# Patient Record
Sex: MALE | Race: WHITE | NOT HISPANIC OR LATINO | Employment: OTHER | URBAN - METROPOLITAN AREA
[De-identification: names, ages, dates, MRNs, and addresses within clinical notes are randomized per-mention and may not be internally consistent; named-entity substitution may affect disease eponyms.]

---

## 2017-01-19 ENCOUNTER — LAB VISIT (OUTPATIENT)
Dept: LAB | Facility: HOSPITAL | Age: 72
End: 2017-01-19
Attending: FAMILY MEDICINE
Payer: MEDICARE

## 2017-01-19 ENCOUNTER — OFFICE VISIT (OUTPATIENT)
Dept: FAMILY MEDICINE | Facility: CLINIC | Age: 72
End: 2017-01-19
Payer: MEDICARE

## 2017-01-19 VITALS
SYSTOLIC BLOOD PRESSURE: 160 MMHG | HEIGHT: 71 IN | WEIGHT: 183.06 LBS | OXYGEN SATURATION: 98 % | BODY MASS INDEX: 25.63 KG/M2 | TEMPERATURE: 98 F | HEART RATE: 62 BPM | DIASTOLIC BLOOD PRESSURE: 86 MMHG

## 2017-01-19 DIAGNOSIS — Z01.89 ENCOUNTER FOR OTHER SPECIFIED SPECIAL EXAMINATIONS: ICD-10-CM

## 2017-01-19 DIAGNOSIS — Z12.5 ENCOUNTER FOR SCREENING FOR MALIGNANT NEOPLASM OF PROSTATE: ICD-10-CM

## 2017-01-19 DIAGNOSIS — E78.5 HYPERLIPIDEMIA, UNSPECIFIED HYPERLIPIDEMIA TYPE: ICD-10-CM

## 2017-01-19 DIAGNOSIS — Z13.6 SCREENING FOR AAA (AORTIC ABDOMINAL ANEURYSM): ICD-10-CM

## 2017-01-19 DIAGNOSIS — N40.1 BENIGN NON-NODULAR PROSTATIC HYPERPLASIA WITH LOWER URINARY TRACT SYMPTOMS: ICD-10-CM

## 2017-01-19 DIAGNOSIS — I10 ESSENTIAL HYPERTENSION, BENIGN: ICD-10-CM

## 2017-01-19 DIAGNOSIS — Z23 NEED FOR PROPHYLACTIC VACCINATION AND INOCULATION AGAINST INFLUENZA: ICD-10-CM

## 2017-01-19 DIAGNOSIS — R79.9 ABNORMAL FINDING OF BLOOD CHEMISTRY: ICD-10-CM

## 2017-01-19 DIAGNOSIS — M19.90 OSTEOARTHRITIS, UNSPECIFIED OSTEOARTHRITIS TYPE, UNSPECIFIED SITE: ICD-10-CM

## 2017-01-19 DIAGNOSIS — N40.1 BENIGN NON-NODULAR PROSTATIC HYPERPLASIA WITH LOWER URINARY TRACT SYMPTOMS: Primary | ICD-10-CM

## 2017-01-19 LAB
ALBUMIN SERPL BCP-MCNC: 3.9 G/DL
ALP SERPL-CCNC: 95 U/L
ALT SERPL W/O P-5'-P-CCNC: 17 U/L
ANION GAP SERPL CALC-SCNC: 6 MMOL/L
AST SERPL-CCNC: 14 U/L
BASOPHILS # BLD AUTO: 0.03 K/UL
BASOPHILS NFR BLD: 0.3 %
BILIRUB SERPL-MCNC: 0.5 MG/DL
BUN SERPL-MCNC: 19 MG/DL
CALCIUM SERPL-MCNC: 10.2 MG/DL
CHLORIDE SERPL-SCNC: 102 MMOL/L
CHOLEST/HDLC SERPL: 3.8 {RATIO}
CO2 SERPL-SCNC: 29 MMOL/L
COMPLEXED PSA SERPL-MCNC: 0.95 NG/ML
CREAT SERPL-MCNC: 1.1 MG/DL
DIFFERENTIAL METHOD: ABNORMAL
EOSINOPHIL # BLD AUTO: 0.1 K/UL
EOSINOPHIL NFR BLD: 1.1 %
ERYTHROCYTE [DISTWIDTH] IN BLOOD BY AUTOMATED COUNT: 13.9 %
EST. GFR  (AFRICAN AMERICAN): >60 ML/MIN/1.73 M^2
EST. GFR  (NON AFRICAN AMERICAN): >60 ML/MIN/1.73 M^2
GLUCOSE SERPL-MCNC: 109 MG/DL
HCT VFR BLD AUTO: 47 %
HDL/CHOLESTEROL RATIO: 26.2 %
HDLC SERPL-MCNC: 149 MG/DL
HDLC SERPL-MCNC: 39 MG/DL
HGB BLD-MCNC: 16.1 G/DL
LDLC SERPL CALC-MCNC: 91.8 MG/DL
LYMPHOCYTES # BLD AUTO: 1.7 K/UL
LYMPHOCYTES NFR BLD: 19.5 %
MCH RBC QN AUTO: 31.2 PG
MCHC RBC AUTO-ENTMCNC: 34.3 %
MCV RBC AUTO: 91 FL
MONOCYTES # BLD AUTO: 0.5 K/UL
MONOCYTES NFR BLD: 5.9 %
NEUTROPHILS # BLD AUTO: 6.4 K/UL
NEUTROPHILS NFR BLD: 72.6 %
NONHDLC SERPL-MCNC: 110 MG/DL
PLATELET # BLD AUTO: 252 K/UL
PMV BLD AUTO: 9.7 FL
POTASSIUM SERPL-SCNC: 5.3 MMOL/L
PROT SERPL-MCNC: 7.3 G/DL
RBC # BLD AUTO: 5.16 M/UL
SODIUM SERPL-SCNC: 137 MMOL/L
TRIGL SERPL-MCNC: 91 MG/DL
WBC # BLD AUTO: 8.79 K/UL

## 2017-01-19 PROCEDURE — 80061 LIPID PANEL: CPT

## 2017-01-19 PROCEDURE — 99999 PR PBB SHADOW E&M-EST. PATIENT-LVL III: CPT | Mod: PBBFAC,,, | Performed by: FAMILY MEDICINE

## 2017-01-19 PROCEDURE — 83036 HEMOGLOBIN GLYCOSYLATED A1C: CPT

## 2017-01-19 PROCEDURE — 99499 UNLISTED E&M SERVICE: CPT | Mod: S$GLB,,, | Performed by: FAMILY MEDICINE

## 2017-01-19 PROCEDURE — 80074 ACUTE HEPATITIS PANEL: CPT

## 2017-01-19 PROCEDURE — 1159F MED LIST DOCD IN RCRD: CPT | Mod: S$GLB,,, | Performed by: FAMILY MEDICINE

## 2017-01-19 PROCEDURE — 1126F AMNT PAIN NOTED NONE PRSNT: CPT | Mod: S$GLB,,, | Performed by: FAMILY MEDICINE

## 2017-01-19 PROCEDURE — 1160F RVW MEDS BY RX/DR IN RCRD: CPT | Mod: S$GLB,,, | Performed by: FAMILY MEDICINE

## 2017-01-19 PROCEDURE — 80053 COMPREHEN METABOLIC PANEL: CPT

## 2017-01-19 PROCEDURE — 3079F DIAST BP 80-89 MM HG: CPT | Mod: S$GLB,,, | Performed by: FAMILY MEDICINE

## 2017-01-19 PROCEDURE — 90662 IIV NO PRSV INCREASED AG IM: CPT | Mod: S$GLB,,, | Performed by: FAMILY MEDICINE

## 2017-01-19 PROCEDURE — 99214 OFFICE O/P EST MOD 30 MIN: CPT | Mod: 25,S$GLB,, | Performed by: FAMILY MEDICINE

## 2017-01-19 PROCEDURE — 1157F ADVNC CARE PLAN IN RCRD: CPT | Mod: S$GLB,,, | Performed by: FAMILY MEDICINE

## 2017-01-19 PROCEDURE — 85025 COMPLETE CBC W/AUTO DIFF WBC: CPT

## 2017-01-19 PROCEDURE — 36415 COLL VENOUS BLD VENIPUNCTURE: CPT | Mod: PO

## 2017-01-19 PROCEDURE — 84153 ASSAY OF PSA TOTAL: CPT

## 2017-01-19 PROCEDURE — 3077F SYST BP >= 140 MM HG: CPT | Mod: S$GLB,,, | Performed by: FAMILY MEDICINE

## 2017-01-19 PROCEDURE — G0008 ADMIN INFLUENZA VIRUS VAC: HCPCS | Mod: S$GLB,,, | Performed by: FAMILY MEDICINE

## 2017-01-19 RX ORDER — TAMSULOSIN HYDROCHLORIDE 0.4 MG/1
0.4 CAPSULE ORAL DAILY
COMMUNITY
Start: 2016-12-29 | End: 2017-01-19

## 2017-01-19 RX ORDER — NAPROXEN 500 MG/1
500 TABLET ORAL 2 TIMES DAILY PRN
Qty: 60 TABLET | Refills: 6 | Status: SHIPPED | OUTPATIENT
Start: 2017-01-19 | End: 2018-01-10 | Stop reason: SDUPTHER

## 2017-01-19 RX ORDER — HYDROCODONE BITARTRATE AND ACETAMINOPHEN 10; 325 MG/1; MG/1
TABLET ORAL
COMMUNITY
Start: 2016-12-31 | End: 2017-01-19

## 2017-01-19 RX ORDER — ATORVASTATIN CALCIUM 80 MG/1
80 TABLET, FILM COATED ORAL DAILY
Qty: 90 TABLET | Refills: 3 | Status: SHIPPED | OUTPATIENT
Start: 2017-01-19 | End: 2018-01-10 | Stop reason: SDUPTHER

## 2017-01-19 RX ORDER — HYDROCHLOROTHIAZIDE 25 MG/1
25 TABLET ORAL DAILY
COMMUNITY
End: 2018-01-10 | Stop reason: SDUPTHER

## 2017-01-19 RX ORDER — FINASTERIDE 5 MG/1
5 TABLET, FILM COATED ORAL DAILY
Qty: 90 TABLET | Refills: 3 | Status: SHIPPED | OUTPATIENT
Start: 2017-01-19 | End: 2018-01-10 | Stop reason: SDUPTHER

## 2017-01-19 RX ORDER — TAMSULOSIN HYDROCHLORIDE 0.4 MG/1
0.4 CAPSULE ORAL DAILY
Qty: 90 CAPSULE | Refills: 3 | Status: SHIPPED | OUTPATIENT
Start: 2017-01-19 | End: 2017-02-18

## 2017-01-19 NOTE — PROGRESS NOTES
[facility]  Progress Note    SUBJECTIVE:     Chief Complaint   Patient presents with    [facility]   Zac Plunkett  is a 71 y.o. male here to establish care and for follow up of chronic conditions. Patient has no other new complaints/problems at this time.he tries to diet/exercise better. Admits taking his meds compliantly. Doesn't check bp at home too often.      Review of patient's allergies indicates:   Allergen Reactions    Metronidazole      Other reaction(s): Rash  Other reaction(s): Rash    Propoxyphene-acetaminophen      Other reaction(s): Rash       Past Medical History   Diagnosis Date    Gross hematuria     Hyperlipidemia     Hypertension     Kidney stone     Lumbar spondylosis     Lumbar spondylosis      Past Surgical History   Procedure Laterality Date    Appendectomy      Laminectomy l4-l5       Family History   Problem Relation Age of Onset    Diabetes Mother     Heart disease Father     Melanoma Neg Hx     Psoriasis Neg Hx     Lupus Neg Hx     Eczema Neg Hx     Acne Neg Hx     Liver disease Neg Hx     Liver cancer Neg Hx     Cirrhosis Neg Hx     Colon polyps Neg Hx     Colon cancer Neg Hx      Social History   Substance Use Topics    Smoking status: Current Every Day Smoker     Types: Cigars    Smokeless tobacco: Never Used      Comment: smokes cigars     Alcohol use No        Review of Systems   Constitutional: Negative for chills, fever and malaise/fatigue.   HENT: Negative.    Respiratory: Negative.  Negative for cough and shortness of breath.    Cardiovascular: Negative.  Negative for chest pain.   Gastrointestinal: Negative.  Negative for abdominal pain, nausea and vomiting.   Genitourinary: Negative.    Neurological: Negative for weakness and headaches.   All other systems reviewed and are negative.    OBJECTIVE:     Vitals:    01/19/17 0932   BP: (!) 160/86   Pulse: 62   Temp: 97.6 °F (36.4 °C)     Body mass index is 25.54  kg/(m^2).    Physical Exam   Constitutional: He is oriented to person, place, and time and well-developed, well-nourished, and in no distress. No distress.   HENT:   Head: Normocephalic and atraumatic.   Eyes: Conjunctivae and EOM are normal.   Cardiovascular: Normal rate, regular rhythm and normal heart sounds.  Exam reveals no gallop and no friction rub.    No murmur heard.  Pulmonary/Chest: Effort normal and breath sounds normal. No respiratory distress. He has no wheezes. He has no rales.   Abdominal: Soft. Bowel sounds are normal. He exhibits no distension. There is no tenderness.   Neurological: He is alert and oriented to person, place, and time.   Skin: Skin is warm. He is not diaphoretic.   + some skin changes consistent with actinic keratosis       Old records were reviewed. Labs and/or images were independently reviewed.    ASSESSMENT     1. Benign non-nodular prostatic hyperplasia with lower urinary tract symptoms    2. Hyperlipidemia, unspecified hyperlipidemia type    3. Need for prophylactic vaccination and inoculation against influenza    4. Osteoarthritis, unspecified osteoarthritis type, unspecified site    5. Essential hypertension, benign    6. Abnormal finding of blood chemistry     7. Encounter for screening for malignant neoplasm of prostate     8. Screening for AAA (aortic abdominal aneurysm)    9. Encounter for other specified special examinations         PLAN:     Need for prophylactic vaccination and inoculation against influenza  -     Influenza - High Dose (65+) (PF) (IM)    Benign non-nodular prostatic hyperplasia with lower urinary tract symptoms  -     tamsulosin (FLOMAX) 0.4 mg Cp24; Take 1 capsule (0.4 mg total) by mouth once daily.  Dispense: 90 capsule; Refill: 3  -     finasteride (PROSCAR) 5 mg tablet; Take 1 tablet (5 mg total) by mouth once daily.  Dispense: 90 tablet; Refill: 3  -     PSA, Screening; Future; Expected date: 1/19/17  -     Stable. Continue current  regimen.    Hyperlipidemia, unspecified hyperlipidemia type  -     atorvastatin (LIPITOR) 80 MG tablet; Take 1 tablet (80 mg total) by mouth once daily.  Dispense: 90 tablet; Refill: 3  -     Stable. Continue current regimen.    Osteoarthritis, unspecified osteoarthritis type, unspecified site  -     naproxen (NAPROSYN) 500 MG tablet; Take 1 tablet (500 mg total) by mouth 2 (two) times daily as needed (pain).  Dispense: 60 tablet; Refill: 6  -     Stable. Continue current regimen.    Essential hypertension, benign  -     CBC auto differential; Future  -     Comprehensive metabolic panel; Future  -     Hemoglobin A1c; Future  -     Lipid panel; Future  -     Educated patient to take medications as prescribed, and to record bp logs daily to bring along to next visit. Instructed patient to decrease salt intake. Also told patient to call if any new signs or symptoms develop.    Encounter for screening for malignant neoplasm of prostate   -     PSA, Screening; Future; Expected date: 1/19/17    Screening for AAA (aortic abdominal aneurysm)  -     US Abdominal Aorta; Future; Expected date: 1/19/17      RTC PRCARTER Waters MD  01/19/2017 9:51 AM

## 2017-01-20 LAB
ESTIMATED AVG GLUCOSE: 131 MG/DL
HAV IGM SERPL QL IA: NEGATIVE
HBA1C MFR BLD HPLC: 6.2 %
HBV CORE IGM SERPL QL IA: NEGATIVE
HBV SURFACE AG SERPL QL IA: NEGATIVE
HCV AB SERPL QL IA: NEGATIVE

## 2017-02-06 ENCOUNTER — TELEPHONE (OUTPATIENT)
Dept: FAMILY MEDICINE | Facility: CLINIC | Age: 72
End: 2017-02-06

## 2017-02-06 ENCOUNTER — HOSPITAL ENCOUNTER (OUTPATIENT)
Dept: RADIOLOGY | Facility: HOSPITAL | Age: 72
Discharge: HOME | End: 2017-02-06
Attending: FAMILY MEDICINE
Payer: MEDICARE

## 2017-02-06 DIAGNOSIS — Z13.6 SCREENING FOR AAA (AORTIC ABDOMINAL ANEURYSM): ICD-10-CM

## 2017-02-06 PROCEDURE — 76775 US EXAM ABDO BACK WALL LIM: CPT | Mod: TC

## 2017-02-06 PROCEDURE — 76775 US EXAM ABDO BACK WALL LIM: CPT | Mod: 26,,, | Performed by: RADIOLOGY

## 2017-08-07 ENCOUNTER — OFFICE VISIT (OUTPATIENT)
Dept: FAMILY MEDICINE | Facility: CLINIC | Age: 72
End: 2017-08-07
Payer: MEDICARE

## 2017-08-07 VITALS
RESPIRATION RATE: 19 BRPM | OXYGEN SATURATION: 98 % | DIASTOLIC BLOOD PRESSURE: 80 MMHG | HEIGHT: 71 IN | HEART RATE: 58 BPM | WEIGHT: 170.31 LBS | SYSTOLIC BLOOD PRESSURE: 128 MMHG | BODY MASS INDEX: 23.84 KG/M2

## 2017-08-07 DIAGNOSIS — R09.89 PROMINENT AORTA: ICD-10-CM

## 2017-08-07 DIAGNOSIS — E78.5 HYPERLIPIDEMIA, UNSPECIFIED HYPERLIPIDEMIA TYPE: ICD-10-CM

## 2017-08-07 DIAGNOSIS — I10 HYPERTENSION, BENIGN: ICD-10-CM

## 2017-08-07 DIAGNOSIS — N40.0 ENLARGED PROSTATE: ICD-10-CM

## 2017-08-07 DIAGNOSIS — Z00.00 ENCOUNTER FOR PREVENTIVE HEALTH EXAMINATION: Primary | ICD-10-CM

## 2017-08-07 PROCEDURE — 99999 PR PBB SHADOW E&M-EST. PATIENT-LVL IV: CPT | Mod: PBBFAC,,, | Performed by: NURSE PRACTITIONER

## 2017-08-07 PROCEDURE — 99499 UNLISTED E&M SERVICE: CPT | Mod: S$GLB,,, | Performed by: NURSE PRACTITIONER

## 2017-08-07 PROCEDURE — G0439 PPPS, SUBSEQ VISIT: HCPCS | Mod: S$GLB,,, | Performed by: NURSE PRACTITIONER

## 2017-08-07 NOTE — PATIENT INSTRUCTIONS
Counseling and Referral of Other Preventative  (Italic type indicates deductible and co-insurance are waived)    Patient Name: Zac Plunkett  Today's Date: 8/7/2017      SERVICE LIMITATIONS RECOMMENDATION    Vaccines    · Pneumococcal (once after 65)    · Influenza (annually)    · Hepatitis B (if medium/high risk)    · Prevnar 13      Hepatitis B medium/high risk factors:       - End-stage renal disease       - Hemophiliacs who received Factor VII or         IX concentrates       - Clients of institutions for the mentally             retarded       - Persons who live in the same house as          a HepB carrier       - Homosexual men       - Illicit injectable drug abusers     Pneumococcal: Done, no repeat necessary     Influenza: Recommended to patient     Hepatitis B: Done, no repeat necessary     Prevnar 13: Done, no repeat necessary    Prostate cancer screening (annually to age 75)     Prostate specific antigen (PSA) Shared decision making with Provider. Sometimes a co-pay may be required if the patient decides to have this test. The USPSTF no longer recommends prostate cancer screening routinely in medicine: every 1 year    Colorectal cancer screening (to age 75)    · Fecal occult blood test (annual)  · Flexible sigmoidoscopy (5y)  · Screening colonoscopy (10y)  · Barium enema   Last done 4/2015, recommend to repeat every 10  years    Diabetes self-management training (no USPSTF recommendations)  Requires referral by treating physician for patient with diabetes or renal disease. 10 hours of initial DSMT sessions of no less than 30 minutes each in a continuous 12-month period. 2 hours of follow-up DSMT in subsequent years.  N/A    Glaucoma screening (no USPSTF recommendation)  Diabetes mellitus, family history   , age 50 or over    American, age 65 or over  Recommend follow up with eye care professional regularly    Medical nutrition therapy for diabetes or renal disease (no recommended  schedule)  Requires referral by treating physician for patient with diabetes or renal disease or kidney transplant within the past 3 years.  Can be provided in same year as diabetes self-management training (DSMT), and CMS recommends medical nutrition therapy take place after DSMT. Up to 3 hours for initial year and 2 hours in subsequent years.  N/A    Cardiovascular screening blood tests (every 5 years)  · Fasting lipid panel  Order as a panel if possible  Done this year, repeat every year    Diabetes screening tests (at least every 3 years, Medicare covers annually or at 6-month intervals for prediabetic patients)  · Fasting blood sugar (FBS) or glucose tolerance test (GTT)  Patient must be diagnosed with one of the following:       - Hypertension       - Dyslipidemia       - Obesity (BMI 30kg/m2)       - Previous elevated impaired FBS or GTT       ... or any two of the following:       - Overweight (BMI 25 but <30)       - Family history of diabetes       - Age 65 or older       - History of gestational diabetes or birth of baby weighing more than 9 pounds  Ha1c completed this year    Abdominal aortic aneurysm screening (once)  · Sonogram   Limited to patients who meet one of the following criteria:       - Men who are 65-75 years old and have smoked more than 100 cigarette in their lifetime       - Anyone with a family history of abdominal aortic aneurysm       - Anyone recommended for screening by the USPSTF  Done, no repeat necessary    HIV screening (annually for increased risk patients)  · HIV-1 and HIV-2 by EIA, or LUISITO, rapid antibody test or oral mucosa transudate  Patients must be at increased risk for HIV infection per USPSTF guidelines or pregnant. Tests covered annually for patient at increased risk or as requested by the patient. Pregnant patients may receive up to 3 tests during pregnancy.  no clinical risk factors      Smoking cessation counseling (up to 8 sessions per year)  Patients must be  asymptomatic of tobacco-related conditions to receive as a preventative service.  discussed with patient, counseled     Subsequent annual wellness visit  At least 12 months since last AWV  Return in one year     The following information is provided to all patients.  This information is to help you find resources for any of the problems found today that may be affecting your health:                Living healthy guide: www.[facility].[place].[facility]      Understanding Diabetes: www.diabetes.org      Eating healthy: www.cdc.gov/healthyweight      CDC home safety checklist: www.cdc.gov/steadi/patient.html      Agency on Aging: www.goea.[place].[facility]      Alcoholics anonymous (AA): www.aa.org      Physical Activity: www.mariann.nih.gov/zf8pbgf      Tobacco use: www.quitwithusla.org

## 2017-08-07 NOTE — PROGRESS NOTES
"Zac Plunkett presented for a  Medicare AWV and comprehensive Health Risk Assessment today. The following components were reviewed and updated:    · Medical history  · Family History  · Social history  · Allergies and Current Medications  · Health Risk Assessment  · Health Maintenance  · Care Team     ** See Completed Assessments for Annual Wellness Visit within the encounter summary.**       The following assessments were completed:  · Living Situation  · CAGE  · Depression Screening  · Timed Get Up and Go  · Whisper Test  · Cognitive Function Screening  · Nutrition Screening  · ADL Screening  · PAQ Screening    Vitals:    08/07/17 0909   BP: 128/80   BP Location: Left arm   Patient Position: Sitting   BP Method: Manual   Pulse: (!) 58   Resp: 19   SpO2: 98%   Weight: 77.3 kg (170 lb 4.9 oz)   Height: 5' 11" (1.803 m)     Body mass index is 23.75 kg/m².  Physical Exam   Constitutional: He is oriented to person, place, and time.   Cardiovascular: Regular rhythm and normal heart sounds.  Bradycardia present.    Pulses:       Radial pulses are 2+ on the right side, and 2+ on the left side.   Pulmonary/Chest: Effort normal and breath sounds normal. He has no decreased breath sounds.   Musculoskeletal: Normal range of motion.   Neurological: He is alert and oriented to person, place, and time.   Skin: Skin is warm.   Vitals reviewed.        Diagnoses and health risks identified today and associated recommendations/orders:    1. Encounter for preventive health examination  Education provided about preventive health examinations and procedures; addressed and discussed patient's health concerns. Additionally, reviewed medical record for risk factors and documented the results during this encounter.    2. Prominent aorta  Stable, patient asymptomatic.  Encouraged continued compliance with directives, diet, and lifestyle modifications as recommended by his cardiologist: Jabari Mendez.     3. Hypertension, benign  At goal. " The current medical regimen is effective;  continue present plan and medications.    4. Hyperlipidemia, unspecified hyperlipidemia type  The current medical regimen is effective;  continue present plan and medications.    5. Enlarged prostate  Symptoms controlled. Education provided about enlarged prostate.  He's followed by [facility] dept. Continue as advised.     Reviewed health maintenance with patient, educated about recommended examinations, procedures (labs & images), and immunizations.       Return in about 1 year (around 8/7/2018) for Health Risk Assessment .    Barrie Hernandez Jr, NP

## 2017-11-11 ENCOUNTER — OFFICE VISIT (OUTPATIENT)
Dept: FAMILY MEDICINE | Facility: CLINIC | Age: 72
End: 2017-11-11
Payer: MEDICARE

## 2017-11-11 VITALS
SYSTOLIC BLOOD PRESSURE: 120 MMHG | TEMPERATURE: 98 F | BODY MASS INDEX: 24.39 KG/M2 | WEIGHT: 174.19 LBS | HEART RATE: 62 BPM | OXYGEN SATURATION: 98 % | DIASTOLIC BLOOD PRESSURE: 70 MMHG | HEIGHT: 71 IN | RESPIRATION RATE: 17 BRPM
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
## 2017-11-11 DIAGNOSIS — T63.301A SPIDER BITE WOUND, ACCIDENTAL OR UNINTENTIONAL, INITIAL ENCOUNTER: ICD-10-CM

## 2017-11-11 DIAGNOSIS — Z23 NEED FOR DIPHTHERIA-TETANUS-PERTUSSIS (TDAP) VACCINE, ADULT/ADOLESCENT: ICD-10-CM

## 2017-11-11 PROCEDURE — 90471 IMMUNIZATION ADMIN: CPT | Mod: AT,S$GLB,, | Performed by: INTERNAL MEDICINE

## 2017-11-11 PROCEDURE — 90715 TDAP VACCINE 7 YRS/> IM: CPT | Mod: AT,S$GLB,, | Performed by: INTERNAL MEDICINE

## 2017-11-11 PROCEDURE — 99213 OFFICE O/P EST LOW 20 MIN: CPT | Mod: 25,S$GLB,, | Performed by: NURSE PRACTITIONER

## 2017-11-11 PROCEDURE — 99999 PR PBB SHADOW E&M-EST. PATIENT-LVL IV: CPT | Mod: PBBFAC,,, | Performed by: NURSE PRACTITIONER

## 2017-11-11 RX ORDER — TRIAMCINOLONE ACETONIDE 1 MG/G
OINTMENT TOPICAL 2 TIMES DAILY
Qty: 80 G | Refills: 0 | Status: SHIPPED | OUTPATIENT
Start: 2017-11-11 | End: 2018-01-10

## 2017-11-11 NOTE — PATIENT INSTRUCTIONS
Spider Bite, Local Reaction    The venom from a spider bite can cause a local skin reaction. This often causes local redness, itching, and swelling. This reaction will fade over a few hours to a few days. A spider bite can become infected, so watch for the signs listed below. Sometimes it is hard to tell the difference between a local reaction to the insect bite or sting and an early infection. Because of this, your healthcare provider may start you on antibiotics.  Home care  The following guidelines will help you care for your wound at home:  · Avoid anything that heats up your skin if itching is a problem. This includes hot showers or baths or direct sunlight. This will make the itching worse.  · During the first 24 hours, you may put an ice pack on the injury. Use it for no more than 20 minutes at a time every 1 to 2 hours. This will reduce pain and swelling. You can make an ice pack by putting ice cubes in a zip-top plastic bag and wrapping it in a thin towel. You may also use an over-the-counter spray or cream containing benzocaine to help relieve pain. Over-the-counter skin creams containing diphenhydramine or hydrocortisone may help with itching. Remember to review the medicine instructions for any allergies.  · If the wound becomes red, wash the area with soap and water every day.  Put an antibiotic cream or ointment on the injury 3 times a day.  · If your doctor has prescribed oral antibiotics, be sure to take them as directed until they are all finished.  Follow-up care  Follow up with your healthcare provider, or as advised.  When to seek medical advice  Call your healthcare provider right away if any of these occur:  · Spreading areas of itching, redness, or swelling  · Pain or swelling that gets worse  · Fever of 100.4ºF (38ºC) or higher, or as directed by your healthcare provider  · Colored fluid draining from the wound  · You get a skin ulcer  · A red streak in the skin leading away from the  wound  · You still have symptoms after 3 days  · Generalized rash, fever, or joint pain starting 1 to 2 weeks after treatment  Call 911  Call 911 if any of the following occur:  · New or worse swelling in the face, eyelids, lips, mouth, throat, or tongue  · Hard time swallowing or breathing  · Dizziness, weakness, or fainting  Date Last Reviewed: 10/1/2016  © 1242-0325 [employer]. [address] [place] [address], [place] [address]. All rights reserved. This information is not intended as a substitute for professional medical care. Always follow your healthcare professional's instructions.

## 2017-11-11 NOTE — PROGRESS NOTES
Subjective:       Patient ID: Zac Plunkett is a 72 y.o. male.    Chief Complaint: Insect Bite (possible spider right arm)    Insect Bite   This is a new problem. The current episode started in the past 7 days. The problem occurs constantly. The problem has been unchanged. Pertinent negatives include no arthralgias, chest pain, chills, fatigue, headaches, joint swelling, myalgias, nausea, numbness, rash, sore throat or weakness. Nothing aggravates the symptoms. He has tried nothing for the symptoms.     Review of Systems   Constitutional: Negative for chills and fatigue.   HENT: Negative for sore throat.    Respiratory: Negative for chest tightness and shortness of breath.    Cardiovascular: Negative for chest pain.   Gastrointestinal: Negative for nausea.   Musculoskeletal: Negative for arthralgias, joint swelling and myalgias.   Skin: Negative for rash.   Neurological: Negative for weakness, numbness and headaches.       Objective:      Physical Exam   Constitutional: He is oriented to person, place, and time. Vital signs are normal. He appears well-developed and well-nourished.   Cardiovascular: Normal rate, regular rhythm and normal heart sounds.    Pulmonary/Chest: Effort normal and breath sounds normal.   Abdominal: Soft. Bowel sounds are normal.   Neurological: He is alert and oriented to person, place, and time.   Skin: Skin is warm, dry and intact. Capillary refill takes less than 2 seconds. Lesion noted.        Psychiatric: He has a normal mood and affect.       Assessment:       1. Insect bite, initial encounter    2. Spider bite wound, accidental or unintentional, initial encounter    3. Need for diphtheria-tetanus-pertussis (Tdap) vaccine, adult/adolescent        Plan:       Zac was seen today for insect bite.    Diagnoses and all orders for this visit:    Insect bite, initial encounter  -     triamcinolone acetonide 0.1% (KENALOG) 0.1 % ointment; Apply topically 2 (two) times daily.  -     (In  Office Administered) Tdap Vaccine    Spider bite wound, accidental or unintentional, initial encounter  -     (In Office Administered) Tdap Vaccine    Need for diphtheria-tetanus-pertussis (Tdap) vaccine, adult/adolescent  -     (In Office Administered) Tdap Vaccine      Home care  The following guidelines will help you care for your wound at home:  · Avoid anything that heats up your skin if itching is a problem. This includes hot showers or baths or direct sunlight. This will make the itching worse.  · During the first 24 hours, you may put an ice pack on the injury. Use it for no more than 20 minutes at a time every 1 to 2 hours. This will reduce pain and swelling. You can make an ice pack by putting ice cubes in a zip-top plastic bag and wrapping it in a thin towel. You may also use an over-the-counter spray or cream containing benzocaine to help relieve pain. Over-the-counter skin creams containing diphenhydramine or hydrocortisone may help with itching. Remember to review the medicine instructions for any allergies.  · If the wound becomes red, wash the area with soap and water every day.  Put an antibiotic cream or ointment on the injury 3 times a day.  · If your doctor has prescribed oral antibiotics, be sure to take them as directed until they are all finished.  Follow-up care  Follow up with your healthcare provider, or as advised.  When to seek medical advice  Call your healthcare provider right away if any of these occur:  · Spreading areas of itching, redness, or swelling  · Pain or swelling that gets worse  · Fever of 100.4ºF (38ºC) or higher, or as directed by your healthcare provider  · Colored fluid draining from the wound  · You get a skin ulcer  · A red streak in the skin leading away from the wound  · You still have symptoms after 3 days  · Generalized rash, fever, or joint pain starting 1 to 2 weeks after treatment  Call 911  Call 911 if any of the following occur:  · New or worse swelling in  the face, eyelids, lips, mouth, throat, or tongue  · Hard time swallowing or breathing  · Dizziness, weakness, or fainting  Date Last Reviewed: 10/1/2016  © 5658-9769 [employer]. [address] [place] [address], [place] [address]. All rights reserved. This information is not intended as a substitute for professional medical care. Always follow your healthcare professional's instructions.

## 2017-11-22 ENCOUNTER — OFFICE VISIT (OUTPATIENT)
Dept: FAMILY MEDICINE | Facility: CLINIC | Age: 72
End: 2017-11-22
Payer: MEDICARE

## 2017-11-22 VITALS
HEIGHT: 71 IN | HEART RATE: 61 BPM | DIASTOLIC BLOOD PRESSURE: 72 MMHG | WEIGHT: 175.25 LBS | TEMPERATURE: 99 F | BODY MASS INDEX: 24.53 KG/M2 | SYSTOLIC BLOOD PRESSURE: 124 MMHG

## 2017-11-22 DIAGNOSIS — R10.11 RUQ ABDOMINAL PAIN: ICD-10-CM
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
## 2017-11-22 PROCEDURE — 99999 PR PBB SHADOW E&M-EST. PATIENT-LVL III: CPT | Mod: PBBFAC,,, | Performed by: NURSE PRACTITIONER

## 2017-11-22 PROCEDURE — 99214 OFFICE O/P EST MOD 30 MIN: CPT | Mod: S$GLB,,, | Performed by: NURSE PRACTITIONER

## 2017-11-22 RX ORDER — TRAMADOL HYDROCHLORIDE 50 MG/1
50 TABLET ORAL EVERY 6 HOURS PRN
Qty: 30 TABLET | Refills: 0 | Status: SHIPPED | OUTPATIENT
Start: 2017-11-22 | End: 2017-12-02
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
## 2017-11-29 ENCOUNTER — HOSPITAL ENCOUNTER (OUTPATIENT)
Dept: RADIOLOGY | Facility: HOSPITAL | Age: 72
Discharge: HOME | End: 2017-11-29
Attending: NURSE PRACTITIONER
Payer: MEDICARE

## 2017-11-29 ENCOUNTER — OFFICE VISIT (OUTPATIENT)
Dept: FAMILY MEDICINE | Facility: CLINIC | Age: 72
End: 2017-11-29
Payer: MEDICARE

## 2017-11-29 VITALS
BODY MASS INDEX: 24.88 KG/M2 | HEART RATE: 64 BPM | TEMPERATURE: 99 F | SYSTOLIC BLOOD PRESSURE: 118 MMHG | DIASTOLIC BLOOD PRESSURE: 68 MMHG | WEIGHT: 177.69 LBS | HEIGHT: 71 IN | OXYGEN SATURATION: 97 %
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
## 2017-11-29 DIAGNOSIS — S20.211D CONTUSION OF RIB ON RIGHT SIDE, SUBSEQUENT ENCOUNTER: ICD-10-CM

## 2017-11-29 DIAGNOSIS — R07.81 RIB PAIN ON RIGHT SIDE: ICD-10-CM
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
## 2017-11-29 PROCEDURE — 71100 X-RAY EXAM RIBS UNI 2 VIEWS: CPT | Mod: TC,PO

## 2017-11-29 PROCEDURE — 99214 OFFICE O/P EST MOD 30 MIN: CPT | Mod: S$GLB,,, | Performed by: NURSE PRACTITIONER

## 2017-11-29 PROCEDURE — 99999 PR PBB SHADOW E&M-EST. PATIENT-LVL IV: CPT | Mod: PBBFAC,,, | Performed by: NURSE PRACTITIONER

## 2017-11-29 PROCEDURE — 71100 X-RAY EXAM RIBS UNI 2 VIEWS: CPT | Mod: 26,,, | Performed by: RADIOLOGY
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
## 2017-11-29 NOTE — PATIENT INSTRUCTIONS
Rib Contusion     A rib contusion is a bruise to one or more rib bones. It may cause pain, tenderness, swelling and a purplish discoloration. There may be a sharp pain while breathing.  You will be assessed for other injuries. You will likely be given pain medicine. Rib contusions heal on their own, without further treatment. However, pain may take weeks to months to go away.   Note that a small crack (fracture) in the rib may cause the same symptoms as a rib contusion. The small crack may not be seen on a chest X-ray. However, the conditions are managed in the same way.  Home care  · Rest. Avoid heavy lifting, strenuous exertion, or any activity that causes pain.  · Ice the area to reduce pain and swelling. Put ice cubes in a plastic bag or use a cold pack. (Wrap the cold source in a thin towel. Do not place it directly on your skin.) Ice the injured area for 20 minutes every 1 to 2 hours the first day. Continue with ice packs 3 to 4 times a day for the next 2 days, then as needed for the relief of pain and swelling.  · Take any prescribed pain medicine as directed by your healthcare provider. If none was prescribed, take acetaminophen, ibuprofen, or naproxen to control pain.  · If you have a significant injury, you may be given a device called an incentive spirometer to keep your lungs healthy. Use as directed.  Follow-up care  Follow up with your healthcare provider during the next week or as directed.  When to seek medical advice  Call your healthcare provider for any of the following:  · Shortness of breath or trouble breathing  · Increasing chest pain with breathing  · Coughing  · Dizziness, weakness, or fainting  · New or worsening pain  · Fever of 100.4°F (38ºC) or higher, or as directed by your healthcare provider  Date Last Reviewed: 2/1/2017  © 2363-0091 [employer]. [address] [place] [address], [place] [address]. All rights reserved. This information is not intended as a substitute for  professional medical care. Always follow your healthcare professional's instructions.

## 2018-01-10 ENCOUNTER — OFFICE VISIT (OUTPATIENT)
Dept: FAMILY MEDICINE | Facility: CLINIC | Age: 73
End: 2018-01-10
Payer: MEDICARE

## 2018-01-10 VITALS
HEART RATE: 48 BPM | OXYGEN SATURATION: 99 % | BODY MASS INDEX: 24.6 KG/M2 | SYSTOLIC BLOOD PRESSURE: 118 MMHG | HEIGHT: 71 IN | DIASTOLIC BLOOD PRESSURE: 68 MMHG | WEIGHT: 175.69 LBS | TEMPERATURE: 98 F

## 2018-01-10 DIAGNOSIS — N40.1 BENIGN NON-NODULAR PROSTATIC HYPERPLASIA WITH LOWER URINARY TRACT SYMPTOMS: ICD-10-CM

## 2018-01-10 DIAGNOSIS — R09.89 PROMINENT AORTA: Primary | ICD-10-CM

## 2018-01-10 DIAGNOSIS — M54.16 LUMBAR RADICULOPATHY: ICD-10-CM

## 2018-01-10 DIAGNOSIS — E78.5 HYPERLIPIDEMIA, UNSPECIFIED HYPERLIPIDEMIA TYPE: ICD-10-CM

## 2018-01-10 DIAGNOSIS — M19.90 OSTEOARTHRITIS, UNSPECIFIED OSTEOARTHRITIS TYPE, UNSPECIFIED SITE: ICD-10-CM

## 2018-01-10 PROCEDURE — 99499 UNLISTED E&M SERVICE: CPT | Mod: S$GLB,,, | Performed by: FAMILY MEDICINE

## 2018-01-10 PROCEDURE — 99999 PR PBB SHADOW E&M-EST. PATIENT-LVL III: CPT | Mod: PBBFAC,,, | Performed by: FAMILY MEDICINE

## 2018-01-10 PROCEDURE — 99214 OFFICE O/P EST MOD 30 MIN: CPT | Mod: 25,S$GLB,, | Performed by: FAMILY MEDICINE

## 2018-01-10 PROCEDURE — 96372 THER/PROPH/DIAG INJ SC/IM: CPT | Mod: S$GLB,,, | Performed by: FAMILY MEDICINE

## 2018-01-10 RX ORDER — HYDROCHLOROTHIAZIDE 25 MG/1
25 TABLET ORAL DAILY
Qty: 90 TABLET | Refills: 3 | Status: SHIPPED | OUTPATIENT
Start: 2018-01-10 | End: 2018-12-11 | Stop reason: SDUPTHER

## 2018-01-10 RX ORDER — KETOROLAC TROMETHAMINE 30 MG/ML
30 INJECTION, SOLUTION INTRAMUSCULAR; INTRAVENOUS ONCE
Status: COMPLETED | OUTPATIENT
Start: 2018-01-10 | End: 2018-01-10

## 2018-01-10 RX ORDER — ATORVASTATIN CALCIUM 80 MG/1
80 TABLET, FILM COATED ORAL DAILY
Qty: 90 TABLET | Refills: 3 | Status: SHIPPED | OUTPATIENT
Start: 2018-01-10 | End: 2018-12-11 | Stop reason: SDUPTHER

## 2018-01-10 RX ORDER — NAPROXEN 500 MG/1
500 TABLET ORAL 2 TIMES DAILY PRN
Qty: 60 TABLET | Refills: 6 | Status: ON HOLD | OUTPATIENT
Start: 2018-01-10 | End: 2018-08-07 | Stop reason: HOSPADM

## 2018-01-10 RX ORDER — FINASTERIDE 5 MG/1
5 TABLET, FILM COATED ORAL DAILY
Qty: 90 TABLET | Refills: 3 | Status: SHIPPED | OUTPATIENT
Start: 2018-01-10 | End: 2018-12-11 | Stop reason: SDUPTHER

## 2018-01-10 RX ORDER — TAMSULOSIN HYDROCHLORIDE 0.4 MG/1
CAPSULE ORAL
COMMUNITY
Start: 2017-11-14 | End: 2018-01-10 | Stop reason: SDUPTHER

## 2018-01-10 RX ORDER — TAMSULOSIN HYDROCHLORIDE 0.4 MG/1
0.4 CAPSULE ORAL DAILY
Qty: 90 CAPSULE | Refills: 3 | Status: SHIPPED | OUTPATIENT
Start: 2018-01-10 | End: 2018-12-11 | Stop reason: SDUPTHER

## 2018-01-10 RX ADMIN — KETOROLAC TROMETHAMINE 30 MG: 30 INJECTION, SOLUTION INTRAMUSCULAR; INTRAVENOUS at 09:01

## 2018-01-11 NOTE — PROGRESS NOTES
[facility]  Progress Note    SUBJECTIVE:     Chief Complaint   Patient presents with    Hip Pain    Sciatica       HPI   Zac Plunkett  is a 72 y.o. male here for c/o left sided hip pain and some lower back pain which may radiate down his left leg. He did have back surgery many years ago for neuroforaminal narrowing. This improved his symptoms for years but is now starting to flare up again. He is very active and doesn't like to be inhibited.     Review of patient's allergies indicates:  No Known Allergies    Past Medical History:   Diagnosis Date    Gross hematuria     Hyperlipidemia     Hypertension     Kidney stone     Lumbar spondylosis     Lumbar spondylosis     Tobacco dependence      Past Surgical History:   Procedure Laterality Date    APPENDECTOMY      laminectomy l4-l5       Family History   Problem Relation Age of Onset    Diabetes Mother     Heart disease Father     No Known Problems Sister     Cerebral palsy Brother     Epilepsy Brother     Melanoma Neg Hx     Psoriasis Neg Hx     Lupus Neg Hx     Eczema Neg Hx     Acne Neg Hx     Liver disease Neg Hx     Liver cancer Neg Hx     Cirrhosis Neg Hx     Colon polyps Neg Hx     Colon cancer Neg Hx      Social History   Substance Use Topics    Smoking status: Current Every Day Smoker     Types: Cigars    Smokeless tobacco: Never Used      Comment: smokes cigars     Alcohol use No        Review of Systems   HENT: Negative for hearing loss.    Eyes: Negative for discharge.   Respiratory: Negative for wheezing.    Cardiovascular: Negative for chest pain and palpitations.   Gastrointestinal: Negative for blood in stool, constipation, diarrhea and vomiting.   Genitourinary: Negative for hematuria and urgency.   Musculoskeletal: Positive for back pain and joint pain (Left hip pain). Negative for falls and neck pain.   Neurological: Negative for weakness and headaches.   Endo/Heme/Allergies: Negative for polydipsia.      OBJECTIVE:     Vitals:    01/10/18 0825   BP: 118/68   Pulse: (!) 48   Temp: 97.7 °F (36.5 °C)     Body mass index is 24.51 kg/m².    Physical Exam   Constitutional: He is oriented to person, place, and time and well-developed, well-nourished, and in no distress. No distress.   HENT:   Head: Normocephalic and atraumatic.   Nose: Nose normal.   Eyes: Conjunctivae and EOM are normal.   Cardiovascular: Normal rate, regular rhythm and normal heart sounds.  Exam reveals no gallop and no friction rub.    No murmur heard.  Pulmonary/Chest: Effort normal and breath sounds normal. No respiratory distress. He has no wheezes. He has no rales. He exhibits no tenderness.   Abdominal: Soft. Bowel sounds are normal. He exhibits no distension. There is no tenderness. There is no rebound.   Musculoskeletal: He exhibits tenderness (to palpation of lateral left hip, good ROM on flex/ext however it elicits pain. no obvious fracture/dislocation).   Neurological: He is alert and oriented to person, place, and time.   Skin: Skin is warm. He is not diaphoretic.       Old records were reviewed. Labs and/or images were independently reviewed.    ASSESSMENT     1. Prominent aorta    2. Benign non-nodular prostatic hyperplasia with lower urinary tract symptoms    3. Hyperlipidemia, unspecified hyperlipidemia type    4. Osteoarthritis, unspecified osteoarthritis type, unspecified site    5. Lumbar radiculopathy        PLAN:     Prominent aorta   -     Stable. Continue current regimen.    Benign non-nodular prostatic hyperplasia with lower urinary tract symptoms  -     finasteride (PROSCAR) 5 mg tablet; Take 1 tablet (5 mg total) by mouth once daily.  Dispense: 90 tablet; Refill: 3  -     Stable. Continue current regimen.    Hyperlipidemia, unspecified hyperlipidemia type  -     atorvastatin (LIPITOR) 80 MG tablet; Take 1 tablet (80 mg total) by mouth once daily.  Dispense: 90 tablet; Refill: 3  -     Stable. Continue current  regimen.    Osteoarthritis, unspecified osteoarthritis type, unspecified site  -     naproxen (NAPROSYN) 500 MG tablet; Take 1 tablet (500 mg total) by mouth 2 (two) times daily as needed (pain).  Dispense: 60 tablet; Refill: 6  -     ketorolac injection 30 mg; Inject 30 mg into the muscle once.  -     Patient counseled on good posture and stretching exercises. Continue to place ice packs on affected areas 3-4 times daily. Take medications as prescribed. Instructed patient to call MD if symptoms persist or worsen.    Lumbar radiculopathy  -     MRI Lumbar Spine Without Contrast; Future; Expected date: 01/10/2018  -     Will refer to neurosurg depending on results. Had back surgery previously for neuroforaminal narrowing.    Other orders  -     tamsulosin (FLOMAX) 0.4 mg Cp24; Take 1 capsule (0.4 mg total) by mouth once daily.  Dispense: 90 capsule; Refill: 3  -     hydroCHLOROthiazide (HYDRODIURIL) 25 MG tablet; Take 1 tablet (25 mg total) by mouth once daily.  Dispense: 90 tablet; Refill: 3      RTC CHARBEL Waters MD  01/10/2018 8:03 PM

## 2018-01-18 ENCOUNTER — PATIENT MESSAGE (OUTPATIENT)
Dept: FAMILY MEDICINE | Facility: CLINIC | Age: 73
End: 2018-01-18

## 2018-01-22 ENCOUNTER — TELEPHONE (OUTPATIENT)
Dept: FAMILY MEDICINE | Facility: CLINIC | Age: 73
End: 2018-01-22

## 2018-01-22 NOTE — TELEPHONE ENCOUNTER
----- Message from Zuleima Ashby sent at 1/19/2018  4:38 PM CST -----  Contact: self  Pt would like to schedule appt for clearance for surgery on Feb 6.     [phone].

## 2018-01-23 ENCOUNTER — OFFICE VISIT (OUTPATIENT)
Dept: OPTOMETRY | Facility: CLINIC | Age: 73
End: 2018-01-23
Payer: COMMERCIAL

## 2018-01-23 DIAGNOSIS — H25.13 NUCLEAR SCLEROSIS OF BOTH EYES: Primary | ICD-10-CM

## 2018-01-23 DIAGNOSIS — I10 HYPERTENSION, BENIGN: ICD-10-CM

## 2018-01-23 DIAGNOSIS — H52.4 PRESBYOPIA: ICD-10-CM

## 2018-01-23 PROCEDURE — 99499 UNLISTED E&M SERVICE: CPT | Mod: S$GLB,,, | Performed by: OPTOMETRIST

## 2018-01-23 PROCEDURE — 92004 COMPRE OPH EXAM NEW PT 1/>: CPT | Mod: S$GLB,,, | Performed by: OPTOMETRIST

## 2018-01-23 PROCEDURE — 99999 PR PBB SHADOW E&M-EST. PATIENT-LVL II: CPT | Mod: PBBFAC,,, | Performed by: OPTOMETRIST

## 2018-01-23 NOTE — PROGRESS NOTES
HPI     Last eye exam was 1/24/12 with Dr. Horne.  Patient states decrease in near vision. Using +2.25-+2.50 OTC readers for   small print.  Patient denies diplopia, headaches, flashes/floaters, and pain.        Last edited by Cony Dang on 1/23/2018  9:03 AM. (History)            Assessment /Plan     For exam results, see Encounter Report.    Nuclear sclerosis of both eyes    Hypertension, benign    Presbyopia            1.  Educated on cataracts and affects on vision.  Monitor.  2.  No retinopathy--monitor yearly.  BP control.  3.  No rx given.  Continue with OTC readers.          RTC 1 year for routine exam.

## 2018-02-06 ENCOUNTER — HOSPITAL ENCOUNTER (OUTPATIENT)
Dept: RADIOLOGY | Facility: HOSPITAL | Age: 73
Discharge: HOME | End: 2018-02-06
Attending: FAMILY MEDICINE
Payer: MEDICARE

## 2018-02-06 ENCOUNTER — OFFICE VISIT (OUTPATIENT)
Dept: FAMILY MEDICINE | Facility: CLINIC | Age: 73
End: 2018-02-06
Payer: MEDICARE

## 2018-02-06 VITALS
BODY MASS INDEX: 24.64 KG/M2 | WEIGHT: 176 LBS | OXYGEN SATURATION: 99 % | DIASTOLIC BLOOD PRESSURE: 70 MMHG | HEIGHT: 71 IN | TEMPERATURE: 98 F | SYSTOLIC BLOOD PRESSURE: 110 MMHG | HEART RATE: 60 BPM

## 2018-02-06 DIAGNOSIS — R79.1 ABNORMAL COAGULATION PROFILE: ICD-10-CM

## 2018-02-06 DIAGNOSIS — E78.5 HYPERLIPIDEMIA, UNSPECIFIED HYPERLIPIDEMIA TYPE: ICD-10-CM

## 2018-02-06 DIAGNOSIS — I10 HYPERTENSION, BENIGN: ICD-10-CM

## 2018-02-06 DIAGNOSIS — Z01.818 PRE-OP EVALUATION: Primary | ICD-10-CM

## 2018-02-06 DIAGNOSIS — Z01.818 PRE-OP EVALUATION: ICD-10-CM

## 2018-02-06 PROCEDURE — 99499 UNLISTED E&M SERVICE: CPT | Mod: S$GLB,,, | Performed by: FAMILY MEDICINE

## 2018-02-06 PROCEDURE — 1126F AMNT PAIN NOTED NONE PRSNT: CPT | Mod: S$GLB,,, | Performed by: FAMILY MEDICINE

## 2018-02-06 PROCEDURE — 93005 ELECTROCARDIOGRAM TRACING: CPT | Mod: S$GLB,,, | Performed by: FAMILY MEDICINE

## 2018-02-06 PROCEDURE — 71046 X-RAY EXAM CHEST 2 VIEWS: CPT | Mod: TC,PO,FY

## 2018-02-06 PROCEDURE — 1159F MED LIST DOCD IN RCRD: CPT | Mod: S$GLB,,, | Performed by: FAMILY MEDICINE

## 2018-02-06 PROCEDURE — 93010 ELECTROCARDIOGRAM REPORT: CPT | Mod: S$GLB,,, | Performed by: INTERNAL MEDICINE

## 2018-02-06 PROCEDURE — 99214 OFFICE O/P EST MOD 30 MIN: CPT | Mod: S$GLB,,, | Performed by: FAMILY MEDICINE

## 2018-02-06 PROCEDURE — 3008F BODY MASS INDEX DOCD: CPT | Mod: S$GLB,,, | Performed by: FAMILY MEDICINE

## 2018-02-06 PROCEDURE — 71046 X-RAY EXAM CHEST 2 VIEWS: CPT | Mod: 26,,, | Performed by: RADIOLOGY

## 2018-02-06 PROCEDURE — 99999 PR PBB SHADOW E&M-EST. PATIENT-LVL III: CPT | Mod: PBBFAC,,, | Performed by: FAMILY MEDICINE

## 2018-02-06 NOTE — PROGRESS NOTES
[facility]  Progress Note    SUBJECTIVE:     Chief Complaint   Patient presents with    Pre-op Exam       HPI   Zac Plunkett  is a 72 y.o. male here for pre-op evaluation for hip replacement on 2/26/18. Doing well and has no new complaints/problems at this time.    Review of patient's allergies indicates:  No Known Allergies    Past Medical History:   Diagnosis Date    Cataract     Gross hematuria     Hyperlipidemia     Hypertension     Kidney stone     Lumbar spondylosis     Lumbar spondylosis     Tobacco dependence      Past Surgical History:   Procedure Laterality Date    APPENDECTOMY      laminectomy l4-l5       Family History   Problem Relation Age of Onset    Diabetes Mother     Heart disease Father     No Known Problems Sister     Cerebral palsy Brother     Epilepsy Brother     Melanoma Neg Hx     Psoriasis Neg Hx     Lupus Neg Hx     Eczema Neg Hx     Acne Neg Hx     Liver disease Neg Hx     Liver cancer Neg Hx     Cirrhosis Neg Hx     Colon polyps Neg Hx     Colon cancer Neg Hx      Social History   Substance Use Topics    Smoking status: Current Every Day Smoker     Types: Cigars    Smokeless tobacco: Never Used      Comment: smokes cigars     Alcohol use No        Review of Systems   Constitutional: Negative for chills, diaphoresis and fever.   HENT: Negative for congestion, ear pain and sore throat.    Eyes: Negative for photophobia and discharge.   Respiratory: Negative for cough, shortness of breath and wheezing.    Cardiovascular: Negative for chest pain and palpitations.   Gastrointestinal: Negative for abdominal pain, constipation, diarrhea, nausea and vomiting.   Genitourinary: Negative for dysuria and hematuria.   Musculoskeletal: Negative for back pain and myalgias.   Skin: Negative for itching and rash.   Neurological: Negative for dizziness, sensory change, focal weakness, weakness and headaches.   All other systems reviewed and are  negative.    OBJECTIVE:     Vitals:    02/06/18 0957   BP: 110/70   Pulse: 60   Temp: 98.3 °F (36.8 °C)     Body mass index is 24.55 kg/m².    Physical Exam   Constitutional: He is oriented to person, place, and time and well-developed, well-nourished, and in no distress. No distress.   HENT:   Head: Normocephalic and atraumatic.   Right Ear: External ear normal. Tympanic membrane is not perforated, not erythematous, not retracted and not bulging. No hemotympanum.   Left Ear: External ear normal. Tympanic membrane is not perforated, not erythematous, not retracted and not bulging. No hemotympanum.   Nose: Nose normal.   Mouth/Throat: Oropharynx is clear and moist. No oropharyngeal exudate.   Eyes: Conjunctivae and EOM are normal.   Cardiovascular: Normal rate, regular rhythm and normal heart sounds.  Exam reveals no gallop and no friction rub.    No murmur heard.  Pulmonary/Chest: Effort normal and breath sounds normal. No respiratory distress. He has no wheezes. He has no rales. He exhibits no tenderness.   Abdominal: Soft. Bowel sounds are normal. He exhibits no distension. There is no tenderness. There is no rebound.   Neurological: He is alert and oriented to person, place, and time.   Skin: Skin is warm. He is not diaphoretic.     EKG - NSR  Old records were reviewed. Labs and/or images were independently reviewed.    ASSESSMENT     1. Pre-op evaluation    2. Abnormal coagulation profile     3. Hypertension, benign    4. Hyperlipidemia, unspecified hyperlipidemia type        PLAN:     Pre-op evaluation  -     CBC auto differential; Future  -     Comprehensive metabolic panel; Future  -     Protime-INR; Future; Expected date: 02/06/2018  -     EKG 12-lead  -     Urinalysis; Future  -     X-Ray Chest PA And Lateral; Future; Expected date: 02/06/2018    Abnormal coagulation profile   -     Protime-INR; Future; Expected date: 02/06/2018    Hypertension, benign   -     Stable. Continue current  regimen.    Hyperlipidemia, unspecified hyperlipidemia type   -     Stable. Continue current regimen.      DETSKY SCORE  Risk Factors: Patient   1. Age older than 70 years: 5 points  2. Prior Myocardial Infarction   1. Last infarction within 6 months: 10 points  2. Last infarction more than 6 months ago: 5 points  3. Unstable Angina within last 6 months: 10 points  4. Angina Pectoris   1. Polish Angina Class 3: 10 points  2. Polish Angina Class 4: 20 points  5. Alveolar pulmonary edema   1. Pulmonary edema within one week: 10 points  2. Pulmonary edema at any time: 5 points  6. Suspected critical Aortic Stenosis: 20 points  7. Arrhythmia   1. Rhythm other than sinus or sinus with PACs: 5 points  2. More than five premature ventricular beats: 5 points  8. Emergency surgery: 10 points  9. Poor general medical status: 5 points   1. Based on Antony Risk Index    Interpretation   1. Class 1: Points 0-15 (Low risk)  2. Class 2: Points 20-30 (Moderate risk)  3. Class 3: Points >30 (High risk)     Detsky class 1, which translates into a low risk for a moderate risk procedure. Advised to hold aspirin 1 week prior to surgery, no NSAIDS as well. Please proceed with caution.     RTC PRCARTER Waters MD  02/06/2018 10:35 AM

## 2018-02-07 ENCOUNTER — TELEPHONE (OUTPATIENT)
Dept: FAMILY MEDICINE | Facility: CLINIC | Age: 73
End: 2018-02-07

## 2018-02-07 NOTE — TELEPHONE ENCOUNTER
----- Message from Rowena Campos sent at 2/6/2018  1:42 PM CST -----  Contact: Wife-Anaya  Called to provide 's fax #- [phone].  Wife can be reached @ [phone].

## 2018-02-21 ENCOUNTER — OFFICE VISIT (OUTPATIENT)
Dept: FAMILY MEDICINE | Facility: CLINIC | Age: 73
End: 2018-02-21
Payer: MEDICARE

## 2018-02-21 ENCOUNTER — TELEPHONE (OUTPATIENT)
Dept: FAMILY MEDICINE | Facility: CLINIC | Age: 73
End: 2018-02-21

## 2018-02-21 VITALS
BODY MASS INDEX: 24.88 KG/M2 | HEART RATE: 60 BPM | DIASTOLIC BLOOD PRESSURE: 60 MMHG | HEIGHT: 71 IN | TEMPERATURE: 98 F | OXYGEN SATURATION: 99 % | SYSTOLIC BLOOD PRESSURE: 124 MMHG | WEIGHT: 177.69 LBS

## 2018-02-21 DIAGNOSIS — J32.9 SINUSITIS, UNSPECIFIED CHRONICITY, UNSPECIFIED LOCATION: Primary | ICD-10-CM

## 2018-02-21 PROCEDURE — 99214 OFFICE O/P EST MOD 30 MIN: CPT | Mod: S$GLB,,, | Performed by: NURSE PRACTITIONER

## 2018-02-21 PROCEDURE — 1126F AMNT PAIN NOTED NONE PRSNT: CPT | Mod: S$GLB,,, | Performed by: NURSE PRACTITIONER

## 2018-02-21 PROCEDURE — 1159F MED LIST DOCD IN RCRD: CPT | Mod: S$GLB,,, | Performed by: NURSE PRACTITIONER

## 2018-02-21 PROCEDURE — 99999 PR PBB SHADOW E&M-EST. PATIENT-LVL IV: CPT | Mod: PBBFAC,,, | Performed by: NURSE PRACTITIONER

## 2018-02-21 PROCEDURE — 3008F BODY MASS INDEX DOCD: CPT | Mod: S$GLB,,, | Performed by: NURSE PRACTITIONER

## 2018-02-21 RX ORDER — FLUTICASONE PROPIONATE 50 MCG
1 SPRAY, SUSPENSION (ML) NASAL DAILY
Qty: 1 BOTTLE | Refills: 2 | Status: SHIPPED | OUTPATIENT
Start: 2018-02-21 | End: 2021-05-27

## 2018-02-21 RX ORDER — AZITHROMYCIN 250 MG/1
TABLET, FILM COATED ORAL
Qty: 6 TABLET | Refills: 0 | Status: SHIPPED | OUTPATIENT
Start: 2018-02-21 | End: 2018-03-27 | Stop reason: ALTCHOICE

## 2018-02-21 NOTE — TELEPHONE ENCOUNTER
----- Message from Xiomara Pierce sent at 2/21/2018 12:50 PM CST -----  Contact: self  [employer]    Patient states he has sore throat, congestion, body aches and stuffy head. He can be reached at [phone]. Thank you!

## 2018-02-21 NOTE — PROGRESS NOTES
Subjective:       Patient ID: Zac Plunkett is a 72 y.o. male.    Chief Complaint: Cough and Nasal Congestion    72-year-old male presents to the clinic today with complaint of sinus congestion, ear pressure, and coughing for the last 2 days.  He denies any fever, chills, sore throat, wheezing, shortness of breath, abdominal pain, nausea, vomiting, or diarrhea.  He denies any headaches, dizziness, or blurred vision.  He denies any cardiac chest pain, heart palpitations, shortness breath, or swelling to lower extremities.  He is scheduled for Monday, February 26, 2018 for a left hip replacement.  He is concerned about getting well prior to the procedure.  He's been taken Cristal-[place] cold and plus, Claritin-D, and TheraFlu.      Past Medical History:   Diagnosis Date    Cataract     Gross hematuria     Hyperlipidemia     Hypertension     Kidney stone     Lumbar spondylosis     Lumbar spondylosis     Tobacco dependence      Past Surgical History:   Procedure Laterality Date    APPENDECTOMY      BACK SURGERY      laminectomy l4-l5        reports that he has been smoking Cigars.  He has never used smokeless tobacco. He reports that he does not drink alcohol or use drugs.  Review of Systems   Constitutional: Negative for chills, fatigue and fever.   HENT: Positive for congestion and ear pain. Negative for ear discharge, nosebleeds, postnasal drip, rhinorrhea, sinus pressure, sneezing and sore throat.    Respiratory: Positive for cough. Negative for shortness of breath and wheezing.    Cardiovascular: Negative for chest pain, palpitations and leg swelling.   Gastrointestinal: Negative for abdominal pain, constipation, diarrhea, nausea and vomiting.   Musculoskeletal: Negative for back pain, gait problem and neck pain.   Skin: Negative for color change and rash.   Neurological: Negative for dizziness, light-headedness and headaches.       Objective:      Physical Exam   Constitutional: He is oriented to  person, place, and time. He appears well-developed and well-nourished. No distress.   HENT:   Head: Normocephalic and atraumatic.   Right Ear: External ear normal.   Left Ear: External ear normal.   Nose: Nose normal.   Mouth/Throat: Oropharynx is clear and moist. No oropharyngeal exudate.   Both nares red and inflamed with tenderness over both frontal sinuses    Eyes: Conjunctivae and EOM are normal. Pupils are equal, round, and reactive to light. Right eye exhibits no discharge. Left eye exhibits no discharge. No scleral icterus.   Neck: Normal range of motion. Neck supple.   Cardiovascular: Normal rate and regular rhythm.  Exam reveals no gallop and no friction rub.    No murmur heard.  Pulmonary/Chest: Effort normal and breath sounds normal. No respiratory distress. He has no wheezes. He has no rales.   Abdominal: Soft. Bowel sounds are normal. There is no tenderness.   Musculoskeletal: Normal range of motion. He exhibits no edema.   Lymphadenopathy:     He has cervical adenopathy.   Neurological: He is alert and oriented to person, place, and time.   Skin: Skin is warm and dry. No rash noted. He is not diaphoretic.   Psychiatric: He has a normal mood and affect.       Assessment:       1. Sinusitis, unspecified chronicity, unspecified location        Plan:         Sinusitis, unspecified chronicity, unspecified location  -     azithromycin (ZITHROMAX Z-PRICE) 250 MG tablet; Take 2 pills day 1, then 1 pill day 2-5.  Dispense: 6 tablet; Refill: 0  -     fluticasone (FLONASE) 50 mcg/actuation nasal spray; 1 spray (50 mcg total) by Each Nare route once daily.  Dispense: 1 Bottle; Refill: 2

## 2018-02-21 NOTE — TELEPHONE ENCOUNTER
Spoke w/patient, states he has been having a sore throat, nasal and chest congestion, body aches since Monday. States the symptoms presented gradually on yesterday and today it's worse. No chills/fever, N/V stated. States he is scheduled for surgery on Monday and would like to get some antibiotics before to clear it up. Appt scheduled 2/21/18.

## 2018-02-28 ENCOUNTER — TELEPHONE (OUTPATIENT)
Dept: FAMILY MEDICINE | Facility: CLINIC | Age: 73
End: 2018-02-28

## 2018-02-28 NOTE — TELEPHONE ENCOUNTER
----- Message from Zuleima Ashby sent at 2/27/2018  4:44 PM CST -----  Contact: self  Pt asking to schedule appt for clearance for surgery 30 days prior to scheduled surgery on  04/02. Please call back at [phone].

## 2018-03-07 ENCOUNTER — OFFICE VISIT (OUTPATIENT)
Dept: FAMILY MEDICINE | Facility: CLINIC | Age: 73
End: 2018-03-07
Payer: MEDICARE

## 2018-03-07 ENCOUNTER — LAB VISIT (OUTPATIENT)
Dept: LAB | Facility: HOSPITAL | Age: 73
End: 2018-03-07
Attending: FAMILY MEDICINE
Payer: MEDICARE

## 2018-03-07 VITALS
SYSTOLIC BLOOD PRESSURE: 124 MMHG | BODY MASS INDEX: 24.92 KG/M2 | OXYGEN SATURATION: 99 % | HEART RATE: 54 BPM | TEMPERATURE: 98 F | WEIGHT: 178 LBS | DIASTOLIC BLOOD PRESSURE: 78 MMHG | HEIGHT: 71 IN

## 2018-03-07 DIAGNOSIS — R79.1 ABNORMAL COAGULATION PROFILE: ICD-10-CM

## 2018-03-07 DIAGNOSIS — Z01.818 PRE-OP EVALUATION: Primary | ICD-10-CM

## 2018-03-07 DIAGNOSIS — Z01.818 PRE-OP EVALUATION: ICD-10-CM

## 2018-03-07 LAB
ALBUMIN SERPL BCP-MCNC: 3.9 G/DL
ALP SERPL-CCNC: 103 U/L
ALT SERPL W/O P-5'-P-CCNC: 18 U/L
ANION GAP SERPL CALC-SCNC: 11 MMOL/L
AST SERPL-CCNC: 18 U/L
BASOPHILS # BLD AUTO: 0.08 K/UL
BASOPHILS NFR BLD: 1.1 %
BILIRUB SERPL-MCNC: 0.5 MG/DL
BUN SERPL-MCNC: 13 MG/DL
CALCIUM SERPL-MCNC: 10.2 MG/DL
CHLORIDE SERPL-SCNC: 96 MMOL/L
CO2 SERPL-SCNC: 24 MMOL/L
CREAT SERPL-MCNC: 0.9 MG/DL
DIFFERENTIAL METHOD: ABNORMAL
EOSINOPHIL # BLD AUTO: 0.1 K/UL
EOSINOPHIL NFR BLD: 1.5 %
ERYTHROCYTE [DISTWIDTH] IN BLOOD BY AUTOMATED COUNT: 13.2 %
EST. GFR  (AFRICAN AMERICAN): >60 ML/MIN/1.73 M^2
EST. GFR  (NON AFRICAN AMERICAN): >60 ML/MIN/1.73 M^2
GLUCOSE SERPL-MCNC: 96 MG/DL
HCT VFR BLD AUTO: 45.5 %
HGB BLD-MCNC: 15.2 G/DL
IMM GRANULOCYTES # BLD AUTO: 0.08 K/UL
IMM GRANULOCYTES NFR BLD AUTO: 1.1 %
INR PPP: 1
LYMPHOCYTES # BLD AUTO: 1.7 K/UL
LYMPHOCYTES NFR BLD: 22.9 %
MCH RBC QN AUTO: 29.5 PG
MCHC RBC AUTO-ENTMCNC: 33.4 G/DL
MCV RBC AUTO: 88 FL
MONOCYTES # BLD AUTO: 0.5 K/UL
MONOCYTES NFR BLD: 6.7 %
NEUTROPHILS # BLD AUTO: 5 K/UL
NEUTROPHILS NFR BLD: 66.7 %
NRBC BLD-RTO: 0 /100 WBC
PLATELET # BLD AUTO: 270 K/UL
PMV BLD AUTO: 9.2 FL
POTASSIUM SERPL-SCNC: 4.3 MMOL/L
PROT SERPL-MCNC: 7.2 G/DL
PROTHROMBIN TIME: 10.1 SEC
RBC # BLD AUTO: 5.15 M/UL
SODIUM SERPL-SCNC: 131 MMOL/L
WBC # BLD AUTO: 7.48 K/UL

## 2018-03-07 PROCEDURE — 85610 PROTHROMBIN TIME: CPT

## 2018-03-07 PROCEDURE — 36415 COLL VENOUS BLD VENIPUNCTURE: CPT | Mod: PO

## 2018-03-07 PROCEDURE — 93010 ELECTROCARDIOGRAM REPORT: CPT | Mod: S$GLB,,, | Performed by: INTERNAL MEDICINE

## 2018-03-07 PROCEDURE — 3074F SYST BP LT 130 MM HG: CPT | Mod: S$GLB,,, | Performed by: FAMILY MEDICINE

## 2018-03-07 PROCEDURE — 99999 PR PBB SHADOW E&M-EST. PATIENT-LVL III: CPT | Mod: PBBFAC,,, | Performed by: FAMILY MEDICINE

## 2018-03-07 PROCEDURE — 85025 COMPLETE CBC W/AUTO DIFF WBC: CPT

## 2018-03-07 PROCEDURE — 80053 COMPREHEN METABOLIC PANEL: CPT

## 2018-03-07 PROCEDURE — 99499 UNLISTED E&M SERVICE: CPT | Mod: S$GLB,,, | Performed by: FAMILY MEDICINE

## 2018-03-07 PROCEDURE — 99214 OFFICE O/P EST MOD 30 MIN: CPT | Mod: S$GLB,,, | Performed by: FAMILY MEDICINE

## 2018-03-07 PROCEDURE — 93005 ELECTROCARDIOGRAM TRACING: CPT | Mod: S$GLB,,, | Performed by: FAMILY MEDICINE

## 2018-03-07 PROCEDURE — 3078F DIAST BP <80 MM HG: CPT | Mod: S$GLB,,, | Performed by: FAMILY MEDICINE

## 2018-03-07 NOTE — PROGRESS NOTES
[facility]  Progress Note    SUBJECTIVE:     Chief Complaint   Patient presents with    Pre-op Exam       HPI   Zac Plunkett  is a 72 y.o. male here for pre-op evaluation for hip replacement on 4/2/18. Had a sinus infection so surgery had to be postponed.  Doing well and has no new complaints/problems at this time.    Review of patient's allergies indicates:  No Known Allergies    Past Medical History:   Diagnosis Date    Cataract     Gross hematuria     Hyperlipidemia     Hypertension     Kidney stone     Lumbar spondylosis     Lumbar spondylosis     Tobacco dependence      Past Surgical History:   Procedure Laterality Date    APPENDECTOMY      BACK SURGERY      laminectomy l4-l5       Family History   Problem Relation Age of Onset    Diabetes Mother     Heart disease Father     No Known Problems Sister     Cerebral palsy Brother     Epilepsy Brother     Melanoma Neg Hx     Psoriasis Neg Hx     Lupus Neg Hx     Eczema Neg Hx     Acne Neg Hx     Liver disease Neg Hx     Liver cancer Neg Hx     Cirrhosis Neg Hx     Colon polyps Neg Hx     Colon cancer Neg Hx      Social History   Substance Use Topics    Smoking status: Current Every Day Smoker     Types: Cigars    Smokeless tobacco: Never Used      Comment: smokes cigars     Alcohol use No        Review of Systems   Constitutional: Negative for chills, diaphoresis and fever.   HENT: Negative for congestion, ear pain and sore throat.    Eyes: Negative for photophobia and discharge.   Respiratory: Negative for cough, shortness of breath and wheezing.    Cardiovascular: Negative for chest pain and palpitations.   Gastrointestinal: Negative for abdominal pain, constipation, diarrhea, nausea and vomiting.   Genitourinary: Negative for dysuria and hematuria.   Musculoskeletal: Positive for joint pain (hip pain). Negative for back pain and myalgias.   Skin: Negative for itching and rash.   Neurological: Negative for dizziness,  sensory change, focal weakness, weakness and headaches.   All other systems reviewed and are negative.    OBJECTIVE:     Vitals:    03/07/18 0810   BP: 124/78   Pulse: (!) 54   Temp: 98 °F (36.7 °C)     Body mass index is 24.83 kg/m².    Physical Exam   Constitutional: He is oriented to person, place, and time and well-developed, well-nourished, and in no distress. No distress.   HENT:   Head: Normocephalic and atraumatic.   Right Ear: External ear normal. Tympanic membrane is not perforated, not erythematous, not retracted and not bulging. No hemotympanum.   Left Ear: External ear normal. Tympanic membrane is not perforated, not erythematous, not retracted and not bulging. No hemotympanum.   Nose: Nose normal.   Mouth/Throat: Oropharynx is clear and moist. No oropharyngeal exudate.   Eyes: Conjunctivae and EOM are normal.   Cardiovascular: Normal rate, regular rhythm and normal heart sounds.  Exam reveals no gallop and no friction rub.    No murmur heard.  Pulmonary/Chest: Effort normal and breath sounds normal. No respiratory distress. He has no wheezes. He has no rales. He exhibits no tenderness.   Abdominal: Soft. Bowel sounds are normal. He exhibits no distension. There is no tenderness. There is no rebound.   Neurological: He is alert and oriented to person, place, and time.   Skin: Skin is warm. He is not diaphoretic.     EKG - NSR  Old records were reviewed. Labs and/or images were independently reviewed.    ASSESSMENT     1. Pre-op evaluation    2. Abnormal coagulation profile         PLAN:     Pre-op evaluation  -     CBC auto differential; Future  -     Comprehensive metabolic panel; Future  -     Protime-INR; Future; Expected date: 02/06/2018  -     EKG 12-lead  -     Urinalysis; Future  -     X-Ray Chest PA And Lateral; Future; Expected date: 02/06/2018    Abnormal coagulation profile   -     Protime-INR; Future; Expected date: 02/06/2018    Hypertension, benign   -     Stable. Continue current  regimen.    Hyperlipidemia, unspecified hyperlipidemia type   -     Stable. Continue current regimen.      DETSKY SCORE  Risk Factors: Patient   1. Age older than 70 years: 5 points  2. Prior Myocardial Infarction   1. Last infarction within 6 months: 10 points  2. Last infarction more than 6 months ago: 5 points  3. Unstable Angina within last 6 months: 10 points  4. Angina Pectoris   1. [place] Angina Class 3: 10 points  2. [place] Angina Class 4: 20 points  5. Alveolar pulmonary edema   1. Pulmonary edema within one week: 10 points  2. Pulmonary edema at any time: 5 points  6. Suspected critical Aortic Stenosis: 20 points  7. Arrhythmia   1. Rhythm other than sinus or sinus with PACs: 5 points  2. More than five premature ventricular beats: 5 points  8. Emergency surgery: 10 points  9. Poor general medical status: 5 points   1. Based on Antony Risk Index    Interpretation   1. Class 1: Points 0-15 (Low risk)  2. Class 2: Points 20-30 (Moderate risk)  3. Class 3: Points >30 (High risk)     Detsky class 1, which translates into a low risk for a moderate risk procedure. Advised to hold aspirin 1 week prior to surgery, no NSAIDS as well. Please proceed with caution.     RTC PRCARTER Waters MD  03/07/2018 10:35 AM

## 2018-03-08 ENCOUNTER — TELEPHONE (OUTPATIENT)
Dept: FAMILY MEDICINE | Facility: CLINIC | Age: 73
End: 2018-03-08

## 2018-03-08 DIAGNOSIS — R31.9 HEMATURIA, UNSPECIFIED TYPE: Primary | ICD-10-CM

## 2018-03-08 NOTE — TELEPHONE ENCOUNTER
Discussed results with patient. Will refer to uro for further evaluation. All questions/concerns addressed.

## 2018-03-12 ENCOUNTER — TELEPHONE (OUTPATIENT)
Dept: FAMILY MEDICINE | Facility: CLINIC | Age: 73
End: 2018-03-12

## 2018-03-12 NOTE — TELEPHONE ENCOUNTER
----- Message from Suzanne Frey sent at 3/12/2018 11:24 AM CDT -----  Contact: self  Patient needs clearance faxed over to Dr Chapincito Sagastume at [phone]. Please contact patient once completed at [phone].    Thanks!

## 2018-03-14 ENCOUNTER — TELEPHONE (OUTPATIENT)
Dept: ADMINISTRATIVE | Facility: HOSPITAL | Age: 73
End: 2018-03-14

## 2018-03-27 ENCOUNTER — OFFICE VISIT (OUTPATIENT)
Dept: UROLOGY | Facility: CLINIC | Age: 73
End: 2018-03-27
Payer: MEDICARE

## 2018-03-27 ENCOUNTER — LAB VISIT (OUTPATIENT)
Dept: LAB | Facility: HOSPITAL | Age: 73
End: 2018-03-27
Attending: UROLOGY
Payer: MEDICARE

## 2018-03-27 VITALS
SYSTOLIC BLOOD PRESSURE: 140 MMHG | HEIGHT: 71 IN | DIASTOLIC BLOOD PRESSURE: 83 MMHG | BODY MASS INDEX: 24.69 KG/M2 | HEART RATE: 61 BPM | WEIGHT: 176.38 LBS

## 2018-03-27 DIAGNOSIS — N13.8 BPH WITH URINARY OBSTRUCTION: ICD-10-CM

## 2018-03-27 DIAGNOSIS — N40.1 BPH WITH URINARY OBSTRUCTION: ICD-10-CM

## 2018-03-27 DIAGNOSIS — R31.29 HEMATURIA, MICROSCOPIC: ICD-10-CM

## 2018-03-27 DIAGNOSIS — N13.8 BPH WITH URINARY OBSTRUCTION: Primary | ICD-10-CM

## 2018-03-27 DIAGNOSIS — N40.1 BPH WITH URINARY OBSTRUCTION: Primary | ICD-10-CM

## 2018-03-27 LAB — COMPLEXED PSA SERPL-MCNC: 1 NG/ML

## 2018-03-27 PROCEDURE — 36415 COLL VENOUS BLD VENIPUNCTURE: CPT | Mod: PO

## 2018-03-27 PROCEDURE — 99999 PR PBB SHADOW E&M-EST. PATIENT-LVL III: CPT | Mod: PBBFAC,,, | Performed by: UROLOGY

## 2018-03-27 PROCEDURE — 3077F SYST BP >= 140 MM HG: CPT | Mod: CPTII,S$GLB,, | Performed by: UROLOGY

## 2018-03-27 PROCEDURE — 99213 OFFICE O/P EST LOW 20 MIN: CPT | Mod: S$GLB,,, | Performed by: UROLOGY

## 2018-03-27 PROCEDURE — 84153 ASSAY OF PSA TOTAL: CPT

## 2018-03-27 PROCEDURE — 3079F DIAST BP 80-89 MM HG: CPT | Mod: CPTII,S$GLB,, | Performed by: UROLOGY

## 2018-03-27 NOTE — LETTER
March 27, 2018      Deondre Waters MD  [address]  [place] [address]           [facility]  [address]  [place] [address]  Phone: [phone]  Fax: [phone]          Patient: Zac Plunkett   MR Number: [id number]   YOB: 1945   Date of Visit: 3/27/2018       Dear Dr. Deondre Waters:    Thank you for referring Zac Plunkett to me for evaluation. Attached you will find relevant portions of my assessment and plan of care.    If you have questions, please do not hesitate to call me. I look forward to following Zac Plunkett along with you.    Sincerely,    James Moctezuma Jr., MD    Enclosure  CC:  No Recipients    If you would like to receive this communication electronically, please contact [email or web address] or [phone] to request more information on [facility] access.    For providers and/or their staff who would like to refer a patient to [facility], please contact us through our one-stop-shop provider referral line, [facility], at [phone].    If you feel you have received this communication in error or would no longer like to receive these types of communications, please e-mail [email or web address]

## 2018-03-27 NOTE — PROGRESS NOTES
Subjective:       Patient ID: Zac Plunkett is a 72 y.o. male.    Chief Complaint: Hematuria    HPI patient is here for prostate check.  He has a history of intermittent hematuria throughout his life.  He was worked up by Dr. De Santiago in 2016 with a negative CT scan and cystoscopy.  He has BPH.  He takes finasteride and tamsulosin.  He has a good stream and is happy with it.  He is having hip replacement surgery in the near future.  Today his urinalysis shows some occasional red blood cell.  There does not seem to be an indication to repeat her workup    Past Medical History:   Diagnosis Date    Cataract     Gross hematuria     Hyperlipidemia     Hypertension     Kidney stone     Lumbar spondylosis     Lumbar spondylosis     Tobacco dependence        Past Surgical History:   Procedure Laterality Date    APPENDECTOMY      BACK SURGERY      laminectomy l4-l5         Family History   Problem Relation Age of Onset    Diabetes Mother     Heart disease Father     No Known Problems Sister     Cerebral palsy Brother     Epilepsy Brother     Melanoma Neg Hx     Psoriasis Neg Hx     Lupus Neg Hx     Eczema Neg Hx     Acne Neg Hx     Liver disease Neg Hx     Liver cancer Neg Hx     Cirrhosis Neg Hx     Colon polyps Neg Hx     Colon cancer Neg Hx        Social History     Social History    Marital status:      Spouse name: N/A    Number of children: N/A    Years of education: N/A     Occupational History    Not on file.     Social History Main Topics    Smoking status: Current Every Day Smoker     Types: Cigars    Smokeless tobacco: Never Used      Comment: smokes cigars     Alcohol use No    Drug use: No    Sexual activity: Yes     Partners: Female     Other Topics Concern    Not on file     Social History Narrative    No narrative on file       Allergies:  Patient has no known allergies.    Medications:    Current Outpatient Prescriptions:     aspirin (ECOTRIN) 81 MG EC  tablet, Take 81 mg by mouth once daily.  , Disp: , Rfl:     atorvastatin (LIPITOR) 80 MG tablet, Take 1 tablet (80 mg total) by mouth once daily., Disp: 90 tablet, Rfl: 3    azithromycin (ZITHROMAX Z-PRICE) 250 MG tablet, Take 2 pills day 1, then 1 pill day 2-5., Disp: 6 tablet, Rfl: 0    finasteride (PROSCAR) 5 mg tablet, Take 1 tablet (5 mg total) by mouth once daily., Disp: 90 tablet, Rfl: 3    fluticasone (FLONASE) 50 mcg/actuation nasal spray, 1 spray (50 mcg total) by Each Nare route once daily., Disp: 1 Bottle, Rfl: 2    hydroCHLOROthiazide (HYDRODIURIL) 25 MG tablet, Take 1 tablet (25 mg total) by mouth once daily., Disp: 90 tablet, Rfl: 3    multivitamin with minerals tablet, Take 1 tablet by mouth once daily., Disp: , Rfl:     naproxen (NAPROSYN) 500 MG tablet, Take 1 tablet (500 mg total) by mouth 2 (two) times daily as needed (pain)., Disp: 60 tablet, Rfl: 6    tamsulosin (FLOMAX) 0.4 mg Cp24, Take 1 capsule (0.4 mg total) by mouth once daily., Disp: 90 capsule, Rfl: 3    Review of Systems   Constitutional: Negative for activity change, appetite change, chills, diaphoresis, fatigue, fever and unexpected weight change.   HENT: Negative for congestion, dental problem, hearing loss, mouth sores, postnasal drip, rhinorrhea, sinus pressure and trouble swallowing.    Eyes: Negative for pain, discharge and itching.   Respiratory: Negative for apnea, cough, choking, chest tightness, shortness of breath and wheezing.    Cardiovascular: Negative for chest pain, palpitations and leg swelling.   Gastrointestinal: Negative for abdominal distention, abdominal pain, anal bleeding, blood in stool, constipation, diarrhea, nausea, rectal pain and vomiting.   Endocrine: Negative for polydipsia and polyuria.   Genitourinary: Negative for decreased urine volume, difficulty urinating, discharge, dysuria, enuresis, flank pain, frequency, genital sores, hematuria, penile pain, penile swelling, scrotal swelling, testicular  pain and urgency.   Musculoskeletal: Negative for arthralgias, back pain and myalgias.   Skin: Negative for color change, rash and wound.   Neurological: Negative for dizziness, syncope, speech difficulty, light-headedness and headaches.   Hematological: Negative for adenopathy. Does not bruise/bleed easily.   Psychiatric/Behavioral: Negative for behavioral problems, confusion, hallucinations and sleep disturbance.       Objective:      Physical Exam   Constitutional: He appears well-developed.   HENT:   Head: Normocephalic.   Cardiovascular: Normal rate.    Pulmonary/Chest: Effort normal.   Abdominal: Soft.   Genitourinary: Prostate normal.   Genitourinary Comments: 30 g benign no nodules   Neurological: He is alert.   Skin: Skin is warm.     Psychiatric: He has a normal mood and affect.       Assessment:       1. BPH with urinary obstruction    2. Hematuria, microscopic        Plan:       Zac was seen today for hematuria.    Diagnoses and all orders for this visit:    BPH with urinary obstruction  -     Prostate Specific Antigen, Diagnostic; Future    Hematuria, microscopic     yearly check.  Patient will call me if he has any trouble voiding after his hip surgery.  There is no urological contraindication to him having hip surgery

## 2018-04-13 ENCOUNTER — PES CALL (OUTPATIENT)
Dept: ADMINISTRATIVE | Facility: CLINIC | Age: 73
End: 2018-04-13

## 2018-04-30 ENCOUNTER — TELEPHONE (OUTPATIENT)
Dept: FAMILY MEDICINE | Facility: CLINIC | Age: 73
End: 2018-04-30

## 2018-04-30 NOTE — TELEPHONE ENCOUNTER
----- Message from Celia Ayala sent at 4/30/2018  2:40 PM CDT -----  Contact: Diya/Spouse/[phone]  Diya called to request a Pre Op Clearance appointment. She stated that the patient is having surgery on his hip. Thank you.

## 2018-05-09 ENCOUNTER — OFFICE VISIT (OUTPATIENT)
Dept: FAMILY MEDICINE | Facility: CLINIC | Age: 73
End: 2018-05-09
Payer: MEDICARE

## 2018-05-09 ENCOUNTER — HOSPITAL ENCOUNTER (OUTPATIENT)
Dept: RADIOLOGY | Facility: HOSPITAL | Age: 73
Discharge: HOME | End: 2018-05-09
Attending: FAMILY MEDICINE
Payer: MEDICARE

## 2018-05-09 VITALS
WEIGHT: 174.19 LBS | BODY MASS INDEX: 24.39 KG/M2 | DIASTOLIC BLOOD PRESSURE: 62 MMHG | HEIGHT: 71 IN | HEART RATE: 65 BPM | SYSTOLIC BLOOD PRESSURE: 128 MMHG | TEMPERATURE: 98 F | OXYGEN SATURATION: 97 %

## 2018-05-09 DIAGNOSIS — Z01.818 PRE-OP EVALUATION: Primary | ICD-10-CM

## 2018-05-09 DIAGNOSIS — R79.1 ABNORMAL COAGULATION PROFILE: ICD-10-CM

## 2018-05-09 DIAGNOSIS — Z01.818 PRE-OP EVALUATION: ICD-10-CM

## 2018-05-09 PROCEDURE — 3078F DIAST BP <80 MM HG: CPT | Mod: CPTII,S$GLB,, | Performed by: FAMILY MEDICINE

## 2018-05-09 PROCEDURE — 93010 ELECTROCARDIOGRAM REPORT: CPT | Mod: S$GLB,,, | Performed by: INTERNAL MEDICINE

## 2018-05-09 PROCEDURE — 93005 ELECTROCARDIOGRAM TRACING: CPT | Mod: S$GLB,,, | Performed by: FAMILY MEDICINE

## 2018-05-09 PROCEDURE — 71046 X-RAY EXAM CHEST 2 VIEWS: CPT | Mod: TC,FY,PO

## 2018-05-09 PROCEDURE — 3074F SYST BP LT 130 MM HG: CPT | Mod: CPTII,S$GLB,, | Performed by: FAMILY MEDICINE

## 2018-05-09 PROCEDURE — 71046 X-RAY EXAM CHEST 2 VIEWS: CPT | Mod: 26,,, | Performed by: RADIOLOGY

## 2018-05-09 PROCEDURE — 99999 PR PBB SHADOW E&M-EST. PATIENT-LVL III: CPT | Mod: PBBFAC,,, | Performed by: FAMILY MEDICINE

## 2018-05-09 PROCEDURE — 99214 OFFICE O/P EST MOD 30 MIN: CPT | Mod: S$GLB,,, | Performed by: FAMILY MEDICINE

## 2018-05-09 NOTE — PROGRESS NOTES
[facility]  Progress Note    SUBJECTIVE:     Chief Complaint   Patient presents with    Pre-op Exam       HPI   Zac Plunkett  is a 72 y.o. male here for pre op evaluation for his hip surgery, which is scheduled for 5/22/18. He had to delay it again because his wife had fell and fractured her ankle. He is now ready to proceed again as his hip is bothering him and inhibiting his daily active lifestyle. Patient has no other new complaints/problems at this time.      Review of patient's allergies indicates:  No Known Allergies    Past Medical History:   Diagnosis Date    Cataract     Gross hematuria     Hyperlipidemia     Hypertension     Kidney stone     Lumbar spondylosis     Lumbar spondylosis     PONV (postoperative nausea and vomiting)     Tobacco dependence      Past Surgical History:   Procedure Laterality Date    APPENDECTOMY      BACK SURGERY      laminectomy l4-l5       Family History   Problem Relation Age of Onset    Diabetes Mother     Heart disease Father     No Known Problems Sister     Cerebral palsy Brother     Epilepsy Brother     Melanoma Neg Hx     Psoriasis Neg Hx     Lupus Neg Hx     Eczema Neg Hx     Acne Neg Hx     Liver disease Neg Hx     Liver cancer Neg Hx     Cirrhosis Neg Hx     Colon polyps Neg Hx     Colon cancer Neg Hx      Social History   Substance Use Topics    Smoking status: Current Every Day Smoker     Types: Cigars    Smokeless tobacco: Never Used      Comment: smokes cigars     Alcohol use No        Review of Systems   Constitutional: Negative for chills, diaphoresis and fever.   HENT: Negative for congestion, ear pain and sore throat.    Eyes: Negative for photophobia and discharge.   Respiratory: Negative for cough, shortness of breath and wheezing.    Cardiovascular: Negative for chest pain and palpitations.   Gastrointestinal: Negative for abdominal pain, constipation, diarrhea, nausea and vomiting.   Genitourinary: Negative for  dysuria and hematuria.   Musculoskeletal: Positive for joint pain (hip pain). Negative for back pain and myalgias.   Skin: Negative for itching and rash.   Neurological: Negative for dizziness, sensory change, focal weakness, weakness and headaches.   All other systems reviewed and are negative.    OBJECTIVE:     Vitals:    05/09/18 1011   BP: 128/62   Pulse: 65   Temp: 98.2 °F (36.8 °C)     Body mass index is 24.29 kg/m².    Physical Exam   Constitutional: He is oriented to person, place, and time and well-developed, well-nourished, and in no distress. No distress.   HENT:   Head: Normocephalic and atraumatic.   Right Ear: Tympanic membrane is not perforated, not erythematous and not bulging. No hemotympanum.   Left Ear: Tympanic membrane is not perforated, not erythematous and not bulging. No hemotympanum.   Mouth/Throat: Oropharynx is clear and moist. No oropharyngeal exudate.   Eyes: Conjunctivae and EOM are normal. Pupils are equal, round, and reactive to light.   Neck: No thyromegaly present.   Cardiovascular: Normal rate, regular rhythm and normal heart sounds.  Exam reveals no gallop and no friction rub.    No murmur heard.  Pulmonary/Chest: Effort normal and breath sounds normal. No respiratory distress. He has no wheezes. He has no rales.   Abdominal: Soft. Bowel sounds are normal. He exhibits no distension. There is no tenderness. There is no rebound and no guarding.   Musculoskeletal: Normal range of motion. He exhibits no edema or tenderness.   Lymphadenopathy:     He has no cervical adenopathy.   Neurological: He is alert and oriented to person, place, and time.   Skin: Skin is warm. No rash noted. He is not diaphoretic. No erythema.     EKG - NSR  Old records were reviewed. Labs and/or images were independently reviewed.    ASSESSMENT     1. Pre-op evaluation    2. Abnormal coagulation profile         PLAN:     Pre-op evaluation  -     Comprehensive metabolic panel; Future  -     CBC auto  differential; Future  -     Protime-INR; Future; Expected date: 05/09/2018  -     EKG 12-lead  -     Urinalysis; Future    DETSKY SCORE  Risk Factors: Patient   1. Age older than 70 years: 5 points  2. Prior Myocardial Infarction   1. Last infarction within 6 months: 10 points  2. Last infarction more than 6 months ago: 5 points  3. Unstable Angina within last 6 months: 10 points  4. Angina Pectoris   1. [place] Angina Class 3: 10 points  2. [place] Angina Class 4: 20 points  5. Alveolar pulmonary edema   1. Pulmonary edema within one week: 10 points  2. Pulmonary edema at any time: 5 points  6. Suspected critical Aortic Stenosis: 20 points  7. Arrhythmia   1. Rhythm other than sinus or sinus with PACs: 5 points  2. More than five premature ventricular beats: 5 points  8. Emergency surgery: 10 points  9. Poor general medical status: 5 points   1. Based on Antony Risk Index    Interpretation   1. Class 1: Points 0-15 (Low risk)  2. Class 2: Points 20-30 (Moderate risk)  3. Class 3: Points >30 (High risk)     Detsky Class 1, which translates into a low risk for a moderate risk procedure. No NSAIDs/ASA 1 week prior to surgery. Please proceed with caution.    RTC CHARBEL Waters MD  05/09/2018 10:40 AM

## 2018-05-21 ENCOUNTER — TELEPHONE (OUTPATIENT)
Dept: DERMATOLOGY | Facility: CLINIC | Age: 73
End: 2018-05-21

## 2018-05-21 NOTE — TELEPHONE ENCOUNTER
----- Message from Zhou Marr sent at 5/21/2018  8:16 AM CDT -----  Contact: Patient   Patient Requesting Sooner Appointment.     Reason: pt requesting to be seen by particular date, considered new pt but has seen Dr Fernando in the past     Name of Caller: patient   When is the first available appointment? 6/29  Symptoms: Mole check   Communication Preference:[phone]  Additional Information: pt states that he would like to be seen preferably Friday 5/25, or before 6/1 as he will be having a hip replacement

## 2018-05-21 NOTE — TELEPHONE ENCOUNTER
I called the patient and offered him an appointment on this Friday 5/25, as requested, and he said that he already found a sooner appointment elsewhere, but, thanked me for the returned call any ways.

## 2018-07-13 ENCOUNTER — TELEPHONE (OUTPATIENT)
Dept: FAMILY MEDICINE | Facility: CLINIC | Age: 73
End: 2018-07-13

## 2018-07-13 NOTE — TELEPHONE ENCOUNTER
----- Message from Zuleima syedkris sent at 7/13/2018 12:15 PM CDT -----  Contact: self  Pt's spouse calling to schedule clearance for surgery. She states nurse offered her appt on 07/18 and they will take it. [phone].

## 2018-07-13 NOTE — TELEPHONE ENCOUNTER
----- Message from Celia Ayala sent at 7/13/2018  7:26 AM CDT -----  Contact: Diya/Spouse/[phone]  Diya called to request a Pre Op Clearance appointment for hip surgery. Thank you.

## 2018-07-20 ENCOUNTER — PES CALL (OUTPATIENT)
Dept: ADMINISTRATIVE | Facility: CLINIC | Age: 73
End: 2018-07-20

## 2018-07-24 ENCOUNTER — HOSPITAL ENCOUNTER (OUTPATIENT)
Dept: RADIOLOGY | Facility: HOSPITAL | Age: 73
Discharge: HOME | End: 2018-07-24
Attending: FAMILY MEDICINE
Payer: MEDICARE

## 2018-07-24 ENCOUNTER — OFFICE VISIT (OUTPATIENT)
Dept: FAMILY MEDICINE | Facility: CLINIC | Age: 73
End: 2018-07-24
Payer: MEDICARE

## 2018-07-24 VITALS
OXYGEN SATURATION: 97 % | HEIGHT: 71 IN | TEMPERATURE: 98 F | SYSTOLIC BLOOD PRESSURE: 120 MMHG | BODY MASS INDEX: 24.46 KG/M2 | HEART RATE: 49 BPM | DIASTOLIC BLOOD PRESSURE: 62 MMHG | WEIGHT: 174.69 LBS

## 2018-07-24 DIAGNOSIS — Z01.818 PRE-OP EVALUATION: ICD-10-CM

## 2018-07-24 DIAGNOSIS — R00.1 BRADYCARDIA: ICD-10-CM

## 2018-07-24 DIAGNOSIS — R79.1 ABNORMAL COAGULATION PROFILE: ICD-10-CM

## 2018-07-24 DIAGNOSIS — Z01.818 PRE-OP EVALUATION: Primary | ICD-10-CM

## 2018-07-24 PROCEDURE — 3074F SYST BP LT 130 MM HG: CPT | Mod: CPTII,S$GLB,, | Performed by: FAMILY MEDICINE

## 2018-07-24 PROCEDURE — 71046 X-RAY EXAM CHEST 2 VIEWS: CPT | Mod: 26,,, | Performed by: RADIOLOGY

## 2018-07-24 PROCEDURE — 99499 UNLISTED E&M SERVICE: CPT | Mod: S$GLB,,, | Performed by: FAMILY MEDICINE

## 2018-07-24 PROCEDURE — 3078F DIAST BP <80 MM HG: CPT | Mod: CPTII,S$GLB,, | Performed by: FAMILY MEDICINE

## 2018-07-24 PROCEDURE — 99999 PR PBB SHADOW E&M-EST. PATIENT-LVL III: CPT | Mod: PBBFAC,,, | Performed by: FAMILY MEDICINE

## 2018-07-24 PROCEDURE — 93005 ELECTROCARDIOGRAM TRACING: CPT | Mod: S$GLB,,, | Performed by: FAMILY MEDICINE

## 2018-07-24 PROCEDURE — 99214 OFFICE O/P EST MOD 30 MIN: CPT | Mod: S$GLB,,, | Performed by: FAMILY MEDICINE

## 2018-07-24 PROCEDURE — 93010 ELECTROCARDIOGRAM REPORT: CPT | Mod: S$GLB,,, | Performed by: INTERNAL MEDICINE

## 2018-07-24 PROCEDURE — 71046 X-RAY EXAM CHEST 2 VIEWS: CPT | Mod: TC,FY,PO

## 2018-07-24 RX ORDER — KETOCONAZOLE 20 MG/G
CREAM TOPICAL
COMMUNITY
Start: 2018-07-22 | End: 2021-05-27

## 2018-07-24 RX ORDER — HYDROCORTISONE 25 MG/G
CREAM TOPICAL DAILY PRN
COMMUNITY
Start: 2018-07-22 | End: 2021-05-27

## 2018-07-24 NOTE — PROGRESS NOTES
[facility]  Progress Note    SUBJECTIVE:     Chief Complaint   Patient presents with    Pre-op Exam       HPI   Zac Plunkett  is a 73 y.o. male here for pre op evaluation for his hip surgery, which is scheduled for 8/6/18. He had to delay it again because his wife had fell and fractured her ankle. He is now ready to proceed again as his hip is bothering him and inhibiting his daily active lifestyle. Patient has no other new complaints/problems at this time.      Review of patient's allergies indicates:  No Known Allergies    Past Medical History:   Diagnosis Date    Cataract     Gross hematuria     Hyperlipidemia     Hypertension     Kidney stone     Lumbar spondylosis     Lumbar spondylosis     PONV (postoperative nausea and vomiting)     Tobacco dependence      Past Surgical History:   Procedure Laterality Date    APPENDECTOMY      BACK SURGERY      laminectomy l4-l5       Family History   Problem Relation Age of Onset    Diabetes Mother     Heart disease Father     No Known Problems Sister     Cerebral palsy Brother     Epilepsy Brother     Melanoma Neg Hx     Psoriasis Neg Hx     Lupus Neg Hx     Eczema Neg Hx     Acne Neg Hx     Liver disease Neg Hx     Liver cancer Neg Hx     Cirrhosis Neg Hx     Colon polyps Neg Hx     Colon cancer Neg Hx      Social History   Substance Use Topics    Smoking status: Current Every Day Smoker     Types: Cigars    Smokeless tobacco: Never Used      Comment: smokes cigars     Alcohol use No        Review of Systems   Constitutional: Negative for chills, diaphoresis and fever.   HENT: Negative for congestion, ear pain and sore throat.    Eyes: Negative for photophobia and discharge.   Respiratory: Negative for cough, shortness of breath and wheezing.    Cardiovascular: Negative for chest pain and palpitations.   Gastrointestinal: Negative for abdominal pain, constipation, diarrhea, nausea and vomiting.   Genitourinary: Negative for  dysuria and hematuria.   Musculoskeletal: Positive for joint pain (hip pain). Negative for back pain and myalgias.   Skin: Negative for itching and rash.   Neurological: Negative for dizziness, sensory change, focal weakness, weakness and headaches.   All other systems reviewed and are negative.    OBJECTIVE:     Vitals:    07/24/18 0919   BP: 120/62   Pulse: (!) 49   Temp: 97.6 °F (36.4 °C)     Body mass index is 24.37 kg/m².    Physical Exam   Constitutional: He is oriented to person, place, and time and well-developed, well-nourished, and in no distress. No distress.   HENT:   Head: Normocephalic and atraumatic.   Right Ear: Tympanic membrane is not perforated, not erythematous and not bulging. No hemotympanum.   Left Ear: Tympanic membrane is not perforated, not erythematous and not bulging. No hemotympanum.   Mouth/Throat: Oropharynx is clear and moist. No oropharyngeal exudate.   Eyes: Conjunctivae and EOM are normal. Pupils are equal, round, and reactive to light.   Neck: No thyromegaly present.   Cardiovascular: Regular rhythm and normal heart sounds.  Bradycardia present.  Exam reveals no gallop and no friction rub.    No murmur heard.  Pulmonary/Chest: Effort normal and breath sounds normal. No respiratory distress. He has no wheezes. He has no rales.   Abdominal: Soft. Bowel sounds are normal. He exhibits no distension. There is no tenderness. There is no rebound and no guarding.   Musculoskeletal: Normal range of motion. He exhibits no edema or tenderness.   Lymphadenopathy:     He has no cervical adenopathy.   Neurological: He is alert and oriented to person, place, and time.   Skin: Skin is warm. No rash noted. He is not diaphoretic. No erythema.     EKG - NSR  Old records were reviewed. Labs and/or images were independently reviewed.    ASSESSMENT     1. Pre-op evaluation    2. Abnormal coagulation profile     3. Bradycardia         PLAN:     Pre-op evaluation  -     Comprehensive metabolic panel;  Future  -     CBC auto differential; Future  -     Protime-INR; Future; Expected date: 05/09/2018  -     EKG 12-lead  -     Urinalysis; Future    DETSKY SCORE  Risk Factors: Patient   1. Age older than 70 years: 5 points  2. Prior Myocardial Infarction   1. Last infarction within 6 months: 10 points  2. Last infarction more than 6 months ago: 5 points  3. Unstable Angina within last 6 months: 10 points  4. Angina Pectoris   1. [place] Angina Class 3: 10 points  2. [place] Angina Class 4: 20 points  5. Alveolar pulmonary edema   1. Pulmonary edema within one week: 10 points  2. Pulmonary edema at any time: 5 points  6. Suspected critical Aortic Stenosis: 20 points  7. Arrhythmia   1. Rhythm other than sinus or sinus with PACs: 5 points  2. More than five premature ventricular beats: 5 points  8. Emergency surgery: 10 points  9. Poor general medical status: 5 points   1. Based on Antony Risk Index    Interpretation   1. Class 1: Points 0-15 (Low risk)  2. Class 2: Points 20-30 (Moderate risk)  3. Class 3: Points >30 (High risk)     D[place]ky Class 1, which translates into a low risk for a moderate risk procedure. No NSAIDs/ASA 1 week prior to surgery. Please proceed with caution.    RTC CHARBEL Waters MD  07/24/2018 10:40 AM

## 2018-07-24 NOTE — MEDICAL/APP STUDENT
Subjective:       Patient ID: Zac Plunkett is a 73 y.o. male.    Chief Complaint: Pre-op Exam    HPI Mr. Plunkett is a 73 y.o. Male presenting for pre-op evaluation for Total Right Hip Replacement scheduled for August 6, 2018. The patient has been cleared for the operation 4 times previously, with acute infections and personal matters delaying the operation. He denies new symptoms including Chest Pain, SOB, cough, leg pain, palpitations, or any new discomfort. Further denies recent fever or illness. The patient has no additional concerns at this time.    Review of Systems   Constitutional: Negative for activity change, appetite change, chills, diaphoresis, fatigue, fever and unexpected weight change.   HENT: Negative for congestion, ear pain, sinus pain, sinus pressure, sneezing and sore throat.    Eyes: Negative for pain and visual disturbance.   Respiratory: Negative for cough, chest tightness and shortness of breath.    Cardiovascular: Negative for chest pain, palpitations and leg swelling.   Gastrointestinal: Negative for abdominal pain, constipation, diarrhea, nausea and vomiting.   Genitourinary: Negative for dysuria, hematuria and urgency.   Musculoskeletal: Negative for back pain, gait problem, myalgias and neck pain.   Skin: Negative for pallor and rash.   Neurological: Negative for syncope, weakness, light-headedness, numbness and headaches.   Psychiatric/Behavioral: Negative for confusion, sleep disturbance and suicidal ideas.       Patient Active Problem List   Diagnosis    Hypertension, benign    Hyperlipidemia    Lumbar spondylosis    History of colon polyps    Prominent aorta    Enlarged prostate     Family History   Problem Relation Age of Onset    Diabetes Mother     Heart disease Father     No Known Problems Sister     Cerebral palsy Brother     Epilepsy Brother     Melanoma Neg Hx     Psoriasis Neg Hx     Lupus Neg Hx     Eczema Neg Hx     Acne Neg Hx     Liver disease Neg  "Hx     Liver cancer Neg Hx     Cirrhosis Neg Hx     Colon polyps Neg Hx     Colon cancer Neg Hx      Current Outpatient Prescriptions on File Prior to Visit   Medication Sig Dispense Refill    aspirin (ECOTRIN) 81 MG EC tablet Take 81 mg by mouth once daily.        atorvastatin (LIPITOR) 80 MG tablet Take 1 tablet (80 mg total) by mouth once daily. 90 tablet 3    finasteride (PROSCAR) 5 mg tablet Take 1 tablet (5 mg total) by mouth once daily. 90 tablet 3    fluticasone (FLONASE) 50 mcg/actuation nasal spray 1 spray (50 mcg total) by Each Nare route once daily. (Patient taking differently: 1 spray by Each Nare route daily as needed. ) 1 Bottle 2    hydroCHLOROthiazide (HYDRODIURIL) 25 MG tablet Take 1 tablet (25 mg total) by mouth once daily. 90 tablet 3    multivitamin with minerals tablet Take 1 tablet by mouth once daily.      naproxen (NAPROSYN) 500 MG tablet Take 1 tablet (500 mg total) by mouth 2 (two) times daily as needed (pain). 60 tablet 6    tamsulosin (FLOMAX) 0.4 mg Cp24 Take 1 capsule (0.4 mg total) by mouth once daily. 90 capsule 3     No current facility-administered medications on file prior to visit.      Social History     Social History    Marital status:      Spouse name: N/A    Number of children: N/A    Years of education: N/A     Occupational History    Not on file.     Social History Main Topics    Smoking status: Current Every Day Smoker     Types: Cigars    Smokeless tobacco: Never Used      Comment: smokes cigars     Alcohol use No    Drug use: No    Sexual activity: Yes     Partners: Female     Other Topics Concern    Not on file     Social History Narrative    No narrative on file     Objective:       Vitals:    07/24/18 0919   BP: 120/62   BP Location: Left arm   Patient Position: Sitting   BP Method: Medium (Manual)   Pulse: (!) 49   Temp: 97.6 °F (36.4 °C)   TempSrc: Oral   SpO2: 97%   Weight: 79.2 kg (174 lb 11.4 oz)   Height: 5' 11" (1.803 m) "     Physical Exam   Constitutional: He is oriented to person, place, and time. He appears well-developed and well-nourished.   HENT:   Head: Normocephalic and atraumatic.   Right Ear: External ear normal.   Left Ear: External ear normal.   Nose: Nose normal.   Mouth/Throat: Oropharynx is clear and moist.   Eyes: Conjunctivae and EOM are normal. Pupils are equal, round, and reactive to light.   Neck: Normal range of motion. Neck supple.   Cardiovascular: Normal rate, regular rhythm, normal heart sounds and intact distal pulses.    Pulmonary/Chest: Effort normal and breath sounds normal.   Abdominal: Soft. Bowel sounds are normal.   Musculoskeletal: Normal range of motion.   Neurological: He is alert and oriented to person, place, and time.   Skin: Skin is warm and dry. Capillary refill takes less than 2 seconds.   Psychiatric: He has a normal mood and affect. His behavior is normal.       Assessment:       1. Pre-op evaluation    2. Abnormal coagulation profile     3. Bradycardia         Plan:       Pre-op evaluation:    DETSKY SCORE: 5 - Class 1 Risk, 6% Complication Risk Secondary CVD  Patient Advised to cease ASA and BP Medication 1 week prior to operation    Screening Labs/Work-up:  CBC  CMP  EKG  CXR  UA     Melissa Lima [facility]  [facility], [place]

## 2018-08-06 PROBLEM — M16.12 DEGENERATIVE JOINT DISEASE OF LEFT HIP: Status: ACTIVE | Noted: 2018-08-06

## 2018-08-06 PROBLEM — Z96.642 S/P HIP REPLACEMENT, LEFT: Status: ACTIVE | Noted: 2018-08-06

## 2018-08-06 PROBLEM — Z72.0 TOBACCO ABUSE: Status: ACTIVE | Noted: 2018-08-06

## 2018-08-06 PROBLEM — N40.0 BPH (BENIGN PROSTATIC HYPERPLASIA): Status: ACTIVE | Noted: 2018-08-06

## 2018-08-07 PROBLEM — D72.829 LEUKOCYTOSIS: Status: ACTIVE | Noted: 2018-08-07

## 2018-08-07 PROBLEM — D72.829 LEUKOCYTOSIS: Status: RESOLVED | Noted: 2018-08-07 | Resolved: 2018-08-07

## 2018-08-07 PROBLEM — D64.9 ANEMIA: Status: ACTIVE | Noted: 2018-08-07

## 2018-08-07 PROBLEM — D64.9 ANEMIA: Status: RESOLVED | Noted: 2018-08-07 | Resolved: 2018-08-07

## 2018-08-14 ENCOUNTER — OFFICE VISIT (OUTPATIENT)
Dept: FAMILY MEDICINE | Facility: CLINIC | Age: 73
End: 2018-08-14
Payer: MEDICARE

## 2018-08-14 VITALS
OXYGEN SATURATION: 97 % | HEART RATE: 67 BPM | BODY MASS INDEX: 24.8 KG/M2 | DIASTOLIC BLOOD PRESSURE: 62 MMHG | TEMPERATURE: 98 F | HEIGHT: 71 IN | SYSTOLIC BLOOD PRESSURE: 128 MMHG | WEIGHT: 177.13 LBS

## 2018-08-14 DIAGNOSIS — Z96.642 STATUS POST LEFT HIP REPLACEMENT: Primary | ICD-10-CM

## 2018-08-14 PROCEDURE — 99214 OFFICE O/P EST MOD 30 MIN: CPT | Mod: S$GLB,,, | Performed by: FAMILY MEDICINE

## 2018-08-14 PROCEDURE — 3074F SYST BP LT 130 MM HG: CPT | Mod: CPTII,S$GLB,, | Performed by: FAMILY MEDICINE

## 2018-08-14 PROCEDURE — 3078F DIAST BP <80 MM HG: CPT | Mod: CPTII,S$GLB,, | Performed by: FAMILY MEDICINE

## 2018-08-14 PROCEDURE — 99999 PR PBB SHADOW E&M-EST. PATIENT-LVL III: CPT | Mod: PBBFAC,,, | Performed by: FAMILY MEDICINE

## 2018-08-14 RX ORDER — HYDROCODONE BITARTRATE AND ACETAMINOPHEN 10; 325 MG/1; MG/1
1 TABLET ORAL EVERY 4 HOURS PRN
Qty: 80 TABLET | Refills: 0 | Status: SHIPPED | OUTPATIENT
Start: 2018-08-14 | End: 2019-07-26 | Stop reason: ALTCHOICE

## 2018-08-14 NOTE — PROGRESS NOTES
[facility]  Progress Note    SUBJECTIVE:     Chief Complaint   Patient presents with    Hospital Follow Up       HPI   Zac Plunkett  is a 73 y.o. male here for hospital follow up, s/p L VILLA. He is doing well with physical therapy. He rates pain as moderate, but severe during therapy sessions. The norcos do help with the pain. Ambulating well. Certain positions do make it worst. He has some lower leg swelling.     Review of patient's allergies indicates:  No Known Allergies    Past Medical History:   Diagnosis Date    Anemia 8/7/2018    Cataract     Enlarged prostate     Gross hematuria     Hyperlipidemia     Hypertension     Kidney stone     Lumbar spondylosis     Lumbar spondylosis     PONV (postoperative nausea and vomiting)     Tobacco dependence      Past Surgical History:   Procedure Laterality Date    APPENDECTOMY      BACK SURGERY      laminectomy l4-l5       Family History   Problem Relation Age of Onset    Diabetes Mother     Heart disease Father     No Known Problems Sister     Cerebral palsy Brother     Epilepsy Brother     Melanoma Neg Hx     Psoriasis Neg Hx     Lupus Neg Hx     Eczema Neg Hx     Acne Neg Hx     Liver disease Neg Hx     Liver cancer Neg Hx     Cirrhosis Neg Hx     Colon polyps Neg Hx     Colon cancer Neg Hx      Social History     Tobacco Use    Smoking status: Current Every Day Smoker     Types: Cigars    Smokeless tobacco: Never Used    Tobacco comment: smokes cigars    Substance Use Topics    Alcohol use: No    Drug use: No        Review of Systems   Constitutional: Negative for chills, fever and malaise/fatigue.   HENT: Negative.    Respiratory: Negative.  Negative for cough and shortness of breath.    Cardiovascular: Negative.  Negative for chest pain.   Gastrointestinal: Negative.  Negative for abdominal pain, nausea and vomiting.   Genitourinary: Negative.    Musculoskeletal: Positive for joint pain (L hip pain).    Neurological: Negative for weakness and headaches.   All other systems reviewed and are negative.    OBJECTIVE:     Vitals:    08/14/18 1016   BP: 128/62   Pulse: 67   Temp: 98.1 °F (36.7 °C)     Body mass index is 24.71 kg/m².    Physical Exam   Constitutional: He is oriented to person, place, and time and well-developed, well-nourished, and in no distress. No distress.   HENT:   Head: Normocephalic and atraumatic.   Nose: Nose normal.   Eyes: Conjunctivae and EOM are normal.   Cardiovascular: Normal rate, regular rhythm and normal heart sounds. Exam reveals no gallop and no friction rub.   No murmur heard.  Pulmonary/Chest: Effort normal and breath sounds normal. No respiratory distress. He has no wheezes. He has no rales. He exhibits no tenderness.   Abdominal: Soft. Bowel sounds are normal. He exhibits no distension. There is no tenderness. There is no rebound.   Musculoskeletal: He exhibits edema (LLE, negative homans sign. non pitting. no erythema. no edema of RLE).   Decent ROM of L hip due to pain. Ambulating with stroller well.    Neurological: He is alert and oriented to person, place, and time.   Skin: Skin is warm. He is not diaphoretic.       Old records were reviewed. Labs and/or images were independently reviewed.    ASSESSMENT     1. Status post left hip replacement        PLAN:     Status post left hip replacement  -     HYDROcodone-acetaminophen (NORCO)  mg per tablet; Take 1 tablet by mouth every 4 (four) hours as needed for Pain.  Dispense: 80 tablet; Refill: 0  -     F/u with orthopedics. Continue with PT and home exercises.       RTC PRCARTER Waters MD  08/14/2018 10:53 AM

## 2018-09-25 ENCOUNTER — CLINICAL SUPPORT (OUTPATIENT)
Dept: FAMILY MEDICINE | Facility: CLINIC | Age: 73
End: 2018-09-25
Payer: MEDICARE

## 2018-09-25 DIAGNOSIS — Z23 NEED FOR PROPHYLACTIC VACCINATION AND INOCULATION AGAINST INFLUENZA: Primary | ICD-10-CM

## 2018-09-25 PROCEDURE — 90662 IIV NO PRSV INCREASED AG IM: CPT | Mod: PBBFAC,PO

## 2018-09-25 NOTE — PROGRESS NOTES
Flu immunization given,the patient tolerated the injection well. VIS sheets given, patient advised to wait 15 minutes before leaving the waiting area.

## 2018-09-26 ENCOUNTER — TELEPHONE (OUTPATIENT)
Dept: ADMINISTRATIVE | Facility: CLINIC | Age: 73
End: 2018-09-26

## 2018-10-01 NOTE — PATIENT INSTRUCTIONS
Influenza Virus Vaccine injection  What is this medicine?  INFLUENZA VIRUS VACCINE (in [facility]) helps to reduce the risk of getting influenza also known as the flu. The vaccine only helps protect you against some strains of the flu.  How should I use this medicine?  This vaccine is for injection into a muscle or under the skin. It is given by a health care professional.  A copy of Vaccine Information Statements will be given before each vaccination. Read this sheet carefully each time. The sheet may change frequently.  Talk to your healthcare provider to see which vaccines are right for you. Some vaccines should not be used in all age groups.  What side effects may I notice from receiving this medicine?  Side effects that you should report to your doctor or health care professional as soon as possible:  · allergic reactions like skin rash, itching or hives, swelling of the face, lips, or tongue  Side effects that usually do not require medical attention (report to your doctor or health care professional if they continue or are bothersome):  · fever  · headache  · muscle aches and pains  · pain, tenderness, redness, or swelling at the injection site  · tiredness  What may interact with this medicine?  · chemotherapy or radiation therapy  · medicines that lower your immune system like etanercept, anakinra, infliximab, and adalimumab  · medicines that treat or prevent blood clots like warfarin  · phenytoin  · steroid medicines like prednisone or cortisone  · theophylline  · vaccines  What if I miss a dose?  This does not apply.  Where should I keep my medicine?  The vaccine will be given by a health care professional in a clinic, pharmacy, doctor's office, or other health care setting. You will not be given vaccine doses to store at home.  What should I tell my health care provider before I take this medicine?  They need to know if you have any of these conditions:  · bleeding disorder like  hemophilia  · fever or infection  · Guillain-[place] syndrome or other neurological problems  · immune system problems  · infection with the human immunodeficiency virus (HIV) or AIDS  · low blood platelet counts  · multiple sclerosis  · an unusual or allergic reaction to influenza virus vaccine, latex, other medicines, foods, dyes, or preservatives. Different brands of vaccines contain different allergens. Some may contain latex or eggs. Talk to your doctor about your allergies to make sure that you get the right vaccine.  · pregnant or trying to get pregnant  · breast-feeding  What should I watch for while using this medicine?  Report any side effects that do not go away within 3 days to your doctor or health care professional. Call your health care provider if any unusual symptoms occur within 6 weeks of receiving this vaccine.  You may still catch the flu, but the illness is not usually as bad. You cannot get the flu from the vaccine. The vaccine will not protect against colds or other illnesses that may cause fever. The vaccine is needed every year.  NOTE:This sheet is a summary. It may not cover all possible information. If you have questions about this medicine, talk to your doctor, pharmacist, or health care provider. Copyright© 2017 [employer]

## 2018-11-30 ENCOUNTER — PES CALL (OUTPATIENT)
Dept: ADMINISTRATIVE | Facility: CLINIC | Age: 73
End: 2018-11-30

## 2018-12-04 ENCOUNTER — PATIENT MESSAGE (OUTPATIENT)
Dept: FAMILY MEDICINE | Facility: CLINIC | Age: 73
End: 2018-12-04

## 2018-12-11 DIAGNOSIS — E78.5 HYPERLIPIDEMIA, UNSPECIFIED HYPERLIPIDEMIA TYPE: ICD-10-CM

## 2018-12-11 DIAGNOSIS — N40.1 BENIGN NON-NODULAR PROSTATIC HYPERPLASIA WITH LOWER URINARY TRACT SYMPTOMS: ICD-10-CM

## 2018-12-11 RX ORDER — HYDROCHLOROTHIAZIDE 25 MG/1
25 TABLET ORAL DAILY
Qty: 90 TABLET | Refills: 3 | Status: SHIPPED | OUTPATIENT
Start: 2018-12-11 | End: 2020-01-14

## 2018-12-11 RX ORDER — FINASTERIDE 5 MG/1
5 TABLET, FILM COATED ORAL DAILY
Qty: 90 TABLET | Refills: 3 | Status: SHIPPED | OUTPATIENT
Start: 2018-12-11 | End: 2020-01-14

## 2018-12-11 RX ORDER — TAMSULOSIN HYDROCHLORIDE 0.4 MG/1
0.4 CAPSULE ORAL DAILY
Qty: 90 CAPSULE | Refills: 3 | Status: SHIPPED | OUTPATIENT
Start: 2018-12-11 | End: 2018-12-18 | Stop reason: SDUPTHER

## 2018-12-11 RX ORDER — ATORVASTATIN CALCIUM 80 MG/1
80 TABLET, FILM COATED ORAL DAILY
Qty: 90 TABLET | Refills: 3 | Status: SHIPPED | OUTPATIENT
Start: 2018-12-11 | End: 2020-01-14

## 2018-12-11 NOTE — TELEPHONE ENCOUNTER
----- Message from Danyelle Galaviz sent at 12/11/2018  2:46 PM CST -----  Contact: Gail Mccauley with [facility] called to check the status of patient's refill request.       atorvastatin (LIPITOR) 80 MG tablet  finasteride (PROSCAR) 5 mg tablet  hydroCHLOROthiazide (HYDRODIURIL) 25 MG tablet   tamsulosin (FLOMAX) 0.4 mg Cp24  naproxen (NAPROSYN) 500 MG tablet       [facility]

## 2018-12-18 DIAGNOSIS — M19.90 OSTEOARTHRITIS, UNSPECIFIED OSTEOARTHRITIS TYPE, UNSPECIFIED SITE: ICD-10-CM

## 2018-12-19 RX ORDER — TAMSULOSIN HYDROCHLORIDE 0.4 MG/1
0.4 CAPSULE ORAL DAILY
Qty: 90 CAPSULE | Refills: 3 | Status: SHIPPED | OUTPATIENT
Start: 2018-12-19 | End: 2020-03-16 | Stop reason: SDUPTHER

## 2018-12-19 RX ORDER — NAPROXEN 500 MG/1
500 TABLET ORAL 2 TIMES DAILY PRN
Qty: 60 TABLET | Refills: 6 | Status: SHIPPED | OUTPATIENT
Start: 2018-12-19 | End: 2020-02-13

## 2019-01-10 ENCOUNTER — PES CALL (OUTPATIENT)
Dept: ADMINISTRATIVE | Facility: CLINIC | Age: 74
End: 2019-01-10

## 2019-02-05 ENCOUNTER — OFFICE VISIT (OUTPATIENT)
Dept: FAMILY MEDICINE | Facility: CLINIC | Age: 74
End: 2019-02-05
Payer: MEDICARE

## 2019-02-05 VITALS
HEIGHT: 71 IN | WEIGHT: 178.56 LBS | OXYGEN SATURATION: 97 % | BODY MASS INDEX: 25 KG/M2 | SYSTOLIC BLOOD PRESSURE: 120 MMHG | HEART RATE: 52 BPM | TEMPERATURE: 98 F | DIASTOLIC BLOOD PRESSURE: 80 MMHG

## 2019-02-05 DIAGNOSIS — R09.89 PROMINENT AORTA: ICD-10-CM

## 2019-02-05 DIAGNOSIS — M79.661 PAIN AND SWELLING OF RIGHT LOWER LEG: Primary | ICD-10-CM

## 2019-02-05 DIAGNOSIS — M79.89 PAIN AND SWELLING OF RIGHT LOWER LEG: Primary | ICD-10-CM

## 2019-02-05 DIAGNOSIS — I10 HYPERTENSION, BENIGN: ICD-10-CM

## 2019-02-05 DIAGNOSIS — N40.0 BENIGN PROSTATIC HYPERPLASIA, UNSPECIFIED WHETHER LOWER URINARY TRACT SYMPTOMS PRESENT: ICD-10-CM

## 2019-02-05 DIAGNOSIS — E78.5 HYPERLIPIDEMIA, UNSPECIFIED HYPERLIPIDEMIA TYPE: ICD-10-CM

## 2019-02-05 DIAGNOSIS — Z72.0 TOBACCO ABUSE: ICD-10-CM

## 2019-02-05 PROCEDURE — 3074F PR MOST RECENT SYSTOLIC BLOOD PRESSURE < 130 MM HG: ICD-10-PCS | Mod: HCNC,CPTII,S$GLB, | Performed by: FAMILY MEDICINE

## 2019-02-05 PROCEDURE — 99214 OFFICE O/P EST MOD 30 MIN: CPT | Mod: HCNC,S$GLB,, | Performed by: FAMILY MEDICINE

## 2019-02-05 PROCEDURE — 99214 PR OFFICE/OUTPT VISIT, EST, LEVL IV, 30-39 MIN: ICD-10-PCS | Mod: HCNC,S$GLB,, | Performed by: FAMILY MEDICINE

## 2019-02-05 PROCEDURE — 3074F SYST BP LT 130 MM HG: CPT | Mod: HCNC,CPTII,S$GLB, | Performed by: FAMILY MEDICINE

## 2019-02-05 PROCEDURE — 3079F PR MOST RECENT DIASTOLIC BLOOD PRESSURE 80-89 MM HG: ICD-10-PCS | Mod: HCNC,CPTII,S$GLB, | Performed by: FAMILY MEDICINE

## 2019-02-05 PROCEDURE — 3079F DIAST BP 80-89 MM HG: CPT | Mod: HCNC,CPTII,S$GLB, | Performed by: FAMILY MEDICINE

## 2019-02-05 PROCEDURE — 1101F PR PT FALLS ASSESS DOC 0-1 FALLS W/OUT INJ PAST YR: ICD-10-PCS | Mod: HCNC,CPTII,S$GLB, | Performed by: FAMILY MEDICINE

## 2019-02-05 PROCEDURE — 99499 UNLISTED E&M SERVICE: CPT | Mod: S$GLB,,, | Performed by: FAMILY MEDICINE

## 2019-02-05 PROCEDURE — 99999 PR PBB SHADOW E&M-EST. PATIENT-LVL III: ICD-10-PCS | Mod: PBBFAC,HCNC,, | Performed by: FAMILY MEDICINE

## 2019-02-05 PROCEDURE — 99499 RISK ADDL DX/OHS AUDIT: ICD-10-PCS | Mod: S$GLB,,, | Performed by: FAMILY MEDICINE

## 2019-02-05 PROCEDURE — 1101F PT FALLS ASSESS-DOCD LE1/YR: CPT | Mod: HCNC,CPTII,S$GLB, | Performed by: FAMILY MEDICINE

## 2019-02-05 PROCEDURE — 99999 PR PBB SHADOW E&M-EST. PATIENT-LVL III: CPT | Mod: PBBFAC,HCNC,, | Performed by: FAMILY MEDICINE
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
## 2019-02-07 ENCOUNTER — ANCILLARY ORDERS (OUTPATIENT)
Dept: FAMILY MEDICINE | Facility: CLINIC | Age: 74
End: 2019-02-07

## 2019-02-07 DIAGNOSIS — M79.661 PAIN AND SWELLING OF RIGHT LOWER LEG: ICD-10-CM

## 2019-02-07 DIAGNOSIS — M79.89 PAIN AND SWELLING OF RIGHT LOWER LEG: ICD-10-CM

## 2019-05-22 ENCOUNTER — PES CALL (OUTPATIENT)
Dept: ADMINISTRATIVE | Facility: CLINIC | Age: 74
End: 2019-05-22

## 2019-06-28 ENCOUNTER — EXTERNAL HOME HEALTH (OUTPATIENT)
Dept: HOME HEALTH SERVICES | Facility: HOSPITAL | Age: 74
End: 2019-06-28

## 2019-07-24 ENCOUNTER — OFFICE VISIT (OUTPATIENT)
Dept: URGENT CARE | Facility: CLINIC | Age: 74
End: 2019-07-24
Payer: MEDICARE

## 2019-07-24 VITALS
HEART RATE: 61 BPM | BODY MASS INDEX: 24.22 KG/M2 | HEIGHT: 71 IN | OXYGEN SATURATION: 98 % | WEIGHT: 173 LBS | SYSTOLIC BLOOD PRESSURE: 148 MMHG | TEMPERATURE: 98 F | RESPIRATION RATE: 16 BRPM | DIASTOLIC BLOOD PRESSURE: 82 MMHG

## 2019-07-24 DIAGNOSIS — R05.9 COUGH: ICD-10-CM

## 2019-07-24 DIAGNOSIS — J30.9 ALLERGIC RHINITIS, UNSPECIFIED SEASONALITY, UNSPECIFIED TRIGGER: ICD-10-CM

## 2019-07-24 DIAGNOSIS — J20.9 ACUTE BRONCHITIS, UNSPECIFIED ORGANISM: Primary | ICD-10-CM

## 2019-07-24 DIAGNOSIS — R09.89 SCATTERED RHONCHI OF RIGHT LUNG: ICD-10-CM

## 2019-07-24 PROCEDURE — 71046 X-RAY EXAM CHEST 2 VIEWS: CPT | Mod: FY,S$GLB,, | Performed by: RADIOLOGY

## 2019-07-24 PROCEDURE — 3077F PR MOST RECENT SYSTOLIC BLOOD PRESSURE >= 140 MM HG: ICD-10-PCS | Mod: CPTII,S$GLB,, | Performed by: NURSE PRACTITIONER

## 2019-07-24 PROCEDURE — 99214 PR OFFICE/OUTPT VISIT, EST, LEVL IV, 30-39 MIN: ICD-10-PCS | Mod: S$GLB,,, | Performed by: NURSE PRACTITIONER

## 2019-07-24 PROCEDURE — 99499 RISK ADDL DX/OHS AUDIT: ICD-10-PCS | Mod: S$GLB,,, | Performed by: NURSE PRACTITIONER

## 2019-07-24 PROCEDURE — 1101F PT FALLS ASSESS-DOCD LE1/YR: CPT | Mod: CPTII,S$GLB,, | Performed by: NURSE PRACTITIONER

## 2019-07-24 PROCEDURE — 3079F DIAST BP 80-89 MM HG: CPT | Mod: CPTII,S$GLB,, | Performed by: NURSE PRACTITIONER

## 2019-07-24 PROCEDURE — 3077F SYST BP >= 140 MM HG: CPT | Mod: CPTII,S$GLB,, | Performed by: NURSE PRACTITIONER

## 2019-07-24 PROCEDURE — 71046 XR CHEST PA AND LATERAL: ICD-10-PCS | Mod: FY,S$GLB,, | Performed by: RADIOLOGY

## 2019-07-24 PROCEDURE — 99499 UNLISTED E&M SERVICE: CPT | Mod: S$GLB,,, | Performed by: NURSE PRACTITIONER

## 2019-07-24 PROCEDURE — 1101F PR PT FALLS ASSESS DOC 0-1 FALLS W/OUT INJ PAST YR: ICD-10-PCS | Mod: CPTII,S$GLB,, | Performed by: NURSE PRACTITIONER

## 2019-07-24 PROCEDURE — 99214 OFFICE O/P EST MOD 30 MIN: CPT | Mod: S$GLB,,, | Performed by: NURSE PRACTITIONER

## 2019-07-24 PROCEDURE — 3079F PR MOST RECENT DIASTOLIC BLOOD PRESSURE 80-89 MM HG: ICD-10-PCS | Mod: CPTII,S$GLB,, | Performed by: NURSE PRACTITIONER

## 2019-07-24 RX ORDER — AZITHROMYCIN 250 MG/1
TABLET, FILM COATED ORAL
Qty: 6 TABLET | Refills: 0 | Status: SHIPPED | OUTPATIENT
Start: 2019-07-24 | End: 2019-07-26 | Stop reason: ALTCHOICE

## 2019-07-24 RX ORDER — ALBUTEROL SULFATE 90 UG/1
2 AEROSOL, METERED RESPIRATORY (INHALATION) EVERY 6 HOURS PRN
Qty: 18 G | Refills: 0 | Status: SHIPPED | OUTPATIENT
Start: 2019-07-24 | End: 2021-05-27

## 2019-07-24 RX ORDER — PREDNISONE 10 MG/1
30 TABLET ORAL DAILY
Qty: 15 TABLET | Refills: 0 | Status: SHIPPED | OUTPATIENT
Start: 2019-07-24 | End: 2019-07-29

## 2019-07-24 RX ORDER — MINERAL OIL
180 ENEMA (ML) RECTAL DAILY
Qty: 30 TABLET | Refills: 1 | Status: SHIPPED | OUTPATIENT
Start: 2019-07-24 | End: 2021-05-27

## 2019-07-24 NOTE — PROGRESS NOTES
"Subjective:       Patient ID: Zac Plunkett is a 74 y.o. male.    Vitals:  height is 5' 11" (1.803 m) and weight is 78.5 kg (173 lb). His oral temperature is 98.3 °F (36.8 °C). His blood pressure is 148/82 (abnormal) and his pulse is 61. His respiration is 16 and oxygen saturation is 98%.     Chief Complaint: Cough    Patient stated that his wife was in the hospital and since then he has been having chest congestion and coughing.    Cough   This is a new problem. Episode onset: 6 days. The problem has been gradually worsening. The problem occurs every few minutes. The cough is productive of sputum. Pertinent negatives include no chills, ear pain, eye redness, fever, hemoptysis, myalgias, rash, sore throat, shortness of breath or wheezing. The symptoms are aggravated by lying down. Treatments tried: OTC cough medication. The treatment provided no relief.       Constitution: Positive for fatigue. Negative for chills, sweating and fever.   HENT: Positive for congestion. Negative for ear pain, sinus pain, sinus pressure, sore throat and voice change.    Neck: Negative for painful lymph nodes.   Eyes: Negative for eye redness.   Respiratory: Positive for cough. Negative for chest tightness, sputum production, bloody sputum, COPD, shortness of breath, stridor, wheezing and asthma.    Gastrointestinal: Negative for nausea and vomiting.   Musculoskeletal: Negative for muscle ache.   Skin: Negative for rash.   Allergic/Immunologic: Negative for seasonal allergies and asthma.   Hematologic/Lymphatic: Negative for swollen lymph nodes.       Objective:      Physical Exam   Constitutional: He is oriented to person, place, and time. He appears well-developed and well-nourished. He is cooperative.  Non-toxic appearance. He does not appear ill. No distress.   HENT:   Head: Normocephalic and atraumatic.   Right Ear: Hearing, tympanic membrane, external ear and ear canal normal.   Left Ear: Hearing, tympanic membrane, external " ear and ear canal normal.   Nose: Mucosal edema and rhinorrhea present. No nasal deformity. No epistaxis. Right sinus exhibits no maxillary sinus tenderness and no frontal sinus tenderness. Left sinus exhibits no maxillary sinus tenderness and no frontal sinus tenderness.   Mouth/Throat: Uvula is midline and mucous membranes are normal. No trismus in the jaw. Normal dentition. No uvula swelling. Posterior oropharyngeal erythema present.   Eyes: Conjunctivae and lids are normal. No scleral icterus.   Sclera clear bilat   Neck: Trachea normal, full passive range of motion without pain and phonation normal. Neck supple.   Cardiovascular: Normal rate, regular rhythm, normal heart sounds, intact distal pulses and normal pulses.   Pulmonary/Chest: Effort normal. No accessory muscle usage or stridor. No tachypnea and no bradypnea. No respiratory distress. He has no wheezes. He has rhonchi in the right middle field and the right lower field. He has no rales.   Abdominal: Soft. Normal appearance and bowel sounds are normal. He exhibits no distension. There is no tenderness.   Musculoskeletal: Normal range of motion. He exhibits no edema or deformity.   Neurological: He is alert and oriented to person, place, and time. He exhibits normal muscle tone. Coordination normal.   Skin: Skin is warm, dry and intact. He is not diaphoretic. No pallor.   Psychiatric: He has a normal mood and affect. His speech is normal and behavior is normal. Judgment and thought content normal. Cognition and memory are normal.   Nursing note and vitals reviewed.      X-ray Chest Pa And Lateral    Result Date: 7/24/2019  EXAMINATION: XR CHEST PA AND LATERAL CLINICAL HISTORY: Cough TECHNIQUE: PA and lateral views of the chest were performed. COMPARISON: Chest radiograph 05/09/2018, 02/06/2018, 11/16/2006 FINDINGS: Trachea and mediastinal structures are midline.  Cardiac silhouette pulmonary vascularity within normal limits within normal limits. No focal  consolidation, pneumothorax, or pleural effusion.  Increased interstitial markings as compared to prior radiograph 07/24/2018 this could be due to a image acquisition technique. No acute osseous abnormality     No acute cardiopulmonary process Electronically signed by resident: Roge Riley Date:    07/24/2019 Time:    10:38 Electronically signed by: Dilan Christianson MD Date:      07/24/2019 Time:    10:46      Assessment:       1. Acute bronchitis, unspecified organism    2. Cough    3. Scattered rhonchi of right lung    4. Allergic rhinitis, unspecified seasonality, unspecified trigger        Plan:         Acute bronchitis, unspecified organism  -     predniSONE (DELTASONE) 10 MG tablet; Take 3 tablets (30 mg total) by mouth once daily. for 5 days  Dispense: 15 tablet; Refill: 0  -     azithromycin (Z-PRICE) 250 MG tablet; Take 2 tablets by mouth on day 1; Take 1 tablet by mouth on days 2-5  Dispense: 6 tablet; Refill: 0  -     albuterol (VENTOLIN HFA) 90 mcg/actuation inhaler; Inhale 2 puffs into the lungs every 6 (six) hours as needed for Wheezing. Rescue  Dispense: 18 g; Refill: 0    Cough  -     X-Ray Chest PA And Lateral; Future; Expected date: 07/24/2019  -     predniSONE (DELTASONE) 10 MG tablet; Take 3 tablets (30 mg total) by mouth once daily. for 5 days  Dispense: 15 tablet; Refill: 0  -     azithromycin (Z-PRICE) 250 MG tablet; Take 2 tablets by mouth on day 1; Take 1 tablet by mouth on days 2-5  Dispense: 6 tablet; Refill: 0  -     albuterol (VENTOLIN HFA) 90 mcg/actuation inhaler; Inhale 2 puffs into the lungs every 6 (six) hours as needed for Wheezing. Rescue  Dispense: 18 g; Refill: 0    Scattered rhonchi of right lung  -     predniSONE (DELTASONE) 10 MG tablet; Take 3 tablets (30 mg total) by mouth once daily. for 5 days  Dispense: 15 tablet; Refill: 0  -     azithromycin (Z-PRICE) 250 MG tablet; Take 2 tablets by mouth on day 1; Take 1 tablet by mouth on days 2-5  Dispense: 6 tablet; Refill: 0  -      albuterol (VENTOLIN HFA) 90 mcg/actuation inhaler; Inhale 2 puffs into the lungs every 6 (six) hours as needed for Wheezing. Rescue  Dispense: 18 g; Refill: 0    Allergic rhinitis, unspecified seasonality, unspecified trigger  -     fexofenadine (ALLEGRA) 180 MG tablet; Take 1 tablet (180 mg total) by mouth once daily.  Dispense: 30 tablet; Refill: 1

## 2019-07-24 NOTE — PATIENT INSTRUCTIONS
Return to Urgent Care or go to ER if symptoms worsen or fail to improve.  Follow up with PCP as recommended for further management.     Continue flonase daily      Acute Bronchitis  Your healthcare provider has told you that you have acute bronchitis. Bronchitis is infection or inflammation of the bronchial tubes (airways in the lungs). Normally, air moves easily in and out of the airways. Bronchitis narrows the airways, making it harder for air to flow in and out of the lungs. This causes symptoms such as shortness of breath, coughing up yellow or green mucus, and wheezing. Bronchitis can be acute or chronic. Acute means the condition comes on quickly and goes away in a short time, usually within 3 to 10 days. Chronic means a condition lasts a long time and often comes back.    What causes acute bronchitis?  Acute bronchitis almost always starts as a viral respiratory infection, such as a cold or the flu. Certain factors make it more likely for a cold or flu to turn into bronchitis. These include being very young, being elderly, having a heart or lung problem, or having a weak immune system. Cigarette smoking also makes bronchitis more likely.  When bronchitis develops, the airways become swollen. The airways may also become infected with bacteria. This is known as a secondary infection.  Diagnosing acute bronchitis  Your healthcare provider will examine you and ask about your symptoms and health history. You may also have a sputum culture to test the fluid in your lungs. Chest X-rays may be done to look for infection in the lungs.  Treating acute bronchitis  Bronchitis usually clears up as the cold or flu goes away. You can help feel better faster by doing the following:  · Take medicine as directed. You may be told to take ibuprofen or other over-the-counter medicines. These help relieve inflammation in your bronchial tubes. Your healthcare provider may prescribe an inhaler to help open up the bronchial tubes.  Most of the time, acute bronchitis is caused by a viral infection. Antibiotics are usually not prescribed for viral infections.  · Drink plenty of fluids, such as water, juice, or warm soup. Fluids loosen mucus so that you can cough it up. This helps you breathe more easily. Fluids also prevent dehydration.  · Make sure you get plenty of rest.  · Do not smoke. Do not allow anyone else to smoke in your home.  Recovery and follow-up  Follow up with your doctor as you are told. You will likely feel better in a week or two. But a dry cough can linger beyond that time. Let your doctor know if you still have symptoms (other than a dry cough) after 2 weeks, or if youre prone to getting bronchial infections. Take steps to protect yourself from future infections. These steps include stopping smoking and avoiding tobacco smoke, washing your hands often, and getting a yearly flu shot.  When to call your healthcare provider  Call the healthcare provider if you have any of the following:  · Fever of 100.4°F (38.0°C) or higher, or as advised  · Symptoms that get worse, or new symptoms  · Trouble breathing  · Symptoms that dont start to improve within a week, or within 3 days of taking antibiotics   Date Last Reviewed: 12/1/2016  © 5649-2612 [employer] [address] [place] [address], [place] [address]. All rights reserved. This information is not intended as a substitute for professional medical care. Always follow your healthcare professional's instructions.

## 2019-07-26 ENCOUNTER — OFFICE VISIT (OUTPATIENT)
Dept: FAMILY MEDICINE | Facility: CLINIC | Age: 74
End: 2019-07-26
Payer: MEDICARE

## 2019-07-26 VITALS
HEIGHT: 71 IN | BODY MASS INDEX: 25.16 KG/M2 | WEIGHT: 179.69 LBS | DIASTOLIC BLOOD PRESSURE: 65 MMHG | HEART RATE: 49 BPM | SYSTOLIC BLOOD PRESSURE: 144 MMHG

## 2019-07-26 DIAGNOSIS — J40 BRONCHITIS: Primary | ICD-10-CM

## 2019-07-26 DIAGNOSIS — Z87.891 PERSONAL HISTORY OF NICOTINE DEPENDENCE: ICD-10-CM

## 2019-07-26 PROCEDURE — 99999 PR PBB SHADOW E&M-EST. PATIENT-LVL IV: ICD-10-PCS | Mod: PBBFAC,HCNC,, | Performed by: NURSE PRACTITIONER

## 2019-07-26 PROCEDURE — 1101F PT FALLS ASSESS-DOCD LE1/YR: CPT | Mod: HCNC,CPTII,S$GLB, | Performed by: NURSE PRACTITIONER

## 2019-07-26 PROCEDURE — 1101F PR PT FALLS ASSESS DOC 0-1 FALLS W/OUT INJ PAST YR: ICD-10-PCS | Mod: HCNC,CPTII,S$GLB, | Performed by: NURSE PRACTITIONER

## 2019-07-26 PROCEDURE — 99213 PR OFFICE/OUTPT VISIT, EST, LEVL III, 20-29 MIN: ICD-10-PCS | Mod: HCNC,S$GLB,, | Performed by: NURSE PRACTITIONER

## 2019-07-26 PROCEDURE — 3078F PR MOST RECENT DIASTOLIC BLOOD PRESSURE < 80 MM HG: ICD-10-PCS | Mod: HCNC,CPTII,S$GLB, | Performed by: NURSE PRACTITIONER

## 2019-07-26 PROCEDURE — 3077F PR MOST RECENT SYSTOLIC BLOOD PRESSURE >= 140 MM HG: ICD-10-PCS | Mod: HCNC,CPTII,S$GLB, | Performed by: NURSE PRACTITIONER

## 2019-07-26 PROCEDURE — 3077F SYST BP >= 140 MM HG: CPT | Mod: HCNC,CPTII,S$GLB, | Performed by: NURSE PRACTITIONER

## 2019-07-26 PROCEDURE — 99213 OFFICE O/P EST LOW 20 MIN: CPT | Mod: HCNC,S$GLB,, | Performed by: NURSE PRACTITIONER

## 2019-07-26 PROCEDURE — 99999 PR PBB SHADOW E&M-EST. PATIENT-LVL IV: CPT | Mod: PBBFAC,HCNC,, | Performed by: NURSE PRACTITIONER

## 2019-07-26 PROCEDURE — 3078F DIAST BP <80 MM HG: CPT | Mod: HCNC,CPTII,S$GLB, | Performed by: NURSE PRACTITIONER

## 2019-07-26 RX ORDER — DOXYCYCLINE 100 MG/1
100 CAPSULE ORAL EVERY 12 HOURS
Qty: 20 CAPSULE | Refills: 0 | Status: SHIPPED | OUTPATIENT
Start: 2019-07-26 | End: 2019-08-05

## 2019-07-26 NOTE — PATIENT INSTRUCTIONS
Bronchitis, Antibiotic Treatment (Adult)    Bronchitis is an infection of the air passages (bronchial tubes) in your lungs. It often occurs when you have a cold. This illness is contagious during the first few days and is spread through the air by coughing and sneezing, or by direct contact (touching the sick person and then touching your own eyes, nose, or mouth).  Symptoms of bronchitis include cough with mucus (phlegm) and low-grade fever. Bronchitis usually lasts 7 to 14 days. Mild cases can be treated with simple home remedies. More severe infection is treated with an antibiotic.  Home care  Follow these guidelines when caring for yourself at home:  · If your symptoms are severe, rest at home for the first 2 to 3 days. When you go back to your usual activities, don't let yourself get too tired.  · Do not smoke. Also avoid being exposed to secondhand smoke.  · You may use over-the-counter medicines to control fever or pain, unless another medicine was prescribed. (Note: If you have chronic liver or kidney disease or have ever had a stomach ulcer or gastrointestinal bleeding, talk with your healthcare provider before using these medicines. Also talk to your provider if you are taking medicine to prevent blood clots.) Aspirin should never be given to anyone younger than 18 years of age who is ill with a viral infection or fever. It may cause severe liver or brain damage.  · Your appetite may be poor, so a light diet is fine. Avoid dehydration by drinking 6 to 8 glasses of fluids per day (such as water, soft drinks, sports drinks, juices, tea, or soup). Extra fluids will help loosen secretions in the nose and lungs.  · Over-the-counter cough, cold, and sore-throat medicines will not shorten the length of the illness, but they may be helpful to reduce symptoms. (Note: Do not use decongestants if you have high blood pressure.)  · Finish all antibiotic medicine. Do this even if you are feeling better after only a  few days.  Follow-up care  Follow up with your healthcare provider, or as advised. If you had an X-ray or ECG (electrocardiogram), a specialist will review it. You will be notified of any new findings that may affect your care.  Note: If you are age 65 or older, or if you have a chronic lung disease or condition that affects your immune system, or you smoke, talk to your healthcare provider about having pneumococcal vaccinations and a yearly influenza vaccination (flu shot).  When to seek medical advice  Call your healthcare provider right away if any of these occur:  · Fever of 100.4°F (38°C) or higher  · Coughing up increased amounts of colored sputum  · Weakness, drowsiness, headache, facial pain, ear pain, or a stiff neck  Call 911, or get immediate medical care  Contact emergency services right away if any of these occur.  · Coughing up blood  · Worsening weakness, drowsiness, headache, or stiff neck  · Trouble breathing, wheezing, or pain with breathing  Date Last Reviewed: 9/13/2015  © 7141-9698 [employer] [address] [place] [address], [place] [address]. All rights reserved. This information is not intended as a substitute for professional medical care. Always follow your healthcare professional's instructions.

## 2019-07-26 NOTE — PROGRESS NOTES
Patient Name: Zac Plunkett    : 1945  MRN: [id number]    Subjective:  Zac is a 74 y.o. male who presents today for     1. Cough x 5 days , dry cough. Went to  2 days ago and started on zithromax with persistent symptoms. Reports dyspnea. CXR normal.    Past Medical History  Past Medical History:   Diagnosis Date    Anemia 2018    Cataract     Enlarged prostate     Gross hematuria     Hyperlipidemia     Hypertension     Kidney stone     Lumbar spondylosis     Lumbar spondylosis     PONV (postoperative nausea and vomiting)     Tobacco dependence        Past Surgical History  Past Surgical History:   Procedure Laterality Date    APPENDECTOMY      ARTHROPLASTY, HIP REPLACEMENT Left 2018    Performed by Chapincito Sagastume MD at [facility] OR    BACK SURGERY      COLONOSCOPY N/A 2015    Performed by Brady Clark MD at [facility] (4TH FLR)    laminectomy l4-l5         Family History  Family History   Problem Relation Age of Onset    Diabetes Mother     Heart disease Father     No Known Problems Sister     Cerebral palsy Brother     Epilepsy Brother     Melanoma Neg Hx     Psoriasis Neg Hx     Lupus Neg Hx     Eczema Neg Hx     Acne Neg Hx     Liver disease Neg Hx     Liver cancer Neg Hx     Cirrhosis Neg Hx     Colon polyps Neg Hx     Colon cancer Neg Hx        Social History  Social History     Socioeconomic History    Marital status:      Spouse name: Not on file    Number of children: Not on file    Years of education: Not on file    Highest education level: Not on file   Occupational History    Not on file   Social Needs    Financial resource strain: Not on file    Food insecurity:     Worry: Not on file     Inability: Not on file    Transportation needs:     Medical: Not on file     Non-medical: Not on file   Tobacco Use    Smoking status: Current Every Day Smoker     Years: 20.00     Types: Cigars    Smokeless tobacco: Never Used    Tobacco  comment: smokes cigars 2-3 a day, smoke cigarette for 15 years   Substance and Sexual Activity    Alcohol use: No    Drug use: No    Sexual activity: Yes     Partners: Female   Lifestyle    Physical activity:     Days per week: Not on file     Minutes per session: Not on file    Stress: Not on file   Relationships    Social connections:     Talks on phone: Not on file     Gets together: Not on file     Attends [employer] service: Not on file     Active member of club or organization: Not on file     Attends meetings of clubs or organizations: Not on file     Relationship status: Not on file   Other Topics Concern    Not on file   Social History Narrative    Not on file       Allergies  Review of patient's allergies indicates:  No Known Allergies -reviewed and updated      Medications  Reviewed and updated.   Current Outpatient Medications   Medication Sig Dispense Refill    albuterol (VENTOLIN HFA) 90 mcg/actuation inhaler Inhale 2 puffs into the lungs every 6 (six) hours as needed for Wheezing. Rescue 18 g 0    atorvastatin (LIPITOR) 80 MG tablet Take 1 tablet (80 mg total) by mouth once daily. 90 tablet 3    doxycycline (VIBRAMYCIN) 100 MG Cap Take 1 capsule (100 mg total) by mouth every 12 (twelve) hours. for 10 days 20 capsule 0    fexofenadine (ALLEGRA) 180 MG tablet Take 1 tablet (180 mg total) by mouth once daily. 30 tablet 1    finasteride (PROSCAR) 5 mg tablet Take 1 tablet (5 mg total) by mouth once daily. 90 tablet 3    fluticasone (FLONASE) 50 mcg/actuation nasal spray 1 spray (50 mcg total) by Each Nare route once daily. (Patient taking differently: 1 spray by Each Nare route daily as needed. ) 1 Bottle 2    hydroCHLOROthiazide (HYDRODIURIL) 25 MG tablet Take 1 tablet (25 mg total) by mouth once daily. 90 tablet 3    hydrocortisone 2.5 % cream Apply topically daily as needed.       ketoconazole (NIZORAL) 2 % cream Apply topically as needed.       multivitamin with minerals tablet Take 1  "tablet by mouth once daily.      naproxen (NAPROSYN) 500 MG tablet Take 1 tablet (500 mg total) by mouth 2 (two) times daily as needed (pain). 60 tablet 6    predniSONE (DELTASONE) 10 MG tablet Take 3 tablets (30 mg total) by mouth once daily. for 5 days 15 tablet 0    tamsulosin (FLOMAX) 0.4 mg Cap Take 1 capsule (0.4 mg total) by mouth once daily. 90 capsule 3     No current facility-administered medications for this visit.          Review of Systems   Constitutional: Positive for malaise/fatigue. Negative for chills and fever.   HENT: Negative for congestion.    Respiratory: Positive for cough. Negative for shortness of breath and wheezing.    Cardiovascular: Negative for chest pain.   Gastrointestinal: Negative for heartburn.         Physical Exam  Blood Pressure (Abnormal) 144/65 (BP Location: Left arm, Patient Position: Sitting, BP Method: Large (Automatic))   Pulse (Abnormal) 49   Height 5' 11" (1.803 m)   Weight 81.5 kg (179 lb 10.8 oz)   Body Mass Index 25.06 kg/m²   Physical Exam   Constitutional: He is oriented to person, place, and time. He appears well-developed. No distress.   HENT:   Head: Normocephalic.   Nose: Nose normal.   Mouth/Throat: Oropharynx is clear and moist.   Eyes: Conjunctivae and EOM are normal.   Neck: Neck supple.   Cardiovascular: Normal rate and regular rhythm.   Pulmonary/Chest: Effort normal. He has wheezes ( rhonchi).   Neurological: He is alert and oriented to person, place, and time.   Skin: He is not diaphoretic.   Psychiatric: He has a normal mood and affect. His behavior is normal. Judgment and thought content normal.         Assessment/Plan:  Zac Plunkett is a 74 y.o. male who presents today for :    Bronchitis  Suspect developing pneumonia, addition doxycycline to regimen, recommend PFT/CT chest  1 month following symptom resolution to assess for chronic obstruction/restriction/cancver given history tobacco use.  smoking cessation  -     doxycycline " (VIBRAMYCIN) 100 MG Cap; Take 1 capsule (100 mg total) by mouth every 12 (twelve) hours. for 10 days  Dispense: 20 capsule; Refill: 0    -     Complete PFT with bronchodilator; Future    Personal history of nicotine dependence  -     CT Chest Lung Screening Low Dose; Future; Expected date: 07/26/2019          Follow up if symptoms worsen or fail to improve 3-5 days .

## 2019-07-27 ENCOUNTER — TELEPHONE (OUTPATIENT)
Dept: URGENT CARE | Facility: CLINIC | Age: 74
End: 2019-07-27

## 2019-09-28 ENCOUNTER — OFFICE VISIT (OUTPATIENT)
Dept: URGENT CARE | Facility: CLINIC | Age: 74
End: 2019-09-28
Payer: MEDICARE

## 2019-09-28 VITALS
HEART RATE: 65 BPM | WEIGHT: 175 LBS | RESPIRATION RATE: 17 BRPM | OXYGEN SATURATION: 99 % | SYSTOLIC BLOOD PRESSURE: 138 MMHG | TEMPERATURE: 98 F | DIASTOLIC BLOOD PRESSURE: 72 MMHG | BODY MASS INDEX: 24.5 KG/M2 | HEIGHT: 71 IN

## 2019-09-28 DIAGNOSIS — S49.92XA INJURY OF LEFT SHOULDER, INITIAL ENCOUNTER: ICD-10-CM

## 2019-09-28 DIAGNOSIS — S41.102A OPEN WOUND OF LEFT UPPER ARM, INITIAL ENCOUNTER: ICD-10-CM

## 2019-09-28 DIAGNOSIS — M79.672 LEFT FOOT PAIN: Primary | ICD-10-CM

## 2019-09-28 DIAGNOSIS — R52 PAIN: ICD-10-CM

## 2019-09-28 PROCEDURE — 3078F PR MOST RECENT DIASTOLIC BLOOD PRESSURE < 80 MM HG: ICD-10-PCS | Mod: CPTII,S$GLB,, | Performed by: NURSE PRACTITIONER

## 2019-09-28 PROCEDURE — 3078F DIAST BP <80 MM HG: CPT | Mod: CPTII,S$GLB,, | Performed by: NURSE PRACTITIONER

## 2019-09-28 PROCEDURE — 73630 X-RAY EXAM OF FOOT: CPT | Mod: FY,LT,S$GLB, | Performed by: RADIOLOGY

## 2019-09-28 PROCEDURE — 3075F PR MOST RECENT SYSTOLIC BLOOD PRESS GE 130-139MM HG: ICD-10-PCS | Mod: CPTII,S$GLB,, | Performed by: NURSE PRACTITIONER

## 2019-09-28 PROCEDURE — 3075F SYST BP GE 130 - 139MM HG: CPT | Mod: CPTII,S$GLB,, | Performed by: NURSE PRACTITIONER

## 2019-09-28 PROCEDURE — 99214 OFFICE O/P EST MOD 30 MIN: CPT | Mod: S$GLB,,, | Performed by: NURSE PRACTITIONER

## 2019-09-28 PROCEDURE — 73030 X-RAY EXAM OF SHOULDER: CPT | Mod: FY,LT,S$GLB, | Performed by: RADIOLOGY

## 2019-09-28 PROCEDURE — 1101F PR PT FALLS ASSESS DOC 0-1 FALLS W/OUT INJ PAST YR: ICD-10-PCS | Mod: CPTII,S$GLB,, | Performed by: NURSE PRACTITIONER

## 2019-09-28 PROCEDURE — 99214 PR OFFICE/OUTPT VISIT, EST, LEVL IV, 30-39 MIN: ICD-10-PCS | Mod: S$GLB,,, | Performed by: NURSE PRACTITIONER

## 2019-09-28 PROCEDURE — 1101F PT FALLS ASSESS-DOCD LE1/YR: CPT | Mod: CPTII,S$GLB,, | Performed by: NURSE PRACTITIONER

## 2019-09-28 PROCEDURE — 73030 XR SHOULDER TRAUMA 3 VIEW LEFT: ICD-10-PCS | Mod: FY,LT,S$GLB, | Performed by: RADIOLOGY

## 2019-09-28 PROCEDURE — 73630 XR FOOT COMPLETE 3 VIEW LEFT: ICD-10-PCS | Mod: FY,LT,S$GLB, | Performed by: RADIOLOGY

## 2019-09-28 RX ORDER — CYCLOBENZAPRINE HCL 5 MG
5 TABLET ORAL 3 TIMES DAILY PRN
Qty: 20 TABLET | Refills: 0 | Status: SHIPPED | OUTPATIENT
Start: 2019-09-28 | End: 2019-10-05

## 2019-09-28 RX ORDER — MUPIROCIN 20 MG/G
OINTMENT TOPICAL 3 TIMES DAILY
Qty: 1 TUBE | Refills: 0 | Status: SHIPPED | OUTPATIENT
Start: 2019-09-28 | End: 2019-10-05

## 2019-09-28 RX ORDER — MELOXICAM 15 MG/1
15 TABLET ORAL DAILY
Qty: 14 TABLET | Refills: 0 | Status: SHIPPED | OUTPATIENT
Start: 2019-09-28 | End: 2019-10-12

## 2019-09-28 NOTE — PROGRESS NOTES
"Subjective:       Patient ID: Zac Plunkett is a 74 y.o. male.    Vitals:  height is 5' 11" (1.803 m) and weight is 79.4 kg (175 lb). His oral temperature is 98.2 °F (36.8 °C). His blood pressure is 138/72 and his pulse is 65. His respiration is 17 and oxygen saturation is 99%.     Chief Complaint: Foot Injury    Patient presents today with left foot injury he got two days ago when he was walking down the steps and missed a step. Patient complains of foot swelling and aching . Patient states he is able to bear weight on it .     Foot Injury    The incident occurred 2 days ago. The incident occurred at home. The injury mechanism was a fall. The pain is present in the left foot. The quality of the pain is described as aching. The pain is at a severity of 7/10. The pain is moderate. He reports no foreign bodies present. He has tried acetaminophen for the symptoms. The treatment provided mild relief.       Constitution: Negative for fatigue.   HENT: Negative for facial swelling and facial trauma.    Neck: Negative for neck stiffness.   Cardiovascular: Negative for chest trauma.   Eyes: Negative for eye trauma, double vision and blurred vision.   Gastrointestinal: Negative for abdominal trauma, abdominal pain and rectal bleeding.   Genitourinary: Negative for hematuria, genital trauma and pelvic pain.   Musculoskeletal: Positive for pain, joint swelling and pain with walking. Negative for trauma and abnormal ROM of joint.   Skin: Negative for color change, wound, abrasion and laceration.   Neurological: Negative for dizziness, history of vertigo, light-headedness, coordination disturbances, altered mental status and loss of consciousness.   Hematologic/Lymphatic: Negative for history of bleeding disorder.   Psychiatric/Behavioral: Negative for altered mental status.       Objective:      Physical Exam   Constitutional: He is oriented to person, place, and time. Vital signs are normal. He appears well-developed and " well-nourished. He is active and cooperative. No distress.   HENT:   Head: Normocephalic and atraumatic.   Nose: Nose normal.   Mouth/Throat: Oropharynx is clear and moist and mucous membranes are normal.   Eyes: Conjunctivae and lids are normal.   Neck: Trachea normal, normal range of motion, full passive range of motion without pain and phonation normal. Neck supple.   Cardiovascular: Normal rate, regular rhythm, normal heart sounds, intact distal pulses and normal pulses.   Pulmonary/Chest: Effort normal and breath sounds normal.   Abdominal: Soft. Normal appearance and bowel sounds are normal. He exhibits no abdominal bruit, no pulsatile midline mass and no mass.   Musculoskeletal: He exhibits no edema or deformity.        Left shoulder: He exhibits tenderness, bony tenderness and laceration (minor erythema).        Left foot: There is decreased range of motion (pain with ROM), tenderness, bony tenderness and swelling (lateral malleolus).        Feet:    Neurological: He is alert and oriented to person, place, and time. He has normal strength and normal reflexes. No sensory deficit.   Skin: Skin is warm, dry and intact. He is not diaphoretic.   Psychiatric: He has a normal mood and affect. His speech is normal and behavior is normal. Judgment and thought content normal. Cognition and memory are normal.   Nursing note and vitals reviewed.      Assessment:       1. Foot injury, left, initial encounter    2. Injury of left shoulder, initial encounter    3. Open wound of left upper arm, initial encounter    4. Pain        Plan:         Foot injury, left, initial encounter  -     X-Ray Foot Complete 3 view Left; Future; Expected date: 09/28/2019  -     NON-PNEUMATIC WALKING BOOT FOR HOME USE  -     Ambulatory referral/consult to Orthopedics    Injury of left shoulder, initial encounter  -     X-Ray Shoulder Trauma 3 view Left; Future; Expected date: 09/28/2019    Open wound of left upper arm, initial encounter  -      mupirocin (BACTROBAN) 2 % ointment; Apply topically 3 (three) times daily. for 7 days  Dispense: 1 Tube; Refill: 0    Pain  -     cyclobenzaprine (FLEXERIL) 5 MG tablet; Take 1 tablet (5 mg total) by mouth 3 (three) times daily as needed for Muscle spasms (1-2 tablets).  Dispense: 20 tablet; Refill: 0  -     meloxicam (MOBIC) 15 MG tablet; Take 1 tablet (15 mg total) by mouth once daily. for 14 days  Dispense: 14 tablet; Refill: 0      Patient Instructions   Always follow your healthcare professional's instructions.    You have received urgent care diagnosis and treatment and you may be released before all of your medical problems are known or treated. Unless you have been given a referral, you (the patient), will arrange for follow-up care as instructed.     If you have been given a referral,  will contact you (their phone number is [phone]). If they do NOT call you by the end of the business day, please call them the following day.    ORTHO    R.I.C.E. to the affected joint or limb as needed:    Rest- the injured or sore extremity, this includes the use of an immobilization device you may have been given in clinic.  Ice- for the next 24-48 hours, alternating 20 minutes on and 20 minutes off as needed up to 3 times a day. Dont use ice packs on the left shoulder if you have a heart condition, and dont use ice packs around the front or side of the neck. Heat treatments should be used for chronic/older conditions to help relax and loosen tissues and to stimulate blood flow to the area. Use heat treatments for conditions such as overuse injuries before participating in activities.  When using heat treatments, be very careful to use a moderate heat for a limited time to avoid burns. Never leave heating pads or towels on for extended periods of time or while sleeping.  Compression-Use an ACE wrap to provide compression to the injured extremity. Copper-infused compression garments are available  over-the-counter at [employer], [employer], and other drug stores and may provide just the right amount of compression and reduce the likeliness of having to frequently adjust a bandage for comfort. Check circulation to make sure your bandage or compression device is not too tight.    Elevate-when possible to reduce swelling.      Topical relief: Tiger Balm may be used as directed on the label. Wash your hands before and after applying this medicine (do not get this medication in your eyes). For your first use, apply only to a small skin area to test how your skin reacts to the medicine. Tiger Balm contains camphor and menthol and this can cause a burning or cold sensation, which is usually mild and should lessen over time with continued use. If this sensation causes significant discomfort, wash the skin with soap and water.    Epsom salt: In water, Epsom salt breaks down into magnesium and sulfate. The theory is that when you soak in an Epsom salt bath, these get into your body through your skin. That hasn't been proven, but just soaking in warm water can help relax muscles and loosen stiff joints.    Please follow up with orthopedics as instructed. You have been given a referral and a CD of your images should you provider not have Epic access.  should call you, if they do not, please call [phone].    Please return here or go to the Emergency Department for any concerns or worsening of condition.  If you were given a splint wear it at all times unless otherwise instructed.     If you were given crutches use them as we instructed. Do not rest your armpits on the foam pad or you risk compressing the nerves and the vessels there.    If you have been given a referral to orthopedics, I will NOT release you from restrictions for work or school duties. Orthopedics must clear you for full use if you have been referred-this is to protect and preserve your full use/function of that extremity/joint in the  future.    If you lose control of your bowel and/or bladder, please go to the nearest Emergency Department immediately.  If you lose sensation in between your legs by your genitalia and/or rectum, please go to the nearest Emergency Department immediately.  If you lose control or sensation of any extremity, please go to the nearest Emergency Department immediately.    You MAY have been given some of the following medications:    Narcotic pain medications: Please be aware that narcotics can be addictive. If I gave you narcotics, I have given you a limited quantity to take as it is needed at this time. However take it sparingly and only when needed. Do not operate machinery or drive on this medication.      Medrol Dose Pack is a steroid dose pack that prevents the release of substances in the body that cause inflammation. This medicine may cause elevated blood sugar and blood pressure and can cause unusual results with certain medical tests. If you have received this medication and are diabetic or have hypertension, it is because it has been determined that the benefits outweigh the risks associated with medication administration. Tell any doctor who treats you in the near future that you have been given methylprednisolone.    Mobic is s known as a nonsteroidal anti-inflammatory drug (NSAID), it is used to treat inflammation. It reduces pain, swelling, and stiffness of the joints. Please do NOT take any other NSAID medications while on this medication, you can take Tylenol if you feel the need. If you were not prescribed an anti-inflammatory medication, and if you do not have any history of stomach/intestinal ulcers, or kidney disease, or are not taking a blood thinner such as Coumadin, Plavix, Pradaxa, Eloquis, or Xaralta for example, it is OK to take over the counter Ibuprofen or Advil or Motrin or Aleve as directed.  Do not take any of these medications on an empty stomach.            Your provider discussed your  plan of care with you during your physical exam. It was reviewed once more by the provider when giving you an after visit summary. If the patient is a minor, the discharge instructions were discussed with an adult and that adult acknowledge their understanding of the provider's teaching.

## 2019-09-28 NOTE — PATIENT INSTRUCTIONS
Always follow your healthcare professional's instructions.    You have received urgent care diagnosis and treatment and you may be released before all of your medical problems are known or treated. Unless you have been given a referral, you (the patient), will arrange for follow-up care as instructed.     If you have been given a referral, [facility] will contact you (their phone number is [phone]). If they do NOT call you by the end of the business day, please call them the following day.    ORTHO    R.I.C.E. to the affected joint or limb as needed:    Rest- the injured or sore extremity, this includes the use of an immobilization device you may have been given in clinic.  Ice- for the next 24-48 hours, alternating 20 minutes on and 20 minutes off as needed up to 3 times a day. Dont use ice packs on the left shoulder if you have a heart condition, and dont use ice packs around the front or side of the neck. Heat treatments should be used for chronic/older conditions to help relax and loosen tissues and to stimulate blood flow to the area. Use heat treatments for conditions such as overuse injuries before participating in activities.  When using heat treatments, be very careful to use a moderate heat for a limited time to avoid burns. Never leave heating pads or towels on for extended periods of time or while sleeping.  Compression-Use an ACE wrap to provide compression to the injured extremity. Copper-infused compression garments are available over-the-counter at [employer], and other drug stores and may provide just the right amount of compression and reduce the likeliness of having to frequently adjust a bandage for comfort. Check circulation to make sure your bandage or compression device is not too tight.    Elevate-when possible to reduce swelling.      Topical relief: Tiger Balm may be used as directed on the label. Wash your hands before and after applying this medicine (do not get this  medication in your eyes). For your first use, apply only to a small skin area to test how your skin reacts to the medicine. Tiger Balm contains camphor and menthol and this can cause a burning or cold sensation, which is usually mild and should lessen over time with continued use. If this sensation causes significant discomfort, wash the skin with soap and water.    Epsom salt: In water, Epsom salt breaks down into magnesium and sulfate. The theory is that when you soak in an Epsom salt bath, these get into your body through your skin. That hasn't been proven, but just soaking in warm water can help relax muscles and loosen stiff joints.    Please follow up with orthopedics as instructed. You have been given a referral and a CD of your images should you provider not have Epic access.  should call you, if they do not, please call [phone].    Please return here or go to the Emergency Department for any concerns or worsening of condition.  If you were given a splint wear it at all times unless otherwise instructed.     If you were given crutches use them as we instructed. Do not rest your armpits on the foam pad or you risk compressing the nerves and the vessels there.    If you have been given a referral to orthopedics, I will NOT release you from restrictions for work or school duties. Orthopedics must clear you for full use if you have been referred-this is to protect and preserve your full use/function of that extremity/joint in the future.    If you lose control of your bowel and/or bladder, please go to the nearest Emergency Department immediately.  If you lose sensation in between your legs by your genitalia and/or rectum, please go to the nearest Emergency Department immediately.  If you lose control or sensation of any extremity, please go to the nearest Emergency Department immediately.    You MAY have been given some of the following medications:    Narcotic pain medications: Please be aware  that narcotics can be addictive. If I gave you narcotics, I have given you a limited quantity to take as it is needed at this time. However take it sparingly and only when needed. Do not operate machinery or drive on this medication.      Medrol Dose Pack is a steroid dose pack that prevents the release of substances in the body that cause inflammation. This medicine may cause elevated blood sugar and blood pressure and can cause unusual results with certain medical tests. If you have received this medication and are diabetic or have hypertension, it is because it has been determined that the benefits outweigh the risks associated with medication administration. Tell any doctor who treats you in the near future that you have been given methylprednisolone.    Mobic is s known as a nonsteroidal anti-inflammatory drug (NSAID), it is used to treat inflammation. It reduces pain, swelling, and stiffness of the joints. Please do NOT take any other NSAID medications while on this medication, you can take Tylenol if you feel the need. If you were not prescribed an anti-inflammatory medication, and if you do not have any history of stomach/intestinal ulcers, or kidney disease, or are not taking a blood thinner such as Coumadin, Plavix, Pradaxa, Eloquis, or Xaralta for example, it is OK to take over the counter Ibuprofen or Advil or Motrin or Aleve as directed.  Do not take any of these medications on an empty stomach.            Your provider discussed your plan of care with you during your physical exam. It was reviewed once more by the provider when giving you an after visit summary. If the patient is a minor, the discharge instructions were discussed with an adult and that adult acknowledge their understanding of the provider's teaching.

## 2019-10-01 ENCOUNTER — TELEPHONE (OUTPATIENT)
Dept: URGENT CARE | Facility: CLINIC | Age: 74
End: 2019-10-01

## 2020-01-06 DIAGNOSIS — I10 HYPERTENSION, BENIGN: ICD-10-CM

## 2020-01-13 DIAGNOSIS — E78.5 HYPERLIPIDEMIA, UNSPECIFIED HYPERLIPIDEMIA TYPE: ICD-10-CM

## 2020-01-13 DIAGNOSIS — N40.1 BENIGN NON-NODULAR PROSTATIC HYPERPLASIA WITH LOWER URINARY TRACT SYMPTOMS: ICD-10-CM

## 2020-01-14 RX ORDER — FINASTERIDE 5 MG/1
TABLET, FILM COATED ORAL
Qty: 90 TABLET | Refills: 0 | Status: SHIPPED | OUTPATIENT
Start: 2020-01-14 | End: 2020-03-15

## 2020-01-14 RX ORDER — ATORVASTATIN CALCIUM 80 MG/1
TABLET, FILM COATED ORAL
Qty: 90 TABLET | Refills: 0 | Status: SHIPPED | OUTPATIENT
Start: 2020-01-14 | End: 2020-03-15

## 2020-01-14 RX ORDER — HYDROCHLOROTHIAZIDE 25 MG/1
TABLET ORAL
Qty: 90 TABLET | Refills: 0 | Status: SHIPPED | OUTPATIENT
Start: 2020-01-14 | End: 2020-03-15

## 2020-02-13 ENCOUNTER — OFFICE VISIT (OUTPATIENT)
Dept: FAMILY MEDICINE | Facility: CLINIC | Age: 75
End: 2020-02-13
Payer: MEDICARE

## 2020-02-13 ENCOUNTER — LAB VISIT (OUTPATIENT)
Dept: LAB | Facility: HOSPITAL | Age: 75
End: 2020-02-13
Payer: MEDICARE

## 2020-02-13 VITALS
HEART RATE: 66 BPM | DIASTOLIC BLOOD PRESSURE: 60 MMHG | HEIGHT: 71 IN | OXYGEN SATURATION: 96 % | SYSTOLIC BLOOD PRESSURE: 127 MMHG | TEMPERATURE: 99 F | BODY MASS INDEX: 25.71 KG/M2 | WEIGHT: 183.63 LBS

## 2020-02-13 DIAGNOSIS — M19.90 OSTEOARTHRITIS, UNSPECIFIED OSTEOARTHRITIS TYPE, UNSPECIFIED SITE: ICD-10-CM

## 2020-02-13 DIAGNOSIS — M47.816 LUMBAR SPONDYLOSIS: ICD-10-CM

## 2020-02-13 DIAGNOSIS — R09.89 PROMINENT AORTA: ICD-10-CM

## 2020-02-13 DIAGNOSIS — M19.90 OSTEOARTHRITIS, UNSPECIFIED OSTEOARTHRITIS TYPE, UNSPECIFIED SITE: Primary | ICD-10-CM

## 2020-02-13 DIAGNOSIS — M16.12 OSTEOARTHRITIS OF LEFT HIP, UNSPECIFIED OSTEOARTHRITIS TYPE: ICD-10-CM

## 2020-02-13 DIAGNOSIS — Z12.5 PROSTATE CANCER SCREENING: ICD-10-CM

## 2020-02-13 DIAGNOSIS — Z72.0 TOBACCO ABUSE: ICD-10-CM

## 2020-02-13 DIAGNOSIS — I10 HYPERTENSION, BENIGN: ICD-10-CM

## 2020-02-13 PROCEDURE — 99499 UNLISTED E&M SERVICE: CPT | Mod: HCNC,S$GLB,, | Performed by: NURSE PRACTITIONER

## 2020-02-13 PROCEDURE — 3074F SYST BP LT 130 MM HG: CPT | Mod: HCNC,CPTII,S$GLB, | Performed by: NURSE PRACTITIONER

## 2020-02-13 PROCEDURE — 1126F AMNT PAIN NOTED NONE PRSNT: CPT | Mod: HCNC,S$GLB,, | Performed by: NURSE PRACTITIONER

## 2020-02-13 PROCEDURE — 99214 OFFICE O/P EST MOD 30 MIN: CPT | Mod: HCNC,S$GLB,, | Performed by: NURSE PRACTITIONER

## 2020-02-13 PROCEDURE — 99999 PR PBB SHADOW E&M-EST. PATIENT-LVL IV: ICD-10-PCS | Mod: PBBFAC,HCNC,, | Performed by: NURSE PRACTITIONER

## 2020-02-13 PROCEDURE — 1159F MED LIST DOCD IN RCRD: CPT | Mod: HCNC,S$GLB,, | Performed by: NURSE PRACTITIONER

## 2020-02-13 PROCEDURE — 99499 RISK ADDL DX/OHS AUDIT: ICD-10-PCS | Mod: HCNC,S$GLB,, | Performed by: NURSE PRACTITIONER

## 2020-02-13 PROCEDURE — 3078F DIAST BP <80 MM HG: CPT | Mod: HCNC,CPTII,S$GLB, | Performed by: NURSE PRACTITIONER

## 2020-02-13 PROCEDURE — 3078F PR MOST RECENT DIASTOLIC BLOOD PRESSURE < 80 MM HG: ICD-10-PCS | Mod: HCNC,CPTII,S$GLB, | Performed by: NURSE PRACTITIONER

## 2020-02-13 PROCEDURE — 36415 COLL VENOUS BLD VENIPUNCTURE: CPT | Mod: HCNC,PO

## 2020-02-13 PROCEDURE — 1101F PR PT FALLS ASSESS DOC 0-1 FALLS W/OUT INJ PAST YR: ICD-10-PCS | Mod: HCNC,CPTII,S$GLB, | Performed by: NURSE PRACTITIONER

## 2020-02-13 PROCEDURE — 1126F PR PAIN SEVERITY QUANTIFIED, NO PAIN PRESENT: ICD-10-PCS | Mod: HCNC,S$GLB,, | Performed by: NURSE PRACTITIONER

## 2020-02-13 PROCEDURE — 99214 PR OFFICE/OUTPT VISIT, EST, LEVL IV, 30-39 MIN: ICD-10-PCS | Mod: HCNC,S$GLB,, | Performed by: NURSE PRACTITIONER

## 2020-02-13 PROCEDURE — 3074F PR MOST RECENT SYSTOLIC BLOOD PRESSURE < 130 MM HG: ICD-10-PCS | Mod: HCNC,CPTII,S$GLB, | Performed by: NURSE PRACTITIONER

## 2020-02-13 PROCEDURE — 1101F PT FALLS ASSESS-DOCD LE1/YR: CPT | Mod: HCNC,CPTII,S$GLB, | Performed by: NURSE PRACTITIONER

## 2020-02-13 PROCEDURE — 99999 PR PBB SHADOW E&M-EST. PATIENT-LVL IV: CPT | Mod: PBBFAC,HCNC,, | Performed by: NURSE PRACTITIONER

## 2020-02-13 PROCEDURE — 1159F PR MEDICATION LIST DOCUMENTED IN MEDICAL RECORD: ICD-10-PCS | Mod: HCNC,S$GLB,, | Performed by: NURSE PRACTITIONER

## 2020-02-13 PROCEDURE — 84153 ASSAY OF PSA TOTAL: CPT | Mod: HCNC

## 2020-02-13 RX ORDER — NAPROXEN 500 MG/1
500 TABLET ORAL 2 TIMES DAILY PRN
Qty: 180 TABLET | Refills: 0 | Status: SHIPPED | OUTPATIENT
Start: 2020-02-13 | End: 2020-05-12

## 2020-02-13 RX ORDER — NAPROXEN 500 MG/1
TABLET ORAL
Qty: 60 TABLET | Refills: 0 | Status: SHIPPED | OUTPATIENT
Start: 2020-02-13 | End: 2020-02-13 | Stop reason: SDUPTHER

## 2020-02-13 NOTE — PROGRESS NOTES
Subjective:       Patient ID: Zac Plunkett is a 74 y.o. male.    Chief Complaint: Hypertension (medication refills) and Hyperlipidemia    75 yo male presents for medication refill. He has been off his naproxen for some time now and reports more arthritic pain. He was taking aleve without pain relief. He reports his pain is from arthritis. He has no other complaints at this time. He reports his blood pressures has been normal at home. He denies chest pain, dizziness, headache or shortness of breath.       Past Medical History:   Diagnosis Date    Anemia 8/7/2018    Cataract     Enlarged prostate     Gross hematuria     Hyperlipidemia     Hypertension     Kidney stone     Lumbar spondylosis     Lumbar spondylosis     PONV (postoperative nausea and vomiting)     Tobacco dependence        Social History     Socioeconomic History    Marital status:      Spouse name: Not on file    Number of children: Not on file    Years of education: Not on file    Highest education level: Not on file   Occupational History    Not on file   Social Needs    Financial resource strain: Not on file    Food insecurity:     Worry: Not on file     Inability: Not on file    Transportation needs:     Medical: Not on file     Non-medical: Not on file   Tobacco Use    Smoking status: Current Every Day Smoker     Years: 20.00     Types: Cigars    Smokeless tobacco: Never Used    Tobacco comment: smokes cigars 2-3 a day, smoke cigarette for 15 years   Substance and Sexual Activity    Alcohol use: No    Drug use: No    Sexual activity: Yes     Partners: Female   Lifestyle    Physical activity:     Days per week: Not on file     Minutes per session: Not on file    Stress: Not on file   Relationships    Social connections:     Talks on phone: Not on file     Gets together: Not on file     Attends Gnosticism service: Not on file     Active member of club or organization: Not on file     Attends meetings of clubs  "or organizations: Not on file     Relationship status: Not on file   Other Topics Concern    Not on file   Social History Narrative    Not on file       Past Surgical History:   Procedure Laterality Date    APPENDECTOMY      BACK SURGERY      HIP REPLACEMENT ARTHROPLASTY Left 8/6/2018    Procedure: ARTHROPLASTY, HIP REPLACEMENT;  Surgeon: Chapincito Sagastume MD;  Location: Replaced by [facility];  Service: Orthopedics;  Laterality: Left;    laminectomy l4-l5         Review of Systems   Constitutional: Negative for activity change and fatigue.   Respiratory: Negative for cough and chest tightness.    Cardiovascular: Negative for leg swelling.   Gastrointestinal: Negative for blood in stool, nausea and vomiting.   Musculoskeletal: Positive for arthralgias.   Skin: Negative for color change and pallor.   Neurological: Negative for dizziness, syncope, weakness and light-headedness.   All other systems reviewed and are negative.      Objective:   /60 (BP Location: Left arm, Patient Position: Sitting, BP Method: Large (Manual))   Pulse 66   Temp 98.7 °F (37.1 °C) (Oral)   Ht 5' 11" (1.803 m)   Wt 83.3 kg (183 lb 10.3 oz)   SpO2 96%   BMI 25.61 kg/m²      Physical Exam   Constitutional: He is oriented to person, place, and time. He appears well-developed and well-nourished.   HENT:   Head: Normocephalic and atraumatic.   Cardiovascular: Normal rate, regular rhythm and normal pulses.   Pulmonary/Chest: Effort normal and breath sounds normal. No respiratory distress.   Neurological: He is alert and oriented to person, place, and time.   Skin: He is not diaphoretic. No pallor.   Psychiatric: He has a normal mood and affect. His speech is normal and behavior is normal.       Assessment:       1. Osteoarthritis, unspecified osteoarthritis type, unspecified site    2. Prostate cancer screening    3. Prominent aorta    4. Lumbar spondylosis    5. Hypertension, benign    6. Osteoarthritis of left hip, unspecified " "osteoarthritis type    7. Tobacco abuse        Plan:       Zac was seen today for hypertension and hyperlipidemia.    Diagnoses and all orders for this visit:    Osteoarthritis, unspecified osteoarthritis type, unspecified site  -     naproxen (NAPROSYN) 500 MG tablet; Take 1 tablet (500 mg total) by mouth 2 (two) times daily as needed.    Prostate cancer screening  -     PSA, Screening; Future    Problem List Items Addressed This Visit     Tobacco abuse    Current Assessment & Plan     Smoke cigars, not ready to quit         Prominent aorta    Overview     "RESULTS: THE HEART IS NOT ENLARGED AND THE AORTA IS SLIGHTLY PROMINENT" - Xray Chest 8-         Current Assessment & Plan     Stable. Continue to monitor         Lumbar spondylosis    Current Assessment & Plan     Stable with naproxen         Hypertension, benign    Current Assessment & Plan     Stable and controlled. Continue current treatment plan as previously prescribed with your PCP.   The patient is asked to make an attempt to improve diet and exercise patterns to aid in medical management of this problem.  Followed by PCP           Degenerative joint disease of left hip    Current Assessment & Plan     Stable with naproxen           Other Visit Diagnoses     Osteoarthritis, unspecified osteoarthritis type, unspecified site    -  Primary    Relevant Medications    naproxen (NAPROSYN) 500 MG tablet    Prostate cancer screening        Relevant Orders    PSA, Screening          Follow up if symptoms worsen or fail to improve.    "

## 2020-02-14 LAB — COMPLEXED PSA SERPL-MCNC: 0.97 NG/ML (ref 0–4)

## 2020-03-14 DIAGNOSIS — E78.5 HYPERLIPIDEMIA, UNSPECIFIED HYPERLIPIDEMIA TYPE: ICD-10-CM

## 2020-03-14 DIAGNOSIS — N40.1 BENIGN NON-NODULAR PROSTATIC HYPERPLASIA WITH LOWER URINARY TRACT SYMPTOMS: ICD-10-CM

## 2020-03-15 RX ORDER — HYDROCHLOROTHIAZIDE 25 MG/1
TABLET ORAL
Qty: 90 TABLET | Refills: 0 | Status: SHIPPED | OUTPATIENT
Start: 2020-03-15 | End: 2020-06-22

## 2020-03-15 RX ORDER — FINASTERIDE 5 MG/1
TABLET, FILM COATED ORAL
Qty: 90 TABLET | Refills: 0 | Status: SHIPPED | OUTPATIENT
Start: 2020-03-15 | End: 2020-06-22

## 2020-03-15 RX ORDER — ATORVASTATIN CALCIUM 80 MG/1
TABLET, FILM COATED ORAL
Qty: 90 TABLET | Refills: 0 | Status: SHIPPED | OUTPATIENT
Start: 2020-03-15 | End: 2020-06-22

## 2020-03-16 NOTE — TELEPHONE ENCOUNTER
----- Message from Graciela Marinelli sent at 3/16/2020 10:24 AM CDT -----  Contact: JARRET CONRAD [[id number]]  Can the clinic reply in [facility]: N       Please refill the medication(s) listed below. Please call the patient when the prescription(s) is ready for  at this phone number  [phone]      Medication #1 tamsulosin (FLOMAX) 0.4 mg Cap      Preferred Pharmacy: [id number] [address] [address] AT [facility] [address] & [facility] [address]

## 2020-03-17 RX ORDER — TAMSULOSIN HYDROCHLORIDE 0.4 MG/1
CAPSULE ORAL
Qty: 90 CAPSULE | OUTPATIENT
Start: 2020-03-17

## 2020-03-17 RX ORDER — TAMSULOSIN HYDROCHLORIDE 0.4 MG/1
0.4 CAPSULE ORAL DAILY
Qty: 90 CAPSULE | Refills: 0 | Status: SHIPPED | OUTPATIENT
Start: 2020-03-17 | End: 2020-06-22

## 2020-03-17 RX ORDER — TAMSULOSIN HYDROCHLORIDE 0.4 MG/1
0.4 CAPSULE ORAL DAILY
Qty: 14 CAPSULE | Refills: 0 | Status: SHIPPED | OUTPATIENT
Start: 2020-03-17 | End: 2020-03-31

## 2020-03-20 RX ORDER — TAMSULOSIN HYDROCHLORIDE 0.4 MG/1
CAPSULE ORAL
Qty: 90 CAPSULE | Refills: 3 | OUTPATIENT
Start: 2020-03-20

## 2020-03-20 NOTE — PROGRESS NOTES
Angel DC. Duplicate Request  Refill Authorization Note     is requesting a refill authorization.    Brief assessment and rationale for refill: QUICK DC: duplicate request          Medication Therapy Plan: Handled in a previous encounter    Medication reconciliation completed: No                         Comments:   Last Prescribed Info:      Ordering Encounter Report     Associated Reports   View Encounter            Note composed:9:39 AM 03/20/2020

## 2020-05-12 DIAGNOSIS — M19.90 OSTEOARTHRITIS, UNSPECIFIED OSTEOARTHRITIS TYPE, UNSPECIFIED SITE: ICD-10-CM

## 2020-05-12 RX ORDER — NAPROXEN 500 MG/1
TABLET ORAL
Qty: 180 TABLET | Refills: 0 | Status: SHIPPED | OUTPATIENT
Start: 2020-05-12 | End: 2020-08-17

## 2020-05-27 ENCOUNTER — PES CALL (OUTPATIENT)
Dept: ADMINISTRATIVE | Facility: CLINIC | Age: 75
End: 2020-05-27

## 2020-09-09 ENCOUNTER — OFFICE VISIT (OUTPATIENT)
Dept: FAMILY MEDICINE | Facility: CLINIC | Age: 75
End: 2020-09-09
Payer: MEDICARE

## 2020-09-09 VITALS
SYSTOLIC BLOOD PRESSURE: 139 MMHG | DIASTOLIC BLOOD PRESSURE: 72 MMHG | BODY MASS INDEX: 26.43 KG/M2 | WEIGHT: 188.81 LBS | HEIGHT: 71 IN | HEART RATE: 61 BPM | OXYGEN SATURATION: 97 % | TEMPERATURE: 98 F

## 2020-09-09 DIAGNOSIS — Z12.11 ENCOUNTER FOR SCREENING FOR MALIGNANT NEOPLASM OF COLON: ICD-10-CM

## 2020-09-09 DIAGNOSIS — M19.90 OSTEOARTHRITIS, UNSPECIFIED OSTEOARTHRITIS TYPE, UNSPECIFIED SITE: ICD-10-CM

## 2020-09-09 DIAGNOSIS — E78.5 HYPERLIPIDEMIA, UNSPECIFIED HYPERLIPIDEMIA TYPE: ICD-10-CM

## 2020-09-09 DIAGNOSIS — R79.9 ABNORMAL FINDING OF BLOOD CHEMISTRY, UNSPECIFIED: ICD-10-CM

## 2020-09-09 DIAGNOSIS — Z00.00 ROUTINE PHYSICAL EXAMINATION: Primary | ICD-10-CM

## 2020-09-09 DIAGNOSIS — N40.1 BENIGN NON-NODULAR PROSTATIC HYPERPLASIA WITH LOWER URINARY TRACT SYMPTOMS: ICD-10-CM

## 2020-09-09 DIAGNOSIS — Z12.5 ENCOUNTER FOR SCREENING FOR MALIGNANT NEOPLASM OF PROSTATE: ICD-10-CM

## 2020-09-09 DIAGNOSIS — I10 HYPERTENSION, BENIGN: ICD-10-CM

## 2020-09-09 PROCEDURE — 3078F DIAST BP <80 MM HG: CPT | Mod: HCNC,CPTII,S$GLB, | Performed by: FAMILY MEDICINE

## 2020-09-09 PROCEDURE — 3078F PR MOST RECENT DIASTOLIC BLOOD PRESSURE < 80 MM HG: ICD-10-PCS | Mod: HCNC,CPTII,S$GLB, | Performed by: FAMILY MEDICINE

## 2020-09-09 PROCEDURE — 99999 PR PBB SHADOW E&M-EST. PATIENT-LVL III: ICD-10-PCS | Mod: PBBFAC,HCNC,, | Performed by: FAMILY MEDICINE

## 2020-09-09 PROCEDURE — 99999 PR PBB SHADOW E&M-EST. PATIENT-LVL III: CPT | Mod: PBBFAC,HCNC,, | Performed by: FAMILY MEDICINE

## 2020-09-09 PROCEDURE — 3075F SYST BP GE 130 - 139MM HG: CPT | Mod: HCNC,CPTII,S$GLB, | Performed by: FAMILY MEDICINE

## 2020-09-09 PROCEDURE — 99499 UNLISTED E&M SERVICE: CPT | Mod: S$GLB,,, | Performed by: FAMILY MEDICINE

## 2020-09-09 PROCEDURE — 99499 RISK ADDL DX/OHS AUDIT: ICD-10-PCS | Mod: S$GLB,,, | Performed by: FAMILY MEDICINE

## 2020-09-09 PROCEDURE — 3075F PR MOST RECENT SYSTOLIC BLOOD PRESS GE 130-139MM HG: ICD-10-PCS | Mod: HCNC,CPTII,S$GLB, | Performed by: FAMILY MEDICINE

## 2020-09-09 PROCEDURE — 99397 PER PM REEVAL EST PAT 65+ YR: CPT | Mod: HCNC,S$GLB,, | Performed by: FAMILY MEDICINE

## 2020-09-09 PROCEDURE — 99397 PR PREVENTIVE VISIT,EST,65 & OVER: ICD-10-PCS | Mod: HCNC,S$GLB,, | Performed by: FAMILY MEDICINE

## 2020-09-09 RX ORDER — FINASTERIDE 5 MG/1
5 TABLET, FILM COATED ORAL DAILY
Qty: 90 TABLET | Refills: 3 | Status: SHIPPED | OUTPATIENT
Start: 2020-09-09 | End: 2021-05-27

## 2020-09-09 RX ORDER — TAMSULOSIN HYDROCHLORIDE 0.4 MG/1
1 CAPSULE ORAL DAILY
Qty: 90 CAPSULE | Refills: 3 | Status: SHIPPED | OUTPATIENT
Start: 2020-09-09 | End: 2020-09-09 | Stop reason: SDUPTHER

## 2020-09-09 RX ORDER — TAMSULOSIN HYDROCHLORIDE 0.4 MG/1
1 CAPSULE ORAL DAILY
Qty: 90 CAPSULE | Refills: 3 | Status: SHIPPED | OUTPATIENT
Start: 2020-09-09 | End: 2021-05-27

## 2020-09-09 RX ORDER — ATORVASTATIN CALCIUM 80 MG/1
80 TABLET, FILM COATED ORAL DAILY
Qty: 90 TABLET | Refills: 3 | Status: SHIPPED | OUTPATIENT
Start: 2020-09-09 | End: 2020-09-09 | Stop reason: SDUPTHER

## 2020-09-09 RX ORDER — NAPROXEN 500 MG/1
TABLET ORAL
Qty: 180 TABLET | Refills: 0 | Status: SHIPPED | OUTPATIENT
Start: 2020-09-09 | End: 2021-03-22

## 2020-09-09 RX ORDER — HYDROCHLOROTHIAZIDE 25 MG/1
25 TABLET ORAL DAILY
Qty: 90 TABLET | Refills: 3 | Status: SHIPPED | OUTPATIENT
Start: 2020-09-09 | End: 2021-05-27

## 2020-09-09 RX ORDER — FINASTERIDE 5 MG/1
5 TABLET, FILM COATED ORAL DAILY
Qty: 90 TABLET | Refills: 3 | Status: SHIPPED | OUTPATIENT
Start: 2020-09-09 | End: 2020-09-09 | Stop reason: SDUPTHER

## 2020-09-09 RX ORDER — NAPROXEN 500 MG/1
TABLET ORAL
Qty: 180 TABLET | Refills: 0 | Status: SHIPPED | OUTPATIENT
Start: 2020-09-09 | End: 2020-09-09 | Stop reason: SDUPTHER

## 2020-09-09 RX ORDER — ATORVASTATIN CALCIUM 80 MG/1
80 TABLET, FILM COATED ORAL DAILY
Qty: 90 TABLET | Refills: 3 | Status: SHIPPED | OUTPATIENT
Start: 2020-09-09 | End: 2021-07-28

## 2020-09-09 RX ORDER — HYDROCHLOROTHIAZIDE 25 MG/1
25 TABLET ORAL DAILY
Qty: 90 TABLET | Refills: 3 | Status: SHIPPED | OUTPATIENT
Start: 2020-09-09 | End: 2020-09-09 | Stop reason: SDUPTHER

## 2020-09-09 NOTE — PROGRESS NOTES
[facility]  Progress Note    SUBJECTIVE:     Chief Complaint   Patient presents with    Annual Exam     Hypertension       HPI   Zac Plunkett  is a 75 y.o. male here for routine physical exam. Patient has no other new complaints/problems at this time.      Review of patient's allergies indicates:  No Known Allergies    Past Medical History:   Diagnosis Date    Anemia 8/7/2018    Cataract     Enlarged prostate     Gross hematuria     Hyperlipidemia     Hypertension     Kidney stone     Lumbar spondylosis     Lumbar spondylosis     PONV (postoperative nausea and vomiting)     Tobacco dependence      Past Surgical History:   Procedure Laterality Date    APPENDECTOMY      BACK SURGERY      HIP REPLACEMENT ARTHROPLASTY Left 8/6/2018    Procedure: ARTHROPLASTY, HIP REPLACEMENT;  Surgeon: Chapincito Sagastume MD;  Location: [facility];  Service: Orthopedics;  Laterality: Left;    laminectomy l4-l5       Family History   Problem Relation Age of Onset    Diabetes Mother     Heart disease Father     No Known Problems Sister     Cerebral palsy Brother     Epilepsy Brother     Melanoma Neg Hx     Psoriasis Neg Hx     Lupus Neg Hx     Eczema Neg Hx     Acne Neg Hx     Liver disease Neg Hx     Liver cancer Neg Hx     Cirrhosis Neg Hx     Colon polyps Neg Hx     Colon cancer Neg Hx      Social History     Tobacco Use    Smoking status: Current Every Day Smoker     Years: 20.00     Types: Cigars    Smokeless tobacco: Never Used    Tobacco comment: smokes cigars 2-3 a day, smoke cigarette for 15 years   Substance Use Topics    Alcohol use: No    Drug use: No        Review of Systems   Constitutional: Negative for chills, diaphoresis and fever.   HENT: Negative for congestion, ear pain and sore throat.    Eyes: Negative for photophobia and discharge.   Respiratory: Negative for cough, shortness of breath and wheezing.    Cardiovascular: Negative for chest pain and palpitations.    Gastrointestinal: Negative for abdominal pain, constipation, diarrhea, nausea and vomiting.   Genitourinary: Negative for dysuria and hematuria.   Musculoskeletal: Negative for back pain and myalgias.   Skin: Negative for itching and rash.   Neurological: Negative for dizziness, sensory change, focal weakness, weakness and headaches.   All other systems reviewed and are negative.    OBJECTIVE:     Vitals:    09/09/20 0846   BP: 139/72   Pulse: 61   Temp: 97.9 °F (36.6 °C)     Body mass index is 26.34 kg/m².    Physical Exam   Constitutional: He is oriented to person, place, and time and well-developed, well-nourished, and in no distress. No distress.   HENT:   Head: Normocephalic and atraumatic.   Right Ear: Tympanic membrane is not perforated, not erythematous and not bulging. No hemotympanum.   Left Ear: Tympanic membrane is not perforated, not erythematous and not bulging. No hemotympanum.   Mouth/Throat: Oropharynx is clear and moist. No oropharyngeal exudate.   Eyes: Pupils are equal, round, and reactive to light. Conjunctivae and EOM are normal.   Neck: No thyromegaly present.   Cardiovascular: Normal rate, regular rhythm and normal heart sounds. Exam reveals no gallop and no friction rub.   No murmur heard.  Pulmonary/Chest: Effort normal and breath sounds normal. No respiratory distress. He has no wheezes. He has no rales.   Abdominal: Soft. Bowel sounds are normal. He exhibits no distension. There is no abdominal tenderness. There is no rebound and no guarding.   Musculoskeletal: Normal range of motion.         General: No tenderness or edema.   Lymphadenopathy:     He has no cervical adenopathy.   Neurological: He is alert and oriented to person, place, and time.   Skin: Skin is warm. No rash noted. He is not diaphoretic. No erythema.       Old records were reviewed. Labs and/or images were independently reviewed.    ASSESSMENT     1. Routine physical examination    2. Hyperlipidemia, unspecified  hyperlipidemia type    3. Osteoarthritis, unspecified osteoarthritis type, unspecified site    4. Benign non-nodular prostatic hyperplasia with lower urinary tract symptoms    5. Hypertension, benign    6. Abnormal finding of blood chemistry, unspecified     7. Encounter for screening for malignant neoplasm of prostate     8. Encounter for screening for malignant neoplasm of colon        PLAN:     Routine physical examination  -     CBC auto differential; Future  -     Comprehensive metabolic panel; Future  -     Hemoglobin A1C; Future  -     Lipid Panel; Future  -     TSH; Future  -     T4, free; Future  -     PSA, Screening; Future; Expected date: 09/09/2020  -     We briefly discussed diet, exercise, and routine preventive exams. All questions and comments addressed.    Hyperlipidemia, unspecified hyperlipidemia type  -     atorvastatin (LIPITOR) 80 MG tablet; Take 1 tablet (80 mg total) by mouth once daily.  Dispense: 90 tablet; Refill: 3  -     CBC auto differential; Future  -     Comprehensive metabolic panel; Future  -     Hemoglobin A1C; Future  -     Lipid Panel; Future  -     TSH; Future  -     T4, free; Future  -     PSA, Screening; Future; Expected date: 09/09/2020  -     Counseled patient about healthy diet, exercise habits, and to increase physical activity.    Osteoarthritis, unspecified osteoarthritis type, unspecified site  -     naproxen (NAPROSYN) 500 MG tablet; TAKE 1 TABLET(500 MG) BY MOUTH TWICE DAILY AS NEEDED  Dispense: 180 tablet; Refill: 0    Benign non-nodular prostatic hyperplasia with lower urinary tract symptoms  -     tamsulosin (FLOMAX) 0.4 mg Cap; Take 1 capsule (0.4 mg total) by mouth once daily.  Dispense: 90 capsule; Refill: 3  -     finasteride (PROSCAR) 5 mg tablet; Take 1 tablet (5 mg total) by mouth once daily.  Dispense: 90 tablet; Refill: 3    Hypertension, benign  -     hydroCHLOROthiazide (HYDRODIURIL) 25 MG tablet; Take 1 tablet (25 mg total) by mouth once daily.   Dispense: 90 tablet; Refill: 3  -     CBC auto differential; Future  -     Comprehensive metabolic panel; Future  -     Hemoglobin A1C; Future  -     Lipid Panel; Future  -     TSH; Future  -     T4, free; Future  -     PSA, Screening; Future; Expected date: 09/09/2020  -     Educated patient to take medications as prescribed, and to record bp logs daily to bring along to next visit. Instructed patient to decrease salt intake. Also told patient to call if any new signs or symptoms develop.    Encounter for screening for malignant neoplasm of prostate   -     PSA, Screening; Future; Expected date: 09/09/2020    Encounter for screening for malignant neoplasm of colon  -     Case request GI: COLONOSCOPY      RTC PRCARTER Waters MD  09/09/2020 9:02 AM

## 2020-09-16 DIAGNOSIS — Z12.11 COLON CANCER SCREENING: ICD-10-CM

## 2020-09-29 ENCOUNTER — PATIENT MESSAGE (OUTPATIENT)
Dept: OTHER | Facility: OTHER | Age: 75
End: 2020-09-29

## 2020-12-11 ENCOUNTER — PATIENT MESSAGE (OUTPATIENT)
Dept: OTHER | Facility: OTHER | Age: 75
End: 2020-12-11

## 2021-01-10 ENCOUNTER — IMMUNIZATION (OUTPATIENT)
Dept: INTERNAL MEDICINE | Facility: CLINIC | Age: 76
End: 2021-01-10
Payer: MEDICARE

## 2021-01-10 DIAGNOSIS — Z23 NEED FOR VACCINATION: ICD-10-CM

## 2021-01-10 PROCEDURE — 91300 COVID-19, MRNA, LNP-S, PF, 30 MCG/0.3 ML DOSE VACCINE: CPT | Mod: PBBFAC | Performed by: FAMILY MEDICINE

## 2021-02-04 ENCOUNTER — IMMUNIZATION (OUTPATIENT)
Dept: INTERNAL MEDICINE | Facility: CLINIC | Age: 76
End: 2021-02-04
Payer: MEDICARE

## 2021-02-04 DIAGNOSIS — Z23 NEED FOR VACCINATION: Primary | ICD-10-CM

## 2021-02-04 PROCEDURE — 91300 COVID-19, MRNA, LNP-S, PF, 30 MCG/0.3 ML DOSE VACCINE: CPT | Mod: PBBFAC | Performed by: INTERNAL MEDICINE

## 2021-02-04 PROCEDURE — 0002A COVID-19, MRNA, LNP-S, PF, 30 MCG/0.3 ML DOSE VACCINE: CPT | Mod: PBBFAC | Performed by: INTERNAL MEDICINE

## 2021-05-19 ENCOUNTER — OFFICE VISIT (OUTPATIENT)
Dept: FAMILY MEDICINE | Facility: CLINIC | Age: 76
End: 2021-05-19
Payer: MEDICARE

## 2021-05-19 VITALS
DIASTOLIC BLOOD PRESSURE: 60 MMHG | OXYGEN SATURATION: 98 % | TEMPERATURE: 98 F | BODY MASS INDEX: 25.83 KG/M2 | WEIGHT: 184.5 LBS | SYSTOLIC BLOOD PRESSURE: 136 MMHG | HEART RATE: 61 BPM | HEIGHT: 71 IN

## 2021-05-19 DIAGNOSIS — I10 HYPERTENSION, BENIGN: ICD-10-CM

## 2021-05-19 DIAGNOSIS — Z00.00 ROUTINE PHYSICAL EXAMINATION: Primary | ICD-10-CM

## 2021-05-19 DIAGNOSIS — R79.9 ABNORMAL FINDING OF BLOOD CHEMISTRY, UNSPECIFIED: ICD-10-CM

## 2021-05-19 DIAGNOSIS — L72.9 SKIN CYST: ICD-10-CM

## 2021-05-19 DIAGNOSIS — Z12.5 ENCOUNTER FOR SCREENING FOR MALIGNANT NEOPLASM OF PROSTATE: ICD-10-CM

## 2021-05-19 DIAGNOSIS — Z12.11 ENCOUNTER FOR SCREENING FOR MALIGNANT NEOPLASM OF COLON: ICD-10-CM

## 2021-05-19 PROCEDURE — 3008F PR BODY MASS INDEX (BMI) DOCUMENTED: ICD-10-PCS | Mod: CPTII,S$GLB,, | Performed by: FAMILY MEDICINE

## 2021-05-19 PROCEDURE — 99499 UNLISTED E&M SERVICE: CPT | Mod: HCNC,S$GLB,, | Performed by: FAMILY MEDICINE

## 2021-05-19 PROCEDURE — 3288F PR FALLS RISK ASSESSMENT DOCUMENTED: ICD-10-PCS | Mod: CPTII,S$GLB,, | Performed by: FAMILY MEDICINE

## 2021-05-19 PROCEDURE — 1101F PT FALLS ASSESS-DOCD LE1/YR: CPT | Mod: CPTII,S$GLB,, | Performed by: FAMILY MEDICINE

## 2021-05-19 PROCEDURE — 3008F BODY MASS INDEX DOCD: CPT | Mod: CPTII,S$GLB,, | Performed by: FAMILY MEDICINE

## 2021-05-19 PROCEDURE — 3288F FALL RISK ASSESSMENT DOCD: CPT | Mod: CPTII,S$GLB,, | Performed by: FAMILY MEDICINE

## 2021-05-19 PROCEDURE — 1101F PR PT FALLS ASSESS DOC 0-1 FALLS W/OUT INJ PAST YR: ICD-10-PCS | Mod: CPTII,S$GLB,, | Performed by: FAMILY MEDICINE

## 2021-05-19 PROCEDURE — 99499 RISK ADDL DX/OHS AUDIT: ICD-10-PCS | Mod: HCNC,S$GLB,, | Performed by: FAMILY MEDICINE

## 2021-05-19 PROCEDURE — 99213 OFFICE O/P EST LOW 20 MIN: CPT | Mod: S$GLB,,, | Performed by: FAMILY MEDICINE

## 2021-05-19 PROCEDURE — 99213 PR OFFICE/OUTPT VISIT, EST, LEVL III, 20-29 MIN: ICD-10-PCS | Mod: S$GLB,,, | Performed by: FAMILY MEDICINE

## 2021-05-19 PROCEDURE — 99999 PR PBB SHADOW E&M-EST. PATIENT-LVL IV: CPT | Mod: PBBFAC,,, | Performed by: FAMILY MEDICINE

## 2021-05-19 PROCEDURE — 1126F PR PAIN SEVERITY QUANTIFIED, NO PAIN PRESENT: ICD-10-PCS | Mod: S$GLB,,, | Performed by: FAMILY MEDICINE

## 2021-05-19 PROCEDURE — 1126F AMNT PAIN NOTED NONE PRSNT: CPT | Mod: S$GLB,,, | Performed by: FAMILY MEDICINE

## 2021-05-19 PROCEDURE — 99999 PR PBB SHADOW E&M-EST. PATIENT-LVL IV: ICD-10-PCS | Mod: PBBFAC,,, | Performed by: FAMILY MEDICINE

## 2021-05-27 ENCOUNTER — HOSPITAL ENCOUNTER (OUTPATIENT)
Dept: RADIOLOGY | Facility: HOSPITAL | Age: 76
Discharge: HOME | End: 2021-05-27
Attending: SURGERY
Payer: MEDICARE

## 2021-05-27 ENCOUNTER — OFFICE VISIT (OUTPATIENT)
Dept: SURGERY | Facility: CLINIC | Age: 76
End: 2021-05-27
Payer: MEDICARE

## 2021-05-27 VITALS
WEIGHT: 180.69 LBS | DIASTOLIC BLOOD PRESSURE: 89 MMHG | HEART RATE: 64 BPM | SYSTOLIC BLOOD PRESSURE: 154 MMHG | BODY MASS INDEX: 25.3 KG/M2 | HEIGHT: 71 IN

## 2021-05-27 DIAGNOSIS — D21.21 BENIGN NEOPLASM OF CONNECTIVE AND OTHER SOFT TISSUE OF RIGHT LOWER LIMB, INCLUDING HIP: ICD-10-CM

## 2021-05-27 DIAGNOSIS — L72.9 SKIN CYST: ICD-10-CM

## 2021-05-27 PROCEDURE — 76882 US LMTD JT/FCL EVL NVASC XTR: CPT | Mod: 26,RT,, | Performed by: RADIOLOGY

## 2021-05-27 PROCEDURE — 99204 OFFICE O/P NEW MOD 45 MIN: CPT | Mod: S$GLB,,, | Performed by: SURGERY

## 2021-05-27 PROCEDURE — 99999 PR PBB SHADOW E&M-EST. PATIENT-LVL III: CPT | Mod: PBBFAC,,, | Performed by: SURGERY

## 2021-05-27 PROCEDURE — 1159F MED LIST DOCD IN RCRD: CPT | Mod: S$GLB,,, | Performed by: SURGERY

## 2021-05-27 PROCEDURE — 99999 PR PBB SHADOW E&M-EST. PATIENT-LVL III: ICD-10-PCS | Mod: PBBFAC,,, | Performed by: SURGERY

## 2021-05-27 PROCEDURE — 1101F PT FALLS ASSESS-DOCD LE1/YR: CPT | Mod: CPTII,S$GLB,, | Performed by: SURGERY

## 2021-05-27 PROCEDURE — 3008F PR BODY MASS INDEX (BMI) DOCUMENTED: ICD-10-PCS | Mod: CPTII,S$GLB,, | Performed by: SURGERY

## 2021-05-27 PROCEDURE — 1159F PR MEDICATION LIST DOCUMENTED IN MEDICAL RECORD: ICD-10-PCS | Mod: S$GLB,,, | Performed by: SURGERY

## 2021-05-27 PROCEDURE — 3288F FALL RISK ASSESSMENT DOCD: CPT | Mod: CPTII,S$GLB,, | Performed by: SURGERY

## 2021-05-27 PROCEDURE — 99204 PR OFFICE/OUTPT VISIT, NEW, LEVL IV, 45-59 MIN: ICD-10-PCS | Mod: S$GLB,,, | Performed by: SURGERY

## 2021-05-27 PROCEDURE — 1126F AMNT PAIN NOTED NONE PRSNT: CPT | Mod: S$GLB,,, | Performed by: SURGERY

## 2021-05-27 PROCEDURE — 76882 US LMTD JT/FCL EVL NVASC XTR: CPT | Mod: TC,RT

## 2021-05-27 PROCEDURE — 76882 US EXTREMITY NON VASCULAR LIMITED RIGHT: ICD-10-PCS | Mod: 26,RT,, | Performed by: RADIOLOGY

## 2021-05-27 PROCEDURE — 1126F PR PAIN SEVERITY QUANTIFIED, NO PAIN PRESENT: ICD-10-PCS | Mod: S$GLB,,, | Performed by: SURGERY

## 2021-05-27 PROCEDURE — 3288F PR FALLS RISK ASSESSMENT DOCUMENTED: ICD-10-PCS | Mod: CPTII,S$GLB,, | Performed by: SURGERY

## 2021-05-27 PROCEDURE — 1101F PR PT FALLS ASSESS DOC 0-1 FALLS W/OUT INJ PAST YR: ICD-10-PCS | Mod: CPTII,S$GLB,, | Performed by: SURGERY

## 2021-05-27 PROCEDURE — 3008F BODY MASS INDEX DOCD: CPT | Mod: CPTII,S$GLB,, | Performed by: SURGERY

## 2021-06-01 ENCOUNTER — PROCEDURE VISIT (OUTPATIENT)
Dept: SURGERY | Facility: CLINIC | Age: 76
End: 2021-06-01
Payer: MEDICARE

## 2021-06-01 VITALS
HEIGHT: 71 IN | DIASTOLIC BLOOD PRESSURE: 70 MMHG | WEIGHT: 177.56 LBS | SYSTOLIC BLOOD PRESSURE: 120 MMHG | TEMPERATURE: 98 F | BODY MASS INDEX: 24.86 KG/M2 | HEART RATE: 65 BPM

## 2021-06-01 DIAGNOSIS — R22.41 MASS OF RIGHT THIGH: ICD-10-CM

## 2021-06-01 DIAGNOSIS — D21.21 BENIGN NEOPLASM OF CONNECTIVE AND OTHER SOFT TISSUE OF RIGHT LOWER LIMB, INCLUDING HIP: Primary | ICD-10-CM

## 2021-06-01 RX ORDER — SODIUM CHLORIDE 9 MG/ML
INJECTION, SOLUTION INTRAVENOUS CONTINUOUS
Status: CANCELLED | OUTPATIENT
Start: 2021-06-01

## 2021-06-01 RX ORDER — TAMSULOSIN HYDROCHLORIDE 0.4 MG/1
CAPSULE ORAL DAILY
COMMUNITY
End: 2021-10-06 | Stop reason: SDUPTHER

## 2021-06-01 RX ORDER — HYDROCHLOROTHIAZIDE 25 MG/1
25 TABLET ORAL DAILY
COMMUNITY
End: 2021-10-06 | Stop reason: SDUPTHER

## 2021-06-01 RX ORDER — FINASTERIDE 5 MG/1
5 TABLET, FILM COATED ORAL DAILY
COMMUNITY
End: 2021-10-06 | Stop reason: SDUPTHER

## 2021-06-04 ENCOUNTER — HOSPITAL ENCOUNTER (OUTPATIENT)
Dept: PREADMISSION TESTING | Facility: HOSPITAL | Age: 76
Discharge: HOME | End: 2021-06-04
Attending: SURGERY
Payer: MEDICARE

## 2021-06-04 ENCOUNTER — ANESTHESIA EVENT (OUTPATIENT)
Dept: SURGERY | Facility: HOSPITAL | Age: 76
End: 2021-06-04
Payer: MEDICARE

## 2021-06-04 ENCOUNTER — HOSPITAL ENCOUNTER (OUTPATIENT)
Dept: RADIOLOGY | Facility: HOSPITAL | Age: 76
Discharge: HOME | End: 2021-06-04
Attending: SURGERY
Payer: MEDICARE

## 2021-06-04 VITALS
DIASTOLIC BLOOD PRESSURE: 83 MMHG | SYSTOLIC BLOOD PRESSURE: 151 MMHG | HEART RATE: 56 BPM | OXYGEN SATURATION: 98 % | WEIGHT: 178.13 LBS | BODY MASS INDEX: 24.94 KG/M2 | HEIGHT: 71 IN | TEMPERATURE: 97 F | RESPIRATION RATE: 16 BRPM

## 2021-06-04 DIAGNOSIS — Z01.818 PREOP TESTING: Primary | ICD-10-CM

## 2021-06-04 DIAGNOSIS — D21.21 BENIGN NEOPLASM OF CONNECTIVE AND OTHER SOFT TISSUE OF RIGHT LOWER LIMB, INCLUDING HIP: ICD-10-CM

## 2021-06-04 PROCEDURE — 71046 XR CHEST PA AND LATERAL PRE-OP: ICD-10-PCS | Mod: 26,,, | Performed by: RADIOLOGY

## 2021-06-04 PROCEDURE — 71046 X-RAY EXAM CHEST 2 VIEWS: CPT | Mod: TC,FY

## 2021-06-04 PROCEDURE — 71046 X-RAY EXAM CHEST 2 VIEWS: CPT | Mod: 26,,, | Performed by: RADIOLOGY

## 2021-06-11 ENCOUNTER — ANESTHESIA (OUTPATIENT)
Dept: SURGERY | Facility: HOSPITAL | Age: 76
End: 2021-06-11
Payer: MEDICARE

## 2021-06-11 ENCOUNTER — HOSPITAL ENCOUNTER (OUTPATIENT)
Facility: HOSPITAL | Age: 76
Discharge: HOME | End: 2021-06-11
Attending: SURGERY | Admitting: SURGERY
Payer: MEDICARE

## 2021-06-11 VITALS
OXYGEN SATURATION: 95 % | BODY MASS INDEX: 24.84 KG/M2 | SYSTOLIC BLOOD PRESSURE: 103 MMHG | TEMPERATURE: 98 F | DIASTOLIC BLOOD PRESSURE: 58 MMHG | RESPIRATION RATE: 16 BRPM | WEIGHT: 178.13 LBS | HEART RATE: 59 BPM

## 2021-06-11 DIAGNOSIS — R22.41 MASS OF RIGHT THIGH: ICD-10-CM

## 2021-06-11 DIAGNOSIS — D21.21 BENIGN NEOPLASM OF CONNECTIVE AND OTHER SOFT TISSUE OF RIGHT LOWER LIMB, INCLUDING HIP: ICD-10-CM

## 2021-06-11 PROCEDURE — 63600175 PHARM REV CODE 636 W HCPCS: Performed by: ANESTHESIOLOGY

## 2021-06-11 PROCEDURE — 37000008 HC ANESTHESIA 1ST 15 MINUTES: Performed by: SURGERY

## 2021-06-11 PROCEDURE — D9220A PRA ANESTHESIA: ICD-10-PCS | Mod: ANES,,, | Performed by: ANESTHESIOLOGY

## 2021-06-11 PROCEDURE — 88341 IMHCHEM/IMCYTCHM EA ADD ANTB: CPT | Performed by: PATHOLOGY

## 2021-06-11 PROCEDURE — 27201423 OPTIME MED/SURG SUP & DEVICES STERILE SUPPLY: Performed by: SURGERY

## 2021-06-11 PROCEDURE — 88341 PR IHC OR ICC EACH ADD'L SINGLE ANTIBODY  STAINPR: ICD-10-PCS | Mod: 26,,, | Performed by: PATHOLOGY

## 2021-06-11 PROCEDURE — D9220A PRA ANESTHESIA: ICD-10-PCS | Mod: CRNA,,, | Performed by: NURSE ANESTHETIST, CERTIFIED REGISTERED

## 2021-06-11 PROCEDURE — 88307 TISSUE EXAM BY PATHOLOGIST: CPT | Mod: 26,,, | Performed by: PATHOLOGY

## 2021-06-11 PROCEDURE — 88307 PR  SURG PATH,LEVEL V: ICD-10-PCS | Mod: 26,,, | Performed by: PATHOLOGY

## 2021-06-11 PROCEDURE — 88342 CHG IMMUNOCYTOCHEMISTRY: ICD-10-PCS | Mod: 26,,, | Performed by: PATHOLOGY

## 2021-06-11 PROCEDURE — D9220A PRA ANESTHESIA: Mod: CRNA,,, | Performed by: NURSE ANESTHETIST, CERTIFIED REGISTERED

## 2021-06-11 PROCEDURE — 25000003 PHARM REV CODE 250: Performed by: ANESTHESIOLOGY

## 2021-06-11 PROCEDURE — 88342 IMHCHEM/IMCYTCHM 1ST ANTB: CPT | Mod: 26,,, | Performed by: PATHOLOGY

## 2021-06-11 PROCEDURE — 71000015 HC POSTOP RECOV 1ST HR: Performed by: SURGERY

## 2021-06-11 PROCEDURE — 36000704 HC OR TIME LEV I 1ST 15 MIN: Performed by: SURGERY

## 2021-06-11 PROCEDURE — 27328 PR EXC TUMOR SOFT TISSUE THIGH/KNEE SUBFASC <5CM: ICD-10-PCS | Mod: RT,,, | Performed by: SURGERY

## 2021-06-11 PROCEDURE — 63600175 PHARM REV CODE 636 W HCPCS: Performed by: SURGERY

## 2021-06-11 PROCEDURE — 63600175 PHARM REV CODE 636 W HCPCS: Performed by: NURSE ANESTHETIST, CERTIFIED REGISTERED

## 2021-06-11 PROCEDURE — 88341 IMHCHEM/IMCYTCHM EA ADD ANTB: CPT | Mod: 26,,, | Performed by: PATHOLOGY

## 2021-06-11 PROCEDURE — 36000705 HC OR TIME LEV I EA ADD 15 MIN: Performed by: SURGERY

## 2021-06-11 PROCEDURE — 88342 IMHCHEM/IMCYTCHM 1ST ANTB: CPT | Performed by: PATHOLOGY

## 2021-06-11 PROCEDURE — D9220A PRA ANESTHESIA: Mod: ANES,,, | Performed by: ANESTHESIOLOGY

## 2021-06-11 PROCEDURE — 88307 TISSUE EXAM BY PATHOLOGIST: CPT | Performed by: PATHOLOGY

## 2021-06-11 PROCEDURE — 25000003 PHARM REV CODE 250: Performed by: NURSE ANESTHETIST, CERTIFIED REGISTERED

## 2021-06-11 PROCEDURE — 37000009 HC ANESTHESIA EA ADD 15 MINS: Performed by: SURGERY

## 2021-06-11 PROCEDURE — 27328 EXC THIGH/KNEE TUM DEEP <5CM: CPT | Mod: RT,,, | Performed by: SURGERY

## 2021-06-11 PROCEDURE — 25000003 PHARM REV CODE 250: Performed by: SURGERY

## 2021-06-11 RX ORDER — SODIUM CHLORIDE, SODIUM LACTATE, POTASSIUM CHLORIDE, CALCIUM CHLORIDE 600; 310; 30; 20 MG/100ML; MG/100ML; MG/100ML; MG/100ML
INJECTION, SOLUTION INTRAVENOUS CONTINUOUS
Status: DISCONTINUED | OUTPATIENT
Start: 2021-06-11 | End: 2021-06-11 | Stop reason: HOSPADM

## 2021-06-11 RX ORDER — HYDROMORPHONE HYDROCHLORIDE 2 MG/ML
0.2 INJECTION, SOLUTION INTRAMUSCULAR; INTRAVENOUS; SUBCUTANEOUS EVERY 5 MIN PRN
Status: CANCELLED | OUTPATIENT
Start: 2021-06-11

## 2021-06-11 RX ORDER — ACETAMINOPHEN 500 MG
1000 TABLET ORAL
Status: COMPLETED | OUTPATIENT
Start: 2021-06-11 | End: 2021-06-11

## 2021-06-11 RX ORDER — ONDANSETRON 2 MG/ML
4 INJECTION INTRAMUSCULAR; INTRAVENOUS DAILY PRN
Status: CANCELLED | OUTPATIENT
Start: 2021-06-11

## 2021-06-11 RX ORDER — LIDOCAINE HYDROCHLORIDE AND EPINEPHRINE 10; 10 MG/ML; UG/ML
INJECTION, SOLUTION INFILTRATION; PERINEURAL
Status: DISCONTINUED | OUTPATIENT
Start: 2021-06-11 | End: 2021-06-11 | Stop reason: HOSPADM

## 2021-06-11 RX ORDER — PROPOFOL 10 MG/ML
VIAL (ML) INTRAVENOUS
Status: DISCONTINUED | OUTPATIENT
Start: 2021-06-11 | End: 2021-06-11

## 2021-06-11 RX ORDER — LIDOCAINE HYDROCHLORIDE 20 MG/ML
INJECTION INTRAVENOUS
Status: DISCONTINUED | OUTPATIENT
Start: 2021-06-11 | End: 2021-06-11

## 2021-06-11 RX ORDER — SODIUM CHLORIDE 0.9 % (FLUSH) 0.9 %
10 SYRINGE (ML) INJECTION
Status: CANCELLED | OUTPATIENT
Start: 2021-06-11

## 2021-06-11 RX ORDER — FENTANYL CITRATE 50 UG/ML
INJECTION, SOLUTION INTRAMUSCULAR; INTRAVENOUS
Status: DISCONTINUED | OUTPATIENT
Start: 2021-06-11 | End: 2021-06-11

## 2021-06-11 RX ORDER — HYDROCODONE BITARTRATE AND ACETAMINOPHEN 5; 325 MG/1; MG/1
1 TABLET ORAL EVERY 6 HOURS PRN
Qty: 10 TABLET | Refills: 0 | Status: SHIPPED | OUTPATIENT
Start: 2021-06-11 | End: 2021-07-07

## 2021-06-11 RX ORDER — DEXAMETHASONE SODIUM PHOSPHATE 4 MG/ML
INJECTION, SOLUTION INTRA-ARTICULAR; INTRALESIONAL; INTRAMUSCULAR; INTRAVENOUS; SOFT TISSUE
Status: DISCONTINUED | OUTPATIENT
Start: 2021-06-11 | End: 2021-06-11

## 2021-06-11 RX ORDER — LIDOCAINE HYDROCHLORIDE 10 MG/ML
1 INJECTION, SOLUTION EPIDURAL; INFILTRATION; INTRACAUDAL; PERINEURAL ONCE
Status: COMPLETED | OUTPATIENT
Start: 2021-06-11 | End: 2021-06-11

## 2021-06-11 RX ORDER — CEFAZOLIN SODIUM 2 G/50ML
2 SOLUTION INTRAVENOUS
Status: COMPLETED | OUTPATIENT
Start: 2021-06-11 | End: 2021-06-11

## 2021-06-11 RX ORDER — ONDANSETRON 2 MG/ML
INJECTION INTRAMUSCULAR; INTRAVENOUS
Status: DISCONTINUED | OUTPATIENT
Start: 2021-06-11 | End: 2021-06-11

## 2021-06-11 RX ORDER — SODIUM CHLORIDE 9 MG/ML
INJECTION, SOLUTION INTRAVENOUS CONTINUOUS
Status: DISCONTINUED | OUTPATIENT
Start: 2021-06-11 | End: 2021-06-11 | Stop reason: HOSPADM

## 2021-06-11 RX ADMIN — ACETAMINOPHEN 1000 MG: 500 TABLET, FILM COATED ORAL at 10:06

## 2021-06-11 RX ADMIN — PROPOFOL 10 MG: 10 INJECTION, EMULSION INTRAVENOUS at 12:06

## 2021-06-11 RX ADMIN — Medication 100 MG: at 11:06

## 2021-06-11 RX ADMIN — CEFAZOLIN SODIUM 1 G: 2 SOLUTION INTRAVENOUS at 11:06

## 2021-06-11 RX ADMIN — FENTANYL CITRATE 50 MCG: 50 INJECTION, SOLUTION INTRAMUSCULAR; INTRAVENOUS at 11:06

## 2021-06-11 RX ADMIN — DEXAMETHASONE SODIUM PHOSPHATE 4 MG: 4 INJECTION, SOLUTION INTRAMUSCULAR; INTRAVENOUS at 11:06

## 2021-06-11 RX ADMIN — GLYCOPYRROLATE 0.1 MG: 0.2 INJECTION, SOLUTION INTRAMUSCULAR; INTRAVITREAL at 11:06

## 2021-06-11 RX ADMIN — PROPOFOL 80 MG: 10 INJECTION, EMULSION INTRAVENOUS at 11:06

## 2021-06-11 RX ADMIN — PROPOFOL 40 MG: 10 INJECTION, EMULSION INTRAVENOUS at 11:06

## 2021-06-11 RX ADMIN — LIDOCAINE HYDROCHLORIDE 0.1 MG: 10 INJECTION, SOLUTION EPIDURAL; INFILTRATION; INTRACAUDAL at 11:06

## 2021-06-11 RX ADMIN — SODIUM CHLORIDE, SODIUM LACTATE, POTASSIUM CHLORIDE, AND CALCIUM CHLORIDE: .6; .31; .03; .02 INJECTION, SOLUTION INTRAVENOUS at 11:06

## 2021-06-11 RX ADMIN — ONDANSETRON 4 MG: 2 INJECTION, SOLUTION INTRAMUSCULAR; INTRAVENOUS at 11:06

## 2021-06-21 LAB
COMMENT: ABNORMAL
FINAL PATHOLOGIC DIAGNOSIS: ABNORMAL
GROSS: ABNORMAL
Lab: ABNORMAL

## 2021-06-22 ENCOUNTER — OFFICE VISIT (OUTPATIENT)
Dept: SURGERY | Facility: CLINIC | Age: 76
End: 2021-06-22
Payer: MEDICARE

## 2021-06-22 VITALS
TEMPERATURE: 98 F | HEIGHT: 71 IN | OXYGEN SATURATION: 98 % | BODY MASS INDEX: 24.45 KG/M2 | HEART RATE: 64 BPM | DIASTOLIC BLOOD PRESSURE: 78 MMHG | SYSTOLIC BLOOD PRESSURE: 159 MMHG | WEIGHT: 174.63 LBS

## 2021-06-22 DIAGNOSIS — C49.21 SARCOMA OF RIGHT THIGH: Primary | ICD-10-CM

## 2021-06-22 PROCEDURE — 3008F PR BODY MASS INDEX (BMI) DOCUMENTED: ICD-10-PCS | Mod: CPTII,S$GLB,, | Performed by: SURGERY

## 2021-06-22 PROCEDURE — 1101F PT FALLS ASSESS-DOCD LE1/YR: CPT | Mod: CPTII,S$GLB,, | Performed by: SURGERY

## 2021-06-22 PROCEDURE — 1126F AMNT PAIN NOTED NONE PRSNT: CPT | Mod: S$GLB,,, | Performed by: SURGERY

## 2021-06-22 PROCEDURE — 3288F PR FALLS RISK ASSESSMENT DOCUMENTED: ICD-10-PCS | Mod: CPTII,S$GLB,, | Performed by: SURGERY

## 2021-06-22 PROCEDURE — 3008F BODY MASS INDEX DOCD: CPT | Mod: CPTII,S$GLB,, | Performed by: SURGERY

## 2021-06-22 PROCEDURE — 1101F PR PT FALLS ASSESS DOC 0-1 FALLS W/OUT INJ PAST YR: ICD-10-PCS | Mod: CPTII,S$GLB,, | Performed by: SURGERY

## 2021-06-22 PROCEDURE — 1126F PR PAIN SEVERITY QUANTIFIED, NO PAIN PRESENT: ICD-10-PCS | Mod: S$GLB,,, | Performed by: SURGERY

## 2021-06-22 PROCEDURE — 99024 POSTOP FOLLOW-UP VISIT: CPT | Mod: S$GLB,,, | Performed by: SURGERY

## 2021-06-22 PROCEDURE — 3288F FALL RISK ASSESSMENT DOCD: CPT | Mod: CPTII,S$GLB,, | Performed by: SURGERY

## 2021-06-22 PROCEDURE — 99999 PR PBB SHADOW E&M-EST. PATIENT-LVL III: ICD-10-PCS | Mod: PBBFAC,,, | Performed by: SURGERY

## 2021-06-22 PROCEDURE — 99999 PR PBB SHADOW E&M-EST. PATIENT-LVL III: CPT | Mod: PBBFAC,,, | Performed by: SURGERY

## 2021-06-22 PROCEDURE — 99024 PR POST-OP FOLLOW-UP VISIT: ICD-10-PCS | Mod: S$GLB,,, | Performed by: SURGERY

## 2021-06-24 ENCOUNTER — TELEPHONE (OUTPATIENT)
Dept: HEMATOLOGY/ONCOLOGY | Facility: CLINIC | Age: 76
End: 2021-06-24

## 2021-06-24 ENCOUNTER — TELEPHONE (OUTPATIENT)
Dept: SURGERY | Facility: CLINIC | Age: 76
End: 2021-06-24

## 2021-06-30 ENCOUNTER — OFFICE VISIT (OUTPATIENT)
Dept: HEMATOLOGY/ONCOLOGY | Facility: CLINIC | Age: 76
End: 2021-06-30
Payer: MEDICARE

## 2021-06-30 VITALS
BODY MASS INDEX: 24.81 KG/M2 | HEART RATE: 58 BPM | HEIGHT: 71 IN | TEMPERATURE: 98 F | WEIGHT: 177.25 LBS | RESPIRATION RATE: 14 BRPM | DIASTOLIC BLOOD PRESSURE: 77 MMHG | SYSTOLIC BLOOD PRESSURE: 160 MMHG | OXYGEN SATURATION: 99 %

## 2021-06-30 DIAGNOSIS — C44.99 MYXOFIBROSARCOMA OF SKIN: Primary | ICD-10-CM

## 2021-06-30 DIAGNOSIS — R91.8 OTHER NONSPECIFIC ABNORMAL FINDING OF LUNG FIELD: ICD-10-CM

## 2021-06-30 PROCEDURE — 1126F AMNT PAIN NOTED NONE PRSNT: CPT | Mod: GC,S$GLB,,

## 2021-06-30 PROCEDURE — 3288F PR FALLS RISK ASSESSMENT DOCUMENTED: ICD-10-PCS | Mod: CPTII,GC,S$GLB,

## 2021-06-30 PROCEDURE — 99999 PR PBB SHADOW E&M-EST. PATIENT-LVL IV: ICD-10-PCS | Mod: PBBFAC,GC,,

## 2021-06-30 PROCEDURE — 99999 PR PBB SHADOW E&M-EST. PATIENT-LVL IV: CPT | Mod: PBBFAC,GC,,

## 2021-06-30 PROCEDURE — 99205 OFFICE O/P NEW HI 60 MIN: CPT | Mod: GC,S$GLB,,

## 2021-06-30 PROCEDURE — 3288F FALL RISK ASSESSMENT DOCD: CPT | Mod: CPTII,GC,S$GLB,

## 2021-06-30 PROCEDURE — 1101F PR PT FALLS ASSESS DOC 0-1 FALLS W/OUT INJ PAST YR: ICD-10-PCS | Mod: CPTII,GC,S$GLB,

## 2021-06-30 PROCEDURE — 3008F PR BODY MASS INDEX (BMI) DOCUMENTED: ICD-10-PCS | Mod: CPTII,GC,S$GLB,

## 2021-06-30 PROCEDURE — 1159F MED LIST DOCD IN RCRD: CPT | Mod: GC,S$GLB,,

## 2021-06-30 PROCEDURE — 1126F PR PAIN SEVERITY QUANTIFIED, NO PAIN PRESENT: ICD-10-PCS | Mod: GC,S$GLB,,

## 2021-06-30 PROCEDURE — 1159F PR MEDICATION LIST DOCUMENTED IN MEDICAL RECORD: ICD-10-PCS | Mod: GC,S$GLB,,

## 2021-06-30 PROCEDURE — 3008F BODY MASS INDEX DOCD: CPT | Mod: CPTII,GC,S$GLB,

## 2021-06-30 PROCEDURE — 99205 PR OFFICE/OUTPT VISIT, NEW, LEVL V, 60-74 MIN: ICD-10-PCS | Mod: GC,S$GLB,,

## 2021-06-30 PROCEDURE — 1101F PT FALLS ASSESS-DOCD LE1/YR: CPT | Mod: CPTII,GC,S$GLB,

## 2021-07-07 ENCOUNTER — OFFICE VISIT (OUTPATIENT)
Dept: RADIATION ONCOLOGY | Facility: CLINIC | Age: 76
End: 2021-07-07
Payer: MEDICARE

## 2021-07-07 VITALS
DIASTOLIC BLOOD PRESSURE: 79 MMHG | HEIGHT: 71 IN | TEMPERATURE: 98 F | BODY MASS INDEX: 24.78 KG/M2 | HEART RATE: 59 BPM | WEIGHT: 177 LBS | OXYGEN SATURATION: 98 % | RESPIRATION RATE: 17 BRPM | SYSTOLIC BLOOD PRESSURE: 164 MMHG

## 2021-07-07 DIAGNOSIS — C44.99 MYXOFIBROSARCOMA OF SKIN: ICD-10-CM

## 2021-07-07 DIAGNOSIS — C49.9 SOFT TISSUE SARCOMA: ICD-10-CM

## 2021-07-07 PROCEDURE — 99205 OFFICE O/P NEW HI 60 MIN: CPT | Mod: S$GLB,,, | Performed by: RADIOLOGY

## 2021-07-07 PROCEDURE — 3288F PR FALLS RISK ASSESSMENT DOCUMENTED: ICD-10-PCS | Mod: CPTII,S$GLB,, | Performed by: RADIOLOGY

## 2021-07-07 PROCEDURE — 99999 PR PBB SHADOW E&M-EST. PATIENT-LVL III: CPT | Mod: PBBFAC,,, | Performed by: RADIOLOGY

## 2021-07-07 PROCEDURE — 99499 RISK ADDL DX/OHS AUDIT: ICD-10-PCS | Mod: HCNC,S$GLB,, | Performed by: RADIOLOGY

## 2021-07-07 PROCEDURE — 1101F PT FALLS ASSESS-DOCD LE1/YR: CPT | Mod: CPTII,S$GLB,, | Performed by: RADIOLOGY

## 2021-07-07 PROCEDURE — 1159F PR MEDICATION LIST DOCUMENTED IN MEDICAL RECORD: ICD-10-PCS | Mod: S$GLB,,, | Performed by: RADIOLOGY

## 2021-07-07 PROCEDURE — 99999 PR PBB SHADOW E&M-EST. PATIENT-LVL III: ICD-10-PCS | Mod: PBBFAC,,, | Performed by: RADIOLOGY

## 2021-07-07 PROCEDURE — 1159F MED LIST DOCD IN RCRD: CPT | Mod: S$GLB,,, | Performed by: RADIOLOGY

## 2021-07-07 PROCEDURE — 1101F PR PT FALLS ASSESS DOC 0-1 FALLS W/OUT INJ PAST YR: ICD-10-PCS | Mod: CPTII,S$GLB,, | Performed by: RADIOLOGY

## 2021-07-07 PROCEDURE — 99205 PR OFFICE/OUTPT VISIT, NEW, LEVL V, 60-74 MIN: ICD-10-PCS | Mod: S$GLB,,, | Performed by: RADIOLOGY

## 2021-07-07 PROCEDURE — 3288F FALL RISK ASSESSMENT DOCD: CPT | Mod: CPTII,S$GLB,, | Performed by: RADIOLOGY

## 2021-07-07 PROCEDURE — 99499 UNLISTED E&M SERVICE: CPT | Mod: HCNC,S$GLB,, | Performed by: RADIOLOGY

## 2021-07-13 ENCOUNTER — HOSPITAL ENCOUNTER (OUTPATIENT)
Dept: RADIATION THERAPY | Facility: HOSPITAL | Age: 76
Discharge: HOME | End: 2021-07-13
Attending: RADIOLOGY
Payer: MEDICARE

## 2021-07-13 ENCOUNTER — HOSPITAL ENCOUNTER (OUTPATIENT)
Dept: RADIOLOGY | Facility: HOSPITAL | Age: 76
Discharge: HOME | End: 2021-07-13
Payer: MEDICARE

## 2021-07-13 DIAGNOSIS — R91.8 OTHER NONSPECIFIC ABNORMAL FINDING OF LUNG FIELD: ICD-10-CM

## 2021-07-13 DIAGNOSIS — C44.99 MYXOFIBROSARCOMA OF SKIN: ICD-10-CM

## 2021-07-13 LAB — POCT GLUCOSE: 122 MG/DL (ref 70–110)

## 2021-07-13 PROCEDURE — 77014 HC CT GUIDANCE RADIATION THERAPY FLDS PLACEMENT: CPT | Mod: TC | Performed by: RADIOLOGY

## 2021-07-13 PROCEDURE — 78816 PET IMAGE W/CT FULL BODY: CPT | Mod: TC

## 2021-07-13 PROCEDURE — 77334 RADIATION TREATMENT AID(S): CPT | Mod: 26,,, | Performed by: RADIOLOGY

## 2021-07-13 PROCEDURE — 77263 PR  RADIATION THERAPY PLAN COMPLEX: ICD-10-PCS | Mod: ,,, | Performed by: RADIOLOGY

## 2021-07-13 PROCEDURE — 77334 RADIATION TREATMENT AID(S): CPT | Mod: TC | Performed by: RADIOLOGY

## 2021-07-13 PROCEDURE — A9698 NON-RAD CONTRAST MATERIALNOC: HCPCS

## 2021-07-13 PROCEDURE — 78816 PET IMAGE W/CT FULL BODY: CPT | Mod: 26,PS,, | Performed by: RADIOLOGY

## 2021-07-13 PROCEDURE — 77290 THER RAD SIMULAJ FIELD CPLX: CPT | Mod: TC | Performed by: RADIOLOGY

## 2021-07-13 PROCEDURE — 25500020 PHARM REV CODE 255

## 2021-07-13 PROCEDURE — 77263 THER RADIOLOGY TX PLNG CPLX: CPT | Mod: ,,, | Performed by: RADIOLOGY

## 2021-07-13 PROCEDURE — 78816 NM PET CT WHOLE BODY: ICD-10-PCS | Mod: 26,PS,, | Performed by: RADIOLOGY

## 2021-07-13 PROCEDURE — 77334 PR  RADN TREATMENT AID(S) COMPLX: ICD-10-PCS | Mod: 26,,, | Performed by: RADIOLOGY

## 2021-07-13 RX ADMIN — IOHEXOL 1000 ML: 9 SOLUTION ORAL at 08:07

## 2021-07-14 PROCEDURE — 77301 RADIOTHERAPY DOSE PLAN IMRT: CPT | Mod: 26,,, | Performed by: RADIOLOGY

## 2021-07-14 PROCEDURE — 77301 RADIOTHERAPY DOSE PLAN IMRT: CPT | Mod: TC | Performed by: RADIOLOGY

## 2021-07-14 PROCEDURE — 77301 PR  INTEN MOD RADIOTHER PLAN W/DOSE VOL HIST: ICD-10-PCS | Mod: 26,,, | Performed by: RADIOLOGY

## 2021-07-15 PROCEDURE — 77338 DESIGN MLC DEVICE FOR IMRT: CPT | Mod: 26,,, | Performed by: RADIOLOGY

## 2021-07-15 PROCEDURE — 77338 DESIGN MLC DEVICE FOR IMRT: CPT | Mod: TC | Performed by: RADIOLOGY

## 2021-07-15 PROCEDURE — 77338 PR  MLC IMRT DESIGN & CONSTRUCTION PER IMRT PLAN: ICD-10-PCS | Mod: 26,,, | Performed by: RADIOLOGY

## 2021-07-15 PROCEDURE — 77300 RADIATION THERAPY DOSE PLAN: CPT | Mod: 26,,, | Performed by: RADIOLOGY

## 2021-07-15 PROCEDURE — 77300 RADIATION THERAPY DOSE PLAN: CPT | Mod: TC | Performed by: RADIOLOGY

## 2021-07-15 PROCEDURE — 77300 PR RADIATION THERAPY,DOSIMETRY PLAN: ICD-10-PCS | Mod: 26,,, | Performed by: RADIOLOGY

## 2021-07-21 PROCEDURE — G6002 STEREOSCOPIC X-RAY GUIDANCE: HCPCS | Mod: 26,,, | Performed by: RADIOLOGY

## 2021-07-21 PROCEDURE — 77417 THER RADIOLOGY PORT IMAGE(S): CPT | Performed by: RADIOLOGY

## 2021-07-21 PROCEDURE — G6002 PR STEREOSCOPIC XRAY GUIDE FOR RADIATION TX DELIV: ICD-10-PCS | Mod: 26,,, | Performed by: RADIOLOGY

## 2021-07-21 PROCEDURE — 77386 HC IMRT, COMPLEX: CPT | Performed by: RADIOLOGY

## 2021-07-22 PROCEDURE — 77386 HC IMRT, COMPLEX: CPT | Performed by: RADIOLOGY

## 2021-07-22 PROCEDURE — G6002 STEREOSCOPIC X-RAY GUIDANCE: HCPCS | Mod: 26,,, | Performed by: RADIOLOGY

## 2021-07-22 PROCEDURE — G6002 PR STEREOSCOPIC XRAY GUIDE FOR RADIATION TX DELIV: ICD-10-PCS | Mod: 26,,, | Performed by: RADIOLOGY

## 2021-07-23 PROCEDURE — G6002 PR STEREOSCOPIC XRAY GUIDE FOR RADIATION TX DELIV: ICD-10-PCS | Mod: 26,,, | Performed by: RADIOLOGY

## 2021-07-23 PROCEDURE — 77386 HC IMRT, COMPLEX: CPT | Performed by: RADIOLOGY

## 2021-07-23 PROCEDURE — G6002 STEREOSCOPIC X-RAY GUIDANCE: HCPCS | Mod: 26,,, | Performed by: RADIOLOGY

## 2021-07-26 DIAGNOSIS — E78.5 HYPERLIPIDEMIA, UNSPECIFIED HYPERLIPIDEMIA TYPE: ICD-10-CM

## 2021-07-26 PROCEDURE — G6002 PR STEREOSCOPIC XRAY GUIDE FOR RADIATION TX DELIV: ICD-10-PCS | Mod: 26,,, | Performed by: RADIOLOGY

## 2021-07-26 PROCEDURE — 77386 HC IMRT, COMPLEX: CPT | Performed by: RADIOLOGY

## 2021-07-26 PROCEDURE — G6002 STEREOSCOPIC X-RAY GUIDANCE: HCPCS | Mod: 26,,, | Performed by: RADIOLOGY

## 2021-07-27 PROCEDURE — 77336 RADIATION PHYSICS CONSULT: CPT | Mod: 59 | Performed by: RADIOLOGY

## 2021-07-27 PROCEDURE — G6002 PR STEREOSCOPIC XRAY GUIDE FOR RADIATION TX DELIV: ICD-10-PCS | Mod: 26,,, | Performed by: RADIOLOGY

## 2021-07-27 PROCEDURE — 77386 HC IMRT, COMPLEX: CPT | Performed by: RADIOLOGY

## 2021-07-27 PROCEDURE — G6002 STEREOSCOPIC X-RAY GUIDANCE: HCPCS | Mod: 26,,, | Performed by: RADIOLOGY

## 2021-07-28 PROCEDURE — 77386 HC IMRT, COMPLEX: CPT | Performed by: RADIOLOGY

## 2021-07-28 PROCEDURE — G6002 PR STEREOSCOPIC XRAY GUIDE FOR RADIATION TX DELIV: ICD-10-PCS | Mod: 26,,, | Performed by: RADIOLOGY

## 2021-07-28 PROCEDURE — G6002 STEREOSCOPIC X-RAY GUIDANCE: HCPCS | Mod: 26,,, | Performed by: RADIOLOGY

## 2021-07-28 PROCEDURE — 77417 THER RADIOLOGY PORT IMAGE(S): CPT | Performed by: RADIOLOGY

## 2021-07-28 RX ORDER — ATORVASTATIN CALCIUM 80 MG/1
80 TABLET, FILM COATED ORAL DAILY
Qty: 90 TABLET | Refills: 3 | Status: SHIPPED | OUTPATIENT
Start: 2021-07-28

## 2021-07-29 PROCEDURE — 77386 HC IMRT, COMPLEX: CPT | Performed by: RADIOLOGY

## 2021-07-29 PROCEDURE — G6002 PR STEREOSCOPIC XRAY GUIDE FOR RADIATION TX DELIV: ICD-10-PCS | Mod: 26,,, | Performed by: RADIOLOGY

## 2021-07-29 PROCEDURE — G6002 STEREOSCOPIC X-RAY GUIDANCE: HCPCS | Mod: 26,,, | Performed by: RADIOLOGY

## 2021-07-30 PROCEDURE — G6002 STEREOSCOPIC X-RAY GUIDANCE: HCPCS | Mod: 26,,, | Performed by: RADIOLOGY

## 2021-07-30 PROCEDURE — 77300 RADIATION THERAPY DOSE PLAN: CPT | Mod: TC | Performed by: RADIOLOGY

## 2021-07-30 PROCEDURE — 77338 DESIGN MLC DEVICE FOR IMRT: CPT | Mod: 26,,, | Performed by: RADIOLOGY

## 2021-07-30 PROCEDURE — G6002 PR STEREOSCOPIC XRAY GUIDE FOR RADIATION TX DELIV: ICD-10-PCS | Mod: 26,,, | Performed by: RADIOLOGY

## 2021-07-30 PROCEDURE — 77338 PR  MLC IMRT DESIGN & CONSTRUCTION PER IMRT PLAN: ICD-10-PCS | Mod: 26,,, | Performed by: RADIOLOGY

## 2021-07-30 PROCEDURE — 77300 PR RADIATION THERAPY,DOSIMETRY PLAN: ICD-10-PCS | Mod: 26,,, | Performed by: RADIOLOGY

## 2021-07-30 PROCEDURE — 77386 HC IMRT, COMPLEX: CPT | Performed by: RADIOLOGY

## 2021-07-30 PROCEDURE — 77300 RADIATION THERAPY DOSE PLAN: CPT | Mod: 26,,, | Performed by: RADIOLOGY

## 2021-07-30 PROCEDURE — 77338 DESIGN MLC DEVICE FOR IMRT: CPT | Mod: TC | Performed by: RADIOLOGY

## 2021-08-02 ENCOUNTER — HOSPITAL ENCOUNTER (OUTPATIENT)
Dept: RADIATION THERAPY | Facility: HOSPITAL | Age: 76
Discharge: HOME | End: 2021-08-02
Attending: RADIOLOGY
Payer: MEDICARE

## 2021-08-02 ENCOUNTER — DOCUMENTATION ONLY (OUTPATIENT)
Dept: RADIATION ONCOLOGY | Facility: CLINIC | Age: 76
End: 2021-08-02

## 2021-08-02 ENCOUNTER — PATIENT MESSAGE (OUTPATIENT)
Dept: RADIATION ONCOLOGY | Facility: CLINIC | Age: 76
End: 2021-08-02

## 2021-08-02 PROCEDURE — 77386 HC IMRT, COMPLEX: CPT | Performed by: RADIOLOGY

## 2021-08-02 PROCEDURE — G6002 PR STEREOSCOPIC XRAY GUIDE FOR RADIATION TX DELIV: ICD-10-PCS | Mod: 26,,, | Performed by: RADIOLOGY

## 2021-08-02 PROCEDURE — G6002 STEREOSCOPIC X-RAY GUIDANCE: HCPCS | Mod: 26,,, | Performed by: RADIOLOGY

## 2021-08-03 PROCEDURE — 77336 RADIATION PHYSICS CONSULT: CPT | Performed by: RADIOLOGY

## 2021-08-03 PROCEDURE — G6002 PR STEREOSCOPIC XRAY GUIDE FOR RADIATION TX DELIV: ICD-10-PCS | Mod: 26,,, | Performed by: RADIOLOGY

## 2021-08-03 PROCEDURE — G6002 STEREOSCOPIC X-RAY GUIDANCE: HCPCS | Mod: 26,,, | Performed by: RADIOLOGY

## 2021-08-03 PROCEDURE — 77386 HC IMRT, COMPLEX: CPT | Performed by: RADIOLOGY

## 2021-08-04 PROCEDURE — 77386 HC IMRT, COMPLEX: CPT | Performed by: RADIOLOGY

## 2021-08-04 PROCEDURE — G6002 PR STEREOSCOPIC XRAY GUIDE FOR RADIATION TX DELIV: ICD-10-PCS | Mod: 26,,, | Performed by: RADIOLOGY

## 2021-08-04 PROCEDURE — G6002 STEREOSCOPIC X-RAY GUIDANCE: HCPCS | Mod: 26,,, | Performed by: RADIOLOGY

## 2021-08-05 PROCEDURE — G6002 STEREOSCOPIC X-RAY GUIDANCE: HCPCS | Mod: 26,,, | Performed by: RADIOLOGY

## 2021-08-05 PROCEDURE — 77386 HC IMRT, COMPLEX: CPT | Performed by: RADIOLOGY

## 2021-08-05 PROCEDURE — 77417 THER RADIOLOGY PORT IMAGE(S): CPT | Performed by: RADIOLOGY

## 2021-08-05 PROCEDURE — G6002 PR STEREOSCOPIC XRAY GUIDE FOR RADIATION TX DELIV: ICD-10-PCS | Mod: 26,,, | Performed by: RADIOLOGY

## 2021-08-06 PROCEDURE — G6002 STEREOSCOPIC X-RAY GUIDANCE: HCPCS | Mod: 26,,, | Performed by: RADIOLOGY

## 2021-08-06 PROCEDURE — G6002 PR STEREOSCOPIC XRAY GUIDE FOR RADIATION TX DELIV: ICD-10-PCS | Mod: 26,,, | Performed by: RADIOLOGY

## 2021-08-06 PROCEDURE — 77386 HC IMRT, COMPLEX: CPT | Performed by: RADIOLOGY

## 2021-08-09 ENCOUNTER — DOCUMENTATION ONLY (OUTPATIENT)
Dept: RADIATION ONCOLOGY | Facility: CLINIC | Age: 76
End: 2021-08-09

## 2021-08-09 PROCEDURE — G6002 STEREOSCOPIC X-RAY GUIDANCE: HCPCS | Mod: 26,,, | Performed by: RADIOLOGY

## 2021-08-09 PROCEDURE — G6002 PR STEREOSCOPIC XRAY GUIDE FOR RADIATION TX DELIV: ICD-10-PCS | Mod: 26,,, | Performed by: RADIOLOGY

## 2021-08-09 PROCEDURE — 77386 HC IMRT, COMPLEX: CPT | Performed by: RADIOLOGY

## 2021-08-10 PROCEDURE — G6002 PR STEREOSCOPIC XRAY GUIDE FOR RADIATION TX DELIV: ICD-10-PCS | Mod: 26,,, | Performed by: RADIOLOGY

## 2021-08-10 PROCEDURE — 77386 HC IMRT, COMPLEX: CPT | Performed by: RADIOLOGY

## 2021-08-10 PROCEDURE — G6002 STEREOSCOPIC X-RAY GUIDANCE: HCPCS | Mod: 26,,, | Performed by: RADIOLOGY

## 2021-08-10 PROCEDURE — 77336 RADIATION PHYSICS CONSULT: CPT | Performed by: RADIOLOGY

## 2021-08-12 PROCEDURE — G6002 STEREOSCOPIC X-RAY GUIDANCE: HCPCS | Mod: 26,,, | Performed by: RADIOLOGY

## 2021-08-12 PROCEDURE — G6002 PR STEREOSCOPIC XRAY GUIDE FOR RADIATION TX DELIV: ICD-10-PCS | Mod: 26,,, | Performed by: RADIOLOGY

## 2021-08-12 PROCEDURE — 77386 HC IMRT, COMPLEX: CPT | Performed by: RADIOLOGY

## 2021-08-13 PROCEDURE — G6002 PR STEREOSCOPIC XRAY GUIDE FOR RADIATION TX DELIV: ICD-10-PCS | Mod: 26,,, | Performed by: RADIOLOGY

## 2021-08-13 PROCEDURE — 77386 HC IMRT, COMPLEX: CPT | Performed by: RADIOLOGY

## 2021-08-13 PROCEDURE — G6002 STEREOSCOPIC X-RAY GUIDANCE: HCPCS | Mod: 26,,, | Performed by: RADIOLOGY

## 2021-08-13 PROCEDURE — 77417 THER RADIOLOGY PORT IMAGE(S): CPT | Performed by: RADIOLOGY

## 2021-08-16 ENCOUNTER — DOCUMENTATION ONLY (OUTPATIENT)
Dept: RADIATION ONCOLOGY | Facility: CLINIC | Age: 76
End: 2021-08-16

## 2021-08-16 PROCEDURE — G6002 PR STEREOSCOPIC XRAY GUIDE FOR RADIATION TX DELIV: ICD-10-PCS | Mod: 26,,, | Performed by: RADIOLOGY

## 2021-08-16 PROCEDURE — G6002 STEREOSCOPIC X-RAY GUIDANCE: HCPCS | Mod: 26,,, | Performed by: RADIOLOGY

## 2021-08-16 PROCEDURE — 77386 HC IMRT, COMPLEX: CPT | Performed by: RADIOLOGY

## 2021-08-17 PROCEDURE — G6002 STEREOSCOPIC X-RAY GUIDANCE: HCPCS | Mod: 26,,, | Performed by: RADIOLOGY

## 2021-08-17 PROCEDURE — 77386 HC IMRT, COMPLEX: CPT | Performed by: RADIOLOGY

## 2021-08-17 PROCEDURE — G6002 PR STEREOSCOPIC XRAY GUIDE FOR RADIATION TX DELIV: ICD-10-PCS | Mod: 26,,, | Performed by: RADIOLOGY

## 2021-08-18 PROCEDURE — 77386 HC IMRT, COMPLEX: CPT | Performed by: RADIOLOGY

## 2021-08-18 PROCEDURE — G6002 PR STEREOSCOPIC XRAY GUIDE FOR RADIATION TX DELIV: ICD-10-PCS | Mod: 26,,, | Performed by: RADIOLOGY

## 2021-08-18 PROCEDURE — G6002 STEREOSCOPIC X-RAY GUIDANCE: HCPCS | Mod: 26,,, | Performed by: RADIOLOGY

## 2021-08-19 PROCEDURE — G6002 PR STEREOSCOPIC XRAY GUIDE FOR RADIATION TX DELIV: ICD-10-PCS | Mod: 26,,, | Performed by: RADIOLOGY

## 2021-08-19 PROCEDURE — 77336 RADIATION PHYSICS CONSULT: CPT | Performed by: RADIOLOGY

## 2021-08-19 PROCEDURE — G6002 STEREOSCOPIC X-RAY GUIDANCE: HCPCS | Mod: 26,,, | Performed by: RADIOLOGY

## 2021-08-19 PROCEDURE — 77386 HC IMRT, COMPLEX: CPT | Performed by: RADIOLOGY

## 2021-08-20 PROCEDURE — G6002 STEREOSCOPIC X-RAY GUIDANCE: HCPCS | Mod: 26,,, | Performed by: RADIOLOGY

## 2021-08-20 PROCEDURE — 77417 THER RADIOLOGY PORT IMAGE(S): CPT | Performed by: RADIOLOGY

## 2021-08-20 PROCEDURE — 77386 HC IMRT, COMPLEX: CPT | Performed by: RADIOLOGY

## 2021-08-20 PROCEDURE — G6002 PR STEREOSCOPIC XRAY GUIDE FOR RADIATION TX DELIV: ICD-10-PCS | Mod: 26,,, | Performed by: RADIOLOGY

## 2021-08-23 ENCOUNTER — DOCUMENTATION ONLY (OUTPATIENT)
Dept: RADIATION ONCOLOGY | Facility: CLINIC | Age: 76
End: 2021-08-23

## 2021-08-23 PROCEDURE — G6002 PR STEREOSCOPIC XRAY GUIDE FOR RADIATION TX DELIV: ICD-10-PCS | Mod: 26,,, | Performed by: RADIOLOGY

## 2021-08-23 PROCEDURE — G6002 STEREOSCOPIC X-RAY GUIDANCE: HCPCS | Mod: 26,,, | Performed by: RADIOLOGY

## 2021-08-23 PROCEDURE — 77386 HC IMRT, COMPLEX: CPT | Performed by: RADIOLOGY

## 2021-08-24 PROCEDURE — 77386 HC IMRT, COMPLEX: CPT | Performed by: RADIOLOGY

## 2021-08-24 PROCEDURE — G6002 PR STEREOSCOPIC XRAY GUIDE FOR RADIATION TX DELIV: ICD-10-PCS | Mod: 26,,, | Performed by: RADIOLOGY

## 2021-08-24 PROCEDURE — G6002 STEREOSCOPIC X-RAY GUIDANCE: HCPCS | Mod: 26,,, | Performed by: RADIOLOGY

## 2021-08-25 PROCEDURE — 77336 RADIATION PHYSICS CONSULT: CPT | Performed by: RADIOLOGY

## 2021-08-25 PROCEDURE — 77386 HC IMRT, COMPLEX: CPT | Performed by: RADIOLOGY

## 2021-08-25 PROCEDURE — G6002 STEREOSCOPIC X-RAY GUIDANCE: HCPCS | Mod: 26,,, | Performed by: RADIOLOGY

## 2021-08-25 PROCEDURE — G6002 PR STEREOSCOPIC XRAY GUIDE FOR RADIATION TX DELIV: ICD-10-PCS | Mod: 26,,, | Performed by: RADIOLOGY

## 2021-08-26 PROCEDURE — 77386 HC IMRT, COMPLEX: CPT | Performed by: RADIOLOGY

## 2021-08-26 PROCEDURE — G6002 STEREOSCOPIC X-RAY GUIDANCE: HCPCS | Mod: 26,,, | Performed by: RADIOLOGY

## 2021-08-26 PROCEDURE — 77417 THER RADIOLOGY PORT IMAGE(S): CPT | Performed by: RADIOLOGY

## 2021-08-26 PROCEDURE — G6002 PR STEREOSCOPIC XRAY GUIDE FOR RADIATION TX DELIV: ICD-10-PCS | Mod: 26,,, | Performed by: RADIOLOGY

## 2021-08-30 ENCOUNTER — TELEPHONE (OUTPATIENT)
Dept: RADIATION ONCOLOGY | Facility: CLINIC | Age: 76
End: 2021-08-30

## 2021-09-02 ENCOUNTER — TELEPHONE (OUTPATIENT)
Dept: RADIATION ONCOLOGY | Facility: CLINIC | Age: 76
End: 2021-09-02

## 2021-09-02 ENCOUNTER — DOCUMENTATION ONLY (OUTPATIENT)
Dept: RADIATION ONCOLOGY | Facility: CLINIC | Age: 76
End: 2021-09-02

## 2021-09-02 ENCOUNTER — HOSPITAL ENCOUNTER (OUTPATIENT)
Dept: RADIATION THERAPY | Facility: HOSPITAL | Age: 76
Discharge: HOME | End: 2021-09-02
Attending: RADIOLOGY
Payer: MEDICARE

## 2021-09-13 ENCOUNTER — TELEPHONE (OUTPATIENT)
Dept: RADIATION ONCOLOGY | Facility: CLINIC | Age: 76
End: 2021-09-13

## 2021-09-30 RX ORDER — HYDROCHLOROTHIAZIDE 25 MG/1
TABLET ORAL
Refills: 0 | OUTPATIENT
Start: 2021-09-30

## 2021-10-06 DIAGNOSIS — I10 HYPERTENSION, BENIGN: ICD-10-CM

## 2021-10-06 DIAGNOSIS — N40.0 BENIGN PROSTATIC HYPERPLASIA, UNSPECIFIED WHETHER LOWER URINARY TRACT SYMPTOMS PRESENT: Primary | ICD-10-CM

## 2021-10-07 RX ORDER — TAMSULOSIN HYDROCHLORIDE 0.4 MG/1
0.4 CAPSULE ORAL DAILY
Qty: 90 CAPSULE | Refills: 0 | Status: SHIPPED | OUTPATIENT
Start: 2021-10-07 | End: 2022-02-07

## 2021-10-07 RX ORDER — FINASTERIDE 5 MG/1
5 TABLET, FILM COATED ORAL DAILY
Qty: 90 TABLET | Refills: 0 | Status: SHIPPED | OUTPATIENT
Start: 2021-10-07 | End: 2022-02-07

## 2021-10-07 RX ORDER — HYDROCHLOROTHIAZIDE 25 MG/1
25 TABLET ORAL DAILY
Qty: 90 TABLET | Refills: 0 | Status: SHIPPED | OUTPATIENT
Start: 2021-10-07 | End: 2022-02-07

## 2021-10-09 ENCOUNTER — IMMUNIZATION (OUTPATIENT)
Dept: FAMILY MEDICINE | Facility: CLINIC | Age: 76
End: 2021-10-09
Payer: MEDICARE

## 2021-10-09 PROCEDURE — G0008 FLU VACCINE - QUADRIVALENT - ADJUVANTED: ICD-10-PCS | Mod: HCNC,S$GLB,, | Performed by: FAMILY MEDICINE

## 2021-10-09 PROCEDURE — 90694 FLU VACCINE - QUADRIVALENT - ADJUVANTED: ICD-10-PCS | Mod: HCNC,S$GLB,, | Performed by: FAMILY MEDICINE

## 2021-10-09 PROCEDURE — G0008 ADMIN INFLUENZA VIRUS VAC: HCPCS | Mod: HCNC,S$GLB,, | Performed by: FAMILY MEDICINE

## 2021-10-09 PROCEDURE — 90694 VACC AIIV4 NO PRSRV 0.5ML IM: CPT | Mod: HCNC,S$GLB,, | Performed by: FAMILY MEDICINE

## 2021-11-15 ENCOUNTER — OFFICE VISIT (OUTPATIENT)
Dept: RADIATION ONCOLOGY | Facility: CLINIC | Age: 76
End: 2021-11-15
Payer: MEDICARE

## 2021-11-15 VITALS
SYSTOLIC BLOOD PRESSURE: 172 MMHG | HEART RATE: 69 BPM | RESPIRATION RATE: 18 BRPM | BODY MASS INDEX: 24.78 KG/M2 | WEIGHT: 177.69 LBS | DIASTOLIC BLOOD PRESSURE: 80 MMHG | TEMPERATURE: 98 F

## 2021-11-15 DIAGNOSIS — C49.21 MALIGNANT NEOPLASM OF CONNECTIVE AND SOFT TISSUE OF RIGHT LOWER LIMB, INCLUDING HIP: ICD-10-CM

## 2021-11-15 DIAGNOSIS — C49.9 SOFT TISSUE SARCOMA: Primary | ICD-10-CM

## 2021-11-15 PROCEDURE — 3288F FALL RISK ASSESSMENT DOCD: CPT | Mod: HCNC,CPTII,S$GLB, | Performed by: RADIOLOGY

## 2021-11-15 PROCEDURE — 1126F AMNT PAIN NOTED NONE PRSNT: CPT | Mod: HCNC,CPTII,S$GLB, | Performed by: RADIOLOGY

## 2021-11-15 PROCEDURE — 3288F PR FALLS RISK ASSESSMENT DOCUMENTED: ICD-10-PCS | Mod: HCNC,CPTII,S$GLB, | Performed by: RADIOLOGY

## 2021-11-15 PROCEDURE — 3077F PR MOST RECENT SYSTOLIC BLOOD PRESSURE >= 140 MM HG: ICD-10-PCS | Mod: HCNC,CPTII,S$GLB, | Performed by: RADIOLOGY

## 2021-11-15 PROCEDURE — 1126F PR PAIN SEVERITY QUANTIFIED, NO PAIN PRESENT: ICD-10-PCS | Mod: HCNC,CPTII,S$GLB, | Performed by: RADIOLOGY

## 2021-11-15 PROCEDURE — 1160F RVW MEDS BY RX/DR IN RCRD: CPT | Mod: HCNC,CPTII,S$GLB, | Performed by: RADIOLOGY

## 2021-11-15 PROCEDURE — 1101F PR PT FALLS ASSESS DOC 0-1 FALLS W/OUT INJ PAST YR: ICD-10-PCS | Mod: HCNC,CPTII,S$GLB, | Performed by: RADIOLOGY

## 2021-11-15 PROCEDURE — 99213 PR OFFICE/OUTPT VISIT, EST, LEVL III, 20-29 MIN: ICD-10-PCS | Mod: HCNC,S$GLB,, | Performed by: RADIOLOGY

## 2021-11-15 PROCEDURE — 99213 OFFICE O/P EST LOW 20 MIN: CPT | Mod: HCNC,S$GLB,, | Performed by: RADIOLOGY

## 2021-11-15 PROCEDURE — 1101F PT FALLS ASSESS-DOCD LE1/YR: CPT | Mod: HCNC,CPTII,S$GLB, | Performed by: RADIOLOGY

## 2021-11-15 PROCEDURE — 3077F SYST BP >= 140 MM HG: CPT | Mod: HCNC,CPTII,S$GLB, | Performed by: RADIOLOGY

## 2021-11-15 PROCEDURE — 3079F DIAST BP 80-89 MM HG: CPT | Mod: HCNC,CPTII,S$GLB, | Performed by: RADIOLOGY

## 2021-11-15 PROCEDURE — 1159F MED LIST DOCD IN RCRD: CPT | Mod: HCNC,CPTII,S$GLB, | Performed by: RADIOLOGY

## 2021-11-15 PROCEDURE — 1159F PR MEDICATION LIST DOCUMENTED IN MEDICAL RECORD: ICD-10-PCS | Mod: HCNC,CPTII,S$GLB, | Performed by: RADIOLOGY

## 2021-11-15 PROCEDURE — 3079F PR MOST RECENT DIASTOLIC BLOOD PRESSURE 80-89 MM HG: ICD-10-PCS | Mod: HCNC,CPTII,S$GLB, | Performed by: RADIOLOGY

## 2021-11-15 PROCEDURE — 99999 PR PBB SHADOW E&M-EST. PATIENT-LVL III: ICD-10-PCS | Mod: PBBFAC,HCNC,, | Performed by: RADIOLOGY

## 2021-11-15 PROCEDURE — 3044F HG A1C LEVEL LT 7.0%: CPT | Mod: HCNC,CPTII,S$GLB, | Performed by: RADIOLOGY

## 2021-11-15 PROCEDURE — 99999 PR PBB SHADOW E&M-EST. PATIENT-LVL III: CPT | Mod: PBBFAC,HCNC,, | Performed by: RADIOLOGY

## 2021-11-15 PROCEDURE — 3044F PR MOST RECENT HEMOGLOBIN A1C LEVEL <7.0%: ICD-10-PCS | Mod: HCNC,CPTII,S$GLB, | Performed by: RADIOLOGY

## 2021-11-15 PROCEDURE — 1160F PR REVIEW ALL MEDS BY PRESCRIBER/CLIN PHARMACIST DOCUMENTED: ICD-10-PCS | Mod: HCNC,CPTII,S$GLB, | Performed by: RADIOLOGY

## 2021-12-27 ENCOUNTER — TELEPHONE (OUTPATIENT)
Dept: RADIATION ONCOLOGY | Facility: CLINIC | Age: 76
End: 2021-12-27
Payer: MEDICARE

## 2022-01-12 ENCOUNTER — TELEPHONE (OUTPATIENT)
Dept: RADIATION ONCOLOGY | Facility: CLINIC | Age: 77
End: 2022-01-12
Payer: MEDICARE

## 2022-01-12 ENCOUNTER — OFFICE VISIT (OUTPATIENT)
Dept: RADIATION ONCOLOGY | Facility: CLINIC | Age: 77
End: 2022-01-12
Payer: MEDICARE

## 2022-01-12 ENCOUNTER — HOSPITAL ENCOUNTER (OUTPATIENT)
Dept: RADIOLOGY | Facility: HOSPITAL | Age: 77
Discharge: HOME | End: 2022-01-12
Attending: RADIOLOGY
Payer: MEDICARE

## 2022-01-12 VITALS
DIASTOLIC BLOOD PRESSURE: 85 MMHG | HEART RATE: 65 BPM | OXYGEN SATURATION: 97 % | WEIGHT: 176.81 LBS | SYSTOLIC BLOOD PRESSURE: 163 MMHG | BODY MASS INDEX: 24.75 KG/M2 | HEIGHT: 71 IN | TEMPERATURE: 99 F | RESPIRATION RATE: 19 BRPM

## 2022-01-12 DIAGNOSIS — C49.21 MALIGNANT NEOPLASM OF CONNECTIVE AND SOFT TISSUE OF RIGHT LOWER LIMB, INCLUDING HIP: ICD-10-CM

## 2022-01-12 DIAGNOSIS — C49.9 SOFT TISSUE SARCOMA: ICD-10-CM

## 2022-01-12 DIAGNOSIS — C49.9 SOFT TISSUE SARCOMA: Primary | ICD-10-CM

## 2022-01-12 LAB — POCT GLUCOSE: 118 MG/DL (ref 70–110)

## 2022-01-12 PROCEDURE — 3077F SYST BP >= 140 MM HG: CPT | Mod: HCNC,CPTII,S$GLB, | Performed by: RADIOLOGY

## 2022-01-12 PROCEDURE — 78816 NM PET CT WHOLE BODY: ICD-10-PCS | Mod: 26,HCNC,PS, | Performed by: RADIOLOGY

## 2022-01-12 PROCEDURE — 1126F PR PAIN SEVERITY QUANTIFIED, NO PAIN PRESENT: ICD-10-PCS | Mod: HCNC,CPTII,S$GLB, | Performed by: RADIOLOGY

## 2022-01-12 PROCEDURE — 1160F PR REVIEW ALL MEDS BY PRESCRIBER/CLIN PHARMACIST DOCUMENTED: ICD-10-PCS | Mod: HCNC,CPTII,S$GLB, | Performed by: RADIOLOGY

## 2022-01-12 PROCEDURE — 78816 PET IMAGE W/CT FULL BODY: CPT | Mod: TC,HCNC

## 2022-01-12 PROCEDURE — 99213 PR OFFICE/OUTPT VISIT, EST, LEVL III, 20-29 MIN: ICD-10-PCS | Mod: HCNC,S$GLB,, | Performed by: RADIOLOGY

## 2022-01-12 PROCEDURE — 1101F PT FALLS ASSESS-DOCD LE1/YR: CPT | Mod: HCNC,CPTII,S$GLB, | Performed by: RADIOLOGY

## 2022-01-12 PROCEDURE — 1101F PR PT FALLS ASSESS DOC 0-1 FALLS W/OUT INJ PAST YR: ICD-10-PCS | Mod: HCNC,CPTII,S$GLB, | Performed by: RADIOLOGY

## 2022-01-12 PROCEDURE — 3288F FALL RISK ASSESSMENT DOCD: CPT | Mod: HCNC,CPTII,S$GLB, | Performed by: RADIOLOGY

## 2022-01-12 PROCEDURE — A9698 NON-RAD CONTRAST MATERIALNOC: HCPCS | Mod: HCNC | Performed by: RADIOLOGY

## 2022-01-12 PROCEDURE — 1159F MED LIST DOCD IN RCRD: CPT | Mod: HCNC,CPTII,S$GLB, | Performed by: RADIOLOGY

## 2022-01-12 PROCEDURE — 3077F PR MOST RECENT SYSTOLIC BLOOD PRESSURE >= 140 MM HG: ICD-10-PCS | Mod: HCNC,CPTII,S$GLB, | Performed by: RADIOLOGY

## 2022-01-12 PROCEDURE — 99213 OFFICE O/P EST LOW 20 MIN: CPT | Mod: HCNC,S$GLB,, | Performed by: RADIOLOGY

## 2022-01-12 PROCEDURE — 3079F PR MOST RECENT DIASTOLIC BLOOD PRESSURE 80-89 MM HG: ICD-10-PCS | Mod: HCNC,CPTII,S$GLB, | Performed by: RADIOLOGY

## 2022-01-12 PROCEDURE — 1126F AMNT PAIN NOTED NONE PRSNT: CPT | Mod: HCNC,CPTII,S$GLB, | Performed by: RADIOLOGY

## 2022-01-12 PROCEDURE — 3288F PR FALLS RISK ASSESSMENT DOCUMENTED: ICD-10-PCS | Mod: HCNC,CPTII,S$GLB, | Performed by: RADIOLOGY

## 2022-01-12 PROCEDURE — 99999 PR PBB SHADOW E&M-EST. PATIENT-LVL III: CPT | Mod: PBBFAC,HCNC,, | Performed by: RADIOLOGY

## 2022-01-12 PROCEDURE — 99999 PR PBB SHADOW E&M-EST. PATIENT-LVL III: ICD-10-PCS | Mod: PBBFAC,HCNC,, | Performed by: RADIOLOGY

## 2022-01-12 PROCEDURE — 78816 PET IMAGE W/CT FULL BODY: CPT | Mod: 26,HCNC,PS, | Performed by: RADIOLOGY

## 2022-01-12 PROCEDURE — 1160F RVW MEDS BY RX/DR IN RCRD: CPT | Mod: HCNC,CPTII,S$GLB, | Performed by: RADIOLOGY

## 2022-01-12 PROCEDURE — 1159F PR MEDICATION LIST DOCUMENTED IN MEDICAL RECORD: ICD-10-PCS | Mod: HCNC,CPTII,S$GLB, | Performed by: RADIOLOGY

## 2022-01-12 PROCEDURE — 25500020 PHARM REV CODE 255: Mod: HCNC | Performed by: RADIOLOGY

## 2022-01-12 PROCEDURE — 3079F DIAST BP 80-89 MM HG: CPT | Mod: HCNC,CPTII,S$GLB, | Performed by: RADIOLOGY

## 2022-01-12 RX ADMIN — IOHEXOL 1000 ML: 9 SOLUTION ORAL at 07:01

## 2022-01-12 NOTE — TELEPHONE ENCOUNTER
I spoke with Mr. Plunkett regarding his PET scan showing avid lesions in his proximal thigh suspicious for recurrent disease. I let him know that I have reached out to Mariann Mao and Rea for next steps, including potential biopsy, and all of his questions were answered. He was instructed to contact me should further questions or concerns arise.
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
## 2022-01-14 ENCOUNTER — OFFICE VISIT (OUTPATIENT)
Dept: SURGERY | Facility: CLINIC | Age: 77
End: 2022-01-14
Payer: MEDICARE

## 2022-01-14 VITALS
BODY MASS INDEX: 24.69 KG/M2 | OXYGEN SATURATION: 100 % | SYSTOLIC BLOOD PRESSURE: 152 MMHG | HEART RATE: 63 BPM | HEIGHT: 71 IN | DIASTOLIC BLOOD PRESSURE: 76 MMHG | WEIGHT: 176.38 LBS

## 2022-01-14 DIAGNOSIS — C49.21 SARCOMA OF RIGHT THIGH: Primary | ICD-10-CM

## 2022-01-14 PROCEDURE — 99999 PR PBB SHADOW E&M-EST. PATIENT-LVL IV: CPT | Mod: PBBFAC,HCNC,, | Performed by: SURGERY

## 2022-01-14 PROCEDURE — 3078F DIAST BP <80 MM HG: CPT | Mod: HCNC,CPTII,S$GLB, | Performed by: SURGERY

## 2022-01-14 PROCEDURE — 1126F AMNT PAIN NOTED NONE PRSNT: CPT | Mod: HCNC,CPTII,S$GLB, | Performed by: SURGERY

## 2022-01-14 PROCEDURE — 3288F PR FALLS RISK ASSESSMENT DOCUMENTED: ICD-10-PCS | Mod: HCNC,CPTII,S$GLB, | Performed by: SURGERY

## 2022-01-14 PROCEDURE — 99215 OFFICE O/P EST HI 40 MIN: CPT | Mod: HCNC,S$GLB,, | Performed by: SURGERY

## 2022-01-14 PROCEDURE — 1101F PR PT FALLS ASSESS DOC 0-1 FALLS W/OUT INJ PAST YR: ICD-10-PCS | Mod: HCNC,CPTII,S$GLB, | Performed by: SURGERY

## 2022-01-14 PROCEDURE — 3078F PR MOST RECENT DIASTOLIC BLOOD PRESSURE < 80 MM HG: ICD-10-PCS | Mod: HCNC,CPTII,S$GLB, | Performed by: SURGERY

## 2022-01-14 PROCEDURE — 1159F MED LIST DOCD IN RCRD: CPT | Mod: HCNC,CPTII,S$GLB, | Performed by: SURGERY

## 2022-01-14 PROCEDURE — 3077F SYST BP >= 140 MM HG: CPT | Mod: HCNC,CPTII,S$GLB, | Performed by: SURGERY

## 2022-01-14 PROCEDURE — 3288F FALL RISK ASSESSMENT DOCD: CPT | Mod: HCNC,CPTII,S$GLB, | Performed by: SURGERY

## 2022-01-14 PROCEDURE — 3077F PR MOST RECENT SYSTOLIC BLOOD PRESSURE >= 140 MM HG: ICD-10-PCS | Mod: HCNC,CPTII,S$GLB, | Performed by: SURGERY

## 2022-01-14 PROCEDURE — 99999 PR PBB SHADOW E&M-EST. PATIENT-LVL IV: ICD-10-PCS | Mod: PBBFAC,HCNC,, | Performed by: SURGERY

## 2022-01-14 PROCEDURE — 1101F PT FALLS ASSESS-DOCD LE1/YR: CPT | Mod: HCNC,CPTII,S$GLB, | Performed by: SURGERY

## 2022-01-14 PROCEDURE — 1159F PR MEDICATION LIST DOCUMENTED IN MEDICAL RECORD: ICD-10-PCS | Mod: HCNC,CPTII,S$GLB, | Performed by: SURGERY

## 2022-01-14 PROCEDURE — 99215 PR OFFICE/OUTPT VISIT, EST, LEVL V, 40-54 MIN: ICD-10-PCS | Mod: HCNC,S$GLB,, | Performed by: SURGERY

## 2022-01-14 PROCEDURE — 1126F PR PAIN SEVERITY QUANTIFIED, NO PAIN PRESENT: ICD-10-PCS | Mod: HCNC,CPTII,S$GLB, | Performed by: SURGERY

## 2022-01-14 RX ORDER — SODIUM CHLORIDE 9 MG/ML
INJECTION, SOLUTION INTRAVENOUS CONTINUOUS
Status: CANCELLED | OUTPATIENT
Start: 2022-01-14

## 2022-01-14 NOTE — H&P
History & Physical    SUBJECTIVE:     History of Present Illness:  Patient is a 76 y.o. male presents with right thigh and right groin palpable masses. Non tender. No skin changes. Noticed approx 1 month ago. 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 Prior surgical site with no disease.  PET scan shows the masses as hypermetabolic. Also with a new lung lesion.  Patient has no new systemic issues.  Denies f/c/n/v/sob/cp.  Scheduled to see oncologist next Wednesday. Will plan for surgery on Thursday to excise the thigh lesions/Lymph nodes.    Chief Complaint   Patient presents with    Follow-up     More mass on the right thigh       Review of patient's allergies indicates:  No Known Allergies    Current Outpatient Medications   Medication Sig Dispense Refill    atorvastatin (LIPITOR) 80 MG tablet Take 1 tablet (80 mg total) by mouth once daily. 90 tablet 3    finasteride (PROSCAR) 5 mg tablet Take 1 tablet (5 mg total) by mouth once daily. 90 tablet 0    hydroCHLOROthiazide (HYDRODIURIL) 25 MG tablet Take 1 tablet (25 mg total) by mouth once daily. 90 tablet 0    tamsulosin (FLOMAX) 0.4 mg Cap Take 1 capsule (0.4 mg total) by mouth once daily. 90 capsule 0     No current facility-administered medications for this visit.       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    Tobacco dependence      Past Surgical History:   Procedure Laterality Date    APPENDECTOMY      BACK SURGERY      HIP REPLACEMENT ARTHROPLASTY Left 8/6/2018    Procedure: ARTHROPLASTY, HIP REPLACEMENT;  Surgeon: Chapincito Sagastume MD;  Location: [facility];  Service: Orthopedics;  Laterality: Left;    JOINT REPLACEMENT Left     hip    laminectomy l4-l5    "    Family History   Problem Relation Age of Onset    Diabetes Mother     Heart disease Father     No Known Problems Sister     Cerebral palsy Brother     Epilepsy Brother     Melanoma Neg Hx     Psoriasis Neg Hx     Lupus Neg Hx     Eczema Neg Hx     Acne Neg Hx     Liver disease Neg Hx     Liver cancer Neg Hx     Cirrhosis Neg Hx     Colon polyps Neg Hx     Colon cancer Neg Hx      Social History     Tobacco Use    Smoking status: Current Every Day Smoker     Years: 20.00     Types: Cigars    Smokeless tobacco: Never Used    Tobacco comment: smokes cigars 2-3 a day, smoke cigarette for 15 years   Substance Use Topics    Alcohol use: No    Drug use: No        Review of Systems:  Review of Systems   Constitutional: Negative for chills and fever.   HENT: Negative.    Eyes: Negative.    Respiratory: Negative for cough, chest tightness and shortness of breath.    Cardiovascular: Negative.    Gastrointestinal: Negative for abdominal pain, blood in stool, constipation, diarrhea, nausea and vomiting.   Endocrine: Negative for cold intolerance and heat intolerance.   Genitourinary: Negative.    Musculoskeletal: Negative.    Skin: Negative.  Negative for rash.   Neurological: Negative for dizziness, syncope and light-headedness.   Psychiatric/Behavioral: Negative for agitation, confusion and hallucinations.       OBJECTIVE:     Vital Signs (Most Recent)  Pulse: 63 (01/14/22 0902)  BP: (!) 152/76 (01/14/22 0902)  SpO2: 100 % (01/14/22 0902)  5' 11" (1.803 m)  80 kg (176 lb 5.9 oz)     Physical Exam:  Physical Exam  Constitutional:       General: He is not in acute distress.     Appearance: He is well-developed and well-nourished. He is not diaphoretic.   HENT:      Head: Normocephalic and atraumatic.   Eyes:      Extraocular Movements: EOM normal.      Conjunctiva/sclera: Conjunctivae normal.      Pupils: Pupils are equal, round, and reactive to light.   Cardiovascular:      Rate and Rhythm: Normal rate " and regular rhythm.      Heart sounds: Normal heart sounds. No murmur heard.  No friction rub. No gallop.    Pulmonary:      Effort: Pulmonary effort is normal. No respiratory distress.      Breath sounds: Normal breath sounds. No stridor. No wheezing.   Abdominal:      General: Bowel sounds are normal. There is no distension.      Palpations: Abdomen is soft.      Tenderness: There is no abdominal tenderness.   Musculoskeletal:         General: Normal range of motion.      Cervical back: Normal range of motion and neck supple.   Skin:     General: Skin is warm and dry.      Findings: No rash.             Comments: Palpable 1.5 cm lymph nodes.   Neurological:      Mental Status: He is alert and oriented to person, place, and time.      Cranial Nerves: No cranial nerve deficit.   Psychiatric:         Mood and Affect: Mood and affect normal.         Behavior: Behavior normal.       Diagnostic Results:  PET   1/12/22  Impression:     1.  Interval development of 2 hypermetabolic sodt tissue nodules in the right leg subcutaneous fat.  Findings are concerning for metastatic disease.     2.  0.9 cm right apical pulmonary opacity with mild uptake, nonspecific.  Finding is concerning for pulmonary metastasis in light of the new soft tissue nodules.  However, infectious or inflammatory etiologies are possible.    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

## 2022-01-14 NOTE — H&P (VIEW-ONLY)
History & Physical    SUBJECTIVE:     History of Present Illness:  Patient is a 76 y.o. male presents with right thigh and right groin palpable masses. Non tender. No skin changes. Noticed approx 1 month ago. 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 Prior surgical site with no disease.  PET scan shows the masses as hypermetabolic. Also with a new lung lesion.  Patient has no new systemic issues.  Denies f/c/n/v/sob/cp.  Scheduled to see oncologist next Wednesday. Will plan for surgery on Thursday to excise the thigh lesions/Lymph nodes.    Chief Complaint   Patient presents with    Follow-up     More mass on the right thigh       Review of patient's allergies indicates:  No Known Allergies    Current Outpatient Medications   Medication Sig Dispense Refill    atorvastatin (LIPITOR) 80 MG tablet Take 1 tablet (80 mg total) by mouth once daily. 90 tablet 3    finasteride (PROSCAR) 5 mg tablet Take 1 tablet (5 mg total) by mouth once daily. 90 tablet 0    hydroCHLOROthiazide (HYDRODIURIL) 25 MG tablet Take 1 tablet (25 mg total) by mouth once daily. 90 tablet 0    tamsulosin (FLOMAX) 0.4 mg Cap Take 1 capsule (0.4 mg total) by mouth once daily. 90 capsule 0     No current facility-administered medications for this visit.       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;  Service: Orthopedics;  Laterality: Left;    JOINT REPLACEMENT Left     hip    laminectomy l4-l5    "    Family History   Problem Relation Age of Onset    Diabetes Mother     Heart disease Father     No Known Problems Sister     Cerebral palsy Brother     Epilepsy Brother     Melanoma Neg Hx     Psoriasis Neg Hx     Lupus Neg Hx     Eczema Neg Hx     Acne Neg Hx     Liver disease Neg Hx     Liver cancer Neg Hx     Cirrhosis Neg Hx     Colon polyps Neg Hx     Colon cancer Neg Hx      Social History     Tobacco Use    Smoking status: Current Every Day Smoker     Years: 20.00     Types: Cigars    Smokeless tobacco: Never Used    Tobacco comment: smokes cigars 2-3 a day, smoke cigarette for 15 years   Substance Use Topics    Alcohol use: No    Drug use: No        Review of Systems:  Review of Systems   Constitutional: Negative for chills and fever.   HENT: Negative.    Eyes: Negative.    Respiratory: Negative for cough, chest tightness and shortness of breath.    Cardiovascular: Negative.    Gastrointestinal: Negative for abdominal pain, blood in stool, constipation, diarrhea, nausea and vomiting.   Endocrine: Negative for cold intolerance and heat intolerance.   Genitourinary: Negative.    Musculoskeletal: Negative.    Skin: Negative.  Negative for rash.   Neurological: Negative for dizziness, syncope and light-headedness.   Psychiatric/Behavioral: Negative for agitation, confusion and hallucinations.       OBJECTIVE:     Vital Signs (Most Recent)  Pulse: 63 (01/14/22 0902)  BP: (!) 152/76 (01/14/22 0902)  SpO2: 100 % (01/14/22 0902)  5' 11" (1.803 m)  80 kg (176 lb 5.9 oz)     Physical Exam:  Physical Exam  Constitutional:       General: He is not in acute distress.     Appearance: He is well-developed and well-nourished. He is not diaphoretic.   HENT:      Head: Normocephalic and atraumatic.   Eyes:      Extraocular Movements: EOM normal.      Conjunctiva/sclera: Conjunctivae normal.      Pupils: Pupils are equal, round, and reactive to light.   Cardiovascular:      Rate and Rhythm: Normal rate " and regular rhythm.      Heart sounds: Normal heart sounds. No murmur heard.  No friction rub. No gallop.    Pulmonary:      Effort: Pulmonary effort is normal. No respiratory distress.      Breath sounds: Normal breath sounds. No stridor. No wheezing.   Abdominal:      General: Bowel sounds are normal. There is no distension.      Palpations: Abdomen is soft.      Tenderness: There is no abdominal tenderness.   Musculoskeletal:         General: Normal range of motion.      Cervical back: Normal range of motion and neck supple.   Skin:     General: Skin is warm and dry.      Findings: No rash.             Comments: Palpable 1.5 cm lymph nodes.   Neurological:      Mental Status: He is alert and oriented to person, place, and time.      Cranial Nerves: No cranial nerve deficit.   Psychiatric:         Mood and Affect: Mood and affect normal.         Behavior: Behavior normal.       Diagnostic Results:  PET   1/12/22  Impression:     1.  Interval development of 2 hypermetabolic sodt tissue nodules in the right leg subcutaneous fat.  Findings are concerning for metastatic disease.     2.  0.9 cm right apical pulmonary opacity with mild uptake, nonspecific.  Finding is concerning for pulmonary metastasis in light of the new soft tissue nodules.  However, infectious or inflammatory etiologies are possible.    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

## 2022-01-18 ENCOUNTER — HOSPITAL ENCOUNTER (OUTPATIENT)
Dept: PREADMISSION TESTING | Facility: HOSPITAL | Age: 77
Discharge: HOME | End: 2022-01-18
Attending: SURGERY
Payer: MEDICARE

## 2022-01-18 ENCOUNTER — HOSPITAL ENCOUNTER (OUTPATIENT)
Dept: RADIOLOGY | Facility: HOSPITAL | Age: 77
Discharge: HOME | End: 2022-01-18
Attending: SURGERY
Payer: MEDICARE

## 2022-01-18 VITALS
WEIGHT: 180.56 LBS | HEART RATE: 62 BPM | RESPIRATION RATE: 18 BRPM | OXYGEN SATURATION: 100 % | TEMPERATURE: 97 F | HEIGHT: 71 IN | SYSTOLIC BLOOD PRESSURE: 163 MMHG | DIASTOLIC BLOOD PRESSURE: 74 MMHG | BODY MASS INDEX: 25.28 KG/M2

## 2022-01-18 DIAGNOSIS — Z01.818 PREOPERATIVE TESTING: Primary | ICD-10-CM

## 2022-01-18 DIAGNOSIS — C49.21 SARCOMA OF RIGHT THIGH: ICD-10-CM

## 2022-01-18 LAB
ANION GAP SERPL CALC-SCNC: 6 MMOL/L (ref 8–16)
BASOPHILS # BLD AUTO: 0.11 K/UL (ref 0–0.2)
BASOPHILS NFR BLD: 1.3 % (ref 0–1.9)
BUN SERPL-MCNC: 12 MG/DL (ref 8–23)
CALCIUM SERPL-MCNC: 10.1 MG/DL (ref 8.7–10.5)
CHLORIDE SERPL-SCNC: 99 MMOL/L (ref 95–110)
CO2 SERPL-SCNC: 28 MMOL/L (ref 23–29)
CREAT SERPL-MCNC: 0.9 MG/DL (ref 0.5–1.4)
DIFFERENTIAL METHOD: ABNORMAL
EOSINOPHIL # BLD AUTO: 0.1 K/UL (ref 0–0.5)
EOSINOPHIL NFR BLD: 0.8 % (ref 0–8)
ERYTHROCYTE [DISTWIDTH] IN BLOOD BY AUTOMATED COUNT: 13.7 % (ref 11.5–14.5)
EST. GFR  (AFRICAN AMERICAN): >60 ML/MIN/1.73 M^2
EST. GFR  (NON AFRICAN AMERICAN): >60 ML/MIN/1.73 M^2
GLUCOSE SERPL-MCNC: 104 MG/DL (ref 70–110)
HCT VFR BLD AUTO: 46.4 % (ref 40–54)
HGB BLD-MCNC: 15.6 G/DL (ref 14–18)
IMM GRANULOCYTES # BLD AUTO: 0.16 K/UL (ref 0–0.04)
IMM GRANULOCYTES NFR BLD AUTO: 1.9 % (ref 0–0.5)
LYMPHOCYTES # BLD AUTO: 1.2 K/UL (ref 1–4.8)
LYMPHOCYTES NFR BLD: 14.1 % (ref 18–48)
MCH RBC QN AUTO: 31.8 PG (ref 27–31)
MCHC RBC AUTO-ENTMCNC: 33.6 G/DL (ref 32–36)
MCV RBC AUTO: 95 FL (ref 82–98)
MONOCYTES # BLD AUTO: 0.6 K/UL (ref 0.3–1)
MONOCYTES NFR BLD: 6.6 % (ref 4–15)
NEUTROPHILS # BLD AUTO: 6.5 K/UL (ref 1.8–7.7)
NEUTROPHILS NFR BLD: 75.3 % (ref 38–73)
NRBC BLD-RTO: 0 /100 WBC
PLATELET # BLD AUTO: 262 K/UL (ref 150–450)
PMV BLD AUTO: 8.8 FL (ref 9.2–12.9)
POTASSIUM SERPL-SCNC: 4.8 MMOL/L (ref 3.5–5.1)
RBC # BLD AUTO: 4.9 M/UL (ref 4.6–6.2)
SODIUM SERPL-SCNC: 133 MMOL/L (ref 136–145)
WBC # BLD AUTO: 8.61 K/UL (ref 3.9–12.7)

## 2022-01-18 PROCEDURE — 80048 BASIC METABOLIC PNL TOTAL CA: CPT | Mod: HCNC | Performed by: SURGERY

## 2022-01-18 PROCEDURE — 71045 X-RAY EXAM CHEST 1 VIEW: CPT | Mod: TC,HCNC,FY

## 2022-01-18 PROCEDURE — 71045 X-RAY EXAM CHEST 1 VIEW: CPT | Mod: 26,HCNC,, | Performed by: RADIOLOGY

## 2022-01-18 PROCEDURE — 36415 COLL VENOUS BLD VENIPUNCTURE: CPT | Mod: HCNC | Performed by: SURGERY

## 2022-01-18 PROCEDURE — 85025 COMPLETE CBC W/AUTO DIFF WBC: CPT | Mod: HCNC | Performed by: SURGERY

## 2022-01-18 PROCEDURE — 71045 XR CHEST 1 VIEW PRE-OP: ICD-10-PCS | Mod: 26,HCNC,, | Performed by: RADIOLOGY

## 2022-01-18 NOTE — DISCHARGE INSTRUCTIONS
Your procedure  is scheduled for _1/20/2022_________.    Call [phone] between 2pm and 5pm on _1/19/2022______to find out your arrival time for the day of surgery.    You may use the main entrance to the hospital on the [facility] side, or the entrance that is next to the garage.     You may have one visitors.  Visiting hours for non-COVID-19 patients expanded to 24/7 (still restricted to one visitor)   Youth visitation changed from age 18 to age 12.      You will be going to the Same Day Surgery Unit on the 2nd floor of the hospital.    Important instructions:   Do not eat or drink after 12 midnight, including water.  It is okay to brush your teeth.  Do not have gum, candy or mints.    SEE MEDICATION SHEET.   TAKE MEDICATIONS AS DIRECTED WITH SIPS OF WATER.    STOP taking Aspirin, Ibuprofen,  Advil, Motrin, Mobic(meloxicam), Aleve (naproxen), Fish oil, and Vitamin E for at least 7 days before your surgery.     You may take Tylenol if needed which is not a blood thinner.     Please shower the night before and the morning of your surgery.      Use Hibiclens soap as instructed by your pre op nurse.   Please place clean linens on your bed the night before surgery. Please wear fresh clean clothing after each shower.     No shaving of procedural area at least 4-5 days before surgery due to increased risk of skin irritation and/or possible infection.     Contact lenses and removable denture work may not be worn during your procedure.     You may wear deodorant only. If you are having breast surgery, do not wear deodorant on the operative side.     Do not wear powder, body lotion, perfume/cologne or make-up.     Do not wear any jewelry or have any metal on your body.     You will be asked to remove any dentures or partials for the procedure.     If you are going home on the same day of surgery, you must arrange for a family member or a friend to drive you home.  Public transportation is prohibited.  You  will not be able to drive home if you were given anesthesia or sedation.     Patients who want to have their Post-op prescriptions filled from our in-house [facility], bring a Credit/Debit Card   or cash with you. A co-pay may be required.  The pharmacy closes at 5:30 pm.     Wear loose fitting clothes allowing for bandages.     Please leave money and valuables home.       You may bring your cell phone.     Call the doctor if fever or illness should occur before your surgery.    Call [phone] to contact us here if needed.

## 2022-01-19 ENCOUNTER — OFFICE VISIT (OUTPATIENT)
Dept: HEMATOLOGY/ONCOLOGY | Facility: CLINIC | Age: 77
End: 2022-01-19
Payer: MEDICARE

## 2022-01-19 ENCOUNTER — ANESTHESIA EVENT (OUTPATIENT)
Dept: SURGERY | Facility: HOSPITAL | Age: 77
End: 2022-01-19
Payer: MEDICARE

## 2022-01-19 VITALS
WEIGHT: 177.25 LBS | SYSTOLIC BLOOD PRESSURE: 143 MMHG | OXYGEN SATURATION: 100 % | DIASTOLIC BLOOD PRESSURE: 73 MMHG | BODY MASS INDEX: 24.81 KG/M2 | RESPIRATION RATE: 16 BRPM | HEIGHT: 71 IN | TEMPERATURE: 98 F | HEART RATE: 67 BPM

## 2022-01-19 DIAGNOSIS — R91.8 OTHER NONSPECIFIC ABNORMAL FINDING OF LUNG FIELD: ICD-10-CM

## 2022-01-19 DIAGNOSIS — C44.99 MYXOFIBROSARCOMA OF SKIN: Primary | ICD-10-CM

## 2022-01-19 PROCEDURE — 3078F DIAST BP <80 MM HG: CPT | Mod: HCNC,CPTII,GC,S$GLB

## 2022-01-19 PROCEDURE — 1101F PT FALLS ASSESS-DOCD LE1/YR: CPT | Mod: HCNC,CPTII,GC,S$GLB

## 2022-01-19 PROCEDURE — 3288F FALL RISK ASSESSMENT DOCD: CPT | Mod: HCNC,CPTII,GC,S$GLB

## 2022-01-19 PROCEDURE — 99214 PR OFFICE/OUTPT VISIT, EST, LEVL IV, 30-39 MIN: ICD-10-PCS | Mod: HCNC,GC,S$GLB,

## 2022-01-19 PROCEDURE — 1101F PR PT FALLS ASSESS DOC 0-1 FALLS W/OUT INJ PAST YR: ICD-10-PCS | Mod: HCNC,CPTII,GC,S$GLB

## 2022-01-19 PROCEDURE — 99999 PR PBB SHADOW E&M-EST. PATIENT-LVL IV: ICD-10-PCS | Mod: PBBFAC,HCNC,GC,

## 2022-01-19 PROCEDURE — 1126F AMNT PAIN NOTED NONE PRSNT: CPT | Mod: HCNC,CPTII,GC,S$GLB

## 2022-01-19 PROCEDURE — 3077F SYST BP >= 140 MM HG: CPT | Mod: HCNC,CPTII,GC,S$GLB

## 2022-01-19 PROCEDURE — 3078F PR MOST RECENT DIASTOLIC BLOOD PRESSURE < 80 MM HG: ICD-10-PCS | Mod: HCNC,CPTII,GC,S$GLB

## 2022-01-19 PROCEDURE — 3288F PR FALLS RISK ASSESSMENT DOCUMENTED: ICD-10-PCS | Mod: HCNC,CPTII,GC,S$GLB

## 2022-01-19 PROCEDURE — 99999 PR PBB SHADOW E&M-EST. PATIENT-LVL IV: CPT | Mod: PBBFAC,HCNC,GC,

## 2022-01-19 PROCEDURE — 99214 OFFICE O/P EST MOD 30 MIN: CPT | Mod: HCNC,GC,S$GLB,

## 2022-01-19 PROCEDURE — 1126F PR PAIN SEVERITY QUANTIFIED, NO PAIN PRESENT: ICD-10-PCS | Mod: HCNC,CPTII,GC,S$GLB

## 2022-01-19 PROCEDURE — 3077F PR MOST RECENT SYSTOLIC BLOOD PRESSURE >= 140 MM HG: ICD-10-PCS | Mod: HCNC,CPTII,GC,S$GLB

## 2022-01-19 NOTE — PROGRESS NOTES
[facility]  [facility]  Hematology/Medical Oncology Clinic      PATIENT: Zac Plunkett  MRN: [id number]  DATE: 1/19/2022    Chief Complaint: consult for myxofibrosarcoma, Myxofibrosarcoma of skin    Other MDs:  PCP: Deondre Waters MD  Gen Surg: Dr. Mao    Subjective:   Initial History: Mr. Plunkett is a 76 y.o. male who presents to oncology clinic for follow up for recurrent myxofibrosarcoma.     Today  Initially saw Mr. Plunkett on 6/30/21 for consultation for newly diagnosed sarcoma. He was staged and appeared to have local disease, on PET imaging, at that time and underwent radiation therapy with Dr. Mcadams July/Aug 2021.     He presented back to his clinic about a week ago with notable R palpable subq nodules, a little more proximal and his previous lesions and a PET was ordered. PET on 1/12/22 showed recurrent local disease with a new <1cm right apical lung lesion. He underwent a chest xr on 1/18 that could not identify the RUL lesion.     He is here today to discuss recommendations. He met with Dr. Mao recently and he is set to undergo excisional biopsy tomorrow.     He is otherwise doing well and has no complaints.     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    Tobacco dependence      Past Surgical History:   Procedure Laterality Date    APPENDECTOMY      BACK SURGERY      HIP REPLACEMENT ARTHROPLASTY Left 8/6/2018    Procedure: ARTHROPLASTY, HIP REPLACEMENT;  Surgeon: Chapincito Sagastume MD;  Location: [facility];  Service: Orthopedics;  Laterality: Left;    JOINT REPLACEMENT Left     hip    laminectomy l4-l5       Family History   Problem Relation Age of Onset    Diabetes Mother     Heart disease Father      No Known Problems Sister     Cerebral palsy Brother     Epilepsy Brother     Melanoma Neg Hx     Psoriasis Neg Hx     Lupus Neg Hx     Eczema Neg Hx     Acne Neg Hx     Liver disease Neg Hx     Liver cancer Neg Hx     Cirrhosis Neg Hx     Colon polyps Neg Hx     Colon cancer Neg Hx       reports that he has been smoking cigars. He has smoked for the past 20.00 years. He has never used smokeless tobacco. He reports that he does not drink alcohol and does not use drugs.  Review of patient's allergies indicates:  No Known Allergies  Current Outpatient Medications   Medication Sig Dispense Refill    atorvastatin (LIPITOR) 80 MG tablet Take 1 tablet (80 mg total) by mouth once daily. 90 tablet 3    finasteride (PROSCAR) 5 mg tablet Take 1 tablet (5 mg total) by mouth once daily. 90 tablet 0    hydroCHLOROthiazide (HYDRODIURIL) 25 MG tablet Take 1 tablet (25 mg total) by mouth once daily. 90 tablet 0    tamsulosin (FLOMAX) 0.4 mg Cap Take 1 capsule (0.4 mg total) by mouth once daily. 90 capsule 0     No current facility-administered medications for this visit.     Review of Systems   Constitutional: Negative for appetite change, chills, fatigue and unexpected weight change.   HENT: Negative for dental problem, mouth sores, nosebleeds, trouble swallowing and voice change.    Eyes: Negative for visual disturbance.   Respiratory: Negative for chest tightness and shortness of breath.    Cardiovascular: Negative for chest pain, palpitations and leg swelling.   Gastrointestinal: Negative for abdominal distention, abdominal pain, blood in stool, diarrhea, nausea and vomiting.   Endocrine: Negative for cold intolerance.   Genitourinary: Negative for hematuria.   Musculoskeletal: Negative for neck pain.   Skin: Negative for pallor and rash.        prox RLE nodules in mid thigh and near groin   Allergic/Immunologic: Negative for immunocompromised state.   Neurological: Negative for dizziness, weakness and  "headaches.   Hematological: Negative for adenopathy. Does not bruise/bleed easily.   Psychiatric/Behavioral: Negative for agitation and behavioral problems.       ECOG Performance Status: 0    Objective:      Vitals:   Vitals:    01/19/22 1004   BP: (!) 143/73   BP Location: Left arm   Patient Position: Sitting   BP Method: Large (Automatic)   Pulse: 67   Resp: 16   Temp: 98 °F (36.7 °C)   TempSrc: Oral   SpO2: 100%   Weight: 80.4 kg (177 lb 4 oz)   Height: 5' 11" (1.803 m)     BMI: Body mass index is 24.72 kg/m².    Physical Exam  Vitals reviewed.   Constitutional:       Appearance: He is well-developed.   HENT:      Head: Normocephalic and atraumatic.   Eyes:      Pupils: Pupils are equal, round, and reactive to light.   Cardiovascular:      Rate and Rhythm: Normal rate and regular rhythm.   Pulmonary:      Effort: Pulmonary effort is normal.      Breath sounds: Normal breath sounds. No wheezing.   Chest:   Breasts:      Right: No supraclavicular adenopathy.      Left: No supraclavicular adenopathy.       Abdominal:      General: Bowel sounds are normal.      Palpations: Abdomen is soft. There is no mass.   Musculoskeletal:         General: Normal range of motion.      Cervical back: Normal range of motion and neck supple.   Lymphadenopathy:      Head:      Right side of head: No submandibular, posterior auricular or occipital adenopathy.      Left side of head: No submandibular, posterior auricular or occipital adenopathy.      Cervical: No cervical adenopathy.      Upper Body:      Right upper body: No supraclavicular adenopathy.      Left upper body: No supraclavicular adenopathy.      Lower Body: No right inguinal adenopathy.   Skin:     General: Skin is warm and dry.      Comments: Mid thigh and proximal 1-2 cm lesions, non-tender   Neurological:      Mental Status: He is alert and oriented to person, place, and time.   Psychiatric:         Behavior: Behavior normal.         Laboratory Data:  Hospital " Outpatient Visit on 01/18/2022   Component Date Value Ref Range Status    Sodium 01/18/2022 133* 136 - 145 mmol/L Final    Potassium 01/18/2022 4.8  3.5 - 5.1 mmol/L Final    Chloride 01/18/2022 99  95 - 110 mmol/L Final    CO2 01/18/2022 28  23 - 29 mmol/L Final    Glucose 01/18/2022 104  70 - 110 mg/dL Final    BUN 01/18/2022 12  8 - 23 mg/dL Final    Creatinine 01/18/2022 0.9  0.5 - 1.4 mg/dL Final    Calcium 01/18/2022 10.1  8.7 - 10.5 mg/dL Final    Anion Gap 01/18/2022 6* 8 - 16 mmol/L Final    eGFR if African American 01/18/2022 >60  >60 mL/min/1.73 m^2 Final    eGFR if non African American 01/18/2022 >60  >60 mL/min/1.73 m^2 Final    Comment: Calculation used to obtain the estimated glomerular filtration  rate (eGFR) is the CKD-EPI equation.       WBC 01/18/2022 8.61  3.90 - 12.70 K/uL Final    RBC 01/18/2022 4.90  4.60 - 6.20 M/uL Final    Hemoglobin 01/18/2022 15.6  14.0 - 18.0 g/dL Final    Hematocrit 01/18/2022 46.4  40.0 - 54.0 % Final    MCV 01/18/2022 95  82 - 98 fL Final    MCH 01/18/2022 31.8* 27.0 - 31.0 pg Final    MCHC 01/18/2022 33.6  32.0 - 36.0 g/dL Final    RDW 01/18/2022 13.7  11.5 - 14.5 % Final    Platelets 01/18/2022 262  150 - 450 K/uL Final    MPV 01/18/2022 8.8* 9.2 - 12.9 fL Final    Immature Granulocytes 01/18/2022 1.9* 0.0 - 0.5 % Final    Gran # (ANC) 01/18/2022 6.5  1.8 - 7.7 K/uL Final    Immature Grans (Abs) 01/18/2022 0.16* 0.00 - 0.04 K/uL Final    Comment: Mild elevation in immature granulocytes is non specific and   can be seen in a variety of conditions including stress response,   acute inflammation, trauma and pregnancy. Correlation with other   laboratory and clinical findings is essential.      Lymph # 01/18/2022 1.2  1.0 - 4.8 K/uL Final    Mono # 01/18/2022 0.6  0.3 - 1.0 K/uL Final    Eos # 01/18/2022 0.1  0.0 - 0.5 K/uL Final    Baso # 01/18/2022 0.11  0.00 - 0.20 K/uL Final    nRBC 01/18/2022 0  0 /100 WBC Final    Gran % 01/18/2022  "75.3* 38.0 - 73.0 % Final    Lymph % 01/18/2022 14.1* 18.0 - 48.0 % Final    Mono % 01/18/2022 6.6  4.0 - 15.0 % Final    Eosinophil % 01/18/2022 0.8  0.0 - 8.0 % Final    Basophil % 01/18/2022 1.3  0.0 - 1.9 % Final    Differential Method 01/18/2022 Automated   Final         Assessment:       1. Myxofibrosarcoma of skin    2. Other nonspecific abnormal finding of lung field         Oncologic History:      2021  May   5/27- US right thigh: showed 2 hypoechoic masses in the subcutaneous tissues of the right anterior thigh. Largest measured 1.1 x 0.9 x 1 cm. Read at the time was concern for lymphadenopathy.   June 6/4 - CXR: "Bilateral reticular opacities are present in a perihilar distribution which appears slightly more pronounced compared to prior exams."    6/11 - underwent excisional biopsy of the right thigh; pathology came back positive for myxofibrosarcoma, high grade. Path a 1.8 x 1.1 x 0.9 cm mass with an adjacent 0.4 x 0.4 x 0.3 cm mass.   July/Aug   - IMRT with Dr. Mcadams  2022 January 1/12 - PET: locoregional recurrence and concerning 0.9 cm RUL lesion       Plan:     Myxofibrosarcoma of the right thigh  Long discussion today regarding goals of care and treatment aims. Agree with plan for local excision as that is the SOC for sarcoma. The RUL lesion is of unknown origin and can be nothing, primary lung lesion, metastatic sarcoma or other process. Will discuss recommendations with Dr. De Anda and go from there. NO role for systemic therapy though, in the future, PD-1 inhibitors can be used. If surgery/resection of lung lesion is not recommended, biopsy and radiation consult versus watchful waiting would be the recommendation from oncology stand point. Discussed this will likely continue to recur going forward and our treatments are aimed at slowing recurrence.   - surgery tomorrow  - discuss with thoracic surgery  - follow up plan and recs per discussion    Discussed with Dr. Martines.     Follow " Up:  Pending discussion with thoracic surgery    Ed Lucia MD  Hematology/Medical Oncology Fellow  [phone] (pager)

## 2022-01-20 ENCOUNTER — ANESTHESIA (OUTPATIENT)
Dept: SURGERY | Facility: HOSPITAL | Age: 77
End: 2022-01-20
Payer: MEDICARE

## 2022-01-20 ENCOUNTER — HOSPITAL ENCOUNTER (OUTPATIENT)
Facility: HOSPITAL | Age: 77
Discharge: HOME | End: 2022-01-20
Attending: SURGERY | Admitting: SURGERY
Payer: MEDICARE

## 2022-01-20 VITALS
DIASTOLIC BLOOD PRESSURE: 70 MMHG | BODY MASS INDEX: 24.72 KG/M2 | HEART RATE: 74 BPM | OXYGEN SATURATION: 97 % | WEIGHT: 177.25 LBS | RESPIRATION RATE: 20 BRPM | SYSTOLIC BLOOD PRESSURE: 146 MMHG | TEMPERATURE: 98 F

## 2022-01-20 DIAGNOSIS — R22.41 MASS OF RIGHT THIGH: Primary | ICD-10-CM

## 2022-01-20 DIAGNOSIS — C49.21 SARCOMA OF RIGHT THIGH: ICD-10-CM

## 2022-01-20 LAB
CTP QC/QA: YES
SARS-COV-2 AG RESP QL IA.RAPID: NEGATIVE

## 2022-01-20 PROCEDURE — 36000706: Mod: HCNC | Performed by: SURGERY

## 2022-01-20 PROCEDURE — 11603 PR EXC SKIN MALIG 2.1-3 CM TRUNK,ARM,LEG: ICD-10-PCS | Mod: HCNC,,, | Performed by: SURGERY

## 2022-01-20 PROCEDURE — D9220A PRA ANESTHESIA: Mod: HCNC,CRNA,, | Performed by: NURSE ANESTHETIST, CERTIFIED REGISTERED

## 2022-01-20 PROCEDURE — C1729 CATH, DRAINAGE: HCPCS | Mod: HCNC | Performed by: SURGERY

## 2022-01-20 PROCEDURE — 63600175 PHARM REV CODE 636 W HCPCS: Mod: HCNC | Performed by: NURSE ANESTHETIST, CERTIFIED REGISTERED

## 2022-01-20 PROCEDURE — 25000003 PHARM REV CODE 250: Mod: HCNC | Performed by: ANESTHESIOLOGY

## 2022-01-20 PROCEDURE — 11603 PR EXC SKIN MALIG 2.1-3 CM TRUNK,ARM,LEG: ICD-10-PCS | Mod: 82,HCNC,, | Performed by: SURGERY

## 2022-01-20 PROCEDURE — D9220A PRA ANESTHESIA: Mod: HCNC,ANES,, | Performed by: ANESTHESIOLOGY

## 2022-01-20 PROCEDURE — 12032 PR LAYR CLOS WND TRUNK,ARM,LEG 2.6-7.5 CM: ICD-10-PCS | Mod: 82,51,HCNC, | Performed by: SURGERY

## 2022-01-20 PROCEDURE — 63600175 PHARM REV CODE 636 W HCPCS: Mod: HCNC | Performed by: ANESTHESIOLOGY

## 2022-01-20 PROCEDURE — 25000003 PHARM REV CODE 250: Mod: HCNC | Performed by: NURSE ANESTHETIST, CERTIFIED REGISTERED

## 2022-01-20 PROCEDURE — 88307 TISSUE EXAM BY PATHOLOGIST: CPT | Mod: 26,HCNC,, | Performed by: PATHOLOGY

## 2022-01-20 PROCEDURE — 71000033 HC RECOVERY, INTIAL HOUR: Mod: HCNC | Performed by: SURGERY

## 2022-01-20 PROCEDURE — 12032 INTMD RPR S/A/T/EXT 2.6-7.5: CPT | Mod: 82,51,HCNC, | Performed by: SURGERY

## 2022-01-20 PROCEDURE — D9220A PRA ANESTHESIA: ICD-10-PCS | Mod: HCNC,ANES,, | Performed by: ANESTHESIOLOGY

## 2022-01-20 PROCEDURE — 25000003 PHARM REV CODE 250: Mod: HCNC | Performed by: SURGERY

## 2022-01-20 PROCEDURE — 27201423 OPTIME MED/SURG SUP & DEVICES STERILE SUPPLY: Mod: HCNC | Performed by: SURGERY

## 2022-01-20 PROCEDURE — D9220A PRA ANESTHESIA: ICD-10-PCS | Mod: HCNC,CRNA,, | Performed by: NURSE ANESTHETIST, CERTIFIED REGISTERED

## 2022-01-20 PROCEDURE — 11603 EXC TR-EXT MAL+MARG 2.1-3 CM: CPT | Mod: HCNC,,, | Performed by: SURGERY

## 2022-01-20 PROCEDURE — 36000707: Mod: HCNC | Performed by: SURGERY

## 2022-01-20 PROCEDURE — 12032 INTMD RPR S/A/T/EXT 2.6-7.5: CPT | Mod: 51,HCNC,, | Performed by: SURGERY

## 2022-01-20 PROCEDURE — 11603 EXC TR-EXT MAL+MARG 2.1-3 CM: CPT | Mod: 82,HCNC,, | Performed by: SURGERY

## 2022-01-20 PROCEDURE — 12032 PR LAYR CLOS WND TRUNK,ARM,LEG 2.6-7.5 CM: ICD-10-PCS | Mod: 51,HCNC,, | Performed by: SURGERY

## 2022-01-20 PROCEDURE — 71000015 HC POSTOP RECOV 1ST HR: Mod: HCNC | Performed by: SURGERY

## 2022-01-20 PROCEDURE — 88307 TISSUE EXAM BY PATHOLOGIST: CPT | Mod: 59,HCNC | Performed by: PATHOLOGY

## 2022-01-20 PROCEDURE — 37000008 HC ANESTHESIA 1ST 15 MINUTES: Mod: HCNC | Performed by: SURGERY

## 2022-01-20 PROCEDURE — 37000009 HC ANESTHESIA EA ADD 15 MINS: Mod: HCNC | Performed by: SURGERY

## 2022-01-20 PROCEDURE — 71000016 HC POSTOP RECOV ADDL HR: Mod: HCNC | Performed by: SURGERY

## 2022-01-20 PROCEDURE — 88307 PR  SURG PATH,LEVEL V: ICD-10-PCS | Mod: 26,HCNC,, | Performed by: PATHOLOGY

## 2022-01-20 RX ORDER — SODIUM CHLORIDE 9 MG/ML
INJECTION, SOLUTION INTRAVENOUS CONTINUOUS
Status: DISCONTINUED | OUTPATIENT
Start: 2022-01-20 | End: 2022-01-20 | Stop reason: HOSPADM

## 2022-01-20 RX ORDER — DEXAMETHASONE SODIUM PHOSPHATE 4 MG/ML
INJECTION, SOLUTION INTRA-ARTICULAR; INTRALESIONAL; INTRAMUSCULAR; INTRAVENOUS; SOFT TISSUE
Status: DISCONTINUED | OUTPATIENT
Start: 2022-01-20 | End: 2022-01-20

## 2022-01-20 RX ORDER — LIDOCAINE HYDROCHLORIDE 10 MG/ML
1 INJECTION, SOLUTION EPIDURAL; INFILTRATION; INTRACAUDAL; PERINEURAL ONCE
Status: DISCONTINUED | OUTPATIENT
Start: 2022-01-20 | End: 2022-01-20 | Stop reason: HOSPADM

## 2022-01-20 RX ORDER — LIDOCAINE HCL/PF 100 MG/5ML
SYRINGE (ML) INTRAVENOUS
Status: DISCONTINUED | OUTPATIENT
Start: 2022-01-20 | End: 2022-01-20

## 2022-01-20 RX ORDER — BUPIVACAINE HYDROCHLORIDE 2.5 MG/ML
INJECTION, SOLUTION INFILTRATION; PERINEURAL
Status: DISCONTINUED | OUTPATIENT
Start: 2022-01-20 | End: 2022-01-20 | Stop reason: HOSPADM

## 2022-01-20 RX ORDER — CEFAZOLIN SODIUM 2 G/50ML
2 SOLUTION INTRAVENOUS
Status: DISCONTINUED | OUTPATIENT
Start: 2022-01-20 | End: 2022-01-20 | Stop reason: HOSPADM

## 2022-01-20 RX ORDER — ACETAMINOPHEN 500 MG
1000 TABLET ORAL
Status: COMPLETED | OUTPATIENT
Start: 2022-01-20 | End: 2022-01-20

## 2022-01-20 RX ORDER — PROPOFOL 10 MG/ML
INJECTION, EMULSION INTRAVENOUS
Status: DISCONTINUED | OUTPATIENT
Start: 2022-01-20 | End: 2022-01-20

## 2022-01-20 RX ORDER — EPHEDRINE SULFATE 50 MG/ML
INJECTION, SOLUTION INTRAVENOUS
Status: DISCONTINUED | OUTPATIENT
Start: 2022-01-20 | End: 2022-01-20

## 2022-01-20 RX ORDER — ONDANSETRON 2 MG/ML
4 INJECTION INTRAMUSCULAR; INTRAVENOUS DAILY PRN
Status: DISCONTINUED | OUTPATIENT
Start: 2022-01-20 | End: 2022-01-20 | Stop reason: HOSPADM

## 2022-01-20 RX ORDER — FENTANYL CITRATE 50 UG/ML
INJECTION, SOLUTION INTRAMUSCULAR; INTRAVENOUS
Status: DISCONTINUED | OUTPATIENT
Start: 2022-01-20 | End: 2022-01-20

## 2022-01-20 RX ORDER — SCOLOPAMINE TRANSDERMAL SYSTEM 1 MG/1
1 PATCH, EXTENDED RELEASE TRANSDERMAL ONCE
Status: DISCONTINUED | OUTPATIENT
Start: 2022-01-20 | End: 2022-01-20 | Stop reason: HOSPADM

## 2022-01-20 RX ORDER — SODIUM CHLORIDE 0.9 G/100ML
IRRIGANT IRRIGATION
Status: DISCONTINUED | OUTPATIENT
Start: 2022-01-20 | End: 2022-01-20 | Stop reason: HOSPADM

## 2022-01-20 RX ORDER — HYDROMORPHONE HYDROCHLORIDE 2 MG/ML
0.2 INJECTION, SOLUTION INTRAMUSCULAR; INTRAVENOUS; SUBCUTANEOUS EVERY 5 MIN PRN
Status: DISCONTINUED | OUTPATIENT
Start: 2022-01-20 | End: 2022-01-20 | Stop reason: HOSPADM

## 2022-01-20 RX ORDER — HYDROCODONE BITARTRATE AND ACETAMINOPHEN 5; 325 MG/1; MG/1
1 TABLET ORAL EVERY 6 HOURS PRN
Qty: 15 TABLET | Refills: 0 | Status: SHIPPED | OUTPATIENT
Start: 2022-01-20 | End: 2022-02-28

## 2022-01-20 RX ORDER — ONDANSETRON 2 MG/ML
INJECTION INTRAMUSCULAR; INTRAVENOUS
Status: DISCONTINUED | OUTPATIENT
Start: 2022-01-20 | End: 2022-01-20

## 2022-01-20 RX ORDER — SODIUM CHLORIDE 0.9 % (FLUSH) 0.9 %
10 SYRINGE (ML) INJECTION
Status: DISCONTINUED | OUTPATIENT
Start: 2022-01-20 | End: 2022-01-20 | Stop reason: HOSPADM

## 2022-01-20 RX ORDER — ROCURONIUM BROMIDE 10 MG/ML
INJECTION, SOLUTION INTRAVENOUS
Status: DISCONTINUED | OUTPATIENT
Start: 2022-01-20 | End: 2022-01-20

## 2022-01-20 RX ORDER — MIDAZOLAM HYDROCHLORIDE 1 MG/ML
INJECTION INTRAMUSCULAR; INTRAVENOUS
Status: DISCONTINUED | OUTPATIENT
Start: 2022-01-20 | End: 2022-01-20

## 2022-01-20 RX ORDER — CEFAZOLIN SODIUM 1 G/3ML
INJECTION, POWDER, FOR SOLUTION INTRAMUSCULAR; INTRAVENOUS
Status: DISCONTINUED | OUTPATIENT
Start: 2022-01-20 | End: 2022-01-20

## 2022-01-20 RX ADMIN — PROPOFOL 150 MG: 10 INJECTION, EMULSION INTRAVENOUS at 12:01

## 2022-01-20 RX ADMIN — EPHEDRINE SULFATE 20 MG: 50 INJECTION INTRAVENOUS at 12:01

## 2022-01-20 RX ADMIN — DEXAMETHASONE SODIUM PHOSPHATE 4 MG: 4 INJECTION, SOLUTION INTRAMUSCULAR; INTRAVENOUS at 12:01

## 2022-01-20 RX ADMIN — MIDAZOLAM HYDROCHLORIDE 2 MG: 1 INJECTION, SOLUTION INTRAMUSCULAR; INTRAVENOUS at 12:01

## 2022-01-20 RX ADMIN — EPHEDRINE SULFATE 10 MG: 50 INJECTION INTRAVENOUS at 01:01

## 2022-01-20 RX ADMIN — ONDANSETRON 4 MG: 2 INJECTION, SOLUTION INTRAMUSCULAR; INTRAVENOUS at 01:01

## 2022-01-20 RX ADMIN — ACETAMINOPHEN 1000 MG: 500 TABLET ORAL at 11:01

## 2022-01-20 RX ADMIN — SUGAMMADEX 200 MG: 100 INJECTION, SOLUTION INTRAVENOUS at 01:01

## 2022-01-20 RX ADMIN — PROPOFOL 20 MG: 10 INJECTION, EMULSION INTRAVENOUS at 12:01

## 2022-01-20 RX ADMIN — ROCURONIUM BROMIDE 50 MG: 10 INJECTION, SOLUTION INTRAVENOUS at 12:01

## 2022-01-20 RX ADMIN — SODIUM CHLORIDE: 0.9 INJECTION, SOLUTION INTRAVENOUS at 10:01

## 2022-01-20 RX ADMIN — CEFAZOLIN SODIUM 2 G: 1 POWDER, FOR SOLUTION INTRAMUSCULAR; INTRAVENOUS at 12:01

## 2022-01-20 RX ADMIN — PROPOFOL 50 MG: 10 INJECTION, EMULSION INTRAVENOUS at 12:01

## 2022-01-20 RX ADMIN — HYDROMORPHONE HYDROCHLORIDE 0.2 MG: 2 INJECTION, SOLUTION INTRAMUSCULAR; INTRAVENOUS; SUBCUTANEOUS at 02:01

## 2022-01-20 RX ADMIN — LIDOCAINE HYDROCHLORIDE 50 MG: 20 INJECTION, SOLUTION INTRAVENOUS at 12:01

## 2022-01-20 RX ADMIN — FENTANYL CITRATE 100 MCG: 50 INJECTION, SOLUTION INTRAMUSCULAR; INTRAVENOUS at 12:01

## 2022-01-20 NOTE — OP NOTE
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 male with hx of right thigh myxofibrosarcoma who had a recurrence of palpable symptoms in the right thigh and a more proximal location compared to the prior resection site.  He received a postop course of radiation the distal right thigh in this area appears clear however there is hypermetabolic area on a PET scan in each of the palpable locations at mid thigh and proximal thigh. After discussion of disease process, we discussed options and have elected for operation to perform wide local excision of each of these palpable subcutaneous masses.    Description of Procedure: After consent was obtained, patient was taken to the OR. The right thigh was prepped and draped in a standard sterile fashion after general anesthesia was started. Time out was performed. Antibiotics were started with Ancef. We began the procedure by clearly palpating each of these lesions was corresponded with the PET positive spots.  We injected Marcaine in the subcutaneous tissue in a transverse orientation overlying it to the palpable masses and incised through the skin with a 15 blade.  We then lifted each skin flap and used Bovie electrocautery to create a skin flap bilaterally over each mass.  We then were able to the see and grasp the masses which were in the subcutaneous tissue and used Bovie electrocautery to go gross negative margins and cervical them completely.  The proximal right thigh mass was removed initially and it did not appear to invade the underlying muscle fascia.  This was  removed and sent as a specimen to pathology.  It measured 3 cm diameter and was spherical.  We repeated the same process for the mid thigh lesion however this 1 at the deep edge appeared to be adherent to the muscle fascia therefore moved resected the muscle fascia along with the and the underlying muscle appeared normal and the mass did not invade any of the surrounding tissue.  He was also 3 cm and was sent as a mid/distal thigh mass.  We used a small clip applier and placed clips in the subcutaneous and wound bed in circumferential orientation for future marking of potential radiation.  We then irrigated achieved hemostasis and closed the skin with 3-0 Vicryl for the deeper tissue and 4-0 Monocryl for the subcuticular skin closure covered this with Dermabond.  All counts were correct x2. Patient was awakened from anesthesia and taken to the recovery room in a stable condition having suffered no issues at this time.    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

## 2022-01-20 NOTE — ANESTHESIA POSTPROCEDURE EVALUATION
Anesthesia Post Evaluation    Patient: Zac Plunkett    Procedure(s) Performed: Procedure(s) (LRB):  EXCISION, LYMPH NODE (Right)    Final Anesthesia Type: general      Patient location during evaluation: PACU  Patient participation: Yes- Able to Participate  Level of consciousness: awake and alert  Post-procedure vital signs: reviewed and stable  Pain management: adequate  Airway patency: patent    PONV status at discharge: No PONV  Anesthetic complications: no      Cardiovascular status: hemodynamically stable  Respiratory status: unassisted and spontaneous ventilation  Hydration status: euvolemic  Follow-up not needed.          Vitals Value Taken Time   /82 01/20/22 1427   Temp 36.3 °C (97.4 °F) 01/20/22 1427   Pulse 57 01/20/22 1427   Resp 18 01/20/22 1427   SpO2 99 % 01/20/22 1427         Event Time   Out of Recovery 14:15:09         Pain/Lanie Score: Pain Rating Prior to Med Admin: 7 (1/20/2022  2:18 PM)  Lanie Score: 10 (1/20/2022  2:10 PM)

## 2022-01-20 NOTE — ANESTHESIA PROCEDURE NOTES
Intubation    Date/Time: 1/20/2022 12:33 PM  Performed by: Uriel De Jesus CRNA  Authorized by: Michelle Parker MD     Intubation:     Induction:  Intravenous    Intubated:  Postinduction    Mask Ventilation:  Easy mask    Attempts:  1    Attempted By:  CRNA    Method of Intubation:  Direct    Blade:  Gong 2    Laryngeal View Grade: Grade I - full view of cords      Difficult Airway Encountered?: No      Complications:  None    Airway Device:  Oral endotracheal tube    Airway Device Size:  7.5    Style/Cuff Inflation:  Cuffed (inflated to minimal occlusive pressure)    Tube secured:  23    Secured at:  The lips    Placement Verified By:  Capnometry and Revisualization with laryngoscopy    Complicating Factors:  None    Findings Post-Intubation:  BS equal bilateral

## 2022-01-20 NOTE — PLAN OF CARE
Hand off report to RENU Sanchez SDS.  Prior to transfer, patient c/o pain 7/10 to right lower extremity incision.  Transfer held, RN notified. PRN pain med admin'd.  AAOx4. VSS per flow sheet.   Island dressing to right groin/thigh cdi at this time.

## 2022-01-20 NOTE — DISCHARGE SUMMARY
[facility] - Surgery  Discharge Note  Short Stay    Procedure(s) (LRB):  EXCISION, LYMPH NODE (Right)    OUTCOME: Patient tolerated treatment/procedure well without complication and is now ready for discharge.    DISPOSITION: Home or Self Care    FINAL DIAGNOSIS:  Right thigh mass    FOLLOWUP: In clinic    DISCHARGE INSTRUCTIONS:    Discharge Procedure Orders   Diet Adult Regular     Notify your health care provider if you experience any of the following:  temperature >100.4     Notify your health care provider if you experience any of the following:  persistent nausea and vomiting or diarrhea     Notify your health care provider if you experience any of the following:  severe uncontrolled pain     Notify your health care provider if you experience any of the following:  redness, tenderness, or signs of infection (pain, swelling, redness, odor or green/yellow discharge around incision site)     Notify your health care provider if you experience any of the following:  difficulty breathing or increased cough     No dressing needed   Order Comments: Keep wound clean and dry. Ok to rinse with soap and water. Do not soak in tub/pool.     Activity as tolerated        TIME SPENT ON DISCHARGE: 15 minutes

## 2022-01-20 NOTE — TRANSFER OF CARE
Anesthesia Transfer of Care Note    Patient: Zac Plunkett    Procedure(s) Performed: Procedure(s) (LRB):  EXCISION, LYMPH NODE (Right)    Patient location: PACU    Anesthesia Type: general    Transport from OR: Transported from OR on room air with adequate spontaneous ventilation    Post pain: adequate analgesia    Post assessment: no apparent anesthetic complications and tolerated procedure well    Post vital signs: stable    Level of consciousness: sedated and responds to stimulation    Nausea/Vomiting: no nausea/vomiting    Complications: none    Transfer of care protocol was followed      Last vitals:   Visit Vitals  BP (!) 148/74 (BP Location: Right arm, Patient Position: Lying)   Pulse 84   Temp 36.5 °C (97.7 °F) (Oral)   Resp 16   Wt 80.4 kg (177 lb 4 oz)   SpO2 100%   BMI 24.72 kg/m²

## 2022-01-20 NOTE — DISCHARGE INSTRUCTIONS
[facility]  Office # [phone]    Discharge Instructions for Same Day Surgery     Call the office for and appointment if one has not already been made.     Diet: Drink plenty of fluids the first 48 hours and you may resume your   usual diet.     Activity: No heavy lifting (over 10 pounds), pushing or pulling until your   post op visit. Your doctor's office may have told you to limit your lifting to less weight, or even no weight.  Be sure to follow those instructions.    Note: You may ride in a car and you may drive when comfortable.     Do not drive, drink alcohol, or sign legal documents for 24 hours, or if taking narcotic pain medication.    Dressings: Remove the dressing 24 hours after surgery. You may shower no tub bath  24 hours after surgery and you may wash your hair.      Dermabond / Prineotape    or a material like it was used on your incision.   It is like a liquid glue.   Do not peel or try to remove it it will start to fall off in 7-10 days on its' own.   It is OK to shower, pat dry, do not apply any creams or lotions.    No tub baths, swimming pools, hot tubs or submersion of the incision until your surgeon says it's ok.      Medical: Call the doctor for any of the following problems: fever above 101,   severe pain, bleeding, or abdominal distention (swelling).   If constipated you may take any stool softener you choose.     Occasionally small areas of skin numbness or an unpleasant skin sensation can result. Also, you may find that your incision is swollen and tender for a few days.  Some redness around sutures and staples is a normal reaction, but if the discomfort persists or worsens, call you doctor.             Fall Prevention  Millions of people fall every year and injure themselves. You may have had anesthesia or sedation which may increase your risk of falling. You may have health issues that put you at an increased risk of falling.     Here are ways to reduce your  risk of falling.  ·   · Make your home safe by keeping walkways clear of objects you may trip over.  · Use non-slip pads under rugs. Do not use area rugs or small throw rugs.  · Use non-slip mats in bathtubs and showers.  · Install handrails and lights on staircases.  · Do not walk in poorly lit areas.  · Do not stand on chairs or wobbly ladders.  · Use caution when reaching overhead or looking upward. This position can cause a loss of balance.  · Be sure your shoes fit properly, have non-slip bottoms and are in good condition.   · Wear shoes both inside and out. Avoid going barefoot or wearing slippers.  · Be cautious when going up and down stairs, curbs, and when walking on uneven sidewalks.  · If your balance is poor, consider using a cane or walker.  · If your fall was related to alcohol use, stop or limit alcohol intake.   · If your fall was related to use of sleeping medicines, talk to your doctor about this. You may need to reduce your dosage at bedtime if you awaken during the night to go to the bathroom.    · To reduce the need for nighttime bathroom trips:  ¨ Avoid drinking fluids for several hours before going to bed  ¨ Empty your bladder before going to bed  ¨ Men can keep a urinal at the bedside  · Stay as active as you can. Balance, flexibility, strength, and endurance all come from exercise. They all play a role in preventing falls. Ask your healthcare provider which types of activity are right for you.  · Get your vision checked on a regular basis.  · If you have pets, know where they are before you stand up or walk so you don't trip over them.  · Use night lights.

## 2022-01-20 NOTE — ANESTHESIA PREPROCEDURE EVALUATION
[facility]  Anesthesia Pre-Operative Evaluation         Patient Name: Zac Plunkett  YOB: 1945  MRN: [id number]    SUBJECTIVE:     Pre-operative evaluation for Procedure(s) (LRB):  EXCISION, LYMPH NODE (Right)     01/20/2022    Zac Plunkett is a 76 y.o. male w/ a significant PMHx of HTN sarcoma R thigh. PONV.    Patient now presents for the above procedure(s).      LDA: None documented.       Peripheral IV - Single Lumen 01/20/22 1120 20 G Left;Posterior Forearm (Active)   Number of days: 0       Prev airway: None documented.    Drips: None documented.   sodium chloride 0.9%      sodium chloride 0.9% 70 mL/hr at 01/20/22 1041       Patient Active Problem List   Diagnosis    Hypertension, benign    Hyperlipidemia    Lumbar spondylosis    History of colon polyps    Prominent aorta    Enlarged prostate    Degenerative joint disease of left hip    BPH (benign prostatic hyperplasia)    S/P hip replacement, left    Tobacco abuse    Mass of right thigh    Soft tissue sarcoma       Review of patient's allergies indicates:  No Known Allergies    Current Inpatient Medications:   LIDOcaine (PF) 10 mg/ml (1%)  1 mL Intradermal Once       No current facility-administered medications on file prior to encounter.     Current Outpatient Medications on File Prior to Encounter   Medication Sig Dispense Refill    atorvastatin (LIPITOR) 80 MG tablet Take 1 tablet (80 mg total) by mouth once daily. 90 tablet 3    finasteride (PROSCAR) 5 mg tablet Take 1 tablet (5 mg total) by mouth once daily. 90 tablet 0    hydroCHLOROthiazide (HYDRODIURIL) 25 MG tablet Take 1 tablet (25 mg total) by mouth once daily. 90 tablet 0    tamsulosin (FLOMAX) 0.4 mg Cap Take 1 capsule (0.4 mg total) by mouth once daily. 90 capsule 0       Past Surgical History:   Procedure Laterality Date    APPENDECTOMY      BACK SURGERY      HIP REPLACEMENT ARTHROPLASTY Left 8/6/2018    Procedure:  ARTHROPLASTY, HIP REPLACEMENT;  Surgeon: Chapincito Sagastume MD;  [facility];  Service: Orthopedics;  Laterality: Left;    JOINT REPLACEMENT Left     hip    laminectomy l4-l5         Social History     Socioeconomic History    Marital status:    Tobacco Use    Smoking status: Current Every Day Smoker     Years: 20.00     Types: Cigars    Smokeless tobacco: Never Used    Tobacco comment: smokes cigars 2-3 a day, smoke cigarette for 15 years   Substance and Sexual Activity    Alcohol use: No    Drug use: No    Sexual activity: Yes     Partners: Female       OBJECTIVE:     Vital Signs Range (Last 24H):  Temp:  [36.6 °C (97.8 °F)]   Pulse:  [57]   Resp:  [18]   BP: (141)/(71)   SpO2:  [99 %]       Significant Labs:  Lab Results   Component Value Date    WBC 8.61 01/18/2022    HGB 15.6 01/18/2022    HCT 46.4 01/18/2022     01/18/2022    CHOL 123 05/20/2021    TRIG 72 05/20/2021    HDL 33 (L) 05/20/2021    ALT 19 05/20/2021    AST 25 05/20/2021     (L) 01/18/2022    K 4.8 01/18/2022    CL 99 01/18/2022    CREATININE 0.9 01/18/2022    BUN 12 01/18/2022    CO2 28 01/18/2022    TSH 1.530 05/20/2021    PSA 0.69 05/20/2021    INR 1.0 07/24/2018    HGBA1C 5.8 (H) 05/20/2021       Diagnostic Studies: No relevant studies.    EKG:   Results for orders placed or performed in visit on 01/18/22   EKG 12-lead    Collection Time: 01/18/22  9:25 AM    Narrative    Test Reason :     Vent. Rate : 057 BPM     Atrial Rate : 047 BPM     P-R Int : 186 ms          QRS Dur : 102 ms      QT Int : 416 ms       P-R-T Axes : 039 043 052 degrees     QTc Int : 404 ms    Marked sinus bradycardia with Premature atrial complexes  Nonspecific T wave abnormality  Abnormal ECG  When compared with ECG of 04-JUN-2021 08:55,  Significant changes have occurred  Confirmed by Ed Ozuna MD ([id number]) on 1/18/2022 6:20:01 PM    Referred By:             Confirmed By:Ed Ozuna MD       2D ECHO:  TTE:  No results found  for this or any previous visit.    ZEE:  No results found for this or any previous visit.    ASSESSMENT/PLAN:                                                                                                                  01/20/2022  Zac Plunkett is a 76 y.o., male.    Anesthesia Evaluation    I have reviewed the Patient Summary Reports.    I have reviewed the Nursing Notes. I have reviewed the NPO Status.   I have reviewed the Medications.     Review of Systems  Anesthesia Hx:  Hx of Anesthetic complications Reports N/V with one surgery 18 years ago Denies Family Hx of Anesthesia complications.  Personal Hx of Anesthesia complications, Post-Operative Nausea/Vomiting, in the past, but not with recent anesthetics / prophylaxis   Social:  Smoker, No Alcohol Use    Hematology/Oncology:  Hematology Normal      Hematology Comments: Provoked R DVT, over 30 years ago   EENT/Dental:EENT/Dental Normal   Cardiovascular:   Exercise tolerance: good Hypertension Denies MI.   Denies Dysrhythmias.   Denies Angina.  Functional Capacity good / => 4 METS    Pulmonary:  Pulmonary Normal    Renal/:  Renal/ Normal     Hepatic/GI:  Hepatic/GI Normal    Musculoskeletal:   Arthritis  Right leg mass   Neurological:  Neurology Normal    Endocrine:  Endocrine Normal    Dermatological:  Skin Normal    Psych:  Psychiatric Normal           Physical Exam  General:  Well nourished    Airway/Jaw/Neck:  Airway Findings: Mouth Opening: Normal Tongue: Large  Mallampati: II  TM Distance: 4 - 6 cm  Jaw/Neck Findings:  Neck ROM: Normal ROM      Dental:  Dental Findings: Upper Dentures    Chest/Lungs:  Chest/Lungs Findings: Normal Respiratory Rate  Coarse BS bilaterally       Mental Status:  Mental Status Findings:  Cooperative, Alert and Oriented         Anesthesia Plan  Type of Anesthesia, risks & benefits discussed:  Anesthesia Type:  general, MAC    Patient's Preference:   Plan Factors:          Intra-op Monitoring Plan: standard ASA  monitors  Intra-op Monitoring Plan Comments:   Post Op Pain Control Plan: multimodal analgesia and per primary service following discharge from PACU  Post Op Pain Control Plan Comments:     Induction:   IV  Beta Blocker:  Patient is not currently on a Beta-Blocker (No further documentation required).       Informed Consent: Patient understands risks and agrees with Anesthesia plan.  Questions answered. Anesthesia consent signed with patient.  ASA Score: 2     Day of Surgery Review of History & Physical: I have interviewed and examined the patient. I have reviewed the patient's H&P dated:    H&P update referred to the surgeon.         Ready For Surgery From Anesthesia Perspective.

## 2022-01-24 DIAGNOSIS — R91.1 PULMONARY NODULE: ICD-10-CM

## 2022-01-24 DIAGNOSIS — C49.9 SOFT TISSUE SARCOMA: Primary | ICD-10-CM

## 2022-01-28 ENCOUNTER — TELEPHONE (OUTPATIENT)
Dept: SURGERY | Facility: CLINIC | Age: 77
End: 2022-01-28

## 2022-01-28 ENCOUNTER — OFFICE VISIT (OUTPATIENT)
Dept: SURGERY | Facility: CLINIC | Age: 77
End: 2022-01-28
Payer: MEDICARE

## 2022-01-28 VITALS
WEIGHT: 177.25 LBS | DIASTOLIC BLOOD PRESSURE: 75 MMHG | HEART RATE: 69 BPM | HEIGHT: 71 IN | SYSTOLIC BLOOD PRESSURE: 137 MMHG | BODY MASS INDEX: 24.81 KG/M2

## 2022-01-28 DIAGNOSIS — C49.9 SOFT TISSUE SARCOMA: Primary | ICD-10-CM

## 2022-01-28 PROCEDURE — 1159F MED LIST DOCD IN RCRD: CPT | Mod: HCNC,CPTII,S$GLB, | Performed by: SURGERY

## 2022-01-28 PROCEDURE — 1101F PR PT FALLS ASSESS DOC 0-1 FALLS W/OUT INJ PAST YR: ICD-10-PCS | Mod: HCNC,CPTII,S$GLB, | Performed by: SURGERY

## 2022-01-28 PROCEDURE — 3288F PR FALLS RISK ASSESSMENT DOCUMENTED: ICD-10-PCS | Mod: HCNC,CPTII,S$GLB, | Performed by: SURGERY

## 2022-01-28 PROCEDURE — 3078F DIAST BP <80 MM HG: CPT | Mod: HCNC,CPTII,S$GLB, | Performed by: SURGERY

## 2022-01-28 PROCEDURE — 99024 PR POST-OP FOLLOW-UP VISIT: ICD-10-PCS | Mod: HCNC,S$GLB,, | Performed by: SURGERY

## 2022-01-28 PROCEDURE — 3075F SYST BP GE 130 - 139MM HG: CPT | Mod: HCNC,CPTII,S$GLB, | Performed by: SURGERY

## 2022-01-28 PROCEDURE — 99024 POSTOP FOLLOW-UP VISIT: CPT | Mod: HCNC,S$GLB,, | Performed by: SURGERY

## 2022-01-28 PROCEDURE — 1126F AMNT PAIN NOTED NONE PRSNT: CPT | Mod: HCNC,CPTII,S$GLB, | Performed by: SURGERY

## 2022-01-28 PROCEDURE — 1159F PR MEDICATION LIST DOCUMENTED IN MEDICAL RECORD: ICD-10-PCS | Mod: HCNC,CPTII,S$GLB, | Performed by: SURGERY

## 2022-01-28 PROCEDURE — 99999 PR PBB SHADOW E&M-EST. PATIENT-LVL III: ICD-10-PCS | Mod: PBBFAC,HCNC,, | Performed by: SURGERY

## 2022-01-28 PROCEDURE — 1126F PR PAIN SEVERITY QUANTIFIED, NO PAIN PRESENT: ICD-10-PCS | Mod: HCNC,CPTII,S$GLB, | Performed by: SURGERY

## 2022-01-28 PROCEDURE — 3075F PR MOST RECENT SYSTOLIC BLOOD PRESS GE 130-139MM HG: ICD-10-PCS | Mod: HCNC,CPTII,S$GLB, | Performed by: SURGERY

## 2022-01-28 PROCEDURE — 1101F PT FALLS ASSESS-DOCD LE1/YR: CPT | Mod: HCNC,CPTII,S$GLB, | Performed by: SURGERY

## 2022-01-28 PROCEDURE — 99999 PR PBB SHADOW E&M-EST. PATIENT-LVL III: CPT | Mod: PBBFAC,HCNC,, | Performed by: SURGERY

## 2022-01-28 PROCEDURE — 3288F FALL RISK ASSESSMENT DOCD: CPT | Mod: HCNC,CPTII,S$GLB, | Performed by: SURGERY

## 2022-01-28 PROCEDURE — 3078F PR MOST RECENT DIASTOLIC BLOOD PRESSURE < 80 MM HG: ICD-10-PCS | Mod: HCNC,CPTII,S$GLB, | Performed by: SURGERY

## 2022-01-28 RX ORDER — CLINDAMYCIN HYDROCHLORIDE 300 MG/1
300 CAPSULE ORAL 3 TIMES DAILY
Qty: 15 CAPSULE | Refills: 0 | Status: SHIPPED | OUTPATIENT
Start: 2022-01-28 | End: 2022-02-02

## 2022-01-28 NOTE — TELEPHONE ENCOUNTER
----- Message from Sonia Galaviz sent at 1/28/2022  2:56 PM CST -----  Regarding: Call  Contact: Patient  Type: Patient Call Back    Who called:Patient    What is the request in detail: Patient is requesting a call back. He states he was seen by [facility] and medication was not sent. Please send before end of day, thanks. Please advise.    Can the clinic reply by [facility]? No    Would the patient rather a call back or a response via [facility]? Call    Best call back number: [phone]    Additional Information:   [facility] #[id number] - [place] [address] AT [facility] [address]   [address] [address]  Phone: [phone] Fax: [phone]    Thanks

## 2022-01-31 NOTE — PROGRESS NOTES
History & Physical    SUBJECTIVE:     History of Present Illness:  Patient is a 76 y.o. 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 Initial thigh resection in June 2021 followed by resection of locally recurrent met 1 week ago. He completed 52 Gy in 26 fractions (out of planned 60 Gy in 30 fractions) via IMRT, with treatment ending sooner than expected due to [place] and subsequent delays. Most recent PET showed new RUL 0.9cm nodule with SUV max 3.2 which was new from PET June 2021. Today he reports feeling well. Denies fever, chills, SOB, cough or wheeze. No changes in appetite or weight.     Smokes at least 1 cigar a day. Quit smoking cigarettes 30 years ago. No longer drinks alcohol. Retired from a chemical plant. [employer] . He may have had asbestos exposure in the [employer] but unsure of any definite exposure.    Lip hip arthroplasty, L3- L4 laminectomy in 2003, appendectomy, right lower extremity sarcoma resections       Chief Complaint   Patient presents with    Consult       Review of patient's allergies indicates:  No Known Allergies    Current Outpatient Medications   Medication Sig Dispense Refill    atorvastatin (LIPITOR) 80 MG tablet Take 1 tablet (80 mg total) by mouth once daily. 90 tablet 3    clindamycin (CLEOCIN) 300 MG capsule Take 1 capsule (300 mg total) by mouth 3 (three) times daily. for 5 days 15 capsule 0    finasteride (PROSCAR) 5 mg tablet Take 1 tablet (5 mg total) by mouth once daily. 90 tablet 0    hydroCHLOROthiazide (HYDRODIURIL) 25 MG tablet Take 1 tablet (25 mg total) by mouth once daily. 90 tablet 0    HYDROcodone-acetaminophen (NORCO) 5-325 mg per tablet Take 1 tablet by mouth every 6 (six) hours as needed for Pain. 15 tablet 0    tamsulosin (FLOMAX) 0.4 mg Cap Take 1 capsule (0.4 mg total) by mouth once daily. 90 capsule 0     No current facility-administered  medications for this visit.       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    Tobacco dependence      Past Surgical History:   Procedure Laterality Date    APPENDECTOMY      BACK SURGERY      HIP REPLACEMENT ARTHROPLASTY Left 8/6/2018    Procedure: ARTHROPLASTY, HIP REPLACEMENT;  Surgeon: Chapincito Sagastume MD;  Location: [facility] OR;  Service: Orthopedics;  Laterality: Left;    JOINT REPLACEMENT Left     hip    laminectomy l4-l5      SURGICAL REMOVAL OF LYMPH NODE Right 1/20/2022    Procedure: EXCISION, LYMPH NODE;  Surgeon: Arun Mao MD;  Location: [facility] OR;  Service: General;  Laterality: Right;  right thigh and right groin  RN PREOP 1/18/2022      PT CHOSE TO DO HOME TEST FOR COVID ON AM OF SURGERY     Family History   Problem Relation Age of Onset    Diabetes Mother     Heart disease Father     No Known Problems Sister     Cerebral palsy Brother     Epilepsy Brother     Melanoma Neg Hx     Psoriasis Neg Hx     Lupus Neg Hx     Eczema Neg Hx     Acne Neg Hx     Liver disease Neg Hx     Liver cancer Neg Hx     Cirrhosis Neg Hx     Colon polyps Neg Hx     Colon cancer Neg Hx      Social History     Tobacco Use    Smoking status: Current Every Day Smoker     Years: 20.00     Types: Cigars    Smokeless tobacco: Never Used    Tobacco comment: smokes cigars 2-3 a day, smoke cigarette for 15 years   Substance Use Topics    Alcohol use: No    Drug use: No        Review of Systems:  Review of Systems   Constitutional: Negative for appetite change, fatigue and fever.   HENT: Negative for trouble swallowing and voice change.    Eyes: Negative for visual disturbance.   Respiratory: Negative for cough, chest tightness, shortness of breath  "and wheezing.    Cardiovascular: Negative for palpitations and leg swelling.   Gastrointestinal: Negative for abdominal pain, diarrhea, nausea and vomiting.   Genitourinary: Negative for difficulty urinating.   Musculoskeletal: Positive for arthralgias.   Neurological: Negative for weakness and headaches.   Psychiatric/Behavioral: Negative for confusion.       OBJECTIVE:     Vital Signs (Most Recent)  Vitals:    02/02/22 0913   BP: (!) 148/75   Pulse: 62   SpO2: 98%   Weight: 81.3 kg (179 lb 3.7 oz)   Height: 5' 11" (1.803 m)   PainSc: 0-No pain       Physical Exam:  Physical Exam  Constitutional:       Appearance: Normal appearance.   Cardiovascular:      Rate and Rhythm: Normal rate and regular rhythm.      Pulses: Normal pulses.   Pulmonary:      Effort: Pulmonary effort is normal.      Breath sounds: Normal breath sounds. No wheezing.   Abdominal:      General: Bowel sounds are normal.      Palpations: Abdomen is soft.      Tenderness: There is no abdominal tenderness.   Musculoskeletal:      Right lower leg: No edema.      Left lower leg: No edema.   Neurological:      General: No focal deficit present.      Mental Status: He is alert and oriented to person, place, and time.   Psychiatric:         Mood and Affect: Mood normal.       PFT 8/2020:  FEV1: 2.62L 82%  DLCO: 13.34  49%    Pathology   Right thigh:  -Myxofibrosarcoma, high-grade  -Both lesions appear completely excised     PETCT 1/12/22:   1.  Interval development of 2 hypermetabolic sodt tissue nodules in the right leg subcutaneous fat.  Findings are concerning for metastatic disease.  2.  0.9 cm right apical pulmonary opacity with mild uptake, nonspecific.  Finding is concerning for pulmonary metastasis in light of the new soft tissue nodules.  However, infectious or inflammatory etiologies are possible.    Chest CT 2/2/22: Images personally reviewed. Right upper lobe pleural based mixed density opacity.       ASSESSMENT/PLAN:     Patient is a 76 y.o. " male current smoker with HTN, HLD and recurrent thigh myofibrosarcoma here today for evaluation of right upper lobe lung nodule.    PLAN:Plan   Right upper lobe opacity less than 1cm and large ground glass component. Intraop localization may be difficult. Recommend repeat chest CT in 2 months to monitor for growth.

## 2022-02-02 ENCOUNTER — PATIENT MESSAGE (OUTPATIENT)
Dept: SURGERY | Facility: CLINIC | Age: 77
End: 2022-02-02
Payer: MEDICARE

## 2022-02-02 ENCOUNTER — TELEPHONE (OUTPATIENT)
Dept: SURGERY | Facility: CLINIC | Age: 77
End: 2022-02-02
Payer: MEDICARE

## 2022-02-02 ENCOUNTER — OFFICE VISIT (OUTPATIENT)
Dept: CARDIOTHORACIC SURGERY | Facility: CLINIC | Age: 77
End: 2022-02-02
Payer: MEDICARE

## 2022-02-02 ENCOUNTER — HOSPITAL ENCOUNTER (OUTPATIENT)
Dept: RADIOLOGY | Facility: HOSPITAL | Age: 77
Discharge: HOME | End: 2022-02-02
Attending: PHYSICIAN ASSISTANT
Payer: MEDICARE

## 2022-02-02 VITALS
SYSTOLIC BLOOD PRESSURE: 148 MMHG | WEIGHT: 179.25 LBS | HEART RATE: 62 BPM | HEIGHT: 71 IN | BODY MASS INDEX: 25.1 KG/M2 | OXYGEN SATURATION: 98 % | DIASTOLIC BLOOD PRESSURE: 75 MMHG

## 2022-02-02 DIAGNOSIS — R91.1 PULMONARY NODULE: ICD-10-CM

## 2022-02-02 DIAGNOSIS — R91.8 OTHER NONSPECIFIC ABNORMAL FINDING OF LUNG FIELD: ICD-10-CM

## 2022-02-02 DIAGNOSIS — C49.9 SOFT TISSUE SARCOMA: ICD-10-CM

## 2022-02-02 DIAGNOSIS — R91.1 LUNG NODULE: Primary | ICD-10-CM

## 2022-02-02 PROCEDURE — 71250 CT CHEST WITHOUT CONTRAST: ICD-10-PCS | Mod: 26,HCNC,, | Performed by: RADIOLOGY

## 2022-02-02 PROCEDURE — 3288F PR FALLS RISK ASSESSMENT DOCUMENTED: ICD-10-PCS | Mod: HCNC,CPTII,S$GLB, | Performed by: THORACIC SURGERY (CARDIOTHORACIC VASCULAR SURGERY)

## 2022-02-02 PROCEDURE — 3078F DIAST BP <80 MM HG: CPT | Mod: HCNC,CPTII,S$GLB, | Performed by: THORACIC SURGERY (CARDIOTHORACIC VASCULAR SURGERY)

## 2022-02-02 PROCEDURE — 3078F PR MOST RECENT DIASTOLIC BLOOD PRESSURE < 80 MM HG: ICD-10-PCS | Mod: HCNC,CPTII,S$GLB, | Performed by: THORACIC SURGERY (CARDIOTHORACIC VASCULAR SURGERY)

## 2022-02-02 PROCEDURE — 3288F FALL RISK ASSESSMENT DOCD: CPT | Mod: HCNC,CPTII,S$GLB, | Performed by: THORACIC SURGERY (CARDIOTHORACIC VASCULAR SURGERY)

## 2022-02-02 PROCEDURE — 1159F PR MEDICATION LIST DOCUMENTED IN MEDICAL RECORD: ICD-10-PCS | Mod: HCNC,CPTII,S$GLB, | Performed by: THORACIC SURGERY (CARDIOTHORACIC VASCULAR SURGERY)

## 2022-02-02 PROCEDURE — 71250 CT THORAX DX C-: CPT | Mod: TC,HCNC

## 2022-02-02 PROCEDURE — 99999 PR PBB SHADOW E&M-EST. PATIENT-LVL IV: CPT | Mod: PBBFAC,HCNC,, | Performed by: THORACIC SURGERY (CARDIOTHORACIC VASCULAR SURGERY)

## 2022-02-02 PROCEDURE — 99205 PR OFFICE/OUTPT VISIT, NEW, LEVL V, 60-74 MIN: ICD-10-PCS | Mod: HCNC,S$GLB,, | Performed by: THORACIC SURGERY (CARDIOTHORACIC VASCULAR SURGERY)

## 2022-02-02 PROCEDURE — 3077F SYST BP >= 140 MM HG: CPT | Mod: HCNC,CPTII,S$GLB, | Performed by: THORACIC SURGERY (CARDIOTHORACIC VASCULAR SURGERY)

## 2022-02-02 PROCEDURE — 1126F PR PAIN SEVERITY QUANTIFIED, NO PAIN PRESENT: ICD-10-PCS | Mod: HCNC,CPTII,S$GLB, | Performed by: THORACIC SURGERY (CARDIOTHORACIC VASCULAR SURGERY)

## 2022-02-02 PROCEDURE — 99205 OFFICE O/P NEW HI 60 MIN: CPT | Mod: HCNC,S$GLB,, | Performed by: THORACIC SURGERY (CARDIOTHORACIC VASCULAR SURGERY)

## 2022-02-02 PROCEDURE — 1101F PT FALLS ASSESS-DOCD LE1/YR: CPT | Mod: HCNC,CPTII,S$GLB, | Performed by: THORACIC SURGERY (CARDIOTHORACIC VASCULAR SURGERY)

## 2022-02-02 PROCEDURE — 99999 PR PBB SHADOW E&M-EST. PATIENT-LVL IV: ICD-10-PCS | Mod: PBBFAC,HCNC,, | Performed by: THORACIC SURGERY (CARDIOTHORACIC VASCULAR SURGERY)

## 2022-02-02 PROCEDURE — 71250 CT THORAX DX C-: CPT | Mod: 26,HCNC,, | Performed by: RADIOLOGY

## 2022-02-02 PROCEDURE — 3077F PR MOST RECENT SYSTOLIC BLOOD PRESSURE >= 140 MM HG: ICD-10-PCS | Mod: HCNC,CPTII,S$GLB, | Performed by: THORACIC SURGERY (CARDIOTHORACIC VASCULAR SURGERY)

## 2022-02-02 PROCEDURE — 1101F PR PT FALLS ASSESS DOC 0-1 FALLS W/OUT INJ PAST YR: ICD-10-PCS | Mod: HCNC,CPTII,S$GLB, | Performed by: THORACIC SURGERY (CARDIOTHORACIC VASCULAR SURGERY)

## 2022-02-02 PROCEDURE — 1159F MED LIST DOCD IN RCRD: CPT | Mod: HCNC,CPTII,S$GLB, | Performed by: THORACIC SURGERY (CARDIOTHORACIC VASCULAR SURGERY)

## 2022-02-02 PROCEDURE — 1126F AMNT PAIN NOTED NONE PRSNT: CPT | Mod: HCNC,CPTII,S$GLB, | Performed by: THORACIC SURGERY (CARDIOTHORACIC VASCULAR SURGERY)

## 2022-02-02 NOTE — LETTER
[facility] - Thoracic Surgery  [address]  [facility] [address]  Phone: [phone]  Fax: [phone] February 2, 2022        Ed Lucia MD  [address]  [facility] [address]    Patient: Zac Plunkett   MR Number: [id number]   YOB: 1945   Date of Visit: 2/2/2022     Dear Dr. Lucia:    Thank you for referring Zac Plunkett to me for evaluation. Mr. Plunkett presents for evaluation of a right upper lobe opacity found in the setting of recurrent right lower extremity sarcoma.    The right upper lobe lesion is mostly interstitial without any distinct borders.  It does not represent a discrete lesion.  I recommend surveillance imaging with a repeat chest CT in 2 months.  If the area of concern develops a more solid component or becomes more discrete, I will recommend exploratory right VATS with wedge resection.    If you have questions, please do not hesitate to call me. I look forward to following Zac along with you.    Sincerely,    Harris De Anda MD    BLP/avni      CC  Jerry Ryan Jr., MD

## 2022-02-02 NOTE — TELEPHONE ENCOUNTER
----- Message from Gabrielle Briceño sent at 2/2/2022 10:27 AM CST -----  Type:  Sooner Appointment Request    Patient is requesting a sooner appointment.  Patient declined first available appointment listed as well as another facility and provider .  Patient will not accept being placed on the waitlist and is requesting a message be sent to doctor.    Name of Caller: Self     When is the first available appointment? 2/9    Symptoms: problem with incision area, pt just had surgery    Would the patient rather a call back or a response via [facility]? Call back    Best Call Back Number: [phone]

## 2022-02-03 ENCOUNTER — OFFICE VISIT (OUTPATIENT)
Dept: SURGERY | Facility: CLINIC | Age: 77
End: 2022-02-03
Payer: MEDICARE

## 2022-02-03 VITALS
WEIGHT: 178.44 LBS | HEIGHT: 71 IN | SYSTOLIC BLOOD PRESSURE: 153 MMHG | BODY MASS INDEX: 24.98 KG/M2 | TEMPERATURE: 98 F | DIASTOLIC BLOOD PRESSURE: 83 MMHG | HEART RATE: 61 BPM

## 2022-02-03 DIAGNOSIS — C49.9 SOFT TISSUE SARCOMA: Primary | ICD-10-CM

## 2022-02-03 PROCEDURE — 1159F MED LIST DOCD IN RCRD: CPT | Mod: HCNC,CPTII,S$GLB, | Performed by: SURGERY

## 2022-02-03 PROCEDURE — 99999 PR PBB SHADOW E&M-EST. PATIENT-LVL III: CPT | Mod: PBBFAC,HCNC,, | Performed by: SURGERY

## 2022-02-03 PROCEDURE — 3079F PR MOST RECENT DIASTOLIC BLOOD PRESSURE 80-89 MM HG: ICD-10-PCS | Mod: HCNC,CPTII,S$GLB, | Performed by: SURGERY

## 2022-02-03 PROCEDURE — 99024 PR POST-OP FOLLOW-UP VISIT: ICD-10-PCS | Mod: HCNC,S$GLB,, | Performed by: SURGERY

## 2022-02-03 PROCEDURE — 1126F PR PAIN SEVERITY QUANTIFIED, NO PAIN PRESENT: ICD-10-PCS | Mod: HCNC,CPTII,S$GLB, | Performed by: SURGERY

## 2022-02-03 PROCEDURE — 3077F PR MOST RECENT SYSTOLIC BLOOD PRESSURE >= 140 MM HG: ICD-10-PCS | Mod: HCNC,CPTII,S$GLB, | Performed by: SURGERY

## 2022-02-03 PROCEDURE — 99024 POSTOP FOLLOW-UP VISIT: CPT | Mod: HCNC,S$GLB,, | Performed by: SURGERY

## 2022-02-03 PROCEDURE — 1126F AMNT PAIN NOTED NONE PRSNT: CPT | Mod: HCNC,CPTII,S$GLB, | Performed by: SURGERY

## 2022-02-03 PROCEDURE — 3077F SYST BP >= 140 MM HG: CPT | Mod: HCNC,CPTII,S$GLB, | Performed by: SURGERY

## 2022-02-03 PROCEDURE — 3079F DIAST BP 80-89 MM HG: CPT | Mod: HCNC,CPTII,S$GLB, | Performed by: SURGERY

## 2022-02-03 PROCEDURE — 3288F PR FALLS RISK ASSESSMENT DOCUMENTED: ICD-10-PCS | Mod: HCNC,CPTII,S$GLB, | Performed by: SURGERY

## 2022-02-03 PROCEDURE — 3288F FALL RISK ASSESSMENT DOCD: CPT | Mod: HCNC,CPTII,S$GLB, | Performed by: SURGERY

## 2022-02-03 PROCEDURE — 99999 PR PBB SHADOW E&M-EST. PATIENT-LVL III: ICD-10-PCS | Mod: PBBFAC,HCNC,, | Performed by: SURGERY

## 2022-02-03 PROCEDURE — 1101F PT FALLS ASSESS-DOCD LE1/YR: CPT | Mod: HCNC,CPTII,S$GLB, | Performed by: SURGERY

## 2022-02-03 PROCEDURE — 1101F PR PT FALLS ASSESS DOC 0-1 FALLS W/OUT INJ PAST YR: ICD-10-PCS | Mod: HCNC,CPTII,S$GLB, | Performed by: SURGERY

## 2022-02-03 PROCEDURE — 1159F PR MEDICATION LIST DOCUMENTED IN MEDICAL RECORD: ICD-10-PCS | Mod: HCNC,CPTII,S$GLB, | Performed by: SURGERY

## 2022-02-03 NOTE — PROGRESS NOTES
77 y/o man with history of right leg sarcoma excisions almost 2 weeks ago.  He is concerned regarding a swelling near the incision.  The size waxes and wanes with activity and is smallest in the mornings.    PE: 2-3 cm soft fluctuant mass, without tenderness and erythema    Impression: lymphocele    Plan: reassured patient it is likely to resolve with time, but occasionally requires surgical intervention (ligation of lymph tract). He has scheduled f/u with Dr. Mao

## 2022-02-09 ENCOUNTER — DOCUMENTATION ONLY (OUTPATIENT)
Dept: CARDIOTHORACIC SURGERY | Facility: HOSPITAL | Age: 77
End: 2022-02-09
Payer: MEDICARE

## 2022-02-09 NOTE — PATIENT CARE CONFERENCE
[facility]      THORACIC MULTIDISCIPLINARY TUMOR BOARD  PATIENT REVIEW FORM  ________________________________________________________________________    CLINIC #: [id number]  DATE: 02/09/2022    DIAGNOSIS:   Myxofibrosarcoma, pulmonary nodule     PRESENTER:   Dr. De Anda     PATIENT SUMMARY:   76 y.o. 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 Initial thigh resection in June 2021. He completed 52 Gy in 26 fractions (out of planned 60 Gy in 30 fractions) via IMRT, with treatment ending sooner than expected due to [place] and subsequent delays. Underwent resection for local thigh recurrence in Jan 2022. Most recent PET showed new RUL 0.9cm nodule with SUV max 3.2 which was new from PET June 2021.       Smokes at least 1 cigar a day. Quit smoking cigarettes 30 years ago. Retired from a chemical plant. [employer] . He may have had asbestos exposure in the [employer] but unsure of any definite exposure.       BOARD RECOMMENDATIONS:   RUL subcentimeter largely ground glass is not consistent with solid nature of sarcoma met. Recommend short interval follow-up with CT in 2 months.     Consider systemic therapy for recurrent sarcoma.     CONSULT NEEDED:     [] Surgery    [x] Hem/Onc    [] Rad/Onc    [] Dietary                 [] Social Service    [] Psychology       [] Pulmonology    Clinical Stage: Tumor  Node(s)  Metastasis   Pathologic Stage: Tumor  Node(s)  Metastasis    GROUP STAGE:     [] O    [] 1A    [] IB    [] IIA    [] IIB     [] IIIA     [] IIIB     [] IIIC    [] IV                               [x] Local recurrence of thigh sarcoma     [] Regional recurrence     [] Distant recurrence                   [] NSCLC     [] SCLC     Tumor type     Unstageable:      [] Yes     [] No  Metastatic site(s):          [x] [place] Treatment Guidelines reviewed and care planned is consistent with guidelines.         (i.e., NCCN, NCI,  PD, ACO, AUA, etc.)    PRESENTATION AT CANCER CONFERENCE:         [] Prospective    [] Retrospective     [] Follow-Up          [] Eligible for clinical trial

## 2022-02-11 LAB
FINAL PATHOLOGIC DIAGNOSIS: NORMAL
GROSS: NORMAL
Lab: NORMAL
MICROSCOPIC EXAM: NORMAL
SUPPLEMENTAL DIAGNOSIS: NORMAL

## 2022-02-18 ENCOUNTER — TELEPHONE (OUTPATIENT)
Dept: HEMATOLOGY/ONCOLOGY | Facility: CLINIC | Age: 77
End: 2022-02-18
Payer: MEDICARE

## 2022-02-23 ENCOUNTER — OFFICE VISIT (OUTPATIENT)
Dept: HEMATOLOGY/ONCOLOGY | Facility: CLINIC | Age: 77
End: 2022-02-23
Payer: MEDICARE

## 2022-02-23 VITALS
BODY MASS INDEX: 25 KG/M2 | DIASTOLIC BLOOD PRESSURE: 77 MMHG | WEIGHT: 178.56 LBS | SYSTOLIC BLOOD PRESSURE: 141 MMHG | TEMPERATURE: 98 F | HEIGHT: 71 IN | RESPIRATION RATE: 18 BRPM | HEART RATE: 66 BPM | OXYGEN SATURATION: 99 %

## 2022-02-23 DIAGNOSIS — R91.8 OTHER NONSPECIFIC ABNORMAL FINDING OF LUNG FIELD: ICD-10-CM

## 2022-02-23 DIAGNOSIS — C44.99 MYXOFIBROSARCOMA OF SKIN: Primary | ICD-10-CM

## 2022-02-23 PROCEDURE — 99214 OFFICE O/P EST MOD 30 MIN: CPT | Mod: HCNC,GC,S$GLB,

## 2022-02-23 PROCEDURE — 3078F DIAST BP <80 MM HG: CPT | Mod: HCNC,CPTII,GC,S$GLB

## 2022-02-23 PROCEDURE — 99999 PR PBB SHADOW E&M-EST. PATIENT-LVL IV: CPT | Mod: PBBFAC,HCNC,GC,

## 2022-02-23 PROCEDURE — 1126F AMNT PAIN NOTED NONE PRSNT: CPT | Mod: HCNC,CPTII,GC,S$GLB

## 2022-02-23 PROCEDURE — 3077F PR MOST RECENT SYSTOLIC BLOOD PRESSURE >= 140 MM HG: ICD-10-PCS | Mod: HCNC,CPTII,GC,S$GLB

## 2022-02-23 PROCEDURE — 3078F PR MOST RECENT DIASTOLIC BLOOD PRESSURE < 80 MM HG: ICD-10-PCS | Mod: HCNC,CPTII,GC,S$GLB

## 2022-02-23 PROCEDURE — 1160F PR REVIEW ALL MEDS BY PRESCRIBER/CLIN PHARMACIST DOCUMENTED: ICD-10-PCS | Mod: HCNC,CPTII,GC,S$GLB

## 2022-02-23 PROCEDURE — 1159F MED LIST DOCD IN RCRD: CPT | Mod: HCNC,CPTII,GC,S$GLB

## 2022-02-23 PROCEDURE — 1159F PR MEDICATION LIST DOCUMENTED IN MEDICAL RECORD: ICD-10-PCS | Mod: HCNC,CPTII,GC,S$GLB

## 2022-02-23 PROCEDURE — 3288F FALL RISK ASSESSMENT DOCD: CPT | Mod: HCNC,CPTII,GC,S$GLB

## 2022-02-23 PROCEDURE — 3288F PR FALLS RISK ASSESSMENT DOCUMENTED: ICD-10-PCS | Mod: HCNC,CPTII,GC,S$GLB

## 2022-02-23 PROCEDURE — 99999 PR PBB SHADOW E&M-EST. PATIENT-LVL IV: ICD-10-PCS | Mod: PBBFAC,HCNC,GC,

## 2022-02-23 PROCEDURE — 99214 PR OFFICE/OUTPT VISIT, EST, LEVL IV, 30-39 MIN: ICD-10-PCS | Mod: HCNC,GC,S$GLB,

## 2022-02-23 PROCEDURE — 1126F PR PAIN SEVERITY QUANTIFIED, NO PAIN PRESENT: ICD-10-PCS | Mod: HCNC,CPTII,GC,S$GLB

## 2022-02-23 PROCEDURE — 1101F PR PT FALLS ASSESS DOC 0-1 FALLS W/OUT INJ PAST YR: ICD-10-PCS | Mod: HCNC,CPTII,GC,S$GLB

## 2022-02-23 PROCEDURE — 1101F PT FALLS ASSESS-DOCD LE1/YR: CPT | Mod: HCNC,CPTII,GC,S$GLB

## 2022-02-23 PROCEDURE — 1160F RVW MEDS BY RX/DR IN RCRD: CPT | Mod: HCNC,CPTII,GC,S$GLB

## 2022-02-23 PROCEDURE — 3077F SYST BP >= 140 MM HG: CPT | Mod: HCNC,CPTII,GC,S$GLB

## 2022-02-23 NOTE — PROGRESS NOTES
[facility]  Hematology/Medical Oncology Clinic      PATIENT: Zac Plunkett  MRN: [id number]  DATE: 2/23/2022    Chief Complaint: consult for myxofibrosarcoma, Myxofibrosarcoma of skin    Other MDs:  PCP: Deondre Waters MD  Gen Surg: Dr. Mao    Subjective:   Initial History: Mr. Plunkett is a 76 y.o. male who presents to oncology clinic for follow up for recurrent myxofibrosarcoma.     Today  Presents back to the clinic for 1 month check up. Was seen by Dr. Mao and underwent resection of his recurrent STS. Path revealed 1.9cm (max dimension), multi-focal recurrent disease.     He followed with Dr. De Anda in [facility] for his RUL lesions seen on staging imaging and recommendation was further evaluation.     Here with his wife today. Doing well. Has healing of the RLE STS in the mid thigh and inguinal area. He has a small area of hematoma or enlarged LN in the area of healing.     Otherwise, no concerning ROS.       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    Tobacco dependence      Past Surgical History:   Procedure Laterality Date    APPENDECTOMY      BACK SURGERY      HIP REPLACEMENT ARTHROPLASTY Left 8/6/2018    Procedure: ARTHROPLASTY, HIP REPLACEMENT;  Surgeon: Chapincito Sagastume MD;  Location: [facility] OR;  Service: Orthopedics;  Laterality: Left;    JOINT REPLACEMENT Left     hip    laminectomy l4-l5      SURGICAL REMOVAL OF LYMPH NODE Right 1/20/2022    Procedure: EXCISION, LYMPH NODE;  Surgeon: Arun Mao MD;  Location: [facility] OR;  Service: General;  Laterality: Right;  right thigh and right groin  RN PREOP 1/18/2022      PT CHOSE TO DO HOME TEST FOR COVID ON AM OF SURGERY     Family History    Problem Relation Age of Onset    Diabetes Mother     Heart disease Father     No Known Problems Sister     Cerebral palsy Brother     Epilepsy Brother     Melanoma Neg Hx     Psoriasis Neg Hx     Lupus Neg Hx     Eczema Neg Hx     Acne Neg Hx     Liver disease Neg Hx     Liver cancer Neg Hx     Cirrhosis Neg Hx     Colon polyps Neg Hx     Colon cancer Neg Hx       reports that he has been smoking cigars. He has smoked for the past 20.00 years. He has never used smokeless tobacco. He reports that he does not drink alcohol and does not use drugs.  Review of patient's allergies indicates:  No Known Allergies  Current Outpatient Medications   Medication Sig Dispense Refill    atorvastatin (LIPITOR) 80 MG tablet Take 1 tablet (80 mg total) by mouth once daily. 90 tablet 3    finasteride (PROSCAR) 5 mg tablet TAKE 1 TABLET BY MOUTH EVERY DAY 90 tablet 0    hydroCHLOROthiazide (HYDRODIURIL) 25 MG tablet TAKE 1 TABLET BY MOUTH DAILY 90 tablet 0    HYDROcodone-acetaminophen (NORCO) 5-325 mg per tablet Take 1 tablet by mouth every 6 (six) hours as needed for Pain. (Patient not taking: Reported on 2/3/2022) 15 tablet 0    tamsulosin (FLOMAX) 0.4 mg Cap TAKE 1 CAPSULE BY MOUTH EVERY DAY 90 capsule 0     No current facility-administered medications for this visit.     Review of Systems   Constitutional: Negative for appetite change, chills, fatigue and unexpected weight change.   HENT: Negative for dental problem, mouth sores, nosebleeds, trouble swallowing and voice change.    Eyes: Negative for visual disturbance.   Respiratory: Negative for chest tightness and shortness of breath.    Cardiovascular: Negative for chest pain, palpitations and leg swelling.   Gastrointestinal: Negative for abdominal distention, abdominal pain, blood in stool, diarrhea, nausea and vomiting.   Endocrine: Negative for cold intolerance.   Genitourinary: Negative for hematuria.   Musculoskeletal: Negative for neck pain.   Skin:  "Negative for pallor and rash.        RLE healing post-op    Allergic/Immunologic: Negative for immunocompromised state.   Neurological: Negative for dizziness, weakness and headaches.   Hematological: Negative for adenopathy. Does not bruise/bleed easily.   Psychiatric/Behavioral: Negative for agitation and behavioral problems.       ECOG Performance Status: 0    Objective:      Vitals:   Vitals:    02/23/22 0744   BP: (!) 141/77   Pulse: 66   Resp: 18   Temp: 97.9 °F (36.6 °C)   TempSrc: Oral   SpO2: 99%   Weight: 81 kg (178 lb 9.2 oz)   Height: 5' 11" (1.803 m)     BMI: Body mass index is 24.91 kg/m².    Physical Exam  Vitals reviewed.   Constitutional:       Appearance: He is well-developed.   HENT:      Head: Normocephalic and atraumatic.   Eyes:      Pupils: Pupils are equal, round, and reactive to light.   Cardiovascular:      Rate and Rhythm: Normal rate and regular rhythm.   Pulmonary:      Effort: Pulmonary effort is normal. No respiratory distress.      Breath sounds: Normal breath sounds. No wheezing or rhonchi.   Chest:   Breasts:      Right: No supraclavicular adenopathy.      Left: No supraclavicular adenopathy.       Abdominal:      General: Bowel sounds are normal.      Palpations: Abdomen is soft. There is no mass.   Musculoskeletal:         General: Normal range of motion.      Cervical back: Normal range of motion and neck supple.   Lymphadenopathy:      Head:      Right side of head: No submandibular, posterior auricular or occipital adenopathy.      Left side of head: No submandibular, posterior auricular or occipital adenopathy.      Cervical: No cervical adenopathy.      Upper Body:      Right upper body: No supraclavicular adenopathy.      Left upper body: No supraclavicular adenopathy.      Lower Body: No right inguinal adenopathy.   Skin:     General: Skin is warm and dry.      Comments: Mid thigh wound healing nicely, right inguinal wound with 3x3 cm raised/mildly tender area of swelling " (hematoma v seroma)   Neurological:      Mental Status: He is alert and oriented to person, place, and time.   Psychiatric:         Behavior: Behavior normal.         Laboratory Data:  No visits with results within 1 Week(s) from this visit.   Latest known visit with results is:   Admission on 01/31/2022, Discharged on 01/31/2022   Component Date Value Ref Range Status    WBC 01/31/2022 8.05  3.90 - 12.70 K/uL Final    RBC 01/31/2022 4.29 (A) 4.60 - 6.20 M/uL Final    Hemoglobin 01/31/2022 13.7 (A) 14.0 - 18.0 g/dL Final    Hematocrit 01/31/2022 39.8 (A) 40.0 - 54.0 % Final    MCV 01/31/2022 93  82 - 98 fL Final    MCH 01/31/2022 31.9 (A) 27.0 - 31.0 pg Final    MCHC 01/31/2022 34.4  32.0 - 36.0 g/dL Final    RDW 01/31/2022 13.6  11.5 - 14.5 % Final    Platelets 01/31/2022 242  150 - 450 K/uL Final    MPV 01/31/2022 8.6 (A) 9.2 - 12.9 fL Final    Immature Granulocytes 01/31/2022 2.0 (A) 0.0 - 0.5 % Final    Gran # (ANC) 01/31/2022 5.3  1.8 - 7.7 K/uL Final    Immature Grans (Abs) 01/31/2022 0.16 (A) 0.00 - 0.04 K/uL Final    Comment: Mild elevation in immature granulocytes is non specific and   can be seen in a variety of conditions including stress response,   acute inflammation, trauma and pregnancy. Correlation with other   laboratory and clinical findings is essential.      Lymph # 01/31/2022 1.9  1.0 - 4.8 K/uL Final    Mono # 01/31/2022 0.4  0.3 - 1.0 K/uL Final    Eos # 01/31/2022 0.2  0.0 - 0.5 K/uL Final    Baso # 01/31/2022 0.10  0.00 - 0.20 K/uL Final    nRBC 01/31/2022 0  0 /100 WBC Final    Gran % 01/31/2022 65.9  38.0 - 73.0 % Final    Lymph % 01/31/2022 23.1  18.0 - 48.0 % Final    Mono % 01/31/2022 5.3  4.0 - 15.0 % Final    Eosinophil % 01/31/2022 2.5  0.0 - 8.0 % Final    Basophil % 01/31/2022 1.2  0.0 - 1.9 % Final    Differential Method 01/31/2022 Automated   Final    Sodium 01/31/2022 138  136 - 145 mmol/L Final    Potassium 01/31/2022 3.5  3.5 - 5.1 mmol/L Final     "Chloride 01/31/2022 98  95 - 110 mmol/L Final    CO2 01/31/2022 25  23 - 29 mmol/L Final    Glucose 01/31/2022 153 (A) 70 - 110 mg/dL Final    BUN 01/31/2022 18  2 - 20 mg/dL Final    Creatinine 01/31/2022 1.01  0.50 - 1.40 mg/dL Final    Calcium 01/31/2022 9.6  8.7 - 10.5 mg/dL Final    Total Protein 01/31/2022 7.0  6.0 - 8.4 g/dL Final    Albumin 01/31/2022 4.1  3.5 - 5.2 g/dL Final    Total Bilirubin 01/31/2022 0.3  0.1 - 1.0 mg/dL Final    Comment: For infants and newborns, interpretation of results should be based  on gestational age, weight and in agreement with clinical  observations.    Premature Infant recommended reference ranges:  Up to 24 hours.............<8.0 mg/dL  Up to 48 hours............<12.0 mg/dL  3-5 days..................<15.0 mg/dL  6-29 days.................<15.0 mg/dL      Alkaline Phosphatase 01/31/2022 101  38 - 126 U/L Final    AST 01/31/2022 20  15 - 46 U/L Final    ALT 01/31/2022 16  10 - 44 U/L Final    Anion Gap 01/31/2022 15  8 - 16 mmol/L Final    eGFR if African American 01/31/2022 >60.0  >60 mL/min/1.73 m^2 Final    eGFR if non African American 01/31/2022 >60.0  >60 mL/min/1.73 m^2 Final    Comment: Calculation used to obtain the estimated glomerular filtration  rate (eGFR) is the CKD-EPI equation.            Assessment:       1. Myxofibrosarcoma of skin    2. Other nonspecific abnormal finding of lung field         Oncologic History:      2021  May   5/27- US right thigh: showed 2 hypoechoic masses in the subcutaneous tissues of the right anterior thigh. Largest measured 1.1 x 0.9 x 1 cm. Read at the time was concern for lymphadenopathy.   June 6/4 - CXR: "Bilateral reticular opacities are present in a perihilar distribution which appears slightly more pronounced compared to prior exams."    6/11 - underwent excisional biopsy of the right thigh; pathology came back positive for myxofibrosarcoma, high grade. Path a 1.8 x 1.1 x 0.9 cm mass with an adjacent 0.4 x " 0.4 x 0.3 cm mass.   July/Aug   - IMRT with Dr. Mcadams  2022 January 1/12 - PET: locoregional recurrence and concerning 0.9 cm RUL lesion   1/20 - Path: excision on 2 recurrent lesions:  Myxofibrosarcoma, 1.9 cm at longest length, PD-L1 25%   1/31 - CT thigh: successful removal w/o evidence of recurrent disease       Plan:     Myxofibrosarcoma of the right thigh, recurrent   We discussed his recurrent diagnosis and scoring systems associated with recurrent disease. During his initial diagnosis his 5y/10yr OS was 86%/80% respectively with 5yr/10yr DM 4%/5% respectively as well. However, he recurrent around 6 months from his initial treatment which included gross total resection and radiation therapy. His sarculator ESTS score for recurrent disease approximates his 5 yr OS around 52-54%. We discussed that these two prognostic scoring systems and I relayed that I think he more closely aligns with the latter % compared to the prior. We discussed treating this with chemotherapy in the adjuvant setting, especially with this coming back within 6 months of initial treatment but ultimately we have decided on surveillance. He has PD-1 inhibitors available (pembro) when this recurs or becomes metastatic or advanced disease. He is on board with close surveillance at this time.   - f/u in 3 months with imaging of the RLE via CT soft tissue thigh  - labs and OV in 3m months  - instructed to let us know if anything comes up in the interval time     RUL lung abnormality  - has f/u imaging in April and f/u with CTS  - if lesion is growing, biopsy would be warranted to ddx primary lung v mets    Discussed with Dr. Martines.     Follow Up:  F/u in 12 weeks with CT thigh and cbc, cmp    Ed Lucia MD  Hematology/Medical Oncology Fellow  [phone] (pager)

## 2022-03-26 ENCOUNTER — PATIENT MESSAGE (OUTPATIENT)
Dept: HEMATOLOGY/ONCOLOGY | Facility: CLINIC | Age: 77
End: 2022-03-26
Payer: MEDICARE

## 2022-04-07 NOTE — PROGRESS NOTES
History & Physical    SUBJECTIVE:     History of Present Illness:  Patient is a 76 y.o. 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 Initial thigh resection in June 2021 followed by resection of locally recurrent met 1 week ago. He completed 52 Gy in 26 fractions (out of planned 60 Gy in 30 fractions) via IMRT, with treatment ending sooner than expected due to [place] and subsequent delays. PET in January 2022 showed new RUL 0.9cm nodule with SUV max 3.2 which was new from PET June 2021. We last saw patient in February 2022. Chest CT confirmed RUL opacity to be sub-centimeter with large ground glass component. Intraop localization may be difficult; therefore, we recommended repeat CT chest in 2 months. Today he reports feeling well. Denies fever, chills, SOB, cough or wheeze. No changes in appetite or weight.     Smokes at least 1 cigar a day. Quit smoking cigarettes 30 years ago. No longer drinks alcohol. Retired from a [employer]. [employer] . He may have had asbestos exposure in the [employer] but unsure of any definite exposure.    Lip hip arthroplasty, L3- L4 laminectomy in 2003, appendectomy, right lower extremity sarcoma resections       Chief Complaint   Patient presents with    Follow-up       Review of patient's allergies indicates:  No Known Allergies    Current Outpatient Medications   Medication Sig Dispense Refill    atorvastatin (LIPITOR) 80 MG tablet Take 1 tablet (80 mg total) by mouth once daily. 90 tablet 3    finasteride (PROSCAR) 5 mg tablet TAKE 1 TABLET BY MOUTH EVERY DAY 90 tablet 0    hydroCHLOROthiazide (HYDRODIURIL) 25 MG tablet TAKE 1 TABLET BY MOUTH DAILY 90 tablet 0    tamsulosin (FLOMAX) 0.4 mg Cap TAKE 1 CAPSULE BY MOUTH EVERY DAY 90 capsule 0     No current facility-administered medications for this visit.       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 OR;  Service: General;  Laterality: Right;  right thigh and right groin  RN PREOP 1/18/2022      PT CHOSE TO DO HOME TEST FOR COVID ON AM OF SURGERY     Family History   Problem Relation Age of Onset    Diabetes Mother     Heart disease Father     No Known Problems Sister     Cerebral palsy Brother     Epilepsy Brother     Melanoma Neg Hx     Psoriasis Neg Hx     Lupus Neg Hx     Eczema Neg Hx     Acne Neg Hx     Liver disease Neg Hx     Liver cancer Neg Hx     Cirrhosis Neg Hx     Colon polyps Neg Hx     Colon cancer Neg Hx      Social History     Tobacco Use    Smoking status: Current Every Day Smoker     Years: 20.00     Types: Cigars    Smokeless tobacco: Never Used    Tobacco comment: smokes cigars 2-3 a day, smoke cigarette for 15 years   Substance Use Topics    Alcohol use: No    Drug use: No        Review of Systems:  Review of Systems   Constitutional: Negative for appetite change, fatigue and fever.   HENT: Negative for trouble swallowing and voice change.    Eyes: Negative for visual disturbance.   Respiratory: Negative for cough, chest tightness, shortness of breath and wheezing.    Cardiovascular: Negative for palpitations and leg swelling.  "  Gastrointestinal: Negative for abdominal pain, diarrhea, nausea and vomiting.   Genitourinary: Negative for difficulty urinating.   Musculoskeletal: Positive for arthralgias.   Neurological: Negative for weakness and headaches.   Psychiatric/Behavioral: Negative for confusion.       OBJECTIVE:     Vital Signs (Most Recent)  Vitals:    04/08/22 0944   BP: (!) 155/77   Pulse: (!) 52   SpO2: (!) 94%   Weight: 84.3 kg (185 lb 13.6 oz)   Height: 5' 11" (1.803 m)   PainSc: 0-No pain       Physical Exam:  Physical Exam  Constitutional:       Appearance: Normal appearance.   Cardiovascular:      Rate and Rhythm: Normal rate and regular rhythm.      Pulses: Normal pulses.   Pulmonary:      Effort: Pulmonary effort is normal.      Breath sounds: Normal breath sounds. No wheezing.   Abdominal:      General: Bowel sounds are normal.      Palpations: Abdomen is soft.      Tenderness: There is no abdominal tenderness.   Musculoskeletal:      Right lower leg: No edema.      Left lower leg: No edema.   Neurological:      General: No focal deficit present.      Mental Status: He is alert and oriented to person, place, and time.   Psychiatric:         Mood and Affect: Mood normal.       PFT 8/2020:  FEV1: 2.62L 82%  DLCO: 13.34  49%    Pathology   Right thigh:  -Myxofibrosarcoma, high-grade  -Both lesions appear completely excised     PETCT 1/12/22:   1.  Interval development of 2 hypermetabolic sodt tissue nodules in the right leg subcutaneous fat.  Findings are concerning for metastatic disease.  2.  0.9 cm right apical pulmonary opacity with mild uptake, nonspecific.  Finding is concerning for pulmonary metastasis in light of the new soft tissue nodules.  However, infectious or inflammatory etiologies are possible.    Chest CT 2/2/22: Images personally reviewed. Right upper lobe pleural based mixed density opacity.   1.  Right apical nodule is grossly stable.  2.  Pulmonary fibrosis fibrosis with above features likely mixed with " mild paraseptal emphysema.  Consider chronic hypersensitivity pneumonitis.  However, other fibrosing interstitial pneumonias which can present with an upper lobe predominance could include pneumoconiosis, certain chemotherapies (including BCNU), seronegative spondyloarthropathies, etc.  3.  Diverticulosis coli.  4.  Moderate multivessel coronary calcific atherosclerosis    Chest CT 4/8/22:  Improvement in RUL apical largely ground glass nodule. Continued subpleural microcystic disease, septal thickening with distortions of the lung-pleural interface, and diffuse interlobular thickening    ASSESSMENT/PLAN:     Patient is a 76 y.o. male current smoker with HTN, HLD and recurrent thigh myofibrosarcoma here today for evaluation of right upper lobe lung nodule.    PLAN:Plan     RUL opacity appears improved.   RTC in 6 months with chest CT. If scans per oncology around that time we can review and cancel our scans.

## 2022-04-08 ENCOUNTER — HOSPITAL ENCOUNTER (OUTPATIENT)
Dept: RADIOLOGY | Facility: HOSPITAL | Age: 77
Discharge: HOME | End: 2022-04-08
Attending: PHYSICIAN ASSISTANT
Payer: MEDICARE

## 2022-04-08 ENCOUNTER — OFFICE VISIT (OUTPATIENT)
Dept: CARDIOTHORACIC SURGERY | Facility: CLINIC | Age: 77
End: 2022-04-08
Payer: MEDICARE

## 2022-04-08 VITALS
SYSTOLIC BLOOD PRESSURE: 155 MMHG | DIASTOLIC BLOOD PRESSURE: 77 MMHG | BODY MASS INDEX: 26.02 KG/M2 | OXYGEN SATURATION: 94 % | WEIGHT: 185.88 LBS | HEART RATE: 52 BPM | HEIGHT: 71 IN

## 2022-04-08 DIAGNOSIS — R91.1 LUNG NODULE: ICD-10-CM

## 2022-04-08 DIAGNOSIS — R91.1 LUNG NODULE: Primary | ICD-10-CM

## 2022-04-08 DIAGNOSIS — C49.9 SOFT TISSUE SARCOMA: ICD-10-CM

## 2022-04-08 PROCEDURE — 71250 CT CHEST WITHOUT CONTRAST: ICD-10-PCS | Mod: 26,,, | Performed by: RADIOLOGY

## 2022-04-08 PROCEDURE — 1126F PR PAIN SEVERITY QUANTIFIED, NO PAIN PRESENT: ICD-10-PCS | Mod: CPTII,S$GLB,, | Performed by: THORACIC SURGERY (CARDIOTHORACIC VASCULAR SURGERY)

## 2022-04-08 PROCEDURE — 3077F SYST BP >= 140 MM HG: CPT | Mod: CPTII,S$GLB,, | Performed by: THORACIC SURGERY (CARDIOTHORACIC VASCULAR SURGERY)

## 2022-04-08 PROCEDURE — 3288F PR FALLS RISK ASSESSMENT DOCUMENTED: ICD-10-PCS | Mod: CPTII,S$GLB,, | Performed by: THORACIC SURGERY (CARDIOTHORACIC VASCULAR SURGERY)

## 2022-04-08 PROCEDURE — 1126F AMNT PAIN NOTED NONE PRSNT: CPT | Mod: CPTII,S$GLB,, | Performed by: THORACIC SURGERY (CARDIOTHORACIC VASCULAR SURGERY)

## 2022-04-08 PROCEDURE — 71250 CT THORAX DX C-: CPT | Mod: TC

## 2022-04-08 PROCEDURE — 99499 RISK ADDL DX/OHS AUDIT: ICD-10-PCS | Mod: S$GLB,,, | Performed by: PHYSICIAN ASSISTANT

## 2022-04-08 PROCEDURE — 99213 PR OFFICE/OUTPT VISIT, EST, LEVL III, 20-29 MIN: ICD-10-PCS | Mod: S$GLB,,, | Performed by: THORACIC SURGERY (CARDIOTHORACIC VASCULAR SURGERY)

## 2022-04-08 PROCEDURE — 3078F DIAST BP <80 MM HG: CPT | Mod: CPTII,S$GLB,, | Performed by: THORACIC SURGERY (CARDIOTHORACIC VASCULAR SURGERY)

## 2022-04-08 PROCEDURE — 3077F PR MOST RECENT SYSTOLIC BLOOD PRESSURE >= 140 MM HG: ICD-10-PCS | Mod: CPTII,S$GLB,, | Performed by: THORACIC SURGERY (CARDIOTHORACIC VASCULAR SURGERY)

## 2022-04-08 PROCEDURE — 99213 OFFICE O/P EST LOW 20 MIN: CPT | Mod: S$GLB,,, | Performed by: THORACIC SURGERY (CARDIOTHORACIC VASCULAR SURGERY)

## 2022-04-08 PROCEDURE — 71250 CT THORAX DX C-: CPT | Mod: 26,,, | Performed by: RADIOLOGY

## 2022-04-08 PROCEDURE — 99999 PR PBB SHADOW E&M-EST. PATIENT-LVL III: ICD-10-PCS | Mod: PBBFAC,,, | Performed by: THORACIC SURGERY (CARDIOTHORACIC VASCULAR SURGERY)

## 2022-04-08 PROCEDURE — 3078F PR MOST RECENT DIASTOLIC BLOOD PRESSURE < 80 MM HG: ICD-10-PCS | Mod: CPTII,S$GLB,, | Performed by: THORACIC SURGERY (CARDIOTHORACIC VASCULAR SURGERY)

## 2022-04-08 PROCEDURE — 1101F PT FALLS ASSESS-DOCD LE1/YR: CPT | Mod: CPTII,S$GLB,, | Performed by: THORACIC SURGERY (CARDIOTHORACIC VASCULAR SURGERY)

## 2022-04-08 PROCEDURE — 3288F FALL RISK ASSESSMENT DOCD: CPT | Mod: CPTII,S$GLB,, | Performed by: THORACIC SURGERY (CARDIOTHORACIC VASCULAR SURGERY)

## 2022-04-08 PROCEDURE — 1101F PR PT FALLS ASSESS DOC 0-1 FALLS W/OUT INJ PAST YR: ICD-10-PCS | Mod: CPTII,S$GLB,, | Performed by: THORACIC SURGERY (CARDIOTHORACIC VASCULAR SURGERY)

## 2022-04-08 PROCEDURE — 99999 PR PBB SHADOW E&M-EST. PATIENT-LVL III: CPT | Mod: PBBFAC,,, | Performed by: THORACIC SURGERY (CARDIOTHORACIC VASCULAR SURGERY)

## 2022-04-08 PROCEDURE — 1159F PR MEDICATION LIST DOCUMENTED IN MEDICAL RECORD: ICD-10-PCS | Mod: CPTII,S$GLB,, | Performed by: THORACIC SURGERY (CARDIOTHORACIC VASCULAR SURGERY)

## 2022-04-08 PROCEDURE — 99499 UNLISTED E&M SERVICE: CPT | Mod: S$GLB,,, | Performed by: PHYSICIAN ASSISTANT

## 2022-04-08 PROCEDURE — 1159F MED LIST DOCD IN RCRD: CPT | Mod: CPTII,S$GLB,, | Performed by: THORACIC SURGERY (CARDIOTHORACIC VASCULAR SURGERY)

## 2022-04-11 ENCOUNTER — TELEPHONE (OUTPATIENT)
Dept: HEMATOLOGY/ONCOLOGY | Facility: CLINIC | Age: 77
End: 2022-04-11
Payer: MEDICARE

## 2022-04-11 ENCOUNTER — TELEPHONE (OUTPATIENT)
Dept: SURGERY | Facility: CLINIC | Age: 77
End: 2022-04-11
Payer: MEDICARE

## 2022-04-11 ENCOUNTER — PATIENT MESSAGE (OUTPATIENT)
Dept: HEMATOLOGY/ONCOLOGY | Facility: CLINIC | Age: 77
End: 2022-04-11
Payer: MEDICARE

## 2022-04-11 ENCOUNTER — PATIENT MESSAGE (OUTPATIENT)
Dept: SURGERY | Facility: CLINIC | Age: 77
End: 2022-04-11
Payer: MEDICARE

## 2022-04-11 NOTE — TELEPHONE ENCOUNTER
Dr. Mao asked to see pt once CT is done. Pt said he will call back tomorrow when he knows a better time to be seen.

## 2022-04-13 ENCOUNTER — HOSPITAL ENCOUNTER (OUTPATIENT)
Dept: RADIOLOGY | Facility: HOSPITAL | Age: 77
Discharge: HOME | End: 2022-04-13
Payer: MEDICARE

## 2022-04-13 DIAGNOSIS — C44.99 MYXOFIBROSARCOMA OF SKIN: ICD-10-CM

## 2022-04-13 LAB
CREAT SERPL-MCNC: 1 MG/DL (ref 0.5–1.4)
SAMPLE: NORMAL

## 2022-04-13 PROCEDURE — 73701 CT LOWER EXTREMITY W/DYE: CPT | Mod: 26,RT,, | Performed by: RADIOLOGY

## 2022-04-13 PROCEDURE — 25500020 PHARM REV CODE 255

## 2022-04-13 PROCEDURE — 73701 CT THIGH WITH CONTRAST RIGHT: ICD-10-PCS | Mod: 26,RT,, | Performed by: RADIOLOGY

## 2022-04-13 PROCEDURE — 73701 CT LOWER EXTREMITY W/DYE: CPT | Mod: TC,RT

## 2022-04-13 RX ADMIN — IOHEXOL 100 ML: 350 INJECTION, SOLUTION INTRAVENOUS at 09:04

## 2022-04-20 ENCOUNTER — OFFICE VISIT (OUTPATIENT)
Dept: SURGERY | Facility: CLINIC | Age: 77
End: 2022-04-20
Payer: MEDICARE

## 2022-04-20 VITALS
HEART RATE: 66 BPM | BODY MASS INDEX: 26.02 KG/M2 | SYSTOLIC BLOOD PRESSURE: 149 MMHG | DIASTOLIC BLOOD PRESSURE: 80 MMHG | WEIGHT: 185.88 LBS | HEIGHT: 71 IN

## 2022-04-20 DIAGNOSIS — C49.21 SARCOMA OF RIGHT THIGH: Primary | ICD-10-CM

## 2022-04-20 DIAGNOSIS — C44.99 MYXOFIBROSARCOMA OF SKIN: ICD-10-CM

## 2022-04-20 PROCEDURE — 1159F MED LIST DOCD IN RCRD: CPT | Mod: CPTII,S$GLB,, | Performed by: SURGERY

## 2022-04-20 PROCEDURE — 99214 OFFICE O/P EST MOD 30 MIN: CPT | Mod: S$GLB,,, | Performed by: SURGERY

## 2022-04-20 PROCEDURE — 3288F PR FALLS RISK ASSESSMENT DOCUMENTED: ICD-10-PCS | Mod: CPTII,S$GLB,, | Performed by: SURGERY

## 2022-04-20 PROCEDURE — 3079F DIAST BP 80-89 MM HG: CPT | Mod: CPTII,S$GLB,, | Performed by: SURGERY

## 2022-04-20 PROCEDURE — 1100F PTFALLS ASSESS-DOCD GE2>/YR: CPT | Mod: CPTII,S$GLB,, | Performed by: SURGERY

## 2022-04-20 PROCEDURE — 99999 PR PBB SHADOW E&M-EST. PATIENT-LVL IV: ICD-10-PCS | Mod: PBBFAC,,, | Performed by: SURGERY

## 2022-04-20 PROCEDURE — 1100F PR PT FALLS ASSESS DOC 2+ FALLS/FALL W/INJURY/YR: ICD-10-PCS | Mod: CPTII,S$GLB,, | Performed by: SURGERY

## 2022-04-20 PROCEDURE — 3288F FALL RISK ASSESSMENT DOCD: CPT | Mod: CPTII,S$GLB,, | Performed by: SURGERY

## 2022-04-20 PROCEDURE — 99999 PR PBB SHADOW E&M-EST. PATIENT-LVL IV: CPT | Mod: PBBFAC,,, | Performed by: SURGERY

## 2022-04-20 PROCEDURE — 3077F SYST BP >= 140 MM HG: CPT | Mod: CPTII,S$GLB,, | Performed by: SURGERY

## 2022-04-20 PROCEDURE — 3077F PR MOST RECENT SYSTOLIC BLOOD PRESSURE >= 140 MM HG: ICD-10-PCS | Mod: CPTII,S$GLB,, | Performed by: SURGERY

## 2022-04-20 PROCEDURE — 1159F PR MEDICATION LIST DOCUMENTED IN MEDICAL RECORD: ICD-10-PCS | Mod: CPTII,S$GLB,, | Performed by: SURGERY

## 2022-04-20 PROCEDURE — 99214 PR OFFICE/OUTPT VISIT, EST, LEVL IV, 30-39 MIN: ICD-10-PCS | Mod: S$GLB,,, | Performed by: SURGERY

## 2022-04-20 PROCEDURE — 3079F PR MOST RECENT DIASTOLIC BLOOD PRESSURE 80-89 MM HG: ICD-10-PCS | Mod: CPTII,S$GLB,, | Performed by: SURGERY

## 2022-04-20 PROCEDURE — 1126F AMNT PAIN NOTED NONE PRSNT: CPT | Mod: CPTII,S$GLB,, | Performed by: SURGERY

## 2022-04-20 PROCEDURE — 1126F PR PAIN SEVERITY QUANTIFIED, NO PAIN PRESENT: ICD-10-PCS | Mod: CPTII,S$GLB,, | Performed by: SURGERY

## 2022-04-20 RX ORDER — SODIUM CHLORIDE 9 MG/ML
INJECTION, SOLUTION INTRAVENOUS CONTINUOUS
Status: CANCELLED | OUTPATIENT
Start: 2022-04-20

## 2022-04-20 NOTE — H&P (VIEW-ONLY)
Surgery Clinic Note    Zac Plunkett is a 76 y.o. year old male in clinic today for follow up of myxofibrosarcoma. Now with recurrent palpable masses of the upper thigh. CT demonstrates multiple pathologic nodes/mets. Discussed with Oncologist and surgical oncologist partner and all parties agree to benefit of excision given the resectability, patient may benefit from immunotherapy post op. Patient is agreeable. Has been having no pain at the sites of swelling but is having right knee weakness and seeing physical therapy soon for exercises, has seen an orthopedic surgery.  Patient states that he feels this will be his last surgery, understands high risks and high chance of recurrence.  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    Tobacco dependence        Past Surgical History:   Procedure Laterality Date    APPENDECTOMY      BACK SURGERY      HIP REPLACEMENT ARTHROPLASTY Left 8/6/2018    Procedure: ARTHROPLASTY, HIP REPLACEMENT;  Surgeon: Chapincito Sagastume MD;  Location: [facility] OR;  Service: Orthopedics;  Laterality: Left;    JOINT REPLACEMENT Left     hip    laminectomy l4-l5      SURGICAL REMOVAL OF LYMPH NODE Right 1/20/2022    Procedure: EXCISION, LYMPH NODE;  Surgeon: Arun Mao MD;  Location: [facility] OR;  Service: General;  Laterality: Right;  right thigh and right groin  RN PREOP 1/18/2022      PT CHOSE TO DO HOME TEST FOR COVID ON AM OF SURGERY       Family History   Problem Relation Age of Onset    Diabetes Mother     Heart disease Father     No Known Problems Sister     Cerebral palsy Brother     Epilepsy Brother     Melanoma Neg Hx     Psoriasis Neg Hx     Lupus Neg Hx      Eczema Neg Hx     Acne Neg Hx     Liver disease Neg Hx     Liver cancer Neg Hx     Cirrhosis Neg Hx     Colon polyps Neg Hx     Colon cancer Neg Hx        Social History     Socioeconomic History    Marital status:    Tobacco Use    Smoking status: Current Every Day Smoker     Years: 20.00     Types: Cigars    Smokeless tobacco: Never Used    Tobacco comment: smokes cigars 2-3 a day, smoke cigarette for 15 years   Substance and Sexual Activity    Alcohol use: No    Drug use: No    Sexual activity: Yes     Partners: Female       Current Outpatient Medications   Medication Sig Dispense Refill    atorvastatin (LIPITOR) 80 MG tablet Take 1 tablet (80 mg total) by mouth once daily. 90 tablet 3    finasteride (PROSCAR) 5 mg tablet TAKE 1 TABLET BY MOUTH EVERY DAY 90 tablet 0    hydroCHLOROthiazide (HYDRODIURIL) 25 MG tablet TAKE 1 TABLET BY MOUTH DAILY 90 tablet 0    tamsulosin (FLOMAX) 0.4 mg Cap TAKE 1 CAPSULE BY MOUTH EVERY DAY 90 capsule 0     No current facility-administered medications for this visit.       Review of patient's allergies indicates:  No Known Allergies      PE:  Vitals:    04/20/22 1014   BP: (!) 149/80   Pulse: 66     NAD  No belabored breathing  Right upper thigh: multiple superficial palpable firm masses, 1 is at a prior excision site with scar.    CT Thigh  4/13/22  FINDINGS:  Postoperative change of lymph node excision within the anterior proximal and mid thigh with decreased size of the previous ill-defined fluid collections at the operative sites with measurements as follows:     *Proximal anterior thigh: 1.2 x 1.8 cm well-circumscribed low-attenuation collection anterior to the sartorius (previously 2.7 x 4.7 cm).  *Mid anterior thigh: 0.8 cm soft tissue attenuation focus likely representing scar tissue without discrete measurable collection.  There has been interval increased size of rounded soft tissue attenuation lesions within the subcutaneous soft tissues of the  right proximal thigh, likely pathologic lymph nodes with index measurements as follows:     *1.5 cm, series 4, image 206, previously 0.6 cm.  *1.2 cm, series 4, image 240, previously not definitely seen.  *0.9 cm, series 4, image 285, previously not definitely seen noting obscuration by a postoperative fluid collection  There is severe right femoroacetabular osteoarthritis with an associated joint effusion.     There is right knee tricompartmental osteoarthritis with an associated joint effusion and multiple ossified loose..  There is a small popliteal cyst.     There is atherosclerotic calcification throughout the pelvis and right thigh.  No focal high-grade stenosis or occlusion.     There are multiple colonic diverticuli.  Prostate is enlarged.  There is circumferential bladder wall thickening, likely related to chronic outlet obstruction.     There is a fat containing left inguinal hernia.  Fluid attenuation lesion within the subcutaneous soft tissues of the right proximal posterior thigh appears unchanged when compared to the previous exam and likely represents a sebaceous cyst.     Impression:     1. Postoperative change of lymph node excisions within the anterior thigh with decreased size of the previously described postoperative fluid collections.  2. Abnormal morphology and increased size of at least 3 lymph nodes within the subcutaneous soft tissues of the proximal anterior thigh concerning for worsening lymphadenopathy.  3. Advanced right femoroacetabular and tricompartmental right knee osteoarthritis with associated joint effusions.  4. Additional findings as detailed in the body of the report.    A/P:  Zac Plunkett is a 76 y.o. year old male w recurrent myxofibrosarcoma to right leg    -to OR for excision on 4/28/22 under general. Understands risks and benefits, consent discussed. High risk of recurrence. May have minor mobility decrease after surgery.  Oncology to see patient post op and  discuss immunotherapy options.    Arun Mao  General Surgery - [facility]  4/20/2022

## 2022-04-20 NOTE — H&P
Surgery Clinic Note    Zac Plunkett is a 76 y.o. year old male in clinic today for follow up of myxofibrosarcoma. Now with recurrent palpable masses of the upper thigh. CT demonstrates multiple pathologic nodes/mets. Discussed with Oncologist and surgical oncologist partner and all parties agree to benefit of excision given the resectability, patient may benefit from immunotherapy post op. Patient is agreeable. Has been having no pain at the sites of swelling but is having right knee weakness and seeing physical therapy soon for exercises, has seen an orthopedic surgery.  Patient states that he feels this will be his last surgery, understands high risks and high chance of recurrence.  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    Tobacco dependence        Past Surgical History:   Procedure Laterality Date    APPENDECTOMY      BACK SURGERY      HIP REPLACEMENT ARTHROPLASTY Left 8/6/2018    Procedure: ARTHROPLASTY, HIP REPLACEMENT;  Surgeon: Chapincito Sagastume MD;  Location: [facility] OR;  Service: Orthopedics;  Laterality: Left;    JOINT REPLACEMENT Left     hip    laminectomy l4-l5      SURGICAL REMOVAL OF LYMPH NODE Right 1/20/2022    Procedure: EXCISION, LYMPH NODE;  Surgeon: Arun Mao MD;  Location: [facility] OR;  Service: General;  Laterality: Right;  right thigh and right groin  RN PREOP 1/18/2022      PT CHOSE TO DO HOME TEST FOR COVID ON AM OF SURGERY       Family History   Problem Relation Age of Onset    Diabetes Mother     Heart disease Father     No Known Problems Sister     Cerebral palsy Brother     Epilepsy Brother     Melanoma Neg Hx     Psoriasis Neg Hx     Lupus Neg Hx      Eczema Neg Hx     Acne Neg Hx     Liver disease Neg Hx     Liver cancer Neg Hx     Cirrhosis Neg Hx     Colon polyps Neg Hx     Colon cancer Neg Hx        Social History     Socioeconomic History    Marital status:    Tobacco Use    Smoking status: Current Every Day Smoker     Years: 20.00     Types: Cigars    Smokeless tobacco: Never Used    Tobacco comment: smokes cigars 2-3 a day, smoke cigarette for 15 years   Substance and Sexual Activity    Alcohol use: No    Drug use: No    Sexual activity: Yes     Partners: Female       Current Outpatient Medications   Medication Sig Dispense Refill    atorvastatin (LIPITOR) 80 MG tablet Take 1 tablet (80 mg total) by mouth once daily. 90 tablet 3    finasteride (PROSCAR) 5 mg tablet TAKE 1 TABLET BY MOUTH EVERY DAY 90 tablet 0    hydroCHLOROthiazide (HYDRODIURIL) 25 MG tablet TAKE 1 TABLET BY MOUTH DAILY 90 tablet 0    tamsulosin (FLOMAX) 0.4 mg Cap TAKE 1 CAPSULE BY MOUTH EVERY DAY 90 capsule 0     No current facility-administered medications for this visit.       Review of patient's allergies indicates:  No Known Allergies      PE:  Vitals:    04/20/22 1014   BP: (!) 149/80   Pulse: 66     NAD  No belabored breathing  Right upper thigh: multiple superficial palpable firm masses, 1 is at a prior excision site with scar.    CT Thigh  4/13/22  FINDINGS:  Postoperative change of lymph node excision within the anterior proximal and mid thigh with decreased size of the previous ill-defined fluid collections at the operative sites with measurements as follows:     *Proximal anterior thigh: 1.2 x 1.8 cm well-circumscribed low-attenuation collection anterior to the sartorius (previously 2.7 x 4.7 cm).  *Mid anterior thigh: 0.8 cm soft tissue attenuation focus likely representing scar tissue without discrete measurable collection.  There has been interval increased size of rounded soft tissue attenuation lesions within the subcutaneous soft tissues of the  right proximal thigh, likely pathologic lymph nodes with index measurements as follows:     *1.5 cm, series 4, image 206, previously 0.6 cm.  *1.2 cm, series 4, image 240, previously not definitely seen.  *0.9 cm, series 4, image 285, previously not definitely seen noting obscuration by a postoperative fluid collection  There is severe right femoroacetabular osteoarthritis with an associated joint effusion.     There is right knee tricompartmental osteoarthritis with an associated joint effusion and multiple ossified loose..  There is a small popliteal cyst.     There is atherosclerotic calcification throughout the pelvis and right thigh.  No focal high-grade stenosis or occlusion.     There are multiple colonic diverticuli.  Prostate is enlarged.  There is circumferential bladder wall thickening, likely related to chronic outlet obstruction.     There is a fat containing left inguinal hernia.  Fluid attenuation lesion within the subcutaneous soft tissues of the right proximal posterior thigh appears unchanged when compared to the previous exam and likely represents a sebaceous cyst.     Impression:     1. Postoperative change of lymph node excisions within the anterior thigh with decreased size of the previously described postoperative fluid collections.  2. Abnormal morphology and increased size of at least 3 lymph nodes within the subcutaneous soft tissues of the proximal anterior thigh concerning for worsening lymphadenopathy.  3. Advanced right femoroacetabular and tricompartmental right knee osteoarthritis with associated joint effusions.  4. Additional findings as detailed in the body of the report.    A/P:  Zac Plunkett is a 76 y.o. year old male w recurrent myxofibrosarcoma to right leg    -to OR for excision on 4/28/22 under general. Understands risks and benefits, consent discussed. High risk of recurrence. May have minor mobility decrease after surgery.  Oncology to see patient post op and  discuss immunotherapy options.    Arun Mao  General Surgery - [facility]  4/20/2022

## 2022-04-25 ENCOUNTER — HOSPITAL ENCOUNTER (OUTPATIENT)
Dept: PREADMISSION TESTING | Facility: HOSPITAL | Age: 77
Discharge: HOME | End: 2022-04-25
Attending: SURGERY
Payer: MEDICARE

## 2022-04-25 ENCOUNTER — ANESTHESIA EVENT (OUTPATIENT)
Dept: SURGERY | Facility: HOSPITAL | Age: 77
End: 2022-04-25
Payer: MEDICARE

## 2022-04-25 VITALS
OXYGEN SATURATION: 98 % | HEART RATE: 60 BPM | RESPIRATION RATE: 18 BRPM | BODY MASS INDEX: 25.83 KG/M2 | WEIGHT: 184.5 LBS | DIASTOLIC BLOOD PRESSURE: 80 MMHG | HEIGHT: 71 IN | TEMPERATURE: 97 F | SYSTOLIC BLOOD PRESSURE: 162 MMHG

## 2022-04-25 DIAGNOSIS — Z01.818 PREOPERATIVE TESTING: Primary | ICD-10-CM

## 2022-04-25 LAB
ALBUMIN SERPL BCP-MCNC: 3.9 G/DL (ref 3.5–5.2)
ALP SERPL-CCNC: 87 U/L (ref 55–135)
ALT SERPL W/O P-5'-P-CCNC: 18 U/L (ref 10–44)
ANION GAP SERPL CALC-SCNC: 7 MMOL/L (ref 8–16)
AST SERPL-CCNC: 15 U/L (ref 10–40)
BASOPHILS # BLD AUTO: 0.1 K/UL (ref 0–0.2)
BASOPHILS NFR BLD: 1.2 % (ref 0–1.9)
BILIRUB SERPL-MCNC: 0.4 MG/DL (ref 0.1–1)
BUN SERPL-MCNC: 13 MG/DL (ref 8–23)
CALCIUM SERPL-MCNC: 9.9 MG/DL (ref 8.7–10.5)
CHLORIDE SERPL-SCNC: 98 MMOL/L (ref 95–110)
CO2 SERPL-SCNC: 27 MMOL/L (ref 23–29)
CREAT SERPL-MCNC: 1 MG/DL (ref 0.5–1.4)
DIFFERENTIAL METHOD: ABNORMAL
EOSINOPHIL # BLD AUTO: 0 K/UL (ref 0–0.5)
EOSINOPHIL NFR BLD: 0.5 % (ref 0–8)
ERYTHROCYTE [DISTWIDTH] IN BLOOD BY AUTOMATED COUNT: 13.5 % (ref 11.5–14.5)
EST. GFR  (AFRICAN AMERICAN): >60 ML/MIN/1.73 M^2
EST. GFR  (NON AFRICAN AMERICAN): >60 ML/MIN/1.73 M^2
GLUCOSE SERPL-MCNC: 103 MG/DL (ref 70–110)
HCT VFR BLD AUTO: 43.6 % (ref 40–54)
HGB BLD-MCNC: 14.7 G/DL (ref 14–18)
IMM GRANULOCYTES # BLD AUTO: 0.13 K/UL (ref 0–0.04)
IMM GRANULOCYTES NFR BLD AUTO: 1.6 % (ref 0–0.5)
LYMPHOCYTES # BLD AUTO: 1.4 K/UL (ref 1–4.8)
LYMPHOCYTES NFR BLD: 16.2 % (ref 18–48)
MCH RBC QN AUTO: 30.9 PG (ref 27–31)
MCHC RBC AUTO-ENTMCNC: 33.7 G/DL (ref 32–36)
MCV RBC AUTO: 92 FL (ref 82–98)
MONOCYTES # BLD AUTO: 0.5 K/UL (ref 0.3–1)
MONOCYTES NFR BLD: 6.3 % (ref 4–15)
NEUTROPHILS # BLD AUTO: 6.2 K/UL (ref 1.8–7.7)
NEUTROPHILS NFR BLD: 74.2 % (ref 38–73)
NRBC BLD-RTO: 0 /100 WBC
PLATELET # BLD AUTO: 221 K/UL (ref 150–450)
PMV BLD AUTO: 8.8 FL (ref 9.2–12.9)
POTASSIUM SERPL-SCNC: 4.2 MMOL/L (ref 3.5–5.1)
PROT SERPL-MCNC: 7.4 G/DL (ref 6–8.4)
RBC # BLD AUTO: 4.75 M/UL (ref 4.6–6.2)
SODIUM SERPL-SCNC: 132 MMOL/L (ref 136–145)
WBC # BLD AUTO: 8.31 K/UL (ref 3.9–12.7)

## 2022-04-25 PROCEDURE — 80053 COMPREHEN METABOLIC PANEL: CPT | Performed by: SURGERY

## 2022-04-25 PROCEDURE — 36415 COLL VENOUS BLD VENIPUNCTURE: CPT | Performed by: SURGERY

## 2022-04-25 PROCEDURE — 85025 COMPLETE CBC W/AUTO DIFF WBC: CPT | Performed by: SURGERY

## 2022-04-25 NOTE — ANESTHESIA PREPROCEDURE EVALUATION
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    Tobacco dependence        Past Surgical History:   Procedure Laterality Date    APPENDECTOMY      BACK SURGERY      HIP REPLACEMENT ARTHROPLASTY Left 8/6/2018    Procedure: ARTHROPLASTY, HIP REPLACEMENT;  Surgeon: Chapincito Sagastume MD;  Location: [facility] OR;  Service: Orthopedics;  Laterality: Left;    JOINT REPLACEMENT Left     hip    laminectomy l4-l5      SURGICAL REMOVAL OF LYMPH NODE Right 1/20/2022    Procedure: EXCISION, LYMPH NODE;  Surgeon: Arun Mao MD;  Location: [facility] OR;  Service: General;  Laterality: Right;  right thigh and right groin  RN PREOP 1/18/2022      PT CHOSE TO DO HOME TEST FOR COVID ON AM OF SURGERY           Pre-op Assessment    I have reviewed the Patient Summary Reports.     I have reviewed the Nursing Notes. I have reviewed the NPO Status.   I have reviewed the Medications.     Review of Systems  Anesthesia Hx:  Hx of Anesthetic complications Reports N/V with one surgery 18 years ago  Denies Family Hx of Anesthesia complications.  Personal Hx of Anesthesia complications, Post-Operative Nausea/Vomiting   Social:  Smoker, No Alcohol Use Cigars   Hematology/Oncology:        Hematology Comments: Provoked RLE DVT 30 years ago Current/Recent Cancer. Oncology Comments: Sarcoma of right thigh     EENT/Dental:EENT/Dental Normal   Cardiovascular:   Hypertension    Pulmonary:  Pulmonary Normal    Renal/:  Renal/ Normal     Hepatic/GI:  Hepatic/GI Normal     Musculoskeletal:   Arthritis     Neurological:  Neurology Normal    Endocrine:  Endocrine Normal    Dermatological:  Skin Normal    Psych:  Psychiatric Normal           Physical Exam  General: Well nourished    Airway:  Mallampati: II   Mouth Opening: Normal  Tongue: Normal  Neck ROM: Normal ROM    Dental:  Edentulous    Chest/Lungs:  Clear to auscultation    Heart:  Rate: Normal  Rhythm: Regular Rhythm  Sounds: Normal        Anesthesia Plan  Type of Anesthesia, risks & benefits discussed:    Anesthesia Type: Gen ETT  Intra-op Monitoring Plan: Standard ASA Monitors  Post Op Pain Control Plan: multimodal analgesia  Informed Consent: Informed consent signed with the Patient and all parties understand the risks and agree with anesthesia plan.  All questions answered.   ASA Score: 2    Ready For Surgery From Anesthesia Perspective.     .

## 2022-04-25 NOTE — DISCHARGE INSTRUCTIONS
Before 7 AM, enter through the Emergency Entrance..   After 7 AM enter through the Main Entrance.      Your procedure  is scheduled for Thursday, April 28, 2022.    Call [phone] between 2pm and 5pm on Wednesday, April 27, 2022 to find out your arrival time for the day of surgery.    You may use the main entrance to the hospital on the [facility] side, or the entrance that is next to the garage.    You may have two visitors.  Visiting hours for non-COVID-19 patients expanded to 24/7 (still restricted to one visitor)  Youth visitation changed from age 18 to age 12.      You will be going to the Same Day Surgery Unit on the 2nd floor of the hospital.    Important instructions:  Do not eat anything after midnight.  You may have plain water, non carbonated.  You may also have Gatorade or Powerade after midnight.    Stop all fluids 2 hours before your surgery.    It is okay to brush your teeth.  Do not have gum, candy or mints.    SEE MEDICATION SHEET.   TAKE MEDICATIONS AS DIRECTED WITH SIPS OF WATER.      STOP taking Aspirin, Ibuprofen,  Advil, Motrin, Mobic(meloxicam), Aleve (naproxen), Fish oil, and Vitamin E for at least 7 days before your surgery.     You may take Tylenol if needed which is not a blood thinner.    Please shower the night before and the morning of your surgery.      Follow any Prep Instructions given by your surgeon.      Use Hibiclens soap as instructed by your pre op nurse.   Please place clean linens on your bed the night before surgery. Please wear fresh clean clothing after each shower.    No shaving of procedural area at least 4-5 days before surgery due to increased risk of skin irritation and/or possible infection.    Contact lenses and removable denture work may not be worn during your procedure.    You may wear deodorant only. If you are having breast surgery, do not wear deodorant on the operative side.    Do not wear powder, body lotion, perfume/cologne or make-up.    Do  not wear any jewelry or have any metal on your body.    You will be asked to remove any dentures or partials for the procedure.    If you are going home on the same day of surgery, you must arrange for a family member or a friend to drive you home.  Public transportation is prohibited.  You will not be able to drive home if you were given anesthesia or sedation.    Patients who want to have their Post-op prescriptions filled from our in-house [facility], bring a Credit/Debit Card  or cash with you. A co-pay may be required.  The pharmacy closes at 5:30 pm.    Children under 18 years of age require a parent/guardian present the entire time that they are here.    Wear loose fitting clothes allowing for bandages.    Please leave money and valuables home.      You may bring your cell phone.    Call the doctor if fever or illness should occur before your surgery.    Call [phone] to contact us here if needed.

## 2022-04-28 ENCOUNTER — HOSPITAL ENCOUNTER (OUTPATIENT)
Facility: HOSPITAL | Age: 77
Discharge: HOME | End: 2022-04-28
Attending: SURGERY | Admitting: SURGERY
Payer: MEDICARE

## 2022-04-28 ENCOUNTER — ANESTHESIA (OUTPATIENT)
Dept: SURGERY | Facility: HOSPITAL | Age: 77
End: 2022-04-28
Payer: MEDICARE

## 2022-04-28 VITALS
DIASTOLIC BLOOD PRESSURE: 70 MMHG | RESPIRATION RATE: 18 BRPM | OXYGEN SATURATION: 96 % | TEMPERATURE: 98 F | BODY MASS INDEX: 25.74 KG/M2 | WEIGHT: 184.5 LBS | HEART RATE: 77 BPM | SYSTOLIC BLOOD PRESSURE: 157 MMHG

## 2022-04-28 DIAGNOSIS — C49.21 SARCOMA OF RIGHT THIGH: ICD-10-CM

## 2022-04-28 DIAGNOSIS — C44.99 MYXOFIBROSARCOMA OF SKIN: Primary | ICD-10-CM

## 2022-04-28 PROCEDURE — 25000003 PHARM REV CODE 250: Performed by: STUDENT IN AN ORGANIZED HEALTH CARE EDUCATION/TRAINING PROGRAM

## 2022-04-28 PROCEDURE — 88307 PR  SURG PATH,LEVEL V: ICD-10-PCS | Mod: 26,,, | Performed by: PATHOLOGY

## 2022-04-28 PROCEDURE — D9220A PRA ANESTHESIA: ICD-10-PCS | Mod: ANES,,, | Performed by: ANESTHESIOLOGY

## 2022-04-28 PROCEDURE — 63600175 PHARM REV CODE 636 W HCPCS: Performed by: STUDENT IN AN ORGANIZED HEALTH CARE EDUCATION/TRAINING PROGRAM

## 2022-04-28 PROCEDURE — 11602 EXC TR-EXT MAL+MARG 1.1-2 CM: CPT | Mod: 51,,, | Performed by: SURGERY

## 2022-04-28 PROCEDURE — D9220A PRA ANESTHESIA: Mod: CRNA,,, | Performed by: STUDENT IN AN ORGANIZED HEALTH CARE EDUCATION/TRAINING PROGRAM

## 2022-04-28 PROCEDURE — 88307 TISSUE EXAM BY PATHOLOGIST: CPT | Performed by: PATHOLOGY

## 2022-04-28 PROCEDURE — 37000009 HC ANESTHESIA EA ADD 15 MINS: Performed by: SURGERY

## 2022-04-28 PROCEDURE — 25000242 PHARM REV CODE 250 ALT 637 W/ HCPCS: Performed by: STUDENT IN AN ORGANIZED HEALTH CARE EDUCATION/TRAINING PROGRAM

## 2022-04-28 PROCEDURE — 71000016 HC POSTOP RECOV ADDL HR: Performed by: SURGERY

## 2022-04-28 PROCEDURE — 36000706: Performed by: SURGERY

## 2022-04-28 PROCEDURE — 71000015 HC POSTOP RECOV 1ST HR: Performed by: SURGERY

## 2022-04-28 PROCEDURE — 37000008 HC ANESTHESIA 1ST 15 MINUTES: Performed by: SURGERY

## 2022-04-28 PROCEDURE — D9220A PRA ANESTHESIA: Mod: ANES,,, | Performed by: ANESTHESIOLOGY

## 2022-04-28 PROCEDURE — 63600175 PHARM REV CODE 636 W HCPCS: Performed by: SURGERY

## 2022-04-28 PROCEDURE — 11604 EXC TR-EXT MAL+MARG 3.1-4 CM: CPT | Mod: ,,, | Performed by: SURGERY

## 2022-04-28 PROCEDURE — 63600175 PHARM REV CODE 636 W HCPCS: Performed by: ANESTHESIOLOGY

## 2022-04-28 PROCEDURE — 11604 PR EXC SKIN MALIG 3.1-4 CM TRUNK,ARM,LEG: ICD-10-PCS | Mod: ,,, | Performed by: SURGERY

## 2022-04-28 PROCEDURE — 12034 PR LAYR CLOS WND TRUNK,ARM,LEG 7.6-12.5 CM: ICD-10-PCS | Mod: 51,,, | Performed by: SURGERY

## 2022-04-28 PROCEDURE — D9220A PRA ANESTHESIA: ICD-10-PCS | Mod: CRNA,,, | Performed by: STUDENT IN AN ORGANIZED HEALTH CARE EDUCATION/TRAINING PROGRAM

## 2022-04-28 PROCEDURE — 25000003 PHARM REV CODE 250: Performed by: SURGERY

## 2022-04-28 PROCEDURE — 12034 INTMD RPR S/TR/EXT 7.6-12.5: CPT | Mod: 51,,, | Performed by: SURGERY

## 2022-04-28 PROCEDURE — 88307 TISSUE EXAM BY PATHOLOGIST: CPT | Mod: 26,,, | Performed by: PATHOLOGY

## 2022-04-28 PROCEDURE — 11602 PR EXC SKIN MALIG 1.1-2 CM TRUNK,ARM,LEG: ICD-10-PCS | Mod: 51,,, | Performed by: SURGERY

## 2022-04-28 PROCEDURE — 36000707: Performed by: SURGERY

## 2022-04-28 PROCEDURE — 71000033 HC RECOVERY, INTIAL HOUR: Performed by: SURGERY

## 2022-04-28 RX ORDER — BUPIVACAINE HYDROCHLORIDE 2.5 MG/ML
INJECTION, SOLUTION INFILTRATION; PERINEURAL
Status: DISCONTINUED | OUTPATIENT
Start: 2022-04-28 | End: 2022-04-28 | Stop reason: HOSPADM

## 2022-04-28 RX ORDER — LIDOCAINE HYDROCHLORIDE 20 MG/ML
INJECTION INTRAVENOUS
Status: DISCONTINUED | OUTPATIENT
Start: 2022-04-28 | End: 2022-04-28

## 2022-04-28 RX ORDER — ACETAMINOPHEN 10 MG/ML
1000 INJECTION, SOLUTION INTRAVENOUS ONCE
Status: COMPLETED | OUTPATIENT
Start: 2022-04-28 | End: 2022-04-28

## 2022-04-28 RX ORDER — SODIUM CHLORIDE 0.9 % (FLUSH) 0.9 %
10 SYRINGE (ML) INJECTION
Status: DISCONTINUED | OUTPATIENT
Start: 2022-04-28 | End: 2022-04-28 | Stop reason: HOSPADM

## 2022-04-28 RX ORDER — SUCCINYLCHOLINE CHLORIDE 20 MG/ML
INJECTION INTRAMUSCULAR; INTRAVENOUS
Status: DISCONTINUED | OUTPATIENT
Start: 2022-04-28 | End: 2022-04-28

## 2022-04-28 RX ORDER — FENTANYL CITRATE 50 UG/ML
INJECTION, SOLUTION INTRAMUSCULAR; INTRAVENOUS
Status: DISCONTINUED | OUTPATIENT
Start: 2022-04-28 | End: 2022-04-28

## 2022-04-28 RX ORDER — CEFAZOLIN SODIUM 1 G/50ML
2 SOLUTION INTRAVENOUS
Status: COMPLETED | OUTPATIENT
Start: 2022-04-28 | End: 2022-04-28

## 2022-04-28 RX ORDER — METOCLOPRAMIDE HYDROCHLORIDE 5 MG/ML
INJECTION INTRAMUSCULAR; INTRAVENOUS
Status: DISCONTINUED | OUTPATIENT
Start: 2022-04-28 | End: 2022-04-28

## 2022-04-28 RX ORDER — PROPOFOL 10 MG/ML
VIAL (ML) INTRAVENOUS
Status: DISCONTINUED | OUTPATIENT
Start: 2022-04-28 | End: 2022-04-28

## 2022-04-28 RX ORDER — ONDANSETRON 2 MG/ML
INJECTION INTRAMUSCULAR; INTRAVENOUS
Status: DISCONTINUED | OUTPATIENT
Start: 2022-04-28 | End: 2022-04-28

## 2022-04-28 RX ORDER — SODIUM CHLORIDE, SODIUM LACTATE, POTASSIUM CHLORIDE, CALCIUM CHLORIDE 600; 310; 30; 20 MG/100ML; MG/100ML; MG/100ML; MG/100ML
INJECTION, SOLUTION INTRAVENOUS CONTINUOUS PRN
Status: DISCONTINUED | OUTPATIENT
Start: 2022-04-28 | End: 2022-04-28

## 2022-04-28 RX ORDER — SODIUM CHLORIDE 9 MG/ML
INJECTION, SOLUTION INTRAVENOUS CONTINUOUS
Status: DISCONTINUED | OUTPATIENT
Start: 2022-04-28 | End: 2022-04-28 | Stop reason: HOSPADM

## 2022-04-28 RX ORDER — DEXAMETHASONE SODIUM PHOSPHATE 4 MG/ML
INJECTION, SOLUTION INTRA-ARTICULAR; INTRALESIONAL; INTRAMUSCULAR; INTRAVENOUS; SOFT TISSUE
Status: DISCONTINUED | OUTPATIENT
Start: 2022-04-28 | End: 2022-04-28

## 2022-04-28 RX ORDER — EPHEDRINE SULFATE 50 MG/ML
INJECTION, SOLUTION INTRAVENOUS
Status: DISCONTINUED | OUTPATIENT
Start: 2022-04-28 | End: 2022-04-28

## 2022-04-28 RX ORDER — HYDROCODONE BITARTRATE AND ACETAMINOPHEN 5; 325 MG/1; MG/1
1 TABLET ORAL EVERY 6 HOURS PRN
Qty: 20 TABLET | Refills: 0 | Status: SHIPPED | OUTPATIENT
Start: 2022-04-28 | End: 2022-05-18

## 2022-04-28 RX ORDER — ALBUTEROL SULFATE 90 UG/1
AEROSOL, METERED RESPIRATORY (INHALATION)
Status: DISCONTINUED | OUTPATIENT
Start: 2022-04-28 | End: 2022-04-28

## 2022-04-28 RX ORDER — PHENYLEPHRINE HYDROCHLORIDE 10 MG/ML
INJECTION INTRAVENOUS
Status: DISCONTINUED | OUTPATIENT
Start: 2022-04-28 | End: 2022-04-28

## 2022-04-28 RX ADMIN — METOCLOPRAMIDE 10 MG: 5 INJECTION, SOLUTION INTRAMUSCULAR; INTRAVENOUS at 08:04

## 2022-04-28 RX ADMIN — EPHEDRINE SULFATE 10 MG: 50 INJECTION INTRAVENOUS at 07:04

## 2022-04-28 RX ADMIN — LIDOCAINE HYDROCHLORIDE 100 MG: 20 INJECTION, SOLUTION INTRAVENOUS at 07:04

## 2022-04-28 RX ADMIN — EPHEDRINE SULFATE 10 MG: 50 INJECTION INTRAVENOUS at 08:04

## 2022-04-28 RX ADMIN — CEFAZOLIN SODIUM 1 G: 1 SOLUTION INTRAVENOUS at 08:04

## 2022-04-28 RX ADMIN — SUCCINYLCHOLINE CHLORIDE 80 MG: 20 INJECTION, SOLUTION INTRAMUSCULAR; INTRAVENOUS at 07:04

## 2022-04-28 RX ADMIN — PHENYLEPHRINE HYDROCHLORIDE 100 MCG: 10 INJECTION INTRAVENOUS at 08:04

## 2022-04-28 RX ADMIN — SODIUM CHLORIDE, SODIUM LACTATE, POTASSIUM CHLORIDE, AND CALCIUM CHLORIDE: .6; .31; .03; .02 INJECTION, SOLUTION INTRAVENOUS at 07:04

## 2022-04-28 RX ADMIN — DEXAMETHASONE SODIUM PHOSPHATE 4 MG: 4 INJECTION, SOLUTION INTRAMUSCULAR; INTRAVENOUS at 08:04

## 2022-04-28 RX ADMIN — PROPOFOL 150 MG: 10 INJECTION, EMULSION INTRAVENOUS at 07:04

## 2022-04-28 RX ADMIN — ONDANSETRON 4 MG: 2 INJECTION, SOLUTION INTRAMUSCULAR; INTRAVENOUS at 07:04

## 2022-04-28 RX ADMIN — CEFAZOLIN SODIUM 1 G: 1 SOLUTION INTRAVENOUS at 07:04

## 2022-04-28 RX ADMIN — ALBUTEROL SULFATE 2 PUFF: 90 AEROSOL, METERED RESPIRATORY (INHALATION) at 08:04

## 2022-04-28 RX ADMIN — ACETAMINOPHEN 1000 MG: 10 INJECTION INTRAVENOUS at 09:04

## 2022-04-28 RX ADMIN — SODIUM CHLORIDE, SODIUM LACTATE, POTASSIUM CHLORIDE, AND CALCIUM CHLORIDE: .6; .31; .03; .02 INJECTION, SOLUTION INTRAVENOUS at 08:04

## 2022-04-28 RX ADMIN — FENTANYL CITRATE 100 MCG: 50 INJECTION, SOLUTION INTRAMUSCULAR; INTRAVENOUS at 07:04

## 2022-04-28 RX ADMIN — ALBUTEROL SULFATE 2 PUFF: 90 AEROSOL, METERED RESPIRATORY (INHALATION) at 07:04

## 2022-04-28 NOTE — TRANSFER OF CARE
Anesthesia Transfer of Care Note    Patient: Zac Plunkett    Procedure(s) Performed: Procedure(s) (LRB):  EXCISION, NEOPLASM RIGHT THIGH (Right)    Patient location: PACU    Anesthesia Type: general    Transport from OR: Transported from OR on room air with adequate spontaneous ventilation    Post pain: adequate analgesia    Post assessment: no apparent anesthetic complications and tolerated procedure well    Post vital signs: stable    Level of consciousness: awake    Nausea/Vomiting: no nausea/vomiting    Complications: none    Transfer of care protocol was followed      Last vitals:   Visit Vitals  BP (!) 143/65   Pulse 91   Temp 36.5 °C (97.7 °F) (Temporal)   Resp 18   Wt 83.7 kg (184 lb 8.4 oz)   SpO2 100%   BMI 25.74 kg/m²

## 2022-04-28 NOTE — DISCHARGE INSTRUCTIONS
[facility]  Office # [phone]    Discharge Instructions for Same Day Surgery     Call the office for and appointment if one has not already been made.     Diet: Drink plenty of fluids the first 48 hours and you may resume your   usual diet.     Activity: No heavy lifting (over 10 pounds), pushing or pulling until your   post op visit. Your doctor's office may have told you to limit your lifting to less weight, or even no weight.  Be sure to follow those instructions.    Note: You may ride in a car and you may drive when comfortable.     Do not drive, drink alcohol, or sign legal documents for 24 hours, or if taking narcotic pain medication.    Dressings: Remove the dressing 48 hours after surgery. You may shower  24 hours after surgery and you may wash your hair.       Dermabond / Prineotape    or a material like it was used on your incision.   It is like a liquid glue.   Do not peel or try to remove it it will start to fall off in 7-10 days on its' own.   It is OK to shower, pat dry, do not apply any creams or lotions.    No tub baths, swimming pools, hot tubs or submersion of the incision until your surgeon says it's ok.        Medical: Call the doctor for any of the following problems: fever above 101,   severe pain, bleeding, or abdominal distention (swelling).   If constipated you may take any stool softener you choose.     Occasionally small areas of skin numbness or an unpleasant skin sensation can result. Also, you may find that your incision is swollen and tender for a few days.  Some redness around sutures and staples is a normal reaction, but if the discomfort persists or worsens, call you doctor.             Fall Prevention  Millions of people fall every year and injure themselves. You may have had anesthesia or sedation which may increase your risk of falling. You may have health issues that put you at an increased risk of falling.     Here are ways to reduce your risk of  falling.    Make your home safe by keeping walkways clear of objects you may trip over.  Use non-slip pads under rugs. Do not use area rugs or small throw rugs.  Use non-slip mats in bathtubs and showers.  Install handrails and lights on staircases.  Do not walk in poorly lit areas.  Do not stand on chairs or wobbly ladders.  Use caution when reaching overhead or looking upward. This position can cause a loss of balance.  Be sure your shoes fit properly, have non-slip bottoms and are in good condition.   Wear shoes both inside and out. Avoid going barefoot or wearing slippers.  Be cautious when going up and down stairs, curbs, and when walking on uneven sidewalks.  If your balance is poor, consider using a cane or walker.  If your fall was related to alcohol use, stop or limit alcohol intake.   If your fall was related to use of sleeping medicines, talk to your doctor about this. You may need to reduce your dosage at bedtime if you awaken during the night to go to the bathroom.    To reduce the need for nighttime bathroom trips:  Avoid drinking fluids for several hours before going to bed  Empty your bladder before going to bed  Men can keep a urinal at the bedside  Stay as active as you can. Balance, flexibility, strength, and endurance all come from exercise. They all play a role in preventing falls. Ask your healthcare provider which types of activity are right for you.  Get your vision checked on a regular basis.  If you have pets, know where they are before you stand up or walk so you don't trip over them.  Use night lights.

## 2022-04-28 NOTE — OP NOTE
[facility] [place] - Surgery  Operative Note    SUMMARY     Surgery Date: 4/28/2022     Surgeon(s) and Role:     * Arun Mao MD - Primary    Assisting Surgeon: None    Pre-op Diagnosis:  Sarcoma of right thigh [C49.21]    Post-op Diagnosis:  Post-Op Diagnosis Codes:     * Sarcoma of right thigh [C49.21]    Procedure(s) (LRB):  EXCISION, NEOPLASM RIGHT THIGH (Right)    Anesthesia: General    Indication for procedure: Zac Plunkett is 76 y.o. 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 After discussion of disease process, we discussed options and have elected for operation to perform excision of recurrent masses the proximal thigh.  We have discussed the risks and benefits and complications with the patient he understands that there is a high low likelihood of recurrence and have discussed this with his oncologist as well and of concluded he should benefit from resection of these fairly superficial albeit enlarged masses the right proximal thigh before they encroached on critical structures.  Plan for excision wide local excision of these subcutaneous recurrent myxofibrosarcoma masses/lymph nodes.    Description of Procedure: After consent was obtained, patient was taken to the OR. The right groin and thigh was prepped and draped in a standard sterile fashion after general anesthesia was started. Time out was performed. Antibiotics were started with ancef. We began the procedure by palpating the 3 masses which were each easily identifiable and palpable.  We also correlated these with the imaging from the CT scan to verify locations.  We made a vertical incision after injecting Marcaine with epinephrine cut down through the skin sharply.  We then used Bovie electrocautery and created flaps bilaterally of the skin.  We resected each of the individual masses separately sent for pathology the superior was marked superior thigh mass.  We used the 3-0 Vicryl base suture, to  transect the vascular and lymphatic supply.  We then did the same for the central thigh mass and the same for the smaller inferior thigh mass.  Then irrigated achieved hemostasis injected additional Marcaine and then closed the skin in layers with 3-0 Vicryl dermal sutures and a running 4 Monocryl subcuticular stitch.  This was then covered with Dermabond and a pressure dressing  All counts were correct x2. Patient was awakened from anesthesia and taken to the recovery room in a stable condition having suffered no issues at this time.    Description of the findings of the procedure:  Three separate lesions  1. 4 cm x 3 cm x 3 cm superior thigh mass  2. 2x2x2 cm central thigh mass  3. 2x1x1 cm inferior thigh mass    Estimated Blood Loss: 10 mL         Specimens:   Specimen (24h ago, onward)             Start     Ordered    04/28/22 0823  Specimen to Pathology, Surgery General Surgery  Once        Comments: Pre-op Diagnosis: Sarcoma of right thigh [C49.21]Procedure(s):EXCISION, NEOPLASM RIGHT THIGH Number of specimens: 3Name of specimens: Right thigh superior mass, right thigh central mass, right thigh inferior mass     References:    Click here for ordering Quick Tip   Question Answer Comment   Procedure Type: General Surgery    Specimen Class: Routine/Screening    Which provider would you like to cc? TAYLOR VEE    Release to patient Immediate        04/28/22 0876                Drains/Implants: None

## 2022-04-28 NOTE — ANESTHESIA PROCEDURE NOTES
Intubation    Date/Time: 4/28/2022 7:50 AM  Performed by: Hali PAGE Do, CRNA  Authorized by: Hansa Brown MD     Intubation:     Induction:  Intravenous    Intubated:  Postinduction    Mask Ventilation:  Moderately difficult with oral airway    Attempts:  1    Attempted By:  CRNA    Method of Intubation:  Direct    Blade:  Becca 3    Laryngeal View Grade: Grade I - full view of cords      Difficult Airway Encountered?: No      Complications:  None    Airway Device:  Oral endotracheal tube    Airway Device Size:  7.0    Style/Cuff Inflation:  Cuffed (inflated to minimal occlusive pressure)    Tube secured:  21    Secured at:  The lips    Placement Verified By:  Capnometry    Complicating Factors:  None    Findings Post-Intubation:  BS equal bilateral and atraumatic/condition of teeth unchanged

## 2022-04-28 NOTE — PLAN OF CARE
Pt transported to \[facility]\"" room 250m via stretcher, awake alert oriented, vital signs stable, dressing to right upper thigh clean dry an intact. Wife at bedside, report given to Maria zaidi

## 2022-04-28 NOTE — DISCHARGE SUMMARY
[facility] - Surgery  Discharge Note  Short Stay    Procedure(s) (LRB):  EXCISION, NEOPLASM RIGHT THIGH (Right)    OUTCOME: Patient tolerated treatment/procedure well without complication and is now ready for discharge.    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    FOLLOWUP: In clinic    DISCHARGE INSTRUCTIONS:    Discharge Procedure Orders   Diet Adult Regular     Notify your health care provider if you experience any of the following:  temperature >100.4     Notify your health care provider if you experience any of the following:  persistent nausea and vomiting or diarrhea     Notify your health care provider if you experience any of the following:  severe uncontrolled pain     Notify your health care provider if you experience any of the following:  redness, tenderness, or signs of infection (pain, swelling, redness, odor or green/yellow discharge around incision site)     Remove dressing in 48 hours   Order Comments: Remove the pressure dressing in 48 hrs.  Keep wound clean and dry. Ok to rinse with soap and water. Do not soak in tub/pool.     Activity as tolerated        TIME SPENT ON DISCHARGE: 15 minutes

## 2022-05-02 ENCOUNTER — OFFICE VISIT (OUTPATIENT)
Dept: RADIATION ONCOLOGY | Facility: CLINIC | Age: 77
End: 2022-05-02
Payer: MEDICARE

## 2022-05-02 VITALS
SYSTOLIC BLOOD PRESSURE: 185 MMHG | RESPIRATION RATE: 20 BRPM | BODY MASS INDEX: 26.15 KG/M2 | WEIGHT: 187.5 LBS | HEART RATE: 63 BPM | TEMPERATURE: 98 F | DIASTOLIC BLOOD PRESSURE: 85 MMHG

## 2022-05-02 DIAGNOSIS — C49.9 SOFT TISSUE SARCOMA: Primary | ICD-10-CM

## 2022-05-02 LAB
FINAL PATHOLOGIC DIAGNOSIS: NORMAL
GROSS: NORMAL
Lab: NORMAL

## 2022-05-02 PROCEDURE — 3079F DIAST BP 80-89 MM HG: CPT | Mod: CPTII,S$GLB,, | Performed by: RADIOLOGY

## 2022-05-02 PROCEDURE — 1160F PR REVIEW ALL MEDS BY PRESCRIBER/CLIN PHARMACIST DOCUMENTED: ICD-10-PCS | Mod: CPTII,S$GLB,, | Performed by: RADIOLOGY

## 2022-05-02 PROCEDURE — 99999 PR PBB SHADOW E&M-EST. PATIENT-LVL III: CPT | Mod: PBBFAC,,, | Performed by: RADIOLOGY

## 2022-05-02 PROCEDURE — 3077F PR MOST RECENT SYSTOLIC BLOOD PRESSURE >= 140 MM HG: ICD-10-PCS | Mod: CPTII,S$GLB,, | Performed by: RADIOLOGY

## 2022-05-02 PROCEDURE — 3288F PR FALLS RISK ASSESSMENT DOCUMENTED: ICD-10-PCS | Mod: CPTII,S$GLB,, | Performed by: RADIOLOGY

## 2022-05-02 PROCEDURE — 1160F RVW MEDS BY RX/DR IN RCRD: CPT | Mod: CPTII,S$GLB,, | Performed by: RADIOLOGY

## 2022-05-02 PROCEDURE — 99213 PR OFFICE/OUTPT VISIT, EST, LEVL III, 20-29 MIN: ICD-10-PCS | Mod: S$GLB,,, | Performed by: RADIOLOGY

## 2022-05-02 PROCEDURE — 1159F MED LIST DOCD IN RCRD: CPT | Mod: CPTII,S$GLB,, | Performed by: RADIOLOGY

## 2022-05-02 PROCEDURE — 1126F PR PAIN SEVERITY QUANTIFIED, NO PAIN PRESENT: ICD-10-PCS | Mod: CPTII,S$GLB,, | Performed by: RADIOLOGY

## 2022-05-02 PROCEDURE — 3079F PR MOST RECENT DIASTOLIC BLOOD PRESSURE 80-89 MM HG: ICD-10-PCS | Mod: CPTII,S$GLB,, | Performed by: RADIOLOGY

## 2022-05-02 PROCEDURE — 99999 PR PBB SHADOW E&M-EST. PATIENT-LVL III: ICD-10-PCS | Mod: PBBFAC,,, | Performed by: RADIOLOGY

## 2022-05-02 PROCEDURE — 99213 OFFICE O/P EST LOW 20 MIN: CPT | Mod: S$GLB,,, | Performed by: RADIOLOGY

## 2022-05-02 PROCEDURE — 1126F AMNT PAIN NOTED NONE PRSNT: CPT | Mod: CPTII,S$GLB,, | Performed by: RADIOLOGY

## 2022-05-02 PROCEDURE — 1100F PR PT FALLS ASSESS DOC 2+ FALLS/FALL W/INJURY/YR: ICD-10-PCS | Mod: CPTII,S$GLB,, | Performed by: RADIOLOGY

## 2022-05-02 PROCEDURE — 3288F FALL RISK ASSESSMENT DOCD: CPT | Mod: CPTII,S$GLB,, | Performed by: RADIOLOGY

## 2022-05-02 PROCEDURE — 1159F PR MEDICATION LIST DOCUMENTED IN MEDICAL RECORD: ICD-10-PCS | Mod: CPTII,S$GLB,, | Performed by: RADIOLOGY

## 2022-05-02 PROCEDURE — 1100F PTFALLS ASSESS-DOCD GE2>/YR: CPT | Mod: CPTII,S$GLB,, | Performed by: RADIOLOGY

## 2022-05-02 PROCEDURE — 3077F SYST BP >= 140 MM HG: CPT | Mod: CPTII,S$GLB,, | Performed by: RADIOLOGY

## 2022-05-02 NOTE — PROGRESS NOTES
05/02/2022    Radiation Oncology Follow-Up Visit    Prior Radiation History:  Treatment Summary  Course: C1 R leg 2021    Treatment Site Ref. ID Energy Dose/Fx (Gy) #Fx Dose Correction (Gy) Total Dose (Gy) Start Date End Date Elapsed Days   IM LEG_R 50Gy PTV 50 cyndi 6X 2 25 / 25 0 50 7/21/2021 8/25/2021 35   IM LEG_R 60Gy PTV 60jb 6X 2 1 / 5 0 2 8/26/2021 8/26/2021 0     HPI: As per previous notes, Mr. 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 He eventually underwent an ultrasound of the R thigh that demonstrated 2 hypoechoic masses in the subcutaneous tissues of the R anterior thigh, largest 1.1 cm.      He underwent excisional biopsy of the R thigh masses on 6/11/21 with Dr. Mao, with pathology demonstrating high grade myxofibrosarcoma. PET/CT demonstrated no evidence of recurrent or metastatic disease and he completed 52 Gy in 26 fractions (out of planned 60 Gy in 30 fractions) via IMRT, with treatment ending sooner than expected due to [place] and subsequent treatment delays.    He unfortunately developed recurrent disease in the R anterior thigh in 1/22 undergoing additional excision of his suspicious palpable lesions with Dr. 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 He has also followed with Dr. White regarding a RUL opacity 0.9 cm in size, with recommendations for continued surveillance imaging.    He underwent CT thigh and chest imaging on 4/8/22 and 4/13/22, which demonstrated no new disease in the chest but worsening adenopathy in the R thigh. He underwent additional resection with Dr. Mao on 4/28/22, with final pathology pending. He presents today and is overall doing well, noting mild to moderate instability associated with his RLE, needing a cane to  ambulate.      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    Tobacco dependence        Past Surgical History:   Procedure Laterality Date    APPENDECTOMY      BACK SURGERY      HIP REPLACEMENT ARTHROPLASTY Left 8/6/2018    Procedure: ARTHROPLASTY, HIP REPLACEMENT;  Surgeon: Chapincito Sagastume MD;  Location: [facility] OR;  Service: Orthopedics;  Laterality: Left;    JOINT REPLACEMENT Left     hip    laminectomy l4-l5      SURGICAL REMOVAL OF LYMPH NODE Right 1/20/2022    Procedure: EXCISION, LYMPH NODE;  Surgeon: Arun Mao MD;  Location: [facility] OR;  Service: General;  Laterality: Right;  right thigh and right groin  RN PREOP 1/18/2022      PT CHOSE TO DO HOME TEST FOR COVID ON AM OF SURGERY       Social History     Tobacco Use    Smoking status: Current Every Day Smoker     Years: 20.00     Types: Cigars    Smokeless tobacco: Never Used    Tobacco comment: smokes cigars 2-3 a day, smoke cigarette for 15 years   Substance Use Topics    Alcohol use: No    Drug use: No       Cancer-related family history is negative for Melanoma and Liver cancer.    Current Outpatient Medications on File Prior to Visit   Medication Sig Dispense Refill    atorvastatin (LIPITOR) 80 MG tablet Take 1 tablet (80 mg total) by mouth once daily. 90 tablet 3    finasteride (PROSCAR) 5 mg tablet TAKE 1 TABLET BY MOUTH EVERY DAY 90 tablet 0    hydroCHLOROthiazide (HYDRODIURIL) 25 MG tablet TAKE 1 TABLET BY MOUTH DAILY 90 tablet 0    HYDROcodone-acetaminophen (NORCO) 5-325 mg per tablet Take 1 tablet by mouth every 6 (six) hours as needed for Pain. 20 tablet 0    SELENIUM ORAL Take by mouth.      tamsulosin (FLOMAX) 0.4 mg Cap TAKE 1 CAPSULE BY MOUTH EVERY DAY 90 capsule 0     TURMERIC ORAL Take by mouth.      VITAMIN E ACETATE ORAL Take by mouth.       No current facility-administered medications on file prior to visit.       Review of patient's allergies indicates:  No Known Allergies    Review of Systems   Constitutional: Positive for malaise/fatigue. Negative for chills, fever and weight loss.   Eyes: Negative for blurred vision and double vision.   Respiratory: Negative for cough, hemoptysis and sputum production.    Cardiovascular: Negative for chest pain and orthopnea.   Gastrointestinal: Negative for abdominal pain, heartburn, nausea and vomiting.   Genitourinary: Negative for dysuria and urgency.   Musculoskeletal: Positive for joint pain. Negative for back pain and neck pain.   Skin: Negative for itching and rash.   Neurological: Positive for weakness. Negative for dizziness.   Psychiatric/Behavioral: Negative for depression. The patient is not nervous/anxious.         Vital Signs:   Vitals:    05/02/22 1040   BP: (!) 185/85   Pulse: 63   Resp: 20   Temp: 97.7 °F (36.5 °C)     ECOG Performance Status: 1 - Ambulates, capable of light work    Physical exam:  Constitutional:  Well-developed, well-nourished and in no acute distress.  Head: Normocephalic, atraumatic.    Eyes: Sclerae are non-icteric.  Pupils are equal and round.  Extraocular movements are intact.  ENMT: Mucous membranes are moist.  Neck: Supple.   Pulmonary: Work of breathing appropriate.  Cardiovascular: No edema.  GI: Non-distended.    Musculoskeletal: Range of motion appears normal in all 4 extremities.  Extremities: No clubbing, cyanosis, or edema.    Skin: No visible lesions.  Neurologic: Patient is alert and oriented x 3.  Normal mood and affect.     Labs: I have personally reviewed the patient's available labs and reports and summarized pertinent findings above in HPI.    Imaging: I have personally reviewed the patient's available images and reports and summarized pertinent findings above in HPI.      Pathology: I have personally reviewed the patient's available pathology and summarized pertinent findings above in HPI.     Assessment:  This is a 76 y.o. male with recurrent high grade extremity sarcoma status post surgery, adjuvant XRT and multiple additional surgeries for recurrent disease. They are doing well at today's follow up appointment and most recent surgical pathology is pending.    Plan:  The patient will follow up with Dr. Mao later this week for additional discussion regarding tissue pathology. Unfortunately he is not a candidate for additional local radiation therapy. He will continue to follow with surgery and med onc as needed and does not need to formally follow up with me. They have my contact information should additional questions or concerns arise.          Rafy Mcadams MD  Radiation Oncology  [facility]

## 2022-05-03 NOTE — ANESTHESIA POSTPROCEDURE EVALUATION
Anesthesia Post Evaluation    Patient: Zac Plunkett    Procedure(s) Performed: Procedure(s) (LRB):  EXCISION, NEOPLASM RIGHT THIGH (Right)    Final Anesthesia Type: general      Patient location during evaluation: PACU  Patient participation: Yes- Able to Participate  Level of consciousness: awake and alert, oriented and awake  Post-procedure vital signs: reviewed and stable  Airway patency: patent    PONV status at discharge: No PONV  Anesthetic complications: no      Cardiovascular status: blood pressure returned to baseline  Respiratory status: unassisted, spontaneous ventilation and room air  Hydration status: euvolemic  Follow-up not needed.          Vitals Value Taken Time   /70 04/28/22 1104   Temp 36.5 °C (97.7 °F) 04/28/22 1104   Pulse 77 04/28/22 1104   Resp 18 04/28/22 1104   SpO2 96 % 04/28/22 1104         Event Time   Out of Recovery 10:01:44         Pain/Lanie Score: No data recorded

## 2022-05-04 ENCOUNTER — OFFICE VISIT (OUTPATIENT)
Dept: SURGERY | Facility: CLINIC | Age: 77
End: 2022-05-04
Payer: MEDICARE

## 2022-05-04 VITALS
HEART RATE: 63 BPM | BODY MASS INDEX: 26.23 KG/M2 | HEIGHT: 71 IN | WEIGHT: 187.38 LBS | DIASTOLIC BLOOD PRESSURE: 70 MMHG | SYSTOLIC BLOOD PRESSURE: 180 MMHG

## 2022-05-04 DIAGNOSIS — C49.9 SOFT TISSUE SARCOMA: Primary | ICD-10-CM

## 2022-05-04 PROCEDURE — 3077F PR MOST RECENT SYSTOLIC BLOOD PRESSURE >= 140 MM HG: ICD-10-PCS | Mod: CPTII,S$GLB,, | Performed by: SURGERY

## 2022-05-04 PROCEDURE — 1100F PR PT FALLS ASSESS DOC 2+ FALLS/FALL W/INJURY/YR: ICD-10-PCS | Mod: CPTII,S$GLB,, | Performed by: SURGERY

## 2022-05-04 PROCEDURE — 1100F PTFALLS ASSESS-DOCD GE2>/YR: CPT | Mod: CPTII,S$GLB,, | Performed by: SURGERY

## 2022-05-04 PROCEDURE — 3078F PR MOST RECENT DIASTOLIC BLOOD PRESSURE < 80 MM HG: ICD-10-PCS | Mod: CPTII,S$GLB,, | Performed by: SURGERY

## 2022-05-04 PROCEDURE — 3078F DIAST BP <80 MM HG: CPT | Mod: CPTII,S$GLB,, | Performed by: SURGERY

## 2022-05-04 PROCEDURE — 99999 PR PBB SHADOW E&M-EST. PATIENT-LVL II: CPT | Mod: PBBFAC,,, | Performed by: SURGERY

## 2022-05-04 PROCEDURE — 1126F PR PAIN SEVERITY QUANTIFIED, NO PAIN PRESENT: ICD-10-PCS | Mod: CPTII,S$GLB,, | Performed by: SURGERY

## 2022-05-04 PROCEDURE — 99999 PR PBB SHADOW E&M-EST. PATIENT-LVL II: ICD-10-PCS | Mod: PBBFAC,,, | Performed by: SURGERY

## 2022-05-04 PROCEDURE — 1126F AMNT PAIN NOTED NONE PRSNT: CPT | Mod: CPTII,S$GLB,, | Performed by: SURGERY

## 2022-05-04 PROCEDURE — 3077F SYST BP >= 140 MM HG: CPT | Mod: CPTII,S$GLB,, | Performed by: SURGERY

## 2022-05-04 PROCEDURE — 3288F FALL RISK ASSESSMENT DOCD: CPT | Mod: CPTII,S$GLB,, | Performed by: SURGERY

## 2022-05-04 PROCEDURE — 99024 PR POST-OP FOLLOW-UP VISIT: ICD-10-PCS | Mod: S$GLB,,, | Performed by: SURGERY

## 2022-05-04 PROCEDURE — 3288F PR FALLS RISK ASSESSMENT DOCUMENTED: ICD-10-PCS | Mod: CPTII,S$GLB,, | Performed by: SURGERY

## 2022-05-04 PROCEDURE — 99024 POSTOP FOLLOW-UP VISIT: CPT | Mod: S$GLB,,, | Performed by: SURGERY

## 2022-05-04 RX ORDER — CLINDAMYCIN HYDROCHLORIDE 300 MG/1
300 CAPSULE ORAL 3 TIMES DAILY
Qty: 21 CAPSULE | Refills: 0 | Status: SHIPPED | OUTPATIENT
Start: 2022-05-04 | End: 2022-05-11

## 2022-05-04 NOTE — PROGRESS NOTES
Surgery Clinic Note    Zac Plunkett is a 76 y.o. year old male in clinic today for follow up of excision of myxofibrosarcoma recurrent sites in the right proximal thigh. Feels good. No pain.  No f/c/n/v/sob/cp    ROS:  Negative except above    Pathology:   Part 1   Specimen submitted as right thigh superior mass:   -Myxofibrosarcoma, high grade with focal tumor necrosis, incompletely excised   -A portion of the lesion is suggestive of an involved lymph node exhibiting   metastatic sarcoma with extranodal extension.  Sarcoma is focally present   within inked but otherwise unspecified peripheral margins.   Part 2   Specimen submitted as right thigh inferior mass:   -Myxofibrosarcoma, high grade with tumor necrosis, incompletely excised   -Sarcoma is focally present within inked but otherwise unspecified peripheral   margins   Part 3   Specimen submitted as right thigh inferior mass:   -Myxofibrosarcoma, high grade with focal tumor necrosis, incompletely excised   -Sarcoma is focally present within inked but otherwise unspecified peripheral   resection margins   -Adjacent to sarcoma is a broad zone of fibrosis exhibiting chronic   inflammation with many macrophages, giant cell reaction, and old hemorrhage      PE:  Vitals:    05/04/22 1035   BP: (!) 180/70   Pulse: 63     NAD  No belabored breathing  No skin changes  Right groin incision: non tender. Mild cellulitis    A/P:  Zac Plunkett is a 76 y.o. year old male s/p excision of recurrence sarcoma sites of the right thigh    -cellulitis to be tx w clindamycin, 1 wk  -f/u with Dr Flores  -rtc 3 months    Arun Mao  General Surgery - [facility]  5/4/2022

## 2022-05-10 ENCOUNTER — PATIENT MESSAGE (OUTPATIENT)
Dept: SURGERY | Facility: CLINIC | Age: 77
End: 2022-05-10
Payer: MEDICARE

## 2022-05-18 ENCOUNTER — OFFICE VISIT (OUTPATIENT)
Dept: HEMATOLOGY/ONCOLOGY | Facility: CLINIC | Age: 77
End: 2022-05-18
Payer: MEDICARE

## 2022-05-18 VITALS
WEIGHT: 183.88 LBS | TEMPERATURE: 98 F | OXYGEN SATURATION: 99 % | RESPIRATION RATE: 18 BRPM | HEIGHT: 71 IN | BODY MASS INDEX: 25.74 KG/M2 | HEART RATE: 53 BPM | SYSTOLIC BLOOD PRESSURE: 162 MMHG | DIASTOLIC BLOOD PRESSURE: 73 MMHG

## 2022-05-18 DIAGNOSIS — Z79.899 OTHER LONG TERM (CURRENT) DRUG THERAPY: ICD-10-CM

## 2022-05-18 DIAGNOSIS — C44.99 MYXOFIBROSARCOMA OF SKIN: Primary | ICD-10-CM

## 2022-05-18 PROCEDURE — 3078F DIAST BP <80 MM HG: CPT | Mod: CPTII,GC,S$GLB,

## 2022-05-18 PROCEDURE — 1160F RVW MEDS BY RX/DR IN RCRD: CPT | Mod: CPTII,GC,S$GLB,

## 2022-05-18 PROCEDURE — 3077F PR MOST RECENT SYSTOLIC BLOOD PRESSURE >= 140 MM HG: ICD-10-PCS | Mod: CPTII,GC,S$GLB,

## 2022-05-18 PROCEDURE — 99999 PR PBB SHADOW E&M-EST. PATIENT-LVL IV: ICD-10-PCS | Mod: PBBFAC,GC,,

## 2022-05-18 PROCEDURE — 3288F FALL RISK ASSESSMENT DOCD: CPT | Mod: CPTII,GC,S$GLB,

## 2022-05-18 PROCEDURE — 1159F MED LIST DOCD IN RCRD: CPT | Mod: CPTII,GC,S$GLB,

## 2022-05-18 PROCEDURE — 1126F AMNT PAIN NOTED NONE PRSNT: CPT | Mod: CPTII,GC,S$GLB,

## 2022-05-18 PROCEDURE — 99214 PR OFFICE/OUTPT VISIT, EST, LEVL IV, 30-39 MIN: ICD-10-PCS | Mod: GC,S$GLB,,

## 2022-05-18 PROCEDURE — 1159F PR MEDICATION LIST DOCUMENTED IN MEDICAL RECORD: ICD-10-PCS | Mod: CPTII,GC,S$GLB,

## 2022-05-18 PROCEDURE — 99214 OFFICE O/P EST MOD 30 MIN: CPT | Mod: GC,S$GLB,,

## 2022-05-18 PROCEDURE — 3078F PR MOST RECENT DIASTOLIC BLOOD PRESSURE < 80 MM HG: ICD-10-PCS | Mod: CPTII,GC,S$GLB,

## 2022-05-18 PROCEDURE — 1101F PT FALLS ASSESS-DOCD LE1/YR: CPT | Mod: CPTII,GC,S$GLB,

## 2022-05-18 PROCEDURE — 1160F PR REVIEW ALL MEDS BY PRESCRIBER/CLIN PHARMACIST DOCUMENTED: ICD-10-PCS | Mod: CPTII,GC,S$GLB,

## 2022-05-18 PROCEDURE — 1126F PR PAIN SEVERITY QUANTIFIED, NO PAIN PRESENT: ICD-10-PCS | Mod: CPTII,GC,S$GLB,

## 2022-05-18 PROCEDURE — 3288F PR FALLS RISK ASSESSMENT DOCUMENTED: ICD-10-PCS | Mod: CPTII,GC,S$GLB,

## 2022-05-18 PROCEDURE — 3077F SYST BP >= 140 MM HG: CPT | Mod: CPTII,GC,S$GLB,

## 2022-05-18 PROCEDURE — 1101F PR PT FALLS ASSESS DOC 0-1 FALLS W/OUT INJ PAST YR: ICD-10-PCS | Mod: CPTII,GC,S$GLB,

## 2022-05-18 PROCEDURE — 99999 PR PBB SHADOW E&M-EST. PATIENT-LVL IV: CPT | Mod: PBBFAC,GC,,

## 2022-05-18 NOTE — Clinical Note
Pembro needs to be authorized and scheduled F/u once chemo is auth for C1 of pembro with cbc, cmp and TSH prior to visit (6/1 if possible)

## 2022-05-18 NOTE — PLAN OF CARE
START OFF PATHWAY REGIMEN - Sarcoma            Pembrolizumab (Keytruda)           Additional Orders: Serious immune-mediated adverse events can occur with   pembrolizumab. Please monitor your patient and refer to the linked   immune-mediated adverse reaction management materials for more information.    **Always confirm dose/schedule in your pharmacy ordering system**    Patient Characteristics:  Other Sarcoma Subtypes, Unresectable / Metastatic, Myxofibrosarcoma  Histology/Anatomic Site: Myxofibrosarcoma    Intent of Therapy:  Non-Curative / Palliative Intent, Discussed with Patient

## 2022-05-18 NOTE — PROGRESS NOTES
[facility]  Hematology/Medical Oncology Clinic      PATIENT: Zac Plunkett  MRN: [id number]  DATE: 5/18/2022    Chief Complaint: consult for myxofibrosarcoma, No chief complaint on file.    Other MDs:  PCP: Deondre Waters MD  Gen Surg: Dr. Mao    Subjective:   Initial History: Mr. Plunkett is a 76 y.o. male who presents to oncology clinic for follow up for recurrent myxofibrosarcoma.     Today  Presents back to the clinic for follow up after recurrence of his sarcoma on his right anterior thigh.     Reports trouble walking over the past few weeks, feels like his right knee is giving out.     Underwent re-excision on 4/28 that revealed recurrent, locally metastatic disease. Healing well.     Otherwise, doing well. PS-0. Denies concerning ROS questions.     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    Tobacco dependence      Past Surgical History:   Procedure Laterality Date    APPENDECTOMY      BACK SURGERY      HIP REPLACEMENT ARTHROPLASTY Left 8/6/2018    Procedure: ARTHROPLASTY, HIP REPLACEMENT;  Surgeon: Chapincito Sagastume MD;  Location: [facility] OR;  Service: Orthopedics;  Laterality: Left;    JOINT REPLACEMENT Left     hip    laminectomy l4-l5      SURGICAL REMOVAL OF LYMPH NODE Right 1/20/2022    Procedure: EXCISION, LYMPH NODE;  Surgeon: Arun Mao MD;  Location: [facility] OR;  Service: General;  Laterality: Right;  right thigh and right groin  RN PREOP 1/18/2022      PT CHOSE TO DO HOME TEST FOR COVID ON AM OF SURGERY     Family History   Problem Relation Age of Onset    Diabetes Mother     Heart disease Father     No Known Problems Sister     Cerebral palsy Brother     Epilepsy Brother      Melanoma Neg Hx     Psoriasis Neg Hx     Lupus Neg Hx     Eczema Neg Hx     Acne Neg Hx     Liver disease Neg Hx     Liver cancer Neg Hx     Cirrhosis Neg Hx     Colon polyps Neg Hx     Colon cancer Neg Hx       reports that he has been smoking cigars. He has smoked for the past 20.00 years. He has never used smokeless tobacco. He reports that he does not drink alcohol and does not use drugs.  Review of patient's allergies indicates:  No Known Allergies  Current Outpatient Medications   Medication Sig Dispense Refill    atorvastatin (LIPITOR) 80 MG tablet Take 1 tablet (80 mg total) by mouth once daily. 90 tablet 3    finasteride (PROSCAR) 5 mg tablet TAKE 1 TABLET BY MOUTH EVERY DAY 90 tablet 0    hydroCHLOROthiazide (HYDRODIURIL) 25 MG tablet TAKE 1 TABLET BY MOUTH DAILY 90 tablet 0    HYDROcodone-acetaminophen (NORCO) 5-325 mg per tablet Take 1 tablet by mouth every 6 (six) hours as needed for Pain. (Patient not taking: Reported on 5/2/2022) 20 tablet 0    SELENIUM ORAL Take by mouth.      tamsulosin (FLOMAX) 0.4 mg Cap TAKE 1 CAPSULE BY MOUTH EVERY DAY 90 capsule 0    TURMERIC ORAL Take by mouth.      VITAMIN E ACETATE ORAL Take by mouth.       No current facility-administered medications for this visit.     Review of Systems   Constitutional: Negative for appetite change, chills, fatigue and unexpected weight change.   HENT: Negative for dental problem, mouth sores, nosebleeds, trouble swallowing and voice change.    Eyes: Negative for visual disturbance.   Respiratory: Negative for chest tightness and shortness of breath.    Cardiovascular: Negative for chest pain, palpitations and leg swelling.   Gastrointestinal: Negative for abdominal distention, abdominal pain, blood in stool, diarrhea, nausea and vomiting.   Endocrine: Negative for cold intolerance.   Genitourinary: Negative for hematuria.   Musculoskeletal: Negative for neck pain.   Skin: Negative for pallor and rash.        RLE healing  "post-op again   Allergic/Immunologic: Negative for immunocompromised state.   Neurological: Negative for dizziness, weakness and headaches.   Hematological: Negative for adenopathy. Does not bruise/bleed easily.   Psychiatric/Behavioral: Negative for agitation and behavioral problems.       ECOG Performance Status: 0    Objective:      Vitals:   Vitals:    05/18/22 0825   BP: (!) 162/73   Pulse: (!) 53   Resp: 18   Temp: 98.1 °F (36.7 °C)   TempSrc: Oral   SpO2: 99%   Weight: 83.4 kg (183 lb 13.8 oz)   Height: 5' 11" (1.803 m)     BMI: Body mass index is 25.64 kg/m².    Physical Exam  Vitals reviewed.   Constitutional:       Appearance: He is well-developed.   HENT:      Head: Normocephalic and atraumatic.   Eyes:      Pupils: Pupils are equal, round, and reactive to light.   Cardiovascular:      Rate and Rhythm: Normal rate and regular rhythm.   Pulmonary:      Effort: Pulmonary effort is normal. No respiratory distress.      Breath sounds: Normal breath sounds. No stridor. No wheezing or rhonchi.   Chest:   Breasts:      Right: No supraclavicular adenopathy.      Left: No supraclavicular adenopathy.       Abdominal:      General: Bowel sounds are normal.      Palpations: Abdomen is soft. There is no mass.   Musculoskeletal:         General: Normal range of motion.      Cervical back: Normal range of motion and neck supple.   Lymphadenopathy:      Head:      Right side of head: No submandibular, posterior auricular or occipital adenopathy.      Left side of head: No submandibular, posterior auricular or occipital adenopathy.      Cervical: No cervical adenopathy.      Upper Body:      Right upper body: No supraclavicular adenopathy.      Left upper body: No supraclavicular adenopathy.      Lower Body: No right inguinal adenopathy.   Skin:     General: Skin is warm and dry.      Comments: Right thigh post-op wound healing nicely. Tender palpable 2 x 2 cm nodule at the superior aspect of the incision, likely reactive " LN though could certainly be a tumor deposit/malignant node   Neurological:      Mental Status: He is alert and oriented to person, place, and time.   Psychiatric:         Behavior: Behavior normal.         Laboratory Data:  Lab Visit on 05/17/2022   Component Date Value Ref Range Status    WBC 05/17/2022 6.17  3.90 - 12.70 K/uL Final    RBC 05/17/2022 4.97  4.60 - 6.20 M/uL Final    Hemoglobin 05/17/2022 15.4  14.0 - 18.0 g/dL Final    Hematocrit 05/17/2022 45.1  40.0 - 54.0 % Final    MCV 05/17/2022 91  82 - 98 fL Final    MCH 05/17/2022 31.0  27.0 - 31.0 pg Final    MCHC 05/17/2022 34.1  32.0 - 36.0 g/dL Final    RDW 05/17/2022 13.8  11.5 - 14.5 % Final    Platelets 05/17/2022 208  150 - 450 K/uL Final    MPV 05/17/2022 9.1 (A) 9.2 - 12.9 fL Final    Immature Granulocytes 05/17/2022 1.6 (A) 0.0 - 0.5 % Final    Gran # (ANC) 05/17/2022 4.1  1.8 - 7.7 K/uL Final    Immature Grans (Abs) 05/17/2022 0.10 (A) 0.00 - 0.04 K/uL Final    Comment: Mild elevation in immature granulocytes is non specific and   can be seen in a variety of conditions including stress response,   acute inflammation, trauma and pregnancy. Correlation with other   laboratory and clinical findings is essential.      Lymph # 05/17/2022 1.3  1.0 - 4.8 K/uL Final    Mono # 05/17/2022 0.5  0.3 - 1.0 K/uL Final    Eos # 05/17/2022 0.1  0.0 - 0.5 K/uL Final    Baso # 05/17/2022 0.08  0.00 - 0.20 K/uL Final    nRBC 05/17/2022 0  0 /100 WBC Final    Gran % 05/17/2022 65.7  38.0 - 73.0 % Final    Lymph % 05/17/2022 21.7  18.0 - 48.0 % Final    Mono % 05/17/2022 8.1  4.0 - 15.0 % Final    Eosinophil % 05/17/2022 1.6  0.0 - 8.0 % Final    Basophil % 05/17/2022 1.3  0.0 - 1.9 % Final    Differential Method 05/17/2022 Automated   Final    Sodium 05/17/2022 132 (A) 136 - 145 mmol/L Final    Potassium 05/17/2022 4.5  3.5 - 5.1 mmol/L Final    Chloride 05/17/2022 98  95 - 110 mmol/L Final    CO2 05/17/2022 27  23 - 29 mmol/L Final     "Glucose 05/17/2022 114 (A) 70 - 110 mg/dL Final    BUN 05/17/2022 13  2 - 20 mg/dL Final    Creatinine 05/17/2022 0.90  0.50 - 1.40 mg/dL Final    Calcium 05/17/2022 9.3  8.7 - 10.5 mg/dL Final    Total Protein 05/17/2022 7.4  6.0 - 8.4 g/dL Final    Albumin 05/17/2022 4.3  3.5 - 5.2 g/dL Final    Total Bilirubin 05/17/2022 0.6  0.1 - 1.0 mg/dL Final    Comment: For infants and newborns, interpretation of results should be based  on gestational age, weight and in agreement with clinical  observations.    Premature Infant recommended reference ranges:  Up to 24 hours.............<8.0 mg/dL  Up to 48 hours............<12.0 mg/dL  3-5 days..................<15.0 mg/dL  6-29 days.................<15.0 mg/dL      Alkaline Phosphatase 05/17/2022 101  38 - 126 U/L Final    AST 05/17/2022 25  15 - 46 U/L Final    ALT 05/17/2022 20  10 - 44 U/L Final    Anion Gap 05/17/2022 7 (A) 8 - 16 mmol/L Final    eGFR if African American 05/17/2022 >60.0  >60 mL/min/1.73 m^2 Final    eGFR if non African American 05/17/2022 >60.0  >60 mL/min/1.73 m^2 Final    Comment: Calculation used to obtain the estimated glomerular filtration  rate (eGFR) is the CKD-EPI equation.            Assessment:       1. Myxofibrosarcoma of skin        Oncologic History:      2021  May   5/27- US right thigh: showed 2 hypoechoic masses in the subcutaneous tissues of the right anterior thigh. Largest measured 1.1 x 0.9 x 1 cm. Read at the time was concern for lymphadenopathy.   June 6/4 - CXR: "Bilateral reticular opacities are present in a perihilar distribution which appears slightly more pronounced compared to prior exams."    6/11 - underwent excisional biopsy of the right thigh; pathology came back positive for myxofibrosarcoma, high grade. Path a 1.8 x 1.1 x 0.9 cm mass with an adjacent 0.4 x 0.4 x 0.3 cm mass.   July/Aug   - IMRT with Dr. Mcadams  2022 January 1/12 - PET: locoregional recurrence and concerning 0.9 cm RUL lesion   1/20 - " Path: excision on 2 recurrent lesions:  Myxofibrosarcoma, 1.9 cm at longest length, PD-L1 25%   1/31 - CT thigh: successful removal w/o evidence of recurrent disease  April 4/28 - recurrence, locally with metastatic deposit in LN        Plan:     Myxofibrosarcoma of the right thigh, recurrent   Long discussion again today about previously discussed OS at 5 years, being somewhere between 50%, especially with his 2x recurrent disease. The tumor was incompletely excised and we discussed treatment options today. We discussed case reports, phase II data and the FDA approval for pembro in this setting with RR around 50% and PFS benefits being similar. Likely no OS benefit though could improve QOL if we increase the time to progression. When discussing the risk vs benefit, I think there is utility in trying pembro and patient/wife are amenable.   - authorize pembrolizumab, schedule  - cbc, cmp and tsh prior to cycle 1  - will restage after 3-4 cycles of q3 week therapy     RUL lung abnormality  - will monitor on imaging     Discussed with Dr. Tapia.     Follow Up:  F/u once chemo is auth for C1 of pembro with cbc, cmp and TSH prior to visit    Ed Lucia MD  Hematology/Medical Oncology Fellow  [phone] (pager)

## 2022-05-20 DIAGNOSIS — C44.99 MYXOFIBROSARCOMA OF SKIN: Primary | ICD-10-CM

## 2022-05-23 ENCOUNTER — LAB VISIT (OUTPATIENT)
Dept: LAB | Facility: HOSPITAL | Age: 77
End: 2022-05-23
Payer: MEDICARE

## 2022-05-23 DIAGNOSIS — C44.99 MYXOFIBROSARCOMA OF SKIN: ICD-10-CM

## 2022-05-23 PROCEDURE — 36415 COLL VENOUS BLD VENIPUNCTURE: CPT

## 2022-05-25 ENCOUNTER — TELEPHONE (OUTPATIENT)
Dept: HEMATOLOGY/ONCOLOGY | Facility: CLINIC | Age: 77
End: 2022-05-25
Payer: MEDICARE

## 2022-05-25 NOTE — TELEPHONE ENCOUNTER
Spoke with patient.  He notes he got a letter from his insurance company stating dr parekh aborted the request for keytruda for him. He is calling to ask why. Patient states he called his insurance company and this was reiterated to him.     Nurse informed patient she does not see anything in the chart about this---nurse will speak with dr parekh and pre service to get updates.      Message routed to dr parekh and pre=-service

## 2022-05-25 NOTE — TELEPHONE ENCOUNTER
----- Message from Nicole Freedman sent at 5/25/2022  8:34 AM CDT -----  Contact: pt  Pt received a letter that stats pt has been case has been dismissed by .. Pt would like to know why..      Confirmed contact info below:  Contact Name: Zac Plunkett  Phone Number: [phone]

## 2022-05-30 ENCOUNTER — TELEPHONE (OUTPATIENT)
Dept: HEMATOLOGY/ONCOLOGY | Facility: CLINIC | Age: 77
End: 2022-05-30
Payer: MEDICARE

## 2022-05-30 NOTE — TELEPHONE ENCOUNTER
"----- Message from Cinthia Lui sent at 5/27/2022  9:44 AM CDT -----  Name Of Caller: Denia with [employer]    Contact Preference?: [phone] option 1     What is the nature of the call?: need verify if ok to change the test type to 360           Additional Notes:  "Thank you for all that you do for our patients'"     "

## 2022-05-31 ENCOUNTER — PATIENT MESSAGE (OUTPATIENT)
Dept: ADMINISTRATIVE | Facility: HOSPITAL | Age: 77
End: 2022-05-31
Payer: MEDICARE

## 2022-06-06 LAB — GUARDANT 360: NORMAL

## 2022-06-07 ENCOUNTER — TELEPHONE (OUTPATIENT)
Dept: HEMATOLOGY/ONCOLOGY | Facility: CLINIC | Age: 77
End: 2022-06-07
Payer: MEDICARE

## 2022-06-07 DIAGNOSIS — C44.99 MYXOFIBROSARCOMA OF SKIN: Primary | ICD-10-CM

## 2022-06-07 NOTE — TELEPHONE ENCOUNTER
Called to discuss [employer] and lack of high TMB. Pembro will not be covered by insurance at this time but will see if we can test his tumor on file.    Will continue with main campus follow up with Dr. Martines in July with updated restaging scans.     Patient thanked us for our help.     Ed Lucia MD  Hematology/Medical Oncology Fellow

## 2022-06-07 NOTE — TELEPHONE ENCOUNTER
----- Message from Ed Lucia MD sent at 6/7/2022 10:09 AM CDT -----  This patient needs ct right thigh and ct scheduled week of 718-22 with follow up with Dr. Edwards of. Thanks

## 2022-06-28 ENCOUNTER — OFFICE VISIT (OUTPATIENT)
Dept: FAMILY MEDICINE | Facility: CLINIC | Age: 77
End: 2022-06-28
Payer: MEDICARE

## 2022-06-28 ENCOUNTER — LAB VISIT (OUTPATIENT)
Dept: LAB | Facility: HOSPITAL | Age: 77
End: 2022-06-28
Attending: FAMILY MEDICINE
Payer: MEDICARE

## 2022-06-28 VITALS
BODY MASS INDEX: 25.82 KG/M2 | SYSTOLIC BLOOD PRESSURE: 139 MMHG | HEART RATE: 59 BPM | TEMPERATURE: 98 F | DIASTOLIC BLOOD PRESSURE: 68 MMHG | HEIGHT: 71 IN | OXYGEN SATURATION: 97 % | WEIGHT: 184.44 LBS

## 2022-06-28 DIAGNOSIS — E78.5 HYPERLIPIDEMIA, UNSPECIFIED HYPERLIPIDEMIA TYPE: ICD-10-CM

## 2022-06-28 DIAGNOSIS — I10 HYPERTENSION, BENIGN: ICD-10-CM

## 2022-06-28 DIAGNOSIS — Z00.00 ROUTINE PHYSICAL EXAMINATION: Primary | ICD-10-CM

## 2022-06-28 DIAGNOSIS — Z00.00 ROUTINE PHYSICAL EXAMINATION: ICD-10-CM

## 2022-06-28 DIAGNOSIS — Z12.5 ENCOUNTER FOR SCREENING FOR MALIGNANT NEOPLASM OF PROSTATE: ICD-10-CM

## 2022-06-28 DIAGNOSIS — C44.99 MYXOFIBROSARCOMA OF SKIN: ICD-10-CM

## 2022-06-28 LAB
ALBUMIN SERPL BCP-MCNC: 3.8 G/DL (ref 3.5–5.2)
ALP SERPL-CCNC: 100 U/L (ref 55–135)
ALT SERPL W/O P-5'-P-CCNC: 12 U/L (ref 10–44)
ANION GAP SERPL CALC-SCNC: 8 MMOL/L (ref 8–16)
AST SERPL-CCNC: 15 U/L (ref 10–40)
BASOPHILS # BLD AUTO: 0.08 K/UL (ref 0–0.2)
BASOPHILS NFR BLD: 1.3 % (ref 0–1.9)
BILIRUB SERPL-MCNC: 0.6 MG/DL (ref 0.1–1)
BUN SERPL-MCNC: 12 MG/DL (ref 8–23)
CALCIUM SERPL-MCNC: 10.7 MG/DL (ref 8.7–10.5)
CHLORIDE SERPL-SCNC: 97 MMOL/L (ref 95–110)
CHOLEST SERPL-MCNC: 135 MG/DL (ref 120–199)
CHOLEST/HDLC SERPL: 3.6 {RATIO} (ref 2–5)
CO2 SERPL-SCNC: 29 MMOL/L (ref 23–29)
COMPLEXED PSA SERPL-MCNC: 0.63 NG/ML (ref 0–4)
CREAT SERPL-MCNC: 0.9 MG/DL (ref 0.5–1.4)
DIFFERENTIAL METHOD: ABNORMAL
EOSINOPHIL # BLD AUTO: 0.1 K/UL (ref 0–0.5)
EOSINOPHIL NFR BLD: 1.1 % (ref 0–8)
ERYTHROCYTE [DISTWIDTH] IN BLOOD BY AUTOMATED COUNT: 14 % (ref 11.5–14.5)
EST. GFR  (AFRICAN AMERICAN): >60 ML/MIN/1.73 M^2
EST. GFR  (NON AFRICAN AMERICAN): >60 ML/MIN/1.73 M^2
ESTIMATED AVG GLUCOSE: 120 MG/DL (ref 68–131)
GLUCOSE SERPL-MCNC: 105 MG/DL (ref 70–110)
HBA1C MFR BLD: 5.8 % (ref 4–5.6)
HCT VFR BLD AUTO: 44.8 % (ref 40–54)
HDLC SERPL-MCNC: 37 MG/DL (ref 40–75)
HDLC SERPL: 27.4 % (ref 20–50)
HGB BLD-MCNC: 14.8 G/DL (ref 14–18)
IMM GRANULOCYTES # BLD AUTO: 0.09 K/UL (ref 0–0.04)
IMM GRANULOCYTES NFR BLD AUTO: 1.4 % (ref 0–0.5)
LDLC SERPL CALC-MCNC: 82.8 MG/DL (ref 63–159)
LYMPHOCYTES # BLD AUTO: 1.3 K/UL (ref 1–4.8)
LYMPHOCYTES NFR BLD: 20.4 % (ref 18–48)
MCH RBC QN AUTO: 31.2 PG (ref 27–31)
MCHC RBC AUTO-ENTMCNC: 33 G/DL (ref 32–36)
MCV RBC AUTO: 95 FL (ref 82–98)
MONOCYTES # BLD AUTO: 0.5 K/UL (ref 0.3–1)
MONOCYTES NFR BLD: 7.6 % (ref 4–15)
NEUTROPHILS # BLD AUTO: 4.3 K/UL (ref 1.8–7.7)
NEUTROPHILS NFR BLD: 68.2 % (ref 38–73)
NONHDLC SERPL-MCNC: 98 MG/DL
NRBC BLD-RTO: 0 /100 WBC
PLATELET # BLD AUTO: 241 K/UL (ref 150–450)
PMV BLD AUTO: 9.4 FL (ref 9.2–12.9)
POTASSIUM SERPL-SCNC: 4.7 MMOL/L (ref 3.5–5.1)
PROT SERPL-MCNC: 7 G/DL (ref 6–8.4)
RBC # BLD AUTO: 4.74 M/UL (ref 4.6–6.2)
SODIUM SERPL-SCNC: 134 MMOL/L (ref 136–145)
T4 FREE SERPL-MCNC: 0.97 NG/DL (ref 0.71–1.51)
TRIGL SERPL-MCNC: 76 MG/DL (ref 30–150)
TSH SERPL DL<=0.005 MIU/L-ACNC: 0.9 UIU/ML (ref 0.4–4)
WBC # BLD AUTO: 6.32 K/UL (ref 3.9–12.7)

## 2022-06-28 PROCEDURE — 83036 HEMOGLOBIN GLYCOSYLATED A1C: CPT | Performed by: FAMILY MEDICINE

## 2022-06-28 PROCEDURE — 1126F AMNT PAIN NOTED NONE PRSNT: CPT | Mod: CPTII,S$GLB,, | Performed by: FAMILY MEDICINE

## 2022-06-28 PROCEDURE — 99397 PER PM REEVAL EST PAT 65+ YR: CPT | Mod: S$GLB,,, | Performed by: FAMILY MEDICINE

## 2022-06-28 PROCEDURE — 99999 PR PBB SHADOW E&M-EST. PATIENT-LVL III: CPT | Mod: PBBFAC,,, | Performed by: FAMILY MEDICINE

## 2022-06-28 PROCEDURE — 36415 COLL VENOUS BLD VENIPUNCTURE: CPT | Mod: PO | Performed by: FAMILY MEDICINE

## 2022-06-28 PROCEDURE — 1159F PR MEDICATION LIST DOCUMENTED IN MEDICAL RECORD: ICD-10-PCS | Mod: CPTII,S$GLB,, | Performed by: FAMILY MEDICINE

## 2022-06-28 PROCEDURE — 85025 COMPLETE CBC W/AUTO DIFF WBC: CPT | Performed by: FAMILY MEDICINE

## 2022-06-28 PROCEDURE — 1159F MED LIST DOCD IN RCRD: CPT | Mod: CPTII,S$GLB,, | Performed by: FAMILY MEDICINE

## 2022-06-28 PROCEDURE — 1100F PTFALLS ASSESS-DOCD GE2>/YR: CPT | Mod: CPTII,S$GLB,, | Performed by: FAMILY MEDICINE

## 2022-06-28 PROCEDURE — 3075F PR MOST RECENT SYSTOLIC BLOOD PRESS GE 130-139MM HG: ICD-10-PCS | Mod: CPTII,S$GLB,, | Performed by: FAMILY MEDICINE

## 2022-06-28 PROCEDURE — 99999 PR PBB SHADOW E&M-EST. PATIENT-LVL III: ICD-10-PCS | Mod: PBBFAC,,, | Performed by: FAMILY MEDICINE

## 2022-06-28 PROCEDURE — 99397 PR PREVENTIVE VISIT,EST,65 & OVER: ICD-10-PCS | Mod: S$GLB,,, | Performed by: FAMILY MEDICINE

## 2022-06-28 PROCEDURE — 3288F PR FALLS RISK ASSESSMENT DOCUMENTED: ICD-10-PCS | Mod: CPTII,S$GLB,, | Performed by: FAMILY MEDICINE

## 2022-06-28 PROCEDURE — 3078F DIAST BP <80 MM HG: CPT | Mod: CPTII,S$GLB,, | Performed by: FAMILY MEDICINE

## 2022-06-28 PROCEDURE — 80061 LIPID PANEL: CPT | Performed by: FAMILY MEDICINE

## 2022-06-28 PROCEDURE — 84443 ASSAY THYROID STIM HORMONE: CPT | Performed by: FAMILY MEDICINE

## 2022-06-28 PROCEDURE — 84439 ASSAY OF FREE THYROXINE: CPT | Performed by: FAMILY MEDICINE

## 2022-06-28 PROCEDURE — 1100F PR PT FALLS ASSESS DOC 2+ FALLS/FALL W/INJURY/YR: ICD-10-PCS | Mod: CPTII,S$GLB,, | Performed by: FAMILY MEDICINE

## 2022-06-28 PROCEDURE — 80053 COMPREHEN METABOLIC PANEL: CPT | Performed by: FAMILY MEDICINE

## 2022-06-28 PROCEDURE — 3075F SYST BP GE 130 - 139MM HG: CPT | Mod: CPTII,S$GLB,, | Performed by: FAMILY MEDICINE

## 2022-06-28 PROCEDURE — 3288F FALL RISK ASSESSMENT DOCD: CPT | Mod: CPTII,S$GLB,, | Performed by: FAMILY MEDICINE

## 2022-06-28 PROCEDURE — 3078F PR MOST RECENT DIASTOLIC BLOOD PRESSURE < 80 MM HG: ICD-10-PCS | Mod: CPTII,S$GLB,, | Performed by: FAMILY MEDICINE

## 2022-06-28 PROCEDURE — 1126F PR PAIN SEVERITY QUANTIFIED, NO PAIN PRESENT: ICD-10-PCS | Mod: CPTII,S$GLB,, | Performed by: FAMILY MEDICINE

## 2022-06-28 PROCEDURE — 84153 ASSAY OF PSA TOTAL: CPT | Performed by: FAMILY MEDICINE

## 2022-06-28 RX ORDER — HYDROCODONE BITARTRATE AND ACETAMINOPHEN 5; 325 MG/1; MG/1
1 TABLET ORAL EVERY 8 HOURS PRN
Qty: 30 TABLET | Refills: 0 | Status: SHIPPED | OUTPATIENT
Start: 2022-06-28 | End: 2023-02-20

## 2022-06-28 NOTE — PROGRESS NOTES
[facility]  Progress Note    SUBJECTIVE:     Chief Complaint   Patient presents with    Annual Exam       HPI   Zac Plunkett  is a 76 y.o. male here for routine physical exam. Patient has no other new complaints/problems at this time.      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    Tobacco dependence      Past Surgical History:   Procedure Laterality Date    APPENDECTOMY      BACK SURGERY      HIP REPLACEMENT ARTHROPLASTY Left 8/6/2018    Procedure: ARTHROPLASTY, HIP REPLACEMENT;  Surgeon: Chapincito Sagastume MD;  Location: [facility] OR;  Service: Orthopedics;  Laterality: Left;    JOINT REPLACEMENT Left     hip    laminectomy l4-l5      SURGICAL REMOVAL OF LYMPH NODE Right 1/20/2022    Procedure: EXCISION, LYMPH NODE;  Surgeon: Arun Mao MD;  Location: [facility] OR;  Service: General;  Laterality: Right;  right thigh and right groin  RN PREOP 1/18/2022      PT CHOSE TO DO HOME TEST FOR COVID ON AM OF SURGERY     Family History   Problem Relation Age of Onset    Diabetes Mother     Heart disease Father     No Known Problems Sister     Cerebral palsy Brother     Epilepsy Brother     Melanoma Neg Hx     Psoriasis Neg Hx     Lupus Neg Hx     Eczema Neg Hx     Acne Neg Hx     Liver disease Neg Hx     Liver cancer Neg Hx     Cirrhosis Neg Hx     Colon polyps Neg Hx     Colon cancer Neg Hx      Social History     Tobacco Use    Smoking status: Current Every Day Smoker     Years: 20.00     Types: Cigars    Smokeless tobacco: Never Used    Tobacco comment: smokes cigars 2-3 a day, smoke cigarette for 15 years   Substance Use Topics    Alcohol use: No    Drug use:  No        Review of Systems   Constitutional: Negative for chills, diaphoresis and fever.   HENT: Negative for congestion, ear pain and sore throat.    Eyes: Negative for photophobia and discharge.   Respiratory: Negative for cough, shortness of breath and wheezing.    Cardiovascular: Negative for chest pain and palpitations.   Gastrointestinal: Negative for abdominal pain, constipation, diarrhea, nausea and vomiting.   Genitourinary: Negative for dysuria and hematuria.   Musculoskeletal: Positive for joint pain (R thigh). Negative for back pain and myalgias.   Skin: Negative for itching and rash.   Neurological: Negative for dizziness, sensory change, focal weakness, weakness and headaches.   All other systems reviewed and are negative.    OBJECTIVE:     Vitals:    06/28/22 0941   BP: 139/68   Pulse: (!) 59   Temp: 98.3 °F (36.8 °C)     Body mass index is 25.72 kg/m².    Physical Exam  Constitutional:       General: He is not in acute distress.     Appearance: He is not diaphoretic.   HENT:      Head: Normocephalic and atraumatic.      Right Ear: Tympanic membrane and ear canal normal. No hemotympanum. Tympanic membrane is not perforated, erythematous or bulging.      Left Ear: Tympanic membrane and ear canal normal. No hemotympanum. Tympanic membrane is not perforated, erythematous or bulging.      Mouth/Throat:      Pharynx: No oropharyngeal exudate.   Eyes:      Conjunctiva/sclera: Conjunctivae normal.      Pupils: Pupils are equal, round, and reactive to light.   Neck:      Thyroid: No thyromegaly.   Cardiovascular:      Rate and Rhythm: Normal rate and regular rhythm.      Heart sounds: Normal heart sounds. No murmur heard.    No friction rub. No gallop.   Pulmonary:      Effort: Pulmonary effort is normal. No respiratory distress.      Breath sounds: Normal breath sounds. No wheezing or rales.   Abdominal:      General: Bowel sounds are normal. There is no distension.      Palpations: Abdomen is soft.       Tenderness: There is no abdominal tenderness. There is no guarding or rebound.   Musculoskeletal:         General: No tenderness. Normal range of motion.   Lymphadenopathy:      Cervical: No cervical adenopathy.   Skin:     General: Skin is warm.      Findings: No erythema or rash.   Neurological:      Mental Status: He is alert and oriented to person, place, and time.         Old records were reviewed. Labs and/or images were independently reviewed.    ASSESSMENT     1. Routine physical examination    2. Hypertension, benign    3. Hyperlipidemia, unspecified hyperlipidemia type    4. Encounter for screening for malignant neoplasm of prostate     5. Myxofibrosarcoma of skin        PLAN:     Routine physical examination  -     CBC Auto Differential; Future  -     Comprehensive Metabolic Panel; Future  -     Hemoglobin A1C; Future  -     Lipid Panel; Future  -     TSH; Future  -     T4, Free; Future  -     PSA, Screening; Future; Expected date: 06/28/2022  -     We briefly discussed diet, exercise, and routine preventive exams. All questions and comments addressed.    Hypertension, benign  -     CBC Auto Differential; Future  -     Comprehensive Metabolic Panel; Future  -     Hemoglobin A1C; Future  -     Lipid Panel; Future  -     TSH; Future  -     T4, Free; Future  -     PSA, Screening; Future; Expected date: 06/28/2022  -     Stable. Continue current regimen.    Hyperlipidemia, unspecified hyperlipidemia type   -     Counseled patient about healthy diet, exercise habits, and to increase physical activity.    Encounter for screening for malignant neoplasm of prostate   -     PSA, Screening; Future; Expected date: 06/28/2022    Myxofibrosarcoma of skin  -     HYDROcodone-acetaminophen (NORCO) 5-325 mg per tablet; Take 1 tablet by mouth every 8 (eight) hours as needed for Pain.  Dispense: 30 tablet; Refill: 0  -     Stable. Continue current regimen. Unable to wean at this time but am monitoring closely for any drug  toxicity. Checked .      RTC PRN    Deondre Waters MD  06/28/2022 9:57 AM

## 2022-07-19 ENCOUNTER — HOSPITAL ENCOUNTER (OUTPATIENT)
Dept: RADIOLOGY | Facility: HOSPITAL | Age: 77
Discharge: HOME | End: 2022-07-19
Payer: MEDICARE

## 2022-07-19 ENCOUNTER — OFFICE VISIT (OUTPATIENT)
Dept: HEMATOLOGY/ONCOLOGY | Facility: CLINIC | Age: 77
End: 2022-07-19
Payer: MEDICARE

## 2022-07-19 VITALS
TEMPERATURE: 99 F | HEIGHT: 71 IN | BODY MASS INDEX: 25.79 KG/M2 | SYSTOLIC BLOOD PRESSURE: 155 MMHG | WEIGHT: 184.19 LBS | HEART RATE: 67 BPM | DIASTOLIC BLOOD PRESSURE: 69 MMHG | OXYGEN SATURATION: 98 % | RESPIRATION RATE: 18 BRPM

## 2022-07-19 DIAGNOSIS — C44.99 MYXOFIBROSARCOMA OF SKIN: ICD-10-CM

## 2022-07-19 DIAGNOSIS — C77.4 SECONDARY MALIGNANCY OF INGUINAL LYMPH NODES: ICD-10-CM

## 2022-07-19 DIAGNOSIS — D84.81 IMMUNODEFICIENCY SECONDARY TO NEOPLASM: ICD-10-CM

## 2022-07-19 DIAGNOSIS — D49.9 IMMUNODEFICIENCY SECONDARY TO NEOPLASM: ICD-10-CM

## 2022-07-19 DIAGNOSIS — C49.9 PRIMARY MYXOFIBROSARCOMA: Primary | ICD-10-CM

## 2022-07-19 DIAGNOSIS — R91.1 LUNG NODULE: ICD-10-CM

## 2022-07-19 DIAGNOSIS — R53.83 FATIGUE, UNSPECIFIED TYPE: ICD-10-CM

## 2022-07-19 DIAGNOSIS — G89.3 CANCER RELATED PAIN: ICD-10-CM

## 2022-07-19 DIAGNOSIS — R91.1 NODULE OF RIGHT LUNG: ICD-10-CM

## 2022-07-19 PROCEDURE — 3077F PR MOST RECENT SYSTOLIC BLOOD PRESSURE >= 140 MM HG: ICD-10-PCS | Mod: CPTII,S$GLB,, | Performed by: INTERNAL MEDICINE

## 2022-07-19 PROCEDURE — 73701 CT THIGH WITH CONTRAST RIGHT: ICD-10-PCS | Mod: 26,RT,, | Performed by: INTERNAL MEDICINE

## 2022-07-19 PROCEDURE — 1160F RVW MEDS BY RX/DR IN RCRD: CPT | Mod: CPTII,S$GLB,, | Performed by: INTERNAL MEDICINE

## 2022-07-19 PROCEDURE — 71250 CT THORAX DX C-: CPT | Mod: TC

## 2022-07-19 PROCEDURE — 3078F PR MOST RECENT DIASTOLIC BLOOD PRESSURE < 80 MM HG: ICD-10-PCS | Mod: CPTII,S$GLB,, | Performed by: INTERNAL MEDICINE

## 2022-07-19 PROCEDURE — 3288F PR FALLS RISK ASSESSMENT DOCUMENTED: ICD-10-PCS | Mod: CPTII,S$GLB,, | Performed by: INTERNAL MEDICINE

## 2022-07-19 PROCEDURE — 99215 PR OFFICE/OUTPT VISIT, EST, LEVL V, 40-54 MIN: ICD-10-PCS | Mod: S$GLB,,, | Performed by: INTERNAL MEDICINE

## 2022-07-19 PROCEDURE — 73701 CT LOWER EXTREMITY W/DYE: CPT | Mod: 26,RT,, | Performed by: INTERNAL MEDICINE

## 2022-07-19 PROCEDURE — 99999 PR PBB SHADOW E&M-EST. PATIENT-LVL III: ICD-10-PCS | Mod: PBBFAC,,, | Performed by: INTERNAL MEDICINE

## 2022-07-19 PROCEDURE — 1159F PR MEDICATION LIST DOCUMENTED IN MEDICAL RECORD: ICD-10-PCS | Mod: CPTII,S$GLB,, | Performed by: INTERNAL MEDICINE

## 2022-07-19 PROCEDURE — 3078F DIAST BP <80 MM HG: CPT | Mod: CPTII,S$GLB,, | Performed by: INTERNAL MEDICINE

## 2022-07-19 PROCEDURE — 1100F PR PT FALLS ASSESS DOC 2+ FALLS/FALL W/INJURY/YR: ICD-10-PCS | Mod: CPTII,S$GLB,, | Performed by: INTERNAL MEDICINE

## 2022-07-19 PROCEDURE — 25500020 PHARM REV CODE 255

## 2022-07-19 PROCEDURE — 71250 CT THORAX DX C-: CPT | Mod: 26,,, | Performed by: RADIOLOGY

## 2022-07-19 PROCEDURE — 99215 OFFICE O/P EST HI 40 MIN: CPT | Mod: S$GLB,,, | Performed by: INTERNAL MEDICINE

## 2022-07-19 PROCEDURE — 1100F PTFALLS ASSESS-DOCD GE2>/YR: CPT | Mod: CPTII,S$GLB,, | Performed by: INTERNAL MEDICINE

## 2022-07-19 PROCEDURE — 71250 CT CHEST WITHOUT CONTRAST: ICD-10-PCS | Mod: 26,,, | Performed by: RADIOLOGY

## 2022-07-19 PROCEDURE — 1160F PR REVIEW ALL MEDS BY PRESCRIBER/CLIN PHARMACIST DOCUMENTED: ICD-10-PCS | Mod: CPTII,S$GLB,, | Performed by: INTERNAL MEDICINE

## 2022-07-19 PROCEDURE — 99999 PR PBB SHADOW E&M-EST. PATIENT-LVL III: CPT | Mod: PBBFAC,,, | Performed by: INTERNAL MEDICINE

## 2022-07-19 PROCEDURE — 3077F SYST BP >= 140 MM HG: CPT | Mod: CPTII,S$GLB,, | Performed by: INTERNAL MEDICINE

## 2022-07-19 PROCEDURE — 1159F MED LIST DOCD IN RCRD: CPT | Mod: CPTII,S$GLB,, | Performed by: INTERNAL MEDICINE

## 2022-07-19 PROCEDURE — 73701 CT LOWER EXTREMITY W/DYE: CPT | Mod: TC,RT

## 2022-07-19 PROCEDURE — 3288F FALL RISK ASSESSMENT DOCD: CPT | Mod: CPTII,S$GLB,, | Performed by: INTERNAL MEDICINE

## 2022-07-19 RX ADMIN — IOHEXOL 100 ML: 350 INJECTION, SOLUTION INTRAVENOUS at 09:07

## 2022-07-19 NOTE — PROGRESS NOTES
"                                                         PROGRESS NOTE    Subjective:       Patient ID: Zac Plunkett is a 77 y.o. male.    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 JD. TMB 6.3    Oncologic History:  Prior patient of Dr Lucia's  2021  May              5/27- US right thigh: showed 2 hypoechoic masses in the subcutaneous tissues of the right anterior thigh. Largest measured 1.1 x 0.9 x 1 cm. Read at the time was concern for lymphadenopathy.   June 6/4 - CXR: "Bilateral reticular opacities are present in a perihilar distribution which appears slightly more pronounced compared to prior exams."               6/11 - underwent excisional biopsy of the right thigh; pathology came back positive for myxofibrosarcoma, high grade. Path a 1.8 x 1.1 x 0.9 cm mass with an adjacent 0.4 x 0.4 x 0.3 cm mass.   July/Aug              - IMRT with Dr. Mcadams  2022 January 1/12 - PET: locoregional recurrence and concerning 0.9 cm RUL lesion              1/20 - Path: excision on 2 recurrent lesions:  Myxofibrosarcoma, 1.9 cm at longest length, PD-L1 25%              1/31 - CT thigh: successful removal w/o evidence of recurrent disease  April 4/28 - recurrence, locally with metastatic deposit in LN. S/p excision on 4/28/22. Path: right thigh superior mass: Myxofibrosarcoma, high grade with focal tumor necrosis, incompletely excised. A portion of the lesion is suggestive of an involved lymph node exhibiting metastatic sarcoma with extranodal extension.  Sarcoma is focally present within inked but otherwise unspecified peripheral margins.   July 7/19 - CT thigh showed local recurrence and a 0.7 cm R inguinal lymph node    Interval History:   Mr Plunkett returns for follow up. Has been having pain in the right " leg since surgery in April. Takes 4 tylenols in the morning which controls the pain. He noted occasional weakness on the right leg. Denies other complaints.     ECO    ROS:   A ten-point system review is obtained and negative except for what was stated in the Interval History.     Physical Examination:   Vital signs reviewed.   General: well hydrated, well developed, in no acute distress  HEENT: normocephalic, PERRLA, EOMI, anicteric sclerae, oropharynx clear  Neck: supple, no JVD, thyromegaly, cervical or supraclavicular lymphadenopathy  Lungs: clear breath sounds bilaterally, no wheezing, rales, or rhonchi  Heart: RRR, no M/R/G  Abdomen: soft, no tenderness, non-distended, no hepatosplenomegaly, mass, or hernia. BS present  Extremities: no clubbing, cyanosis, or edema  Skin: no rash, ulcer, or open wounds  Neuro: alert and oriented x 4, no focal neuro deficit  Psych: pleasant and appropriate mood and affect    Objective:     Laboratory Data:  Labs reviewed.     Imaging Data:  CT right thigh 22:  Postoperative changes from multifocal mass excision in the right anterior thigh.     New 0.7 cm rounded subcutaneous nodule in the right groin in the region of a previous morphologically normal lymph node, concerning for talon metastasis.  Attention on follow-up imaging is recommended.     Enlarging 1.7 cm subcutaneous nodule in the operative bed in the proximal anterior thigh, concerning for local recurrence.    CT chest from today pending    Assessment and Plan:     1. 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 Immunodeficiency secondary to neoplasm    4. Nodule of right lung    5. Cancer related pain      1-3  - Mr Plunkett is a 78 yo man with recurrent myxofibrosarcoma of the right thigh, has had 3 resections and IMRT  - discussed CT thigh results. Local recurrence with spread to R inguinal lymph node  - discussed with patient that I will check with Dr Mao to see if he  is a surgical candidate. If not, treatment will be palliative systemic therapy. Discussed chemotherapy and toxicities. Patient is very clear that he is not interested in any chemotherapy. He understands that treatment is palliative and does not want to get side effects from chemo. Dr Lucia ordered pembro before. Pembro has efficacy in phase II studies and is recommended on NCCN Guidelines for patients with myxofibrosarcoma. Pembro was denied by insurance. We got PD-L1 testing done. It is positive at 25%. I will check with insurance company again  - gave patient handouts on pembro and votrient. Will see him in one month. If pembro approved by then, will start pembro. If not, we will go over the details of votrient and see if he is interested in trying.     4.  - followed by Dr De Anda. Last CT showed resolution of the lung nodule.   - f/u on CT chest from today    5.  - discussed adding narcotics such as oxycodone. Patient wants to hold off as tylenol is adequate right now. Encouraged patient to call should symptoms get worse.     Follow-up:     Return in one month  Knows to call in the interval if any problems arise.    I spent 45 minutes reviewing the chart, interpreting laboratory result and imaging data, coordinating patient's care, with at least 50% of the time on face-to-face counseling.       Electronically signed by Ed Martines      Route Chart for Scheduling    Med Onc Chart Routing  Urgent    Follow up with physician 1 month. Please verify with PA re keytruda. see me in one month with CBC, CMP, TSH, see me then get keytruda   Follow up with NICOLLE    Infusion scheduling note    Injection scheduling note    Labs CBC, CMP and TSH   Lab interval:     Imaging    Pharmacy appointment    Other referrals          Treatment Plan Information   OP PEMBROLIZUMAB 200MG Q3W   Ed Martines MD   Upcoming Treatment Dates - OP PEMBROLIZUMAB 200MG Q3W    6/1/2022       Chemotherapy       pembrolizumab (KEYTRUDA) 200 mg in  sodium chloride 0.9% 108 mL infusion  6/22/2022       Chemotherapy       pembrolizumab (KEYTRUDA) 200 mg in sodium chloride 0.9% 108 mL infusion  7/13/2022       Chemotherapy       pembrolizumab (KEYTRUDA) 200 mg in sodium chloride 0.9% 108 mL infusion  8/3/2022       Chemotherapy       pembrolizumab (KEYTRUDA) 200 mg in sodium chloride 0.9% 108 mL infusion

## 2022-08-02 ENCOUNTER — PATIENT MESSAGE (OUTPATIENT)
Dept: HEMATOLOGY/ONCOLOGY | Facility: CLINIC | Age: 77
End: 2022-08-02
Payer: MEDICARE

## 2022-08-15 ENCOUNTER — TELEPHONE (OUTPATIENT)
Dept: HEMATOLOGY/ONCOLOGY | Facility: CLINIC | Age: 77
End: 2022-08-15
Payer: MEDICARE

## 2022-08-15 NOTE — TELEPHONE ENCOUNTER
"Spoke to patient, discussed that he will receive immunotherapy and that the "chemo" he is seeing in the appointment column is the name of one of our infusion schedules.    Discussed about authorization, it appears that an appeal has been started. Will speak to provider for an update.   "

## 2022-08-15 NOTE — TELEPHONE ENCOUNTER
"----- Message from Cinthia Lui sent at 8/15/2022  8:06 AM CDT -----  Consult/Advisory:           Name Of Caller: self    Contact Preference?: [phone]    What is the nature of the call?: requesting a call back to discuss if Keytruda is chemo or immunotherapy           Additional Notes:  "Thank you for all that you do for our patients'"     "

## 2022-08-16 ENCOUNTER — DOCUMENTATION ONLY (OUTPATIENT)
Dept: HEMATOLOGY/ONCOLOGY | Facility: CLINIC | Age: 77
End: 2022-08-16
Payer: MEDICARE

## 2022-08-16 NOTE — PROGRESS NOTES
Social Work Consult    Name:Zac Plunkett  : 1945  MRN: [id number]    Referral:Medication Assistance     FLEX received consult from Chelsea Boucher RN. Patient's Chemo meds were denied by insurance and patient is scheduled to receive treatment on .    FLEX obtained the expedited appeals number for [employer], [id number]. Number provided to Chelsea Boucher RN and Dr Martines. FLEX requested update after call.

## 2022-08-17 DIAGNOSIS — G89.3 CANCER RELATED PAIN: ICD-10-CM

## 2022-08-17 DIAGNOSIS — C49.9 SOFT TISSUE SARCOMA: Primary | ICD-10-CM

## 2022-08-17 RX ORDER — OXYCODONE HYDROCHLORIDE 5 MG/1
5 TABLET ORAL EVERY 6 HOURS PRN
Qty: 120 TABLET | Refills: 0 | Status: SHIPPED | OUTPATIENT
Start: 2022-08-17 | End: 2023-01-18

## 2022-08-18 ENCOUNTER — PATIENT MESSAGE (OUTPATIENT)
Dept: HEMATOLOGY/ONCOLOGY | Facility: CLINIC | Age: 77
End: 2022-08-18
Payer: MEDICARE

## 2022-08-18 DIAGNOSIS — C49.9 SOFT TISSUE SARCOMA: ICD-10-CM

## 2022-08-18 DIAGNOSIS — C49.9 PRIMARY MYXOFIBROSARCOMA: ICD-10-CM

## 2022-08-18 DIAGNOSIS — C44.99 MYXOFIBROSARCOMA OF SKIN: Primary | ICD-10-CM

## 2022-08-25 ENCOUNTER — TELEPHONE (OUTPATIENT)
Dept: HEMATOLOGY/ONCOLOGY | Facility: CLINIC | Age: 77
End: 2022-08-25
Payer: MEDICARE

## 2022-08-25 ENCOUNTER — TELEPHONE (OUTPATIENT)
Dept: SURGERY | Facility: CLINIC | Age: 77
End: 2022-08-25
Payer: MEDICARE

## 2022-08-25 ENCOUNTER — PATIENT MESSAGE (OUTPATIENT)
Dept: HEMATOLOGY/ONCOLOGY | Facility: CLINIC | Age: 77
End: 2022-08-25
Payer: MEDICARE

## 2022-08-25 ENCOUNTER — DOCUMENTATION ONLY (OUTPATIENT)
Dept: HEMATOLOGY/ONCOLOGY | Facility: CLINIC | Age: 77
End: 2022-08-25
Payer: MEDICARE

## 2022-08-25 NOTE — PROGRESS NOTES
"Pt was called at 9:11 am to discuss concerns with the incision site of the R proximal thigh from the excision of myxofibrosarcoma. This was performed by Dr. Mao on 4/28/2022.     He states that the area is about the size of a dime and appears to becoming more discolored and red, "a big red spot". There is also moderate tenderness to the area when he lays down and with sitting up. The pain improves when he is standing and walking. He is taking Tylenol at this time to help with the pain. He denies any fever, nausea or vomiting. Overall, he feels fine. There is also no associated discharge or bleeding from the site.     It should be noted that he has had concerns with this area previously back in May. He was believed to have cellulitis of the same area, and placed on Clindamycin for 1 week. He completed the course of antibiotics but felt that the area was not improving. He was then seen by his Oncologist on 5/18/2022 and the area had improved.      I have advised him to send a picture of the area to us for an acute evaluation to ensure that emergent care is not needed. Pt is agreeable. I have also offered him an Urgent Care visit with me tomorrow to further evaluate the area if he is stable. He will let us know if he would like to be seen tomorrow. He is scheduled to have a f/u with Dr. Mao on 8/31/2022 as well. Will continue to f/u with patient and await photo. Pt was discussed with Dr. Martines as well.     Pte voiced understanding of the above encounter and treatment plan. All thoughtful questions were answered to their satisfaction. Pte was advised to notify the care team or proceed to the ER if signs and symptoms worsen.       ADEEL Hurley, PA-C  Physician Assistant Certified  Dept of Hematology/Oncology  PA-C to Dr. Martines, Dr. Nicolas and Dr. Sun  "

## 2022-08-25 NOTE — TELEPHONE ENCOUNTER
Spoke with  regarding scheduling an appt with Dr. Mao. Appt scheduled for 8/29 at 11:30. Pt confirmed date and time.

## 2022-08-25 NOTE — TELEPHONE ENCOUNTER
Called patient. Wound has been there since surgery, got worse. Discussed with patient that I am not convinced that it is infected. He is scheduled to see Dr Mao next week. Asked patient to keep that appointment. Discussed with patient I am afraid it is the cancer growing. Still waiting to hear back from [employer]'s decision on the 3rd appeal of keytruda. Will also try to get patient assistance. Discussed if we can't get leonardo in the end, I do recc hospice for symptom control. Patient agrees. Right now pain is manageable by taking oxycodone at night. Asked him to call if pain gets worse. Patient understands and agrees with the plan.

## 2022-08-29 ENCOUNTER — OFFICE VISIT (OUTPATIENT)
Dept: SURGERY | Facility: CLINIC | Age: 77
End: 2022-08-29
Payer: MEDICARE

## 2022-08-29 VITALS
DIASTOLIC BLOOD PRESSURE: 77 MMHG | OXYGEN SATURATION: 99 % | WEIGHT: 185.5 LBS | HEIGHT: 71 IN | HEART RATE: 58 BPM | BODY MASS INDEX: 25.97 KG/M2 | SYSTOLIC BLOOD PRESSURE: 136 MMHG

## 2022-08-29 DIAGNOSIS — C49.9 PRIMARY MYXOFIBROSARCOMA: Primary | ICD-10-CM

## 2022-08-29 PROCEDURE — 1159F PR MEDICATION LIST DOCUMENTED IN MEDICAL RECORD: ICD-10-PCS | Mod: CPTII,S$GLB,, | Performed by: SURGERY

## 2022-08-29 PROCEDURE — 1101F PT FALLS ASSESS-DOCD LE1/YR: CPT | Mod: CPTII,S$GLB,, | Performed by: SURGERY

## 2022-08-29 PROCEDURE — 3078F PR MOST RECENT DIASTOLIC BLOOD PRESSURE < 80 MM HG: ICD-10-PCS | Mod: CPTII,S$GLB,, | Performed by: SURGERY

## 2022-08-29 PROCEDURE — 3078F DIAST BP <80 MM HG: CPT | Mod: CPTII,S$GLB,, | Performed by: SURGERY

## 2022-08-29 PROCEDURE — 3075F SYST BP GE 130 - 139MM HG: CPT | Mod: CPTII,S$GLB,, | Performed by: SURGERY

## 2022-08-29 PROCEDURE — 99999 PR PBB SHADOW E&M-EST. PATIENT-LVL III: ICD-10-PCS | Mod: PBBFAC,,, | Performed by: SURGERY

## 2022-08-29 PROCEDURE — 99214 OFFICE O/P EST MOD 30 MIN: CPT | Mod: S$GLB,,, | Performed by: SURGERY

## 2022-08-29 PROCEDURE — 3288F FALL RISK ASSESSMENT DOCD: CPT | Mod: CPTII,S$GLB,, | Performed by: SURGERY

## 2022-08-29 PROCEDURE — 1125F PR PAIN SEVERITY QUANTIFIED, PAIN PRESENT: ICD-10-PCS | Mod: CPTII,S$GLB,, | Performed by: SURGERY

## 2022-08-29 PROCEDURE — 1125F AMNT PAIN NOTED PAIN PRSNT: CPT | Mod: CPTII,S$GLB,, | Performed by: SURGERY

## 2022-08-29 PROCEDURE — 99999 PR PBB SHADOW E&M-EST. PATIENT-LVL III: CPT | Mod: PBBFAC,,, | Performed by: SURGERY

## 2022-08-29 PROCEDURE — 1101F PR PT FALLS ASSESS DOC 0-1 FALLS W/OUT INJ PAST YR: ICD-10-PCS | Mod: CPTII,S$GLB,, | Performed by: SURGERY

## 2022-08-29 PROCEDURE — 3288F PR FALLS RISK ASSESSMENT DOCUMENTED: ICD-10-PCS | Mod: CPTII,S$GLB,, | Performed by: SURGERY

## 2022-08-29 PROCEDURE — 99214 PR OFFICE/OUTPT VISIT, EST, LEVL IV, 30-39 MIN: ICD-10-PCS | Mod: S$GLB,,, | Performed by: SURGERY

## 2022-08-29 PROCEDURE — 3075F PR MOST RECENT SYSTOLIC BLOOD PRESS GE 130-139MM HG: ICD-10-PCS | Mod: CPTII,S$GLB,, | Performed by: SURGERY

## 2022-08-29 PROCEDURE — 1159F MED LIST DOCD IN RCRD: CPT | Mod: CPTII,S$GLB,, | Performed by: SURGERY

## 2022-08-29 NOTE — PROGRESS NOTES
Surgery Clinic Note    Zac Plunkett is a 77 y.o. year old male in clinic today for follow up of right thigh myxofibrosarcoma, recurrent. Recently has had recurrence on thigh, prior incision site, now with skin changes and it is painful due to rubbing on the groin. Previously had decided on no surgery however with no painful symptoms is re-evaluating. I considered wound issues and need for further larger excision which would be palliative. He is on the fence about surgery. I told him we may benefit from a second opinion and I can arrange for f/u for second opinion soon, he is interested.  No f/c/n/v/sob/cp    ROS:  Negative except above    Imagin22  Impression:     Postoperative changes from multifocal mass excision in the right anterior thigh.     New 0.7 cm rounded subcutaneous nodule in the right groin in the region of a previous morphologically normal lymph node, concerning for talon metastasis.  Attention on follow-up imaging is recommended.     Enlarging 1.7 cm subcutaneous nodule in the operative bed in the proximal anterior thigh, concerning for local recurrence.    PE:  Vitals:    22 1114   BP: 136/77   Pulse: (!) 58     NAD  Right thigh: palpable mass, 3x4 cm, fixed to muscle, with a dry eschar of the skin.  Distal incisions with no palpable masses    A/P:  Zac Plunkett is a 77 y.o. year old male w recurrent myxofibrosarcoma, recurrent mass causing pain and skin changes    -will see surgical oncology partner Dr Perdue for second opinion tomorrow.  -will discuss further surgical plans or treatment plans thereafter    Arun Mao  General Surgery - [facility]  2022

## 2022-08-30 ENCOUNTER — OFFICE VISIT (OUTPATIENT)
Dept: SURGERY | Facility: CLINIC | Age: 77
End: 2022-08-30
Payer: MEDICARE

## 2022-08-30 VITALS
SYSTOLIC BLOOD PRESSURE: 147 MMHG | BODY MASS INDEX: 25.96 KG/M2 | DIASTOLIC BLOOD PRESSURE: 91 MMHG | HEIGHT: 71 IN | WEIGHT: 185.44 LBS

## 2022-08-30 DIAGNOSIS — C49.9 PRIMARY MYXOFIBROSARCOMA: Primary | ICD-10-CM

## 2022-08-30 PROCEDURE — 3288F FALL RISK ASSESSMENT DOCD: CPT | Mod: CPTII,S$GLB,, | Performed by: SURGERY

## 2022-08-30 PROCEDURE — 1101F PR PT FALLS ASSESS DOC 0-1 FALLS W/OUT INJ PAST YR: ICD-10-PCS | Mod: CPTII,S$GLB,, | Performed by: SURGERY

## 2022-08-30 PROCEDURE — 1125F PR PAIN SEVERITY QUANTIFIED, PAIN PRESENT: ICD-10-PCS | Mod: CPTII,S$GLB,, | Performed by: SURGERY

## 2022-08-30 PROCEDURE — 3077F SYST BP >= 140 MM HG: CPT | Mod: CPTII,S$GLB,, | Performed by: SURGERY

## 2022-08-30 PROCEDURE — 3080F PR MOST RECENT DIASTOLIC BLOOD PRESSURE >= 90 MM HG: ICD-10-PCS | Mod: CPTII,S$GLB,, | Performed by: SURGERY

## 2022-08-30 PROCEDURE — 1159F PR MEDICATION LIST DOCUMENTED IN MEDICAL RECORD: ICD-10-PCS | Mod: CPTII,S$GLB,, | Performed by: SURGERY

## 2022-08-30 PROCEDURE — 3288F PR FALLS RISK ASSESSMENT DOCUMENTED: ICD-10-PCS | Mod: CPTII,S$GLB,, | Performed by: SURGERY

## 2022-08-30 PROCEDURE — 99214 PR OFFICE/OUTPT VISIT, EST, LEVL IV, 30-39 MIN: ICD-10-PCS | Mod: S$GLB,,, | Performed by: SURGERY

## 2022-08-30 PROCEDURE — 1159F MED LIST DOCD IN RCRD: CPT | Mod: CPTII,S$GLB,, | Performed by: SURGERY

## 2022-08-30 PROCEDURE — 99214 OFFICE O/P EST MOD 30 MIN: CPT | Mod: S$GLB,,, | Performed by: SURGERY

## 2022-08-30 PROCEDURE — 1125F AMNT PAIN NOTED PAIN PRSNT: CPT | Mod: CPTII,S$GLB,, | Performed by: SURGERY

## 2022-08-30 PROCEDURE — 3080F DIAST BP >= 90 MM HG: CPT | Mod: CPTII,S$GLB,, | Performed by: SURGERY

## 2022-08-30 PROCEDURE — 1101F PT FALLS ASSESS-DOCD LE1/YR: CPT | Mod: CPTII,S$GLB,, | Performed by: SURGERY

## 2022-08-30 PROCEDURE — 3077F PR MOST RECENT SYSTOLIC BLOOD PRESSURE >= 140 MM HG: ICD-10-PCS | Mod: CPTII,S$GLB,, | Performed by: SURGERY

## 2022-08-30 NOTE — PROGRESS NOTES
Subjective:       Patient ID: Zac Plunkett is a 77 y.o. male.    Chief Complaint: Follow-up (Patient presents follow up per Dr. 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    HENT: Negative.     Eyes: Negative.    Respiratory: Negative.     Cardiovascular: Negative.    Gastrointestinal: Negative.    Endocrine: Negative.    Musculoskeletal: Negative.    Integumentary:  Negative.   Allergic/Immunologic: Negative.    Neurological: Negative.    Hematological: Negative.    Psychiatric/Behavioral: Negative.     All other systems reviewed and are negative.      Objective:      Physical Exam  Vitals reviewed.   Constitutional:       Appearance: He is well-developed.   HENT:      Head: Normocephalic and atraumatic.      Right Ear: External ear normal.      Left Ear: External ear normal.      Nose: Nose normal.   Eyes:      Conjunctiva/sclera: Conjunctivae normal.      Pupils: Pupils are equal, round, and reactive to light.   Cardiovascular:      Rate and Rhythm: Normal rate and regular rhythm.      Heart sounds: Normal heart sounds.   Pulmonary:      Effort: Pulmonary effort is normal.      Breath sounds: Normal breath sounds.   Abdominal:      General: Bowel sounds are normal.      Palpations: Abdomen is soft.   Musculoskeletal:         General: Normal range of motion.      Cervical back: Normal range of motion and neck supple.        Legs:    Skin:     General: Skin is warm and dry.   Neurological:      Mental Status: He is alert and oriented to person, place, and time.      Deep Tendon Reflexes: Reflexes are normal and symmetric.   Psychiatric:         Behavior: Behavior normal.         Thought Content: Thought content normal.       Assessment:       Problem List Items Addressed This Visit       Primary myxofibrosarcoma - Primary       With recurrence in his groin  Plan:       I  discussed his options for care with he and his wife.  I will send him to see radiation oncology and then we will come up with a comprehensive plan

## 2022-08-31 ENCOUNTER — PATIENT MESSAGE (OUTPATIENT)
Dept: SURGERY | Facility: CLINIC | Age: 77
End: 2022-08-31
Payer: MEDICARE

## 2022-09-02 ENCOUNTER — PATIENT MESSAGE (OUTPATIENT)
Dept: SURGERY | Facility: CLINIC | Age: 77
End: 2022-09-02
Payer: MEDICARE

## 2022-09-07 ENCOUNTER — PATIENT MESSAGE (OUTPATIENT)
Dept: SURGERY | Facility: CLINIC | Age: 77
End: 2022-09-07
Payer: MEDICARE

## 2022-09-08 ENCOUNTER — OFFICE VISIT (OUTPATIENT)
Dept: SURGERY | Facility: CLINIC | Age: 77
End: 2022-09-08
Payer: MEDICARE

## 2022-09-08 VITALS
WEIGHT: 184.19 LBS | BODY MASS INDEX: 25.79 KG/M2 | DIASTOLIC BLOOD PRESSURE: 70 MMHG | HEIGHT: 71 IN | SYSTOLIC BLOOD PRESSURE: 134 MMHG

## 2022-09-08 DIAGNOSIS — R22.32 MASS OF LEFT HAND: Primary | ICD-10-CM

## 2022-09-08 PROCEDURE — 1126F PR PAIN SEVERITY QUANTIFIED, NO PAIN PRESENT: ICD-10-PCS | Mod: CPTII,S$GLB,, | Performed by: SURGERY

## 2022-09-08 PROCEDURE — 1159F MED LIST DOCD IN RCRD: CPT | Mod: CPTII,S$GLB,, | Performed by: SURGERY

## 2022-09-08 PROCEDURE — 3075F SYST BP GE 130 - 139MM HG: CPT | Mod: CPTII,S$GLB,, | Performed by: SURGERY

## 2022-09-08 PROCEDURE — 1101F PT FALLS ASSESS-DOCD LE1/YR: CPT | Mod: CPTII,S$GLB,, | Performed by: SURGERY

## 2022-09-08 PROCEDURE — 99999 PR PBB SHADOW E&M-EST. PATIENT-LVL III: ICD-10-PCS | Mod: PBBFAC,,, | Performed by: SURGERY

## 2022-09-08 PROCEDURE — 99214 OFFICE O/P EST MOD 30 MIN: CPT | Mod: S$GLB,,, | Performed by: SURGERY

## 2022-09-08 PROCEDURE — 99214 PR OFFICE/OUTPT VISIT, EST, LEVL IV, 30-39 MIN: ICD-10-PCS | Mod: S$GLB,,, | Performed by: SURGERY

## 2022-09-08 PROCEDURE — 3078F DIAST BP <80 MM HG: CPT | Mod: CPTII,S$GLB,, | Performed by: SURGERY

## 2022-09-08 PROCEDURE — 1101F PR PT FALLS ASSESS DOC 0-1 FALLS W/OUT INJ PAST YR: ICD-10-PCS | Mod: CPTII,S$GLB,, | Performed by: SURGERY

## 2022-09-08 PROCEDURE — 3075F PR MOST RECENT SYSTOLIC BLOOD PRESS GE 130-139MM HG: ICD-10-PCS | Mod: CPTII,S$GLB,, | Performed by: SURGERY

## 2022-09-08 PROCEDURE — 3078F PR MOST RECENT DIASTOLIC BLOOD PRESSURE < 80 MM HG: ICD-10-PCS | Mod: CPTII,S$GLB,, | Performed by: SURGERY

## 2022-09-08 PROCEDURE — 99999 PR PBB SHADOW E&M-EST. PATIENT-LVL III: CPT | Mod: PBBFAC,,, | Performed by: SURGERY

## 2022-09-08 PROCEDURE — 1126F AMNT PAIN NOTED NONE PRSNT: CPT | Mod: CPTII,S$GLB,, | Performed by: SURGERY

## 2022-09-08 PROCEDURE — 3288F FALL RISK ASSESSMENT DOCD: CPT | Mod: CPTII,S$GLB,, | Performed by: SURGERY

## 2022-09-08 PROCEDURE — 3288F PR FALLS RISK ASSESSMENT DOCUMENTED: ICD-10-PCS | Mod: CPTII,S$GLB,, | Performed by: SURGERY

## 2022-09-08 PROCEDURE — 1159F PR MEDICATION LIST DOCUMENTED IN MEDICAL RECORD: ICD-10-PCS | Mod: CPTII,S$GLB,, | Performed by: SURGERY

## 2022-09-08 NOTE — PROGRESS NOTES
Surgery Clinic Note    Zac Plunkett is a 77 y.o. year old male in clinic today for follow up of right thigh mass, sarcoma, now with new left thumb mass. Noted a few weeks ago and has been growing. Painful. Regarding groin mass, he is again interested in surgery but we will await discussion with radiation oncologist, appt scheduled for next week, to discuss options.  No f/c/n/v/sob/cp    ROS:  Negative except above    PE:  Vitals:    09/08/22 1511   BP: 134/70     NAD  No belabored breathing  Right groin mass palpable, 3x4 cm, fixed to deep muscle with skin changes.  Left thumb base with a palpable nodule, 1x2 cm, appears fixed    A/P:  Zac Plunkett is a 77 y.o. year old male w myxofibrosarcoma of right thigh, recurrent, with new left thumb mass    -xray hand  -refer to hand surgery  -keep rad onc appt next week  -will discuss options after the above appts.    Arun Mao  General Surgery - [facility]  9/8/2022

## 2022-09-12 ENCOUNTER — PATIENT MESSAGE (OUTPATIENT)
Dept: HEMATOLOGY/ONCOLOGY | Facility: CLINIC | Age: 77
End: 2022-09-12
Payer: MEDICARE

## 2022-09-15 ENCOUNTER — TELEPHONE (OUTPATIENT)
Dept: HEMATOLOGY/ONCOLOGY | Facility: CLINIC | Age: 77
End: 2022-09-15
Payer: MEDICARE

## 2022-09-15 NOTE — TELEPHONE ENCOUNTER
"----- Message from Rito Hightower sent at 9/15/2022  9:00 AM CDT -----  Consult/Advisory:          Name Of Caller:  Isa ([facility])      Contact Preference?:  [phone]      Fax  [phone]      Provider Name: Conrad      Does patient feel the need to be seen today? No      What is the nature of the call?: Calling to speak w/ Chelsea about denial for Ketruda prior auth. Stating the prior auth needed is through the medical, not part D          Additional Notes:  "Thank you for all that you do for our patients"      "

## 2022-09-16 ENCOUNTER — OFFICE VISIT (OUTPATIENT)
Dept: RADIATION ONCOLOGY | Facility: CLINIC | Age: 77
End: 2022-09-16
Payer: MEDICARE

## 2022-09-16 ENCOUNTER — PATIENT MESSAGE (OUTPATIENT)
Dept: SURGERY | Facility: CLINIC | Age: 77
End: 2022-09-16
Payer: MEDICARE

## 2022-09-16 VITALS
WEIGHT: 186.31 LBS | OXYGEN SATURATION: 98 % | TEMPERATURE: 97 F | SYSTOLIC BLOOD PRESSURE: 148 MMHG | HEIGHT: 71 IN | DIASTOLIC BLOOD PRESSURE: 68 MMHG | RESPIRATION RATE: 17 BRPM | BODY MASS INDEX: 26.08 KG/M2 | HEART RATE: 59 BPM

## 2022-09-16 DIAGNOSIS — F17.200 NEEDS SMOKING CESSATION EDUCATION: ICD-10-CM

## 2022-09-16 DIAGNOSIS — C44.99 MYXOFIBROSARCOMA OF SKIN: ICD-10-CM

## 2022-09-16 DIAGNOSIS — C49.9 PRIMARY MYXOFIBROSARCOMA: Primary | ICD-10-CM

## 2022-09-16 PROCEDURE — 1159F PR MEDICATION LIST DOCUMENTED IN MEDICAL RECORD: ICD-10-PCS | Mod: CPTII,S$GLB,, | Performed by: STUDENT IN AN ORGANIZED HEALTH CARE EDUCATION/TRAINING PROGRAM

## 2022-09-16 PROCEDURE — 1101F PR PT FALLS ASSESS DOC 0-1 FALLS W/OUT INJ PAST YR: ICD-10-PCS | Mod: CPTII,S$GLB,, | Performed by: STUDENT IN AN ORGANIZED HEALTH CARE EDUCATION/TRAINING PROGRAM

## 2022-09-16 PROCEDURE — 1159F MED LIST DOCD IN RCRD: CPT | Mod: CPTII,S$GLB,, | Performed by: STUDENT IN AN ORGANIZED HEALTH CARE EDUCATION/TRAINING PROGRAM

## 2022-09-16 PROCEDURE — 99499 RISK ADDL DX/OHS AUDIT: ICD-10-PCS | Mod: HCNC,S$GLB,, | Performed by: STUDENT IN AN ORGANIZED HEALTH CARE EDUCATION/TRAINING PROGRAM

## 2022-09-16 PROCEDURE — 99999 PR PBB SHADOW E&M-EST. PATIENT-LVL III: ICD-10-PCS | Mod: PBBFAC,,, | Performed by: STUDENT IN AN ORGANIZED HEALTH CARE EDUCATION/TRAINING PROGRAM

## 2022-09-16 PROCEDURE — 3077F SYST BP >= 140 MM HG: CPT | Mod: CPTII,S$GLB,, | Performed by: STUDENT IN AN ORGANIZED HEALTH CARE EDUCATION/TRAINING PROGRAM

## 2022-09-16 PROCEDURE — 99213 OFFICE O/P EST LOW 20 MIN: CPT | Mod: S$GLB,,, | Performed by: STUDENT IN AN ORGANIZED HEALTH CARE EDUCATION/TRAINING PROGRAM

## 2022-09-16 PROCEDURE — 99999 PR PBB SHADOW E&M-EST. PATIENT-LVL III: CPT | Mod: PBBFAC,,, | Performed by: STUDENT IN AN ORGANIZED HEALTH CARE EDUCATION/TRAINING PROGRAM

## 2022-09-16 PROCEDURE — 1126F PR PAIN SEVERITY QUANTIFIED, NO PAIN PRESENT: ICD-10-PCS | Mod: CPTII,S$GLB,, | Performed by: STUDENT IN AN ORGANIZED HEALTH CARE EDUCATION/TRAINING PROGRAM

## 2022-09-16 PROCEDURE — 3078F PR MOST RECENT DIASTOLIC BLOOD PRESSURE < 80 MM HG: ICD-10-PCS | Mod: CPTII,S$GLB,, | Performed by: STUDENT IN AN ORGANIZED HEALTH CARE EDUCATION/TRAINING PROGRAM

## 2022-09-16 PROCEDURE — 3288F PR FALLS RISK ASSESSMENT DOCUMENTED: ICD-10-PCS | Mod: CPTII,S$GLB,, | Performed by: STUDENT IN AN ORGANIZED HEALTH CARE EDUCATION/TRAINING PROGRAM

## 2022-09-16 PROCEDURE — 99213 PR OFFICE/OUTPT VISIT, EST, LEVL III, 20-29 MIN: ICD-10-PCS | Mod: S$GLB,,, | Performed by: STUDENT IN AN ORGANIZED HEALTH CARE EDUCATION/TRAINING PROGRAM

## 2022-09-16 PROCEDURE — 3077F PR MOST RECENT SYSTOLIC BLOOD PRESSURE >= 140 MM HG: ICD-10-PCS | Mod: CPTII,S$GLB,, | Performed by: STUDENT IN AN ORGANIZED HEALTH CARE EDUCATION/TRAINING PROGRAM

## 2022-09-16 PROCEDURE — 1101F PT FALLS ASSESS-DOCD LE1/YR: CPT | Mod: CPTII,S$GLB,, | Performed by: STUDENT IN AN ORGANIZED HEALTH CARE EDUCATION/TRAINING PROGRAM

## 2022-09-16 PROCEDURE — 3288F FALL RISK ASSESSMENT DOCD: CPT | Mod: CPTII,S$GLB,, | Performed by: STUDENT IN AN ORGANIZED HEALTH CARE EDUCATION/TRAINING PROGRAM

## 2022-09-16 PROCEDURE — 99499 UNLISTED E&M SERVICE: CPT | Mod: HCNC,S$GLB,, | Performed by: STUDENT IN AN ORGANIZED HEALTH CARE EDUCATION/TRAINING PROGRAM

## 2022-09-16 PROCEDURE — 1126F AMNT PAIN NOTED NONE PRSNT: CPT | Mod: CPTII,S$GLB,, | Performed by: STUDENT IN AN ORGANIZED HEALTH CARE EDUCATION/TRAINING PROGRAM

## 2022-09-16 PROCEDURE — 3078F DIAST BP <80 MM HG: CPT | Mod: CPTII,S$GLB,, | Performed by: STUDENT IN AN ORGANIZED HEALTH CARE EDUCATION/TRAINING PROGRAM

## 2022-09-16 NOTE — PROGRESS NOTES
09/16/2022    Radiation Oncology Follow-Up Visit    Prior Radiation History:  Treatment Summary  Course: C1 R leg 2021    Treatment Site Ref. ID Energy Dose/Fx (Gy) #Fx Dose Correction (Gy) Total Dose (Gy) Start Date End Date Elapsed Days   IM LEG_R 50Gy PTV 50 cyndi 6X 2 25 / 25 0 50 7/21/2021 8/25/2021 35   IM LEG_R 60Gy PTV 60jb 6X 2 1 / 5 0 2 8/26/2021 8/26/2021 0     HPI: As per previous notes, Mr. 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 He eventually underwent an ultrasound of the R thigh that demonstrated 2 hypoechoic masses in the subcutaneous tissues of the R anterior thigh, largest 1.1 cm.      He underwent excisional biopsy of the R thigh masses on 6/11/21 with Dr. Mao, with pathology demonstrating high grade myxofibrosarcoma. PET/CT demonstrated no evidence of recurrent or metastatic disease and he completed 52 Gy in 26 fractions (out of planned 60 Gy in 30 fractions) via IMRT, with treatment ending sooner than expected due to [place] and subsequent treatment delays.    He unfortunately developed recurrent disease in the R anterior thigh in 1/22 undergoing additional excision of his suspicious palpable lesions with Dr. 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 He has also followed with Dr. White regarding a RUL opacity 0.9 cm in size, with recommendations for continued surveillance imaging.    He underwent CT thigh and chest imaging on 4/8/22 and 4/13/22, which demonstrated no new disease in the chest but worsening adenopathy in the R thigh. He underwent additional resection with Dr. Mao on 4/28/22, with final pathology demonstrating recurrent high grade myxofibrosarcoma, with positive margins. He met with Dr. Mcadams who did not recommend any  additional local therapy at the time.    He underwent repeat scans on 7/19/22 with new nodularity in the right groin and operative bed. And CT chest without metastatic disease.  He has declined chemotherapy and discussed pembro but has been declined multiple times from [facility]. This is under appeal.    Today he presents to discuss radiation to the symptomatic right groin mass. The lesion has grown significantly over the past 2 months. The lesion is bothersome, requiring pain meds at night and makes it difficult to ride in the car. Pain is better when standing and walking.  Denies weight gain. He also has a quickly growing lesion on the left thumb and is to undergo evaluation by hand surgery.       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    Tobacco dependence        Past Surgical History:   Procedure Laterality Date    APPENDECTOMY      BACK SURGERY      HIP REPLACEMENT ARTHROPLASTY Left 8/6/2018    Procedure: ARTHROPLASTY, HIP REPLACEMENT;  Surgeon: Chapincito Sagastume MD;  Location: [facility] OR;  Service: Orthopedics;  Laterality: Left;    JOINT REPLACEMENT Left     hip    laminectomy l4-l5      SURGICAL REMOVAL OF LYMPH NODE Right 1/20/2022    Procedure: EXCISION, LYMPH NODE;  Surgeon: Arun Mao MD;  Location: [facility] OR;  Service: General;  Laterality: Right;  right thigh and right groin  RN PREOP 1/18/2022      PT CHOSE TO DO HOME TEST FOR COVID ON AM OF SURGERY       Social History     Tobacco Use    Smoking status: Every Day     Types: Cigars    Smokeless tobacco: Never    Tobacco comments:     smokes cigars 2-3 a day, smoke cigarette for 15 years   Substance Use Topics    Alcohol use: No    Drug use: No       Cancer-related family history is negative for Melanoma  and Liver cancer.    Current Outpatient Medications on File Prior to Visit   Medication Sig Dispense Refill    atorvastatin (LIPITOR) 80 MG tablet Take 1 tablet (80 mg total) by mouth once daily. 90 tablet 3    finasteride (PROSCAR) 5 mg tablet TAKE 1 TABLET BY MOUTH EVERY DAY 90 tablet 0    hydroCHLOROthiazide (HYDRODIURIL) 25 MG tablet TAKE 1 TABLET BY MOUTH DAILY 90 tablet 0    HYDROcodone-acetaminophen (NORCO) 5-325 mg per tablet Take 1 tablet by mouth every 8 (eight) hours as needed for Pain. 30 tablet 0    oxyCODONE (ROXICODONE) 5 MG immediate release tablet Take 1 tablet (5 mg total) by mouth every 6 (six) hours as needed for Pain. 120 tablet 0    SELENIUM ORAL Take by mouth.      tamsulosin (FLOMAX) 0.4 mg Cap TAKE 1 CAPSULE BY MOUTH EVERY DAY 90 capsule 0    TURMERIC ORAL Take by mouth.      VITAMIN E ACETATE ORAL Take by mouth.       No current facility-administered medications on file prior to visit.       Review of patient's allergies indicates:  No Known Allergies    Review of Systems   Constitutional:  Negative for chills, fever, malaise/fatigue and weight loss.   Eyes:  Negative for blurred vision and double vision.   Respiratory:  Negative for cough, hemoptysis and sputum production.    Cardiovascular:  Negative for chest pain and orthopnea.   Gastrointestinal:  Negative for abdominal pain, heartburn, nausea and vomiting.   Genitourinary:  Negative for dysuria and urgency.   Musculoskeletal:  Negative for back pain and neck pain.   Skin:  Negative for itching and rash.   Neurological:  Negative for dizziness, sensory change, focal weakness and weakness.   Psychiatric/Behavioral:  Negative for depression. The patient is not nervous/anxious.       Vital Signs:   Vitals:    09/16/22 1310   BP: (!) 148/68   Pulse: (!) 59   Resp: 17   Temp: 97.1 °F (36.2 °C)       ECOG Performance Status: 1 - Ambulates, capable of light work    Physical exam:  Constitutional:  Well-developed, well-nourished and in no  acute distress.   Pulmonary: Work of breathing appropriate.  Cardiovascular: No appreciated edema.  GI: Non-distended.    Musculoskeletal: Range of motion appears normal in all 4 extremities.  Extremities: thumb mass, ~1.5cm in diameter with clear drainage. No significant surrounding erythema .  R groin with a ~4cm ulcerated mass along a prior incision. The mass is fixed in the craniocaudal direction but is able to be moved laterally. No appreciated drainage. There is a small sub cm subcutaneous nodule medial and cranial to this lesion.   Neurologic: Patient is alert and oriented x 3.  Normal mood and affect.      Data Review  Imaging: CT thigh from 7/19/22 was personally reviewed with Mr. Trejo      Assessment:  This is a 77 y.o. male with recurrent high grade extremity sarcoma status post surgery, adjuvant XRT and multiple additional surgeries for recurrent disease. He has a symptomatic groin recurrence, presenting to discuss focal radiotherapy to this lesion.    Plan:  Given that sarcoma is generally considered a radioresistant histology, I favor resection, if he is a candidate. The patient was favoring resection as well.   Agree with hand surgery eval for left thumb mass.  I will plan to see him back on a PRN basis. However, if he is found to be ineligible for surgery I would he happy to re evaluate for palliative RT.     Shun Morales MD  Radiation Oncology

## 2022-09-20 ENCOUNTER — TELEPHONE (OUTPATIENT)
Dept: HEMATOLOGY/ONCOLOGY | Facility: CLINIC | Age: 77
End: 2022-09-20
Payer: MEDICARE

## 2022-09-20 ENCOUNTER — ANESTHESIA EVENT (OUTPATIENT)
Dept: SURGERY | Facility: HOSPITAL | Age: 77
End: 2022-09-20
Payer: MEDICARE

## 2022-09-20 DIAGNOSIS — R22.41 MASS OF RIGHT THIGH: Primary | ICD-10-CM

## 2022-09-20 DIAGNOSIS — C49.9 PRIMARY MYXOFIBROSARCOMA: ICD-10-CM

## 2022-09-20 RX ORDER — SODIUM CHLORIDE 9 MG/ML
INJECTION, SOLUTION INTRAVENOUS CONTINUOUS
Status: CANCELLED | OUTPATIENT
Start: 2022-09-20

## 2022-09-20 NOTE — TELEPHONE ENCOUNTER
----- Message from Mary Diane sent at 9/20/2022 10:45 AM CDT -----  Regarding: Pharmacy calling for assistance  Reason for Call:  They did receive the Part D Prior Authorization for the Keytruda prescription but they need need the Medical Prior Authorization      Name of caller:  Isa    Pharmacy name and phone number  [facility]  [phone]  Fax:  [phone]  Case #[id number]

## 2022-09-22 ENCOUNTER — HOSPITAL ENCOUNTER (OUTPATIENT)
Dept: PREADMISSION TESTING | Facility: HOSPITAL | Age: 77
Discharge: HOME | End: 2022-09-22
Attending: SURGERY
Payer: MEDICARE

## 2022-09-22 VITALS
HEART RATE: 49 BPM | DIASTOLIC BLOOD PRESSURE: 70 MMHG | BODY MASS INDEX: 26.02 KG/M2 | RESPIRATION RATE: 16 BRPM | SYSTOLIC BLOOD PRESSURE: 129 MMHG | TEMPERATURE: 97 F | WEIGHT: 185.88 LBS | OXYGEN SATURATION: 98 % | HEIGHT: 71 IN

## 2022-09-22 DIAGNOSIS — R22.41 MASS OF RIGHT THIGH: ICD-10-CM

## 2022-09-22 DIAGNOSIS — Z01.818 PREOPERATIVE TESTING: Primary | ICD-10-CM

## 2022-09-22 LAB
ANION GAP SERPL CALC-SCNC: 8 MMOL/L (ref 8–16)
BASOPHILS # BLD AUTO: 0.12 K/UL (ref 0–0.2)
BASOPHILS NFR BLD: 1.2 % (ref 0–1.9)
BUN SERPL-MCNC: 11 MG/DL (ref 8–23)
CALCIUM SERPL-MCNC: 9.9 MG/DL (ref 8.7–10.5)
CHLORIDE SERPL-SCNC: 100 MMOL/L (ref 95–110)
CO2 SERPL-SCNC: 27 MMOL/L (ref 23–29)
CREAT SERPL-MCNC: 0.9 MG/DL (ref 0.5–1.4)
DIFFERENTIAL METHOD: ABNORMAL
EOSINOPHIL # BLD AUTO: 0.2 K/UL (ref 0–0.5)
EOSINOPHIL NFR BLD: 1.7 % (ref 0–8)
ERYTHROCYTE [DISTWIDTH] IN BLOOD BY AUTOMATED COUNT: 13.3 % (ref 11.5–14.5)
EST. GFR  (NO RACE VARIABLE): >60 ML/MIN/1.73 M^2
GLUCOSE SERPL-MCNC: 105 MG/DL (ref 70–110)
HCT VFR BLD AUTO: 40.6 % (ref 40–54)
HGB BLD-MCNC: 14.1 G/DL (ref 14–18)
IMM GRANULOCYTES # BLD AUTO: 0.17 K/UL (ref 0–0.04)
IMM GRANULOCYTES NFR BLD AUTO: 1.7 % (ref 0–0.5)
LYMPHOCYTES # BLD AUTO: 1.3 K/UL (ref 1–4.8)
LYMPHOCYTES NFR BLD: 12.9 % (ref 18–48)
MCH RBC QN AUTO: 31 PG (ref 27–31)
MCHC RBC AUTO-ENTMCNC: 34.7 G/DL (ref 32–36)
MCV RBC AUTO: 89 FL (ref 82–98)
MONOCYTES # BLD AUTO: 0.6 K/UL (ref 0.3–1)
MONOCYTES NFR BLD: 6.1 % (ref 4–15)
NEUTROPHILS # BLD AUTO: 7.8 K/UL (ref 1.8–7.7)
NEUTROPHILS NFR BLD: 76.4 % (ref 38–73)
NRBC BLD-RTO: 0 /100 WBC
PLATELET # BLD AUTO: 204 K/UL (ref 150–450)
PMV BLD AUTO: 9.1 FL (ref 9.2–12.9)
POTASSIUM SERPL-SCNC: 3.6 MMOL/L (ref 3.5–5.1)
RBC # BLD AUTO: 4.55 M/UL (ref 4.6–6.2)
SODIUM SERPL-SCNC: 135 MMOL/L (ref 136–145)
WBC # BLD AUTO: 10.25 K/UL (ref 3.9–12.7)

## 2022-09-22 PROCEDURE — 80048 BASIC METABOLIC PNL TOTAL CA: CPT | Performed by: SURGERY

## 2022-09-22 PROCEDURE — 85025 COMPLETE CBC W/AUTO DIFF WBC: CPT | Performed by: SURGERY

## 2022-09-22 NOTE — DISCHARGE INSTRUCTIONS
Before 7 AM, enter through the Emergency Entrance..   After 7 AM enter through the Main Entrance.      Your procedure  is scheduled for ___9/26/2022_______.    Call [phone] between 2pm and 5pm on __9/23/2022_____to find out your arrival time for the day of surgery.    You may use the main entrance to the hospital on the [facility] side, or the entrance that is next to the [facility].    You may have one visitor.  No children allowed.     You will be going to the Same Day Surgery Unit on the 2nd floor of the hospital.    Important instructions:  Do not eat anything after midnight.  You may have plain water, non carbonated.  You may also have Gatorade or Powerade after midnight.    Stop all fluids 2 hours before your surgery.    It is okay to brush your teeth.  Do not have gum, candy or mints.    SEE MEDICATION SHEET.   TAKE MEDICATIONS AS DIRECTED WITH SIPS OF WATER.      Do not take any diabetic medication on the morning of surgery unless instructed to do so by your doctor or pre op nurse.    STOP taking Aspirin, Ibuprofen,  Advil, Motrin, Mobic(meloxicam), Aleve (naproxen), Fish oil, and Vitamin E for at least 7 days before your surgery.     You may take Tylenol if needed which is not a blood thinner.    Please shower the night before and the morning of your surgery.      Use Hibiclens soap as instructed by your pre op nurse.   Please place clean linens on your bed the night before surgery. Please wear fresh clean clothing after each shower.    No shaving of procedural area at least 4-5 days before surgery due to increased risk of skin irritation and/or possible infection.    Contact lenses and removable denture work may not be worn during your procedure.    You may wear deodorant only. If you are having breast surgery, do not wear deodorant on the operative side.    Do not wear powder, body lotion, perfume/cologne or make-up.    Do not wear any jewelry or have any metal on your body.    You will be  asked to remove any dentures or partials for the procedure.    If you are going home on the same day of surgery, you must arrange for a family member or a friend to drive you home.  Public transportation is prohibited.  You will not be able to drive home if you were given anesthesia or sedation.    Patients who want to have their Post-op prescriptions filled from our in-house [facility], bring a Credit/Debit Card or cash with you. A co-pay may be required.  The pharmacy closes at 5:30 pm.    Wear loose fitting clothes allowing for bandages.    Please leave money and valuables home.      You may bring your cell phone.    Call the doctor if fever or illness should occur before your surgery.    Call [phone] to contact us here if needed.

## 2022-09-22 NOTE — ANESTHESIA PREPROCEDURE EVALUATION
09/22/2022  Zac Plunkett is a 77 y.o., male   To undergo Procedure(s) (LRB):  EXCISION, NEOPLASM, RIGHT PROXIMAL THIGH / GROIN (Right)     Denies CP/SOB/GERD/MI/CVA/URI symptoms.  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    Tobacco dependence        Past Surgical History:  Past Surgical History:   Procedure Laterality Date    APPENDECTOMY      BACK SURGERY      HIP REPLACEMENT ARTHROPLASTY Left 8/6/2018    Procedure: ARTHROPLASTY, HIP REPLACEMENT;  Surgeon: Chapincito Sagastume MD;  Location: [facility] OR;  Service: Orthopedics;  Laterality: Left;    JOINT REPLACEMENT Left     hip    laminectomy l4-l5      SURGICAL REMOVAL OF LYMPH NODE Right 1/20/2022    Procedure: EXCISION, LYMPH NODE;  Surgeon: Arun Mao MD;  Location: [facility] OR;  Service: General;  Laterality: Right;  right thigh and right groin  RN PREOP 1/18/2022      PT CHOSE TO DO HOME TEST FOR COVID ON AM OF SURGERY       Social History:  Social History     Socioeconomic History    Marital status:    Tobacco Use    Smoking status: Every Day     Types: Cigars    Smokeless tobacco: Never    Tobacco comments:     smokes cigars 2-3 a day, smoke cigarette for 15 years   Substance and Sexual Activity    Alcohol use: No    Drug use: No    Sexual activity: Yes     Partners: Female       Medications:  No current facility-administered medications on file prior to encounter.     Current Outpatient Medications on File Prior to Encounter   Medication Sig Dispense Refill    atorvastatin (LIPITOR) 80 MG tablet Take 1 tablet (80 mg total)  by mouth once daily. 90 tablet 3    finasteride (PROSCAR) 5 mg tablet TAKE 1 TABLET BY MOUTH EVERY DAY 90 tablet 0    hydroCHLOROthiazide (HYDRODIURIL) 25 MG tablet TAKE 1 TABLET BY MOUTH DAILY 90 tablet 0    HYDROcodone-acetaminophen (NORCO) 5-325 mg per tablet Take 1 tablet by mouth every 8 (eight) hours as needed for Pain. 30 tablet 0    oxyCODONE (ROXICODONE) 5 MG immediate release tablet Take 1 tablet (5 mg total) by mouth every 6 (six) hours as needed for Pain. 120 tablet 0    SELENIUM ORAL Take by mouth.      TURMERIC ORAL Take by mouth.      VITAMIN E ACETATE ORAL Take by mouth.         Allergies:  Review of patient's allergies indicates:  No Known Allergies    Active Problems:  Patient Active Problem List   Diagnosis    Hypertension, benign    Hyperlipidemia    Lumbar spondylosis    History of colon polyps    Prominent aorta    Enlarged prostate    Degenerative joint disease of left hip    BPH (benign prostatic hyperplasia)    S/P hip replacement, left    Tobacco abuse    Mass of right thigh    Soft tissue sarcoma    Myxofibrosarcoma of skin    Primary myxofibrosarcoma       Diagnostic Studies:   Latest Reference Range & Units 09/22/22 14:50   WBC 3.90 - 12.70 K/uL 10.25   RBC 4.60 - 6.20 M/uL 4.55 (L)   Hemoglobin 14.0 - 18.0 g/dL 14.1   Hematocrit 40.0 - 54.0 % 40.6   MCV 82 - 98 fL 89   MCH 27.0 - 31.0 pg 31.0   MCHC 32.0 - 36.0 g/dL 34.7   RDW 11.5 - 14.5 % 13.3   Platelets 150 - 450 K/uL 204   MPV 9.2 - 12.9 fL 9.1 (L)   Gran % 38.0 - 73.0 % 76.4 (H)   Lymph % 18.0 - 48.0 % 12.9 (L)   Mono % 4.0 - 15.0 % 6.1   Eosinophil % 0.0 - 8.0 % 1.7   Basophil % 0.0 - 1.9 % 1.2   Immature Granulocytes 0.0 - 0.5 % 1.7 (H)   Gran # (ANC) 1.8 - 7.7 K/uL 7.8 (H)   Lymph # 1.0 - 4.8 K/uL 1.3   Mono # 0.3 - 1.0 K/uL 0.6   Eos # 0.0 - 0.5 K/uL 0.2   Baso # 0.00 - 0.20 K/uL 0.12   Immature Grans (Abs) 0.00 - 0.04 K/uL 0.17 (H)   nRBC 0 /100 WBC 0   Differential Method  Automated      Latest Reference  Range & Units 09/22/22 14:50   Sodium 136 - 145 mmol/L 135 (L)   Potassium 3.5 - 5.1 mmol/L 3.6   Chloride 95 - 110 mmol/L 100   CO2 23 - 29 mmol/L 27   Anion Gap 8 - 16 mmol/L 8   BUN 8 - 23 mg/dL 11   Creatinine 0.5 - 1.4 mg/dL 0.9   eGFR >60 mL/min/1.73 m^2 >60     EKG (9/22/22):  Sinus bradycardia with sinus arrhythmia   Nonspecific T wave abnormality     24 Hour Vitals:  Temp:  [36.7 °C (98.1 °F)] 36.7 °C (98.1 °F)  Pulse:  [55] 55  Resp:  [17] 17  SpO2:  [98 %] 98 %  BP: (128)/(71) 128/71   See Nursing Charting For Additional Vitals    Pre-op Assessment    I have reviewed the Patient Summary Reports.     I have reviewed the Nursing Notes. I have reviewed the NPO Status.   I have reviewed the Medications.     Review of Systems  Anesthesia Hx:  Hx of Anesthetic complications  Denies Family Hx of Anesthesia complications.  Personal Hx of Anesthesia complications, Post-Operative Nausea/Vomiting, in the past, but not with recent anesthetics / prophylaxis   Social:  No Alcohol Use, Smoker cigars   Hematology/Oncology:  Hematology Normal      Current/Recent Cancer. Oncology Comments: R groin/thigh sarcoma    EENT/Dental:EENT/Dental Normal   Cardiovascular:   Exercise tolerance: good Hypertension hyperlipidemia ECG has been reviewed.  Functional Capacity good / => 4 METS    Pulmonary:  Pulmonary Normal    Renal/:   Chronic Renal Disease BPH    Hepatic/GI:  Hepatic/GI Normal    Musculoskeletal:   Arthritis     Neurological:  Neurology Normal    Endocrine:  Endocrine Normal    Dermatological:  Skin Normal    Psych:  Psychiatric Normal           Physical Exam  General: Well nourished    Airway:  Mallampati: II   Mouth Opening: Normal  TM Distance: Normal      Dental:  Dentures    Chest/Lungs:  Clear to auscultation, Normal Respiratory Rate    Heart:  Rate: Normal  Rhythm: Regular Rhythm        Anesthesia Plan  Type of Anesthesia, risks & benefits discussed:    Anesthesia Type: Gen ETT  Intra-op Monitoring Plan:  Standard ASA Monitors  Post Op Pain Control Plan: multimodal analgesia and IV/PO Opioids PRN  Induction:  IV  Airway Plan: Direct and Video, Post-Induction  Informed Consent: Informed consent signed with the Patient and all parties understand the risks and agree with anesthesia plan.  All questions answered. Patient consented to blood products? Yes  ASA Score: 2  Anesthesia Plan Notes:   GA with OETT  Standard ASA monitors  Recovery in PACU  PONV: 2    Ready For Surgery From Anesthesia Perspective.     .

## 2022-09-23 ENCOUNTER — TELEPHONE (OUTPATIENT)
Dept: HEMATOLOGY/ONCOLOGY | Facility: CLINIC | Age: 77
End: 2022-09-23
Payer: MEDICARE

## 2022-09-23 NOTE — TELEPHONE ENCOUNTER
"----- Message from Ritu Crawley sent at 9/23/2022  1:56 PM CDT -----  Regarding: Consult/Advisory      Name Of Caller:    Armando quevedo/ Justin      Contact Preference?:   [phone]      What is the nature of the call?:   Calling to speak w/ nurse. Requesting a prior authorization for the pt's medical benefits.      "Thank you for all that you do for our patients"     "

## 2022-09-26 ENCOUNTER — HOSPITAL ENCOUNTER (OUTPATIENT)
Facility: HOSPITAL | Age: 77
Discharge: HOME | End: 2022-09-26
Attending: SURGERY | Admitting: SURGERY
Payer: MEDICARE

## 2022-09-26 ENCOUNTER — ANESTHESIA (OUTPATIENT)
Dept: SURGERY | Facility: HOSPITAL | Age: 77
End: 2022-09-26
Payer: MEDICARE

## 2022-09-26 VITALS
OXYGEN SATURATION: 98 % | RESPIRATION RATE: 18 BRPM | TEMPERATURE: 98 F | BODY MASS INDEX: 25.92 KG/M2 | SYSTOLIC BLOOD PRESSURE: 136 MMHG | DIASTOLIC BLOOD PRESSURE: 71 MMHG | HEART RATE: 65 BPM | WEIGHT: 185.81 LBS

## 2022-09-26 DIAGNOSIS — C44.99 MYXOFIBROSARCOMA OF SKIN: Primary | ICD-10-CM

## 2022-09-26 DIAGNOSIS — R22.41 MASS OF RIGHT THIGH: ICD-10-CM

## 2022-09-26 DIAGNOSIS — C49.9 PRIMARY MYXOFIBROSARCOMA: ICD-10-CM

## 2022-09-26 LAB
ABO + RH BLD: NORMAL
BLD GP AB SCN CELLS X3 SERPL QL: NORMAL

## 2022-09-26 PROCEDURE — 63600175 PHARM REV CODE 636 W HCPCS: Performed by: ANESTHESIOLOGY

## 2022-09-26 PROCEDURE — 36415 COLL VENOUS BLD VENIPUNCTURE: CPT | Performed by: SURGERY

## 2022-09-26 PROCEDURE — 27337 EXC THIGH/KNEE LES SC 3 CM/>: CPT | Mod: RT,,, | Performed by: SURGERY

## 2022-09-26 PROCEDURE — 36000705 HC OR TIME LEV I EA ADD 15 MIN: Performed by: SURGERY

## 2022-09-26 PROCEDURE — 63600175 PHARM REV CODE 636 W HCPCS: Performed by: SURGERY

## 2022-09-26 PROCEDURE — 88307 TISSUE EXAM BY PATHOLOGIST: CPT | Mod: 26,,, | Performed by: PATHOLOGY

## 2022-09-26 PROCEDURE — 37000008 HC ANESTHESIA 1ST 15 MINUTES: Performed by: SURGERY

## 2022-09-26 PROCEDURE — 27337 PR EXCISON TUMOR SOFT TISSUE THIGH/KNEE SUBQ 3+CM: ICD-10-PCS | Mod: RT,,, | Performed by: SURGERY

## 2022-09-26 PROCEDURE — 71000033 HC RECOVERY, INTIAL HOUR: Performed by: SURGERY

## 2022-09-26 PROCEDURE — 25000003 PHARM REV CODE 250: Performed by: NURSE ANESTHETIST, CERTIFIED REGISTERED

## 2022-09-26 PROCEDURE — C1729 CATH, DRAINAGE: HCPCS | Performed by: SURGERY

## 2022-09-26 PROCEDURE — D9220A PRA ANESTHESIA: Mod: CRNA,,, | Performed by: NURSE ANESTHETIST, CERTIFIED REGISTERED

## 2022-09-26 PROCEDURE — 25000242 PHARM REV CODE 250 ALT 637 W/ HCPCS: Performed by: NURSE ANESTHETIST, CERTIFIED REGISTERED

## 2022-09-26 PROCEDURE — D9220A PRA ANESTHESIA: Mod: ANES,,, | Performed by: ANESTHESIOLOGY

## 2022-09-26 PROCEDURE — 86850 RBC ANTIBODY SCREEN: CPT | Performed by: ANESTHESIOLOGY

## 2022-09-26 PROCEDURE — 71000016 HC POSTOP RECOV ADDL HR: Performed by: SURGERY

## 2022-09-26 PROCEDURE — 36000704 HC OR TIME LEV I 1ST 15 MIN: Performed by: SURGERY

## 2022-09-26 PROCEDURE — 88307 TISSUE EXAM BY PATHOLOGIST: CPT | Performed by: PATHOLOGY

## 2022-09-26 PROCEDURE — 63600175 PHARM REV CODE 636 W HCPCS: Performed by: NURSE ANESTHETIST, CERTIFIED REGISTERED

## 2022-09-26 PROCEDURE — 37000009 HC ANESTHESIA EA ADD 15 MINS: Performed by: SURGERY

## 2022-09-26 PROCEDURE — 88307 PR  SURG PATH,LEVEL V: ICD-10-PCS | Mod: 26,,, | Performed by: PATHOLOGY

## 2022-09-26 PROCEDURE — 71000015 HC POSTOP RECOV 1ST HR: Performed by: SURGERY

## 2022-09-26 PROCEDURE — 25000003 PHARM REV CODE 250: Performed by: SURGERY

## 2022-09-26 PROCEDURE — D9220A PRA ANESTHESIA: ICD-10-PCS | Mod: CRNA,,, | Performed by: NURSE ANESTHETIST, CERTIFIED REGISTERED

## 2022-09-26 PROCEDURE — 36415 COLL VENOUS BLD VENIPUNCTURE: CPT | Performed by: ANESTHESIOLOGY

## 2022-09-26 PROCEDURE — D9220A PRA ANESTHESIA: ICD-10-PCS | Mod: ANES,,, | Performed by: ANESTHESIOLOGY

## 2022-09-26 RX ORDER — HYDROMORPHONE HYDROCHLORIDE 1 MG/ML
0.2 INJECTION, SOLUTION INTRAMUSCULAR; INTRAVENOUS; SUBCUTANEOUS EVERY 5 MIN PRN
Status: DISCONTINUED | OUTPATIENT
Start: 2022-09-26 | End: 2022-09-26 | Stop reason: HOSPADM

## 2022-09-26 RX ORDER — LIDOCAINE HYDROCHLORIDE 20 MG/ML
INJECTION INTRAVENOUS
Status: DISCONTINUED | OUTPATIENT
Start: 2022-09-26 | End: 2022-09-26

## 2022-09-26 RX ORDER — PROPOFOL 10 MG/ML
VIAL (ML) INTRAVENOUS
Status: DISCONTINUED | OUTPATIENT
Start: 2022-09-26 | End: 2022-09-26

## 2022-09-26 RX ORDER — BUPIVACAINE HYDROCHLORIDE 2.5 MG/ML
INJECTION, SOLUTION INFILTRATION; PERINEURAL
Status: DISCONTINUED | OUTPATIENT
Start: 2022-09-26 | End: 2022-09-26 | Stop reason: HOSPADM

## 2022-09-26 RX ORDER — LIDOCAINE HYDROCHLORIDE 10 MG/ML
1 INJECTION, SOLUTION EPIDURAL; INFILTRATION; INTRACAUDAL; PERINEURAL ONCE
Status: DISCONTINUED | OUTPATIENT
Start: 2022-09-26 | End: 2022-09-26 | Stop reason: HOSPADM

## 2022-09-26 RX ORDER — FENTANYL CITRATE 50 UG/ML
INJECTION, SOLUTION INTRAMUSCULAR; INTRAVENOUS
Status: DISCONTINUED | OUTPATIENT
Start: 2022-09-26 | End: 2022-09-26

## 2022-09-26 RX ORDER — SODIUM CHLORIDE, SODIUM LACTATE, POTASSIUM CHLORIDE, CALCIUM CHLORIDE 600; 310; 30; 20 MG/100ML; MG/100ML; MG/100ML; MG/100ML
INJECTION, SOLUTION INTRAVENOUS CONTINUOUS
Status: DISCONTINUED | OUTPATIENT
Start: 2022-09-26 | End: 2022-09-26 | Stop reason: HOSPADM

## 2022-09-26 RX ORDER — ACETAMINOPHEN 500 MG
1000 TABLET ORAL
Status: DISCONTINUED | OUTPATIENT
Start: 2022-09-26 | End: 2022-09-26 | Stop reason: HOSPADM

## 2022-09-26 RX ORDER — PROCHLORPERAZINE EDISYLATE 5 MG/ML
5 INJECTION INTRAMUSCULAR; INTRAVENOUS EVERY 30 MIN PRN
Status: DISCONTINUED | OUTPATIENT
Start: 2022-09-26 | End: 2022-09-26 | Stop reason: HOSPADM

## 2022-09-26 RX ORDER — ROCURONIUM BROMIDE 10 MG/ML
INJECTION, SOLUTION INTRAVENOUS
Status: DISCONTINUED | OUTPATIENT
Start: 2022-09-26 | End: 2022-09-26

## 2022-09-26 RX ORDER — ONDANSETRON 2 MG/ML
INJECTION INTRAMUSCULAR; INTRAVENOUS
Status: DISCONTINUED | OUTPATIENT
Start: 2022-09-26 | End: 2022-09-26

## 2022-09-26 RX ORDER — DEXAMETHASONE SODIUM PHOSPHATE 4 MG/ML
INJECTION, SOLUTION INTRA-ARTICULAR; INTRALESIONAL; INTRAMUSCULAR; INTRAVENOUS; SOFT TISSUE
Status: DISCONTINUED | OUTPATIENT
Start: 2022-09-26 | End: 2022-09-26

## 2022-09-26 RX ORDER — HYDROCODONE BITARTRATE AND ACETAMINOPHEN 5; 325 MG/1; MG/1
1 TABLET ORAL EVERY 6 HOURS PRN
Qty: 20 TABLET | Refills: 0 | Status: SHIPPED | OUTPATIENT
Start: 2022-09-26 | End: 2023-01-16

## 2022-09-26 RX ORDER — CEFAZOLIN SODIUM 1 G/50ML
2 SOLUTION INTRAVENOUS
Status: COMPLETED | OUTPATIENT
Start: 2022-09-26 | End: 2022-09-26

## 2022-09-26 RX ORDER — SODIUM CHLORIDE 9 MG/ML
INJECTION, SOLUTION INTRAVENOUS CONTINUOUS
Status: DISCONTINUED | OUTPATIENT
Start: 2022-09-26 | End: 2022-09-26 | Stop reason: HOSPADM

## 2022-09-26 RX ORDER — EPHEDRINE SULFATE 50 MG/ML
INJECTION, SOLUTION INTRAVENOUS
Status: DISCONTINUED | OUTPATIENT
Start: 2022-09-26 | End: 2022-09-26

## 2022-09-26 RX ORDER — ALBUTEROL SULFATE 90 UG/1
AEROSOL, METERED RESPIRATORY (INHALATION)
Status: DISCONTINUED | OUTPATIENT
Start: 2022-09-26 | End: 2022-09-26

## 2022-09-26 RX ORDER — ONDANSETRON 2 MG/ML
4 INJECTION INTRAMUSCULAR; INTRAVENOUS DAILY PRN
Status: DISCONTINUED | OUTPATIENT
Start: 2022-09-26 | End: 2022-09-26 | Stop reason: HOSPADM

## 2022-09-26 RX ORDER — KETOROLAC TROMETHAMINE 10 MG/1
10 TABLET, FILM COATED ORAL EVERY 6 HOURS
Qty: 20 TABLET | Refills: 0 | Status: SHIPPED | OUTPATIENT
Start: 2022-09-26 | End: 2022-10-01

## 2022-09-26 RX ORDER — SODIUM CHLORIDE 0.9 % (FLUSH) 0.9 %
10 SYRINGE (ML) INJECTION
Status: DISCONTINUED | OUTPATIENT
Start: 2022-09-26 | End: 2022-09-26 | Stop reason: HOSPADM

## 2022-09-26 RX ADMIN — ALBUTEROL SULFATE 3 PUFF: 90 AEROSOL, METERED RESPIRATORY (INHALATION) at 08:09

## 2022-09-26 RX ADMIN — LIDOCAINE HYDROCHLORIDE 100 MG: 20 INJECTION, SOLUTION INTRAVENOUS at 07:09

## 2022-09-26 RX ADMIN — ONDANSETRON 4 MG: 2 INJECTION, SOLUTION INTRAMUSCULAR; INTRAVENOUS at 08:09

## 2022-09-26 RX ADMIN — SODIUM CHLORIDE, SODIUM LACTATE, POTASSIUM CHLORIDE, AND CALCIUM CHLORIDE: .6; .31; .03; .02 INJECTION, SOLUTION INTRAVENOUS at 07:09

## 2022-09-26 RX ADMIN — EPHEDRINE SULFATE 5 MG: 50 INJECTION INTRAVENOUS at 07:09

## 2022-09-26 RX ADMIN — ALBUTEROL SULFATE 3 PUFF: 90 AEROSOL, METERED RESPIRATORY (INHALATION) at 07:09

## 2022-09-26 RX ADMIN — CEFAZOLIN SODIUM 2 G: 1 SOLUTION INTRAVENOUS at 07:09

## 2022-09-26 RX ADMIN — FENTANYL CITRATE 50 MCG: 50 INJECTION, SOLUTION INTRAMUSCULAR; INTRAVENOUS at 07:09

## 2022-09-26 RX ADMIN — DEXAMETHASONE SODIUM PHOSPHATE 4 MG: 4 INJECTION, SOLUTION INTRAMUSCULAR; INTRAVENOUS at 07:09

## 2022-09-26 RX ADMIN — PROPOFOL 90 MG: 10 INJECTION, EMULSION INTRAVENOUS at 07:09

## 2022-09-26 RX ADMIN — EPHEDRINE SULFATE 10 MG: 50 INJECTION INTRAVENOUS at 08:09

## 2022-09-26 RX ADMIN — ROCURONIUM BROMIDE 10 MG: 10 INJECTION, SOLUTION INTRAVENOUS at 07:09

## 2022-09-26 RX ADMIN — SUGAMMADEX 200 MG: 100 INJECTION, SOLUTION INTRAVENOUS at 08:09

## 2022-09-26 RX ADMIN — PROPOFOL 20 MG: 10 INJECTION, EMULSION INTRAVENOUS at 07:09

## 2022-09-26 RX ADMIN — EPHEDRINE SULFATE 10 MG: 50 INJECTION INTRAVENOUS at 07:09

## 2022-09-26 RX ADMIN — ROCURONIUM BROMIDE 40 MG: 10 INJECTION, SOLUTION INTRAVENOUS at 07:09

## 2022-09-26 RX ADMIN — HYDROMORPHONE HYDROCHLORIDE 0.2 MG: 1 INJECTION, SOLUTION INTRAMUSCULAR; INTRAVENOUS; SUBCUTANEOUS at 09:09

## 2022-09-26 RX ADMIN — EPHEDRINE SULFATE 5 MG: 50 INJECTION INTRAVENOUS at 08:09

## 2022-09-26 NOTE — DISCHARGE INSTRUCTIONS
[facility]  Office # [phone]    Discharge Instructions for Same Day Surgery     Call the office for and appointment if one has not already been made.     Diet: Drink plenty of fluids the first 48 hours and you may resume your   usual diet.     Activity: No heavy lifting (over 10 pounds), pushing or pulling until your   post op visit. Your doctor's office may have told you to limit your lifting to less weight, or even no weight.  Be sure to follow those instructions.    Note: You may ride in a car and you may drive when comfortable.     Do not drive, drink alcohol, or sign legal documents for 24 hours, or if taking narcotic pain medication.    Dressings: Remove the dressing 24 hours after surgery. You may shower  24 hours after surgery and you may wash your hair.     If you have steri strips ( appears to be strips of white tape) on   your incision, leave them on. In 5-7 days they will begin to fall off.    Drains: If you have a drain, measure and record the drainage. Bring this information with you on your office visit.     Medical: Call the doctor for any of the following problems: fever above 101,   severe pain, bleeding, or abdominal distention (swelling).   If constipated you may take any stool softener you choose.     Occasionally small areas of skin numbness or an unpleasant skin sensation can result. Also, you may find that your incision is swollen and tender for a few days.  Some redness around sutures and staples is a normal reaction, but if the discomfort persists or worsens, call you doctor.             Fall Prevention  Millions of people fall every year and injure themselves. You may have had anesthesia or sedation which may increase your risk of falling. You may have health issues that put you at an increased risk of falling.     Here are ways to reduce your risk of falling.    Make your home safe by keeping walkways clear of objects you may trip over.  Use non-slip pads under  rugs. Do not use area rugs or small throw rugs.  Use non-slip mats in bathtubs and showers.  Install handrails and lights on staircases.  Do not walk in poorly lit areas.  Do not stand on chairs or wobbly ladders.  Use caution when reaching overhead or looking upward. This position can cause a loss of balance.  Be sure your shoes fit properly, have non-slip bottoms and are in good condition.   Wear shoes both inside and out. Avoid going barefoot or wearing slippers.  Be cautious when going up and down stairs, curbs, and when walking on uneven sidewalks.  If your balance is poor, consider using a cane or walker.  If your fall was related to alcohol use, stop or limit alcohol intake.   If your fall was related to use of sleeping medicines, talk to your doctor about this. You may need to reduce your dosage at bedtime if you awaken during the night to go to the bathroom.    To reduce the need for nighttime bathroom trips:  Avoid drinking fluids for several hours before going to bed  Empty your bladder before going to bed  Men can keep a urinal at the bedside  Stay as active as you can. Balance, flexibility, strength, and endurance all come from exercise. They all play a role in preventing falls. Ask your healthcare provider which types of activity are right for you.  Get your vision checked on a regular basis.  If you have pets, know where they are before you stand up or walk so you don't trip over them.  Use night lights.               Fall Prevention  Millions of people fall every year and injure themselves. You may have had anesthesia or sedation which may increase your risk of falling. You may have health issues that put you at an increased risk of falling.     Here are ways to reduce your risk of falling.    Make your home safe by keeping walkways clear of objects you may trip over.  Use non-slip pads under rugs. Do not use area rugs or small throw rugs.  Use non-slip mats in bathtubs and showers.  Install  handrails and lights on staircases.  Do not walk in poorly lit areas.  Do not stand on chairs or wobbly ladders.  Use caution when reaching overhead or looking upward. This position can cause a loss of balance.  Be sure your shoes fit properly, have non-slip bottoms and are in good condition.   Wear shoes both inside and out. Avoid going barefoot or wearing slippers.  Be cautious when going up and down stairs, curbs, and when walking on uneven sidewalks.  If your balance is poor, consider using a cane or walker.  If your fall was related to alcohol use, stop or limit alcohol intake.   If your fall was related to use of sleeping medicines, talk to your doctor about this. You may need to reduce your dosage at bedtime if you awaken during the night to go to the bathroom.    To reduce the need for nighttime bathroom trips:  Avoid drinking fluids for several hours before going to bed  Empty your bladder before going to bed  Men can keep a urinal at the bedside  Stay as active as you can. Balance, flexibility, strength, and endurance all come from exercise. They all play a role in preventing falls. Ask your healthcare provider which types of activity are right for you.  Get your vision checked on a regular basis.  If you have pets, know where they are before you stand up or walk so you don't trip over them.  Use night lights.

## 2022-09-26 NOTE — OP NOTE
[facility]  Surgery Department  Operative Note    SUMMARY     Date of Procedure: 9/26/2022     Procedure: Procedure(s) (LRB):  EXCISION, NEOPLASM, RIGHT PROXIMAL THIGH / GROIN (Right)     Surgeon(s) and Role:     * Taylor Mao MD - Primary    Assisting Surgeon: None    Pre-Operative Diagnosis: Mass of right thigh [R22.41]    Post-Operative Diagnosis: Post-Op Diagnosis Codes:     * Mass of right thigh [R22.41]    Anesthesia: General    Operative Findings (including complications, if any):   - Removal of groin mass [id number]   - Creation of skin flaps    Estimated Blood Loss (EBL): 20 mL           Implants: * No implants in log *    Specimens:   Specimen (24h ago, onward)       Start     Ordered    09/26/22 0759  Specimen to Pathology, Surgery General Surgery  Once        Comments: Pre-op Diagnosis: Mass of right thigh [R22.41]Procedure(s):EXCISION, NEOPLASM, RIGHT PROXIMAL THIGH / GROIN Number of specimens: 1Name of specimens: Right thigh mass     References:    Click here for ordering Quick Tip   Question Answer Comment   Procedure Type: General Surgery    Which provider would you like to cc? TAYLOR MAO    Release to patient Immediate        09/26/22 0759                   Procedure in detail:    After consent was obtained, patient was taken to the OR. The right groin and thigh was prepped and draped in a standard sterile fashion after general anesthesia was started. Time out was performed. Antibiotics were started with ancef. We began the procedure by palpating the mass.  We made an elliptical incision after injecting Marcaine with epinephrine and cut down through the skin sharply.  We then used Bovie electrocautery and created flaps bilaterally of the skin.  We resected the mass completely and was sent to pathology. We placed a flat SUNNY drain and then we irrigated, achieved hemostasis, injected additional Marcaine and then closed the skin with interrupted vertical mattress sutures. This was then  covered with a pressure dressing. All counts were correct x2. Patient was awakened from anesthesia and taken to the recovery room in a stable condition having suffered no issues at this time.           Condition: Good    Disposition: PACU - hemodynamically stable.    Attestation: Dr. Mao was present and scrubbed for the entire procedure.

## 2022-09-26 NOTE — PLAN OF CARE
1202-Pt verbalized a readiness to go home. VSS. Written and verbal discharge instructions given. Return demonstration emptying and recording SUNNY output. Record sheet provided. Pt aware of follow-up appt.         1218-Pt instructed via telephone to call clinic if SUNNY output is less than 30 ml a day before follow-up visit on Oct. 12 per Dr. Mao. Pt verbalized an understanding.

## 2022-09-26 NOTE — DISCHARGE SUMMARY
[facility] - Surgery  Brief Operative Note    Surgery Date: 9/26/2022     Surgeon(s) and Role:     * Taylor Mao MD - Primary    Assisting Surgeon: None    Pre-op Diagnosis:  Mass of right thigh [R22.41]    Post-op Diagnosis:  Post-Op Diagnosis Codes:     * Mass of right thigh [R22.41]    Procedure(s) (LRB):  EXCISION, NEOPLASM, RIGHT PROXIMAL THIGH / GROIN (Right)    Anesthesia: General    Operative Findings:   - Removal of groin mass [id number]   - Creation of skin flaps    Estimated Blood Loss: 20 mL         Specimens:   Specimen (24h ago, onward)       Start     Ordered    09/26/22 0759  Specimen to Pathology, Surgery General Surgery  Once        Comments: Pre-op Diagnosis: Mass of right thigh [R22.41]Procedure(s):EXCISION, NEOPLASM, RIGHT PROXIMAL THIGH / GROIN Number of specimens: 1Name of specimens: Right thigh mass     References:    Click here for ordering Quick Tip   Question Answer Comment   Procedure Type: General Surgery    Which provider would you like to cc? TAYLOR MAO    Release to patient Immediate        09/26/22 0759                      Discharge Note    OUTCOME: Patient tolerated treatment/procedure well without complication and is now ready for discharge.    DISPOSITION: Home or Self Care    FINAL DIAGNOSIS:  <principal problem not specified>    FOLLOWUP: In clinic    DISCHARGE INSTRUCTIONS:    Discharge Procedure Orders   Diet Adult Regular     Remove dressing in 48 hours   Order Comments: Please demonstrate strip/drain process of SUNNY drain and provide drain care instructions on discharge.  Measure output every 24 hr period.    After removal of dressings, ok to shower daily and re-cover with gauze.     Activity as tolerated

## 2022-09-26 NOTE — INTERVAL H&P NOTE
The patient has been examined and the H&P has been reviewed:    I concur with the findings and no changes have occurred since H&P was written.    Surgery risks, benefits and alternative options discussed and understood by patient/family.    Changes since last H&P:  Patient had seen rad onc for wound issue/recurrent mass discussion, they recommended excision, little benefit from radiation in this setting.  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      There are no hospital problems to display for this patient.

## 2022-09-26 NOTE — ANESTHESIA PROCEDURE NOTES
Intubation    Date/Time: 9/26/2022 7:22 AM  Performed by: Saida Zuniga CRNA  Authorized by: Magdaleno Dugan MD     Intubation:     Induction:  Intravenous    Intubated:  Postinduction    Mask Ventilation:  Easy with oral airway    Attempts:  1    Attempted By:  Student    Method of Intubation:  Direct    Blade:  Gong 2    Laryngeal View Grade: Grade I - full view of cords      Difficult Airway Encountered?: No      Complications:  None    Airway Device:  Oral endotracheal tube    Airway Device Size:  7.5    Style/Cuff Inflation:  Cuffed    Inflation Amount (mL):  6    Tube secured:  22    Secured at:  The teeth    Placement Verified By:  Capnometry    Complicating Factors:  None    Findings Post-Intubation:  BS equal bilateral

## 2022-09-26 NOTE — TRANSFER OF CARE
Anesthesia Transfer of Care Note    Patient: Zac Plunkett    Procedure(s) Performed: Procedure(s) (LRB):  EXCISION, NEOPLASM, RIGHT PROXIMAL THIGH / GROIN (Right)    Patient location: PACU    Anesthesia Type: general    Transport from OR: Transported from OR on room air with adequate spontaneous ventilation    Post pain: adequate analgesia    Post assessment: no apparent anesthetic complications    Post vital signs: stable    Level of consciousness: responds to stimulation and sedated    Nausea/Vomiting: no nausea/vomiting    Complications: none    Transfer of care protocol was followed      Last vitals:   Visit Vitals  BP (!) 117/57 (BP Location: Right arm, Patient Position: Lying)   Pulse 89   Temp 36.5 °C (97.7 °F) (Skin)   Resp 17   Wt 84.3 kg (185 lb 13 oz)   SpO2 98%   BMI 25.92 kg/m²

## 2022-09-27 NOTE — ANESTHESIA POSTPROCEDURE EVALUATION
Anesthesia Post Evaluation    Patient: Zac Plunkett    Procedure(s) Performed: Procedure(s) (LRB):  EXCISION, NEOPLASM, RIGHT PROXIMAL THIGH / GROIN (Right)    Final Anesthesia Type: general      Patient location during evaluation: PACU  Patient participation: Yes- Able to Participate  Level of consciousness: awake and alert and oriented  Post-procedure vital signs: reviewed and stable  Pain management: adequate  Airway patency: patent    PONV status at discharge: No PONV  Anesthetic complications: no      Cardiovascular status: hemodynamically stable and blood pressure returned to baseline  Respiratory status: spontaneous ventilation, room air and unassisted  Hydration status: euvolemic  Follow-up not needed.          Vitals Value Taken Time   /71 09/26/22 1053   Temp 36.4 °C (97.6 °F) 09/26/22 1053   Pulse 65 09/26/22 1053   Resp 18 09/26/22 1053   SpO2 98 % 09/26/22 1053         Event Time   Out of Recovery 09:34:35         Pain/Lnaie Score: Pain Rating Prior to Med Admin: 5 (9/26/2022  9:27 AM)  Lanie Score: 10 (9/26/2022  9:39 AM)  Modified Lanie Score: 20 (9/26/2022 11:02 AM)

## 2022-09-28 LAB
FINAL PATHOLOGIC DIAGNOSIS: NORMAL
GROSS: NORMAL
Lab: NORMAL

## 2022-09-29 ENCOUNTER — PATIENT MESSAGE (OUTPATIENT)
Dept: SURGERY | Facility: CLINIC | Age: 77
End: 2022-09-29
Payer: MEDICARE

## 2022-10-03 ENCOUNTER — PATIENT MESSAGE (OUTPATIENT)
Dept: SURGERY | Facility: CLINIC | Age: 77
End: 2022-10-03
Payer: MEDICARE

## 2022-10-06 ENCOUNTER — TELEPHONE (OUTPATIENT)
Dept: HEMATOLOGY/ONCOLOGY | Facility: CLINIC | Age: 77
End: 2022-10-06
Payer: MEDICARE

## 2022-10-07 ENCOUNTER — OFFICE VISIT (OUTPATIENT)
Dept: SURGERY | Facility: CLINIC | Age: 77
End: 2022-10-07
Payer: MEDICARE

## 2022-10-07 VITALS
SYSTOLIC BLOOD PRESSURE: 139 MMHG | BODY MASS INDEX: 26.02 KG/M2 | DIASTOLIC BLOOD PRESSURE: 79 MMHG | HEIGHT: 71 IN | WEIGHT: 185.88 LBS

## 2022-10-07 DIAGNOSIS — C49.9 PRIMARY MYXOFIBROSARCOMA: Primary | ICD-10-CM

## 2022-10-07 PROCEDURE — 3078F DIAST BP <80 MM HG: CPT | Mod: CPTII,S$GLB,, | Performed by: SURGERY

## 2022-10-07 PROCEDURE — 1126F PR PAIN SEVERITY QUANTIFIED, NO PAIN PRESENT: ICD-10-PCS | Mod: CPTII,S$GLB,, | Performed by: SURGERY

## 2022-10-07 PROCEDURE — 3288F FALL RISK ASSESSMENT DOCD: CPT | Mod: CPTII,S$GLB,, | Performed by: SURGERY

## 2022-10-07 PROCEDURE — 1101F PR PT FALLS ASSESS DOC 0-1 FALLS W/OUT INJ PAST YR: ICD-10-PCS | Mod: CPTII,S$GLB,, | Performed by: SURGERY

## 2022-10-07 PROCEDURE — 1101F PT FALLS ASSESS-DOCD LE1/YR: CPT | Mod: CPTII,S$GLB,, | Performed by: SURGERY

## 2022-10-07 PROCEDURE — 1159F PR MEDICATION LIST DOCUMENTED IN MEDICAL RECORD: ICD-10-PCS | Mod: CPTII,S$GLB,, | Performed by: SURGERY

## 2022-10-07 PROCEDURE — 99024 POSTOP FOLLOW-UP VISIT: CPT | Mod: S$GLB,,, | Performed by: SURGERY

## 2022-10-07 PROCEDURE — 3075F SYST BP GE 130 - 139MM HG: CPT | Mod: CPTII,S$GLB,, | Performed by: SURGERY

## 2022-10-07 PROCEDURE — 1126F AMNT PAIN NOTED NONE PRSNT: CPT | Mod: CPTII,S$GLB,, | Performed by: SURGERY

## 2022-10-07 PROCEDURE — 3075F PR MOST RECENT SYSTOLIC BLOOD PRESS GE 130-139MM HG: ICD-10-PCS | Mod: CPTII,S$GLB,, | Performed by: SURGERY

## 2022-10-07 PROCEDURE — 1159F MED LIST DOCD IN RCRD: CPT | Mod: CPTII,S$GLB,, | Performed by: SURGERY

## 2022-10-07 PROCEDURE — 3078F PR MOST RECENT DIASTOLIC BLOOD PRESSURE < 80 MM HG: ICD-10-PCS | Mod: CPTII,S$GLB,, | Performed by: SURGERY

## 2022-10-07 PROCEDURE — 99024 PR POST-OP FOLLOW-UP VISIT: ICD-10-PCS | Mod: S$GLB,,, | Performed by: SURGERY

## 2022-10-07 PROCEDURE — 3288F PR FALLS RISK ASSESSMENT DOCUMENTED: ICD-10-PCS | Mod: CPTII,S$GLB,, | Performed by: SURGERY

## 2022-10-07 PROCEDURE — 99999 PR PBB SHADOW E&M-EST. PATIENT-LVL III: CPT | Mod: PBBFAC,,, | Performed by: SURGERY

## 2022-10-07 PROCEDURE — 99999 PR PBB SHADOW E&M-EST. PATIENT-LVL III: ICD-10-PCS | Mod: PBBFAC,,, | Performed by: SURGERY

## 2022-10-07 NOTE — PROGRESS NOTES
Surgery Clinic Note    Zac Plunkett is a 77 y.o. year old male in clinic today for follow up of right thigh myxofibrosarcoma re-excision. Doing well. Drain removed, output serous and less than 30 per day.  No f/c/n/v/sob/cp    ROS:  Negative except above    Pathology:  Final Pathologic Diagnosis Right thigh mass, excision:   Recurrent myxofibrosarcoma, high-grade, with tumor necrosis   High-grade myxofibrosarcoma extends to one peripheral soft tissue surgical   margin   The deep surgical margin is negative   Skin uninvolved      PE:  Vitals:    10/07/22 1340   BP: 139/79     NAD  No belabored breathing  Abd soft nt nd  Right thigh incision intact. Sutures removed. Drain removed. No fluctuance or cellulitis    A/P:  Zac Plunkett is a 77 y.o. year old male w recurrent myxofibrosarcoma of skin/extremity, right thigh    -rtc 6 wks to re-eval incision, monitor for recurrence.    Arun Mao  General Surgery - [facility]  10/7/2022

## 2022-10-08 ENCOUNTER — NURSE TRIAGE (OUTPATIENT)
Dept: ADMINISTRATIVE | Facility: CLINIC | Age: 77
End: 2022-10-08
Payer: MEDICARE

## 2022-10-08 NOTE — TELEPHONE ENCOUNTER
Pt calling back states that he was seen in the ER last night after the bottom of his incision opened and was bleeding, states they placed steri-strips on it and a bandage. Reports that the steri strips have since come off and he is having a hard time keeping the bandage on due to the location of the incision. Reports the incision is open again at the bottom about 1/2 inch long and 1/4 inch wide opening. Per protocol advised to ED NOW. verbalized understanding. Denies any further questions or concerns at this time, advised to call back if they have any that come up. Advised pt to call back with any other concerns or worsening symptoms. Verbalized understanding and will route message to provider.       Reason for Disposition   [1] Incision gaping open AND [2] < 48 hours since wound re-opened    Additional Information   Negative: [1] Major abdominal surgical incision AND [2] wound gaping open AND [3] visible internal organs   Negative: Sounds like a life-threatening emergency to the triager   Negative: [1] Bleeding from incision AND [2] won't stop after 10 minutes of direct pressure   Negative: [1] Bleeding (more than a few drops) from incision AND [2] blood vessel surgery (e.g., carotid endarterectomy, femoral bypass graft, kidney dialysis fistula, tracheostomy)   Negative: [1] Widespread rash AND [2] bright red, sunburn-like   Negative: Severe pain in the incision    Protocols used: Post-Op Incision Symptoms and [id number]

## 2022-10-10 ENCOUNTER — TELEPHONE (OUTPATIENT)
Dept: SURGERY | Facility: CLINIC | Age: 77
End: 2022-10-10
Payer: MEDICARE

## 2022-10-10 NOTE — TELEPHONE ENCOUNTER
Spoke with  regarding triage note. He states he is doing much better but would like a sooner follow up appt with . Appt scheduled for 10/12 at 11:15. Pt confirmed date and time.

## 2022-10-12 ENCOUNTER — OFFICE VISIT (OUTPATIENT)
Dept: SURGERY | Facility: CLINIC | Age: 77
End: 2022-10-12
Payer: MEDICARE

## 2022-10-12 VITALS — SYSTOLIC BLOOD PRESSURE: 144 MMHG | DIASTOLIC BLOOD PRESSURE: 83 MMHG | HEART RATE: 83 BPM

## 2022-10-12 DIAGNOSIS — C49.9 PRIMARY MYXOFIBROSARCOMA: Primary | ICD-10-CM

## 2022-10-12 PROCEDURE — 1126F PR PAIN SEVERITY QUANTIFIED, NO PAIN PRESENT: ICD-10-PCS | Mod: CPTII,S$GLB,, | Performed by: SURGERY

## 2022-10-12 PROCEDURE — 99999 PR PBB SHADOW E&M-EST. PATIENT-LVL III: ICD-10-PCS | Mod: PBBFAC,,, | Performed by: SURGERY

## 2022-10-12 PROCEDURE — 99024 PR POST-OP FOLLOW-UP VISIT: ICD-10-PCS | Mod: S$GLB,,, | Performed by: SURGERY

## 2022-10-12 PROCEDURE — 3077F PR MOST RECENT SYSTOLIC BLOOD PRESSURE >= 140 MM HG: ICD-10-PCS | Mod: CPTII,S$GLB,, | Performed by: SURGERY

## 2022-10-12 PROCEDURE — 1126F AMNT PAIN NOTED NONE PRSNT: CPT | Mod: CPTII,S$GLB,, | Performed by: SURGERY

## 2022-10-12 PROCEDURE — 1159F PR MEDICATION LIST DOCUMENTED IN MEDICAL RECORD: ICD-10-PCS | Mod: CPTII,S$GLB,, | Performed by: SURGERY

## 2022-10-12 PROCEDURE — 3077F SYST BP >= 140 MM HG: CPT | Mod: CPTII,S$GLB,, | Performed by: SURGERY

## 2022-10-12 PROCEDURE — 3079F PR MOST RECENT DIASTOLIC BLOOD PRESSURE 80-89 MM HG: ICD-10-PCS | Mod: CPTII,S$GLB,, | Performed by: SURGERY

## 2022-10-12 PROCEDURE — 3079F DIAST BP 80-89 MM HG: CPT | Mod: CPTII,S$GLB,, | Performed by: SURGERY

## 2022-10-12 PROCEDURE — 99024 POSTOP FOLLOW-UP VISIT: CPT | Mod: S$GLB,,, | Performed by: SURGERY

## 2022-10-12 PROCEDURE — 1159F MED LIST DOCD IN RCRD: CPT | Mod: CPTII,S$GLB,, | Performed by: SURGERY

## 2022-10-12 PROCEDURE — 99999 PR PBB SHADOW E&M-EST. PATIENT-LVL III: CPT | Mod: PBBFAC,,, | Performed by: SURGERY

## 2022-10-12 NOTE — PROGRESS NOTES
Surgery Clinic Note    Zac Plunkett is a 77 y.o. year old male in clinic today for follow up of right groin myxofibrosarcoma excision, with partial wound dehiscence. The lower part of the incision came apart 2 cm, no deep tracking. This is exudate/eschar forming in the wound, and covered with gauze. No pain. Some serous drainage.  No f/c/n/v/sob/cp    ROS:  Negative except above    PE:  Vitals:    10/12/22 1037   BP: (!) 144/83   Pulse: 83       NAD  No belabored breathing  Abd soft nt nd  Top half of incision intact. No cellulitis. No fluctuance. The lower half of the incision partially dehisced, but has an eschar with some exudate filling this space.    A/P:  Zac Plunkett is a 77 y.o. year old male w partial dehiscence of wound s/p excision of sarcoma    -continue wound care. Shower daily w soap and water. Cover with gauze, bandaids daily  -rtc 3 wks. Earlier if the dehiscence further separates.    Arun Mao  General Surgery - [facility]  10/12/2022

## 2022-10-20 NOTE — PROGRESS NOTES
History & Physical    SUBJECTIVE:     History of Present Illness:  Patient is a 77 y.o. 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 Initial thigh resection in June 2021 followed by resection of locally recurrent met 1 week ago. He completed 52 Gy in 26 fractions (out of planned 60 Gy in 30 fractions) via IMRT, with treatment ending sooner than expected due to [place] and subsequent delays. PET in January 2022 showed new RUL 0.9cm nodule with SUV max 3.2 which was new from PET June 2021. We last saw patient in February 2022. Chest CT confirmed RUL opacity to be sub-centimeter with large ground glass component. Intraop localization may be difficult.     Smokes at least 1 cigar a day. Quit smoking cigarettes 30 years ago. No longer drinks alcohol. Retired from a [employer] plant. [employer] . He may have had asbestos exposure in the [employer] but unsure of any definite exposure.    Lip hip arthroplasty, L3- L4 laminectomy in 2003, appendectomy, right lower extremity sarcoma resections       No chief complaint on file.      Review of patient's allergies indicates:  No Known Allergies    Current Outpatient Medications   Medication Sig Dispense Refill    atorvastatin (LIPITOR) 80 MG tablet Take 1 tablet (80 mg total) by mouth once daily. 90 tablet 3    finasteride (PROSCAR) 5 mg tablet TAKE 1 TABLET BY MOUTH EVERY DAY 90 tablet 0    hydroCHLOROthiazide (HYDRODIURIL) 25 MG tablet TAKE 1 TABLET BY MOUTH DAILY 90 tablet 0    HYDROcodone-acetaminophen (NORCO) 5-325 mg per tablet Take 1 tablet by mouth every 8 (eight) hours as needed for Pain. 30 tablet 0    HYDROcodone-acetaminophen (NORCO) 5-325 mg per tablet Take 1 tablet by mouth every 6 (six) hours as needed for Pain. 20 tablet 0    oxyCODONE (ROXICODONE) 5 MG immediate release tablet Take 1 tablet (5 mg total) by mouth every 6 (six) hours as needed for  Pain. 120 tablet 0    SELENIUM ORAL Take by mouth.      TURMERIC ORAL Take by mouth.      VITAMIN E ACETATE ORAL Take by mouth.       No current facility-administered medications for this visit.       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    Tobacco dependence      Past Surgical History:   Procedure Laterality Date    APPENDECTOMY      BACK SURGERY      HIP REPLACEMENT ARTHROPLASTY Left 8/6/2018    Procedure: ARTHROPLASTY, HIP REPLACEMENT;  Surgeon: Chapincito Sagastume MD;  Location: [facility];  Service: Orthopedics;  Laterality: Left;    JOINT REPLACEMENT Left     hip    laminectomy l4-l5      SURGICAL REMOVAL OF LYMPH NODE Right 1/20/2022    Procedure: EXCISION, LYMPH NODE;  Surgeon: Arun Mao MD;  Location: [facility] OR;  Service: General;  Laterality: Right;  right thigh and right groin  RN PREOP 1/18/2022      PT CHOSE TO DO HOME TEST FOR COVID ON AM OF SURGERY     Family History   Problem Relation Age of Onset    Diabetes Mother     Heart disease Father     No Known Problems Sister     Cerebral palsy Brother     Epilepsy Brother     Melanoma Neg Hx     Psoriasis Neg Hx     Lupus Neg Hx     Eczema Neg Hx     Acne Neg Hx     Liver disease Neg Hx     Liver cancer Neg Hx     Cirrhosis Neg Hx     Colon polyps Neg Hx     Colon cancer Neg Hx      Social History     Tobacco Use    Smoking status: Every Day     Types: Cigars    Smokeless tobacco: Never    Tobacco comments:     smokes cigars 2-3 a day, smoke cigarette for 15 years   Substance Use Topics    Alcohol use: No    Drug use: No        Review of Systems:  Review of Systems   Constitutional:  Negative for appetite change, fatigue and fever.   HENT:  Negative for trouble swallowing and voice change.    Eyes:   Negative for visual disturbance.   Respiratory:  Negative for cough, chest tightness, shortness of breath and wheezing.    Cardiovascular:  Negative for palpitations and leg swelling.   Gastrointestinal:  Negative for abdominal pain, diarrhea, nausea and vomiting.   Genitourinary:  Negative for difficulty urinating.   Musculoskeletal:  Positive for arthralgias.   Neurological:  Negative for weakness and headaches.   Psychiatric/Behavioral:  Negative for confusion.      OBJECTIVE:     Vital Signs (Most Recent)  There were no vitals filed for this visit.      Physical Exam:  Physical Exam  Constitutional:       Appearance: Normal appearance.   Cardiovascular:      Rate and Rhythm: Normal rate and regular rhythm.      Pulses: Normal pulses.   Pulmonary:      Effort: Pulmonary effort is normal.      Breath sounds: Normal breath sounds. No wheezing.   Abdominal:      General: Bowel sounds are normal.      Palpations: Abdomen is soft.      Tenderness: There is no abdominal tenderness.   Musculoskeletal:      Right lower leg: No edema.      Left lower leg: No edema.   Neurological:      General: No focal deficit present.      Mental Status: He is alert and oriented to person, place, and time.   Psychiatric:         Mood and Affect: Mood normal.     PFT 8/2020:  FEV1: 2.62L 82%  DLCO: 13.34  49%    Pathology   Right thigh:  -Myxofibrosarcoma, high-grade  -Both lesions appear completely excised     PETCT 1/12/22:   1.  Interval development of 2 hypermetabolic sodt tissue nodules in the right leg subcutaneous fat.  Findings are concerning for metastatic disease.  2.  0.9 cm right apical pulmonary opacity with mild uptake, nonspecific.  Finding is concerning for pulmonary metastasis in light of the new soft tissue nodules.  However, infectious or inflammatory etiologies are possible.    Chest CT 2/2/22: Images personally reviewed. Right upper lobe pleural based mixed density opacity.   1.  Right apical nodule is grossly  stable.  2.  Pulmonary fibrosis fibrosis with above features likely mixed with mild paraseptal emphysema.  Consider chronic hypersensitivity pneumonitis.  However, other fibrosing interstitial pneumonias which can present with an upper lobe predominance could include pneumoconiosis, certain chemotherapies (including BCNU), seronegative spondyloarthropathies, etc.  3.  Diverticulosis coli.  4.  Moderate multivessel coronary calcific atherosclerosis    Chest CT 4/8/22:  Improvement in RUL apical largely ground glass nodule. Continued subpleural microcystic disease, septal thickening with distortions of the lung-pleural interface, and diffuse interlobular thickening    Chest CT 07/19/22:   Stable upper lobe predominant pulmonary fibrosis with above features; indeterminate for UIP.  Consider hypersensitivity pneumonitis.  Stable 0.5 cm solid pulmonary nodule in the right lower lobe.  Colonic diverticulosis without diverticulitis.  No CT evidence for new metastatic disease related to the reported history of sarcoma.    Chest CT 10/26/22:  I reviewed the images  1.  In this patient a reported history sarcoma, there are new solid pulmonary nodules in the left upper lobe and right lower lobe measuring up to 1.0 cm.  There is also an left perifissural nodule, now up to 1.5 cm.  These findings are concerning for metastasis.  2.  The previously seen right apical opacity is not demonstrated on today's examination.  3.  Stable upper lobe predominant fibrotic changes, pattern suggests fibrosing hypersensitivity pneumonitis.    ASSESSMENT/PLAN:     Patient is a 77 y.o. male current smoker with HTN, HLD and recurrent thigh myofibrosarcoma here today for 6 month follow up of right upper lobe lung nodule.   RUL nodule decreased in size on previous CT and stable on most recent scan, 10/26/22.  There are new solid nodules present on the most recent chest CT.    PLAN:Plan    Evaluate for possible robotic bronchoscopy with biopsy to rule  out metastatic disease  RTC in 2 months with chest CT for surveillance of new lung nodules.

## 2022-10-26 ENCOUNTER — HOSPITAL ENCOUNTER (OUTPATIENT)
Dept: RADIOLOGY | Facility: HOSPITAL | Age: 77
Discharge: HOME | End: 2022-10-26
Attending: THORACIC SURGERY (CARDIOTHORACIC VASCULAR SURGERY)
Payer: MEDICARE

## 2022-10-26 ENCOUNTER — OFFICE VISIT (OUTPATIENT)
Dept: CARDIOTHORACIC SURGERY | Facility: CLINIC | Age: 77
End: 2022-10-26
Payer: MEDICARE

## 2022-10-26 VITALS
HEART RATE: 43 BPM | SYSTOLIC BLOOD PRESSURE: 151 MMHG | HEIGHT: 71 IN | BODY MASS INDEX: 26.39 KG/M2 | OXYGEN SATURATION: 94 % | DIASTOLIC BLOOD PRESSURE: 68 MMHG | WEIGHT: 188.5 LBS

## 2022-10-26 DIAGNOSIS — R91.1 PULMONARY NODULE, LEFT: ICD-10-CM

## 2022-10-26 DIAGNOSIS — R91.1 LUNG NODULE: ICD-10-CM

## 2022-10-26 DIAGNOSIS — R91.1 PULMONARY NODULE, RIGHT: ICD-10-CM

## 2022-10-26 DIAGNOSIS — R91.1 LUNG NODULE: Primary | ICD-10-CM

## 2022-10-26 PROCEDURE — 1126F AMNT PAIN NOTED NONE PRSNT: CPT | Mod: CPTII,S$GLB,, | Performed by: THORACIC SURGERY (CARDIOTHORACIC VASCULAR SURGERY)

## 2022-10-26 PROCEDURE — 3078F DIAST BP <80 MM HG: CPT | Mod: CPTII,S$GLB,, | Performed by: THORACIC SURGERY (CARDIOTHORACIC VASCULAR SURGERY)

## 2022-10-26 PROCEDURE — 1101F PT FALLS ASSESS-DOCD LE1/YR: CPT | Mod: CPTII,S$GLB,, | Performed by: THORACIC SURGERY (CARDIOTHORACIC VASCULAR SURGERY)

## 2022-10-26 PROCEDURE — 3078F PR MOST RECENT DIASTOLIC BLOOD PRESSURE < 80 MM HG: ICD-10-PCS | Mod: CPTII,S$GLB,, | Performed by: THORACIC SURGERY (CARDIOTHORACIC VASCULAR SURGERY)

## 2022-10-26 PROCEDURE — 99999 PR PBB SHADOW E&M-EST. PATIENT-LVL III: ICD-10-PCS | Mod: PBBFAC,,, | Performed by: THORACIC SURGERY (CARDIOTHORACIC VASCULAR SURGERY)

## 2022-10-26 PROCEDURE — 1159F PR MEDICATION LIST DOCUMENTED IN MEDICAL RECORD: ICD-10-PCS | Mod: CPTII,S$GLB,, | Performed by: THORACIC SURGERY (CARDIOTHORACIC VASCULAR SURGERY)

## 2022-10-26 PROCEDURE — 3077F PR MOST RECENT SYSTOLIC BLOOD PRESSURE >= 140 MM HG: ICD-10-PCS | Mod: CPTII,S$GLB,, | Performed by: THORACIC SURGERY (CARDIOTHORACIC VASCULAR SURGERY)

## 2022-10-26 PROCEDURE — 3288F PR FALLS RISK ASSESSMENT DOCUMENTED: ICD-10-PCS | Mod: CPTII,S$GLB,, | Performed by: THORACIC SURGERY (CARDIOTHORACIC VASCULAR SURGERY)

## 2022-10-26 PROCEDURE — 71250 CT THORAX DX C-: CPT | Mod: TC

## 2022-10-26 PROCEDURE — 99213 PR OFFICE/OUTPT VISIT, EST, LEVL III, 20-29 MIN: ICD-10-PCS | Mod: S$GLB,,, | Performed by: THORACIC SURGERY (CARDIOTHORACIC VASCULAR SURGERY)

## 2022-10-26 PROCEDURE — 71250 CT CHEST WITHOUT CONTRAST: ICD-10-PCS | Mod: 26,,, | Performed by: RADIOLOGY

## 2022-10-26 PROCEDURE — 99999 PR PBB SHADOW E&M-EST. PATIENT-LVL III: CPT | Mod: PBBFAC,,, | Performed by: THORACIC SURGERY (CARDIOTHORACIC VASCULAR SURGERY)

## 2022-10-26 PROCEDURE — 99213 OFFICE O/P EST LOW 20 MIN: CPT | Mod: S$GLB,,, | Performed by: THORACIC SURGERY (CARDIOTHORACIC VASCULAR SURGERY)

## 2022-10-26 PROCEDURE — 3288F FALL RISK ASSESSMENT DOCD: CPT | Mod: CPTII,S$GLB,, | Performed by: THORACIC SURGERY (CARDIOTHORACIC VASCULAR SURGERY)

## 2022-10-26 PROCEDURE — 1126F PR PAIN SEVERITY QUANTIFIED, NO PAIN PRESENT: ICD-10-PCS | Mod: CPTII,S$GLB,, | Performed by: THORACIC SURGERY (CARDIOTHORACIC VASCULAR SURGERY)

## 2022-10-26 PROCEDURE — 71250 CT THORAX DX C-: CPT | Mod: 26,,, | Performed by: RADIOLOGY

## 2022-10-26 PROCEDURE — 1159F MED LIST DOCD IN RCRD: CPT | Mod: CPTII,S$GLB,, | Performed by: THORACIC SURGERY (CARDIOTHORACIC VASCULAR SURGERY)

## 2022-10-26 PROCEDURE — 1101F PR PT FALLS ASSESS DOC 0-1 FALLS W/OUT INJ PAST YR: ICD-10-PCS | Mod: CPTII,S$GLB,, | Performed by: THORACIC SURGERY (CARDIOTHORACIC VASCULAR SURGERY)

## 2022-10-26 PROCEDURE — 3077F SYST BP >= 140 MM HG: CPT | Mod: CPTII,S$GLB,, | Performed by: THORACIC SURGERY (CARDIOTHORACIC VASCULAR SURGERY)

## 2022-10-27 DIAGNOSIS — R91.1 LUNG NODULE: Primary | ICD-10-CM

## 2022-10-28 ENCOUNTER — TELEPHONE (OUTPATIENT)
Dept: HEMATOLOGY/ONCOLOGY | Facility: CLINIC | Age: 77
End: 2022-10-28
Payer: MEDICARE

## 2022-10-28 NOTE — TELEPHONE ENCOUNTER
Contacted Judd Dimitrios to schedule an appointment in Lung Nodule clinic.  Patient declined at this time.  States that he would like to wait until after his next CT.

## 2022-10-31 ENCOUNTER — TELEPHONE (OUTPATIENT)
Dept: SURGERY | Facility: CLINIC | Age: 77
End: 2022-10-31
Payer: MEDICARE

## 2022-10-31 ENCOUNTER — TELEPHONE (OUTPATIENT)
Dept: FAMILY MEDICINE | Facility: CLINIC | Age: 77
End: 2022-10-31
Payer: MEDICARE

## 2022-10-31 ENCOUNTER — TELEPHONE (OUTPATIENT)
Dept: HEMATOLOGY/ONCOLOGY | Facility: CLINIC | Age: 77
End: 2022-10-31
Payer: MEDICARE

## 2022-10-31 NOTE — TELEPHONE ENCOUNTER
Called Mr. Ames to offer appointment with Dr. Diehl for eval of lung nodule.  Patient declined again.  Patient states that he is dealing with a wound on his leg at this time.  Patient states that he will call when he's ready to schedule an appointment.

## 2022-10-31 NOTE — TELEPHONE ENCOUNTER
"----- Message from Rosa Mendoza sent at 10/31/2022  2:17 PM CDT -----  Regarding: Returning Call            Name of Who is Calling:  Zac Plunkett    Who Left The Message:  Zac lPunkett      What is the request in detail:      Patient called stating, "he's returning the Office's call and would you to please call again.:"  Thank you!      Reply by [facility]: No    Preferred Call Back :  [phone] (H)                                          "

## 2022-10-31 NOTE — TELEPHONE ENCOUNTER
Spoke with patient says that he has a wound care appointment scheduled for Wednesday with another provider and he does not need to schedule with \.

## 2022-10-31 NOTE — TELEPHONE ENCOUNTER
----- Message from Jovanna Don sent at 10/31/2022  1:31 PM CDT -----  Regarding: Patient Concern  Contact: Zac Plunkett (patient)  Patient called regarding procedure performed by Dr. Mao on his right thigh. Patient states he had a surgery performed in September and then he saw Dr. Mao to have his sutures removed a few weeks ago. The night after his sutures were removed the patient states he started bleeding from the incision site. Patient requesting call back from staff with recommendation on what he can be doing. Patient attempted to schedule appointment with [facility], but first availability not until 11/21/2022.     Patient's call back number [phone].

## 2022-10-31 NOTE — TELEPHONE ENCOUNTER
----- Message from Sarkis Lu sent at 10/31/2022  9:44 AM CDT -----  Type: Patient Call Back    Who called:Self    What is the request in detail: Pt is requesting a call back from Dr. Leahy regarding wound care. Please call    Can the clinic reply by [facility]? no    Would the patient rather a call back or a response via [facility]? Call back    Best call back number: [phone] (home)       Additional Information:

## 2022-10-31 NOTE — TELEPHONE ENCOUNTER
----- Message from Jovanna Don sent at 10/31/2022  1:31 PM CDT -----  Regarding: Patient Concern  Contact: Zac Plunkett (patient)  Patient called regarding procedure performed by Dr. Mao on his right thigh. Patient states he had a surgery performed in September and then he saw Dr. Mao to have his sutures removed a few weeks ago. The night after his sutures were removed the patient states he started bleeding from the incision site. Patient requesting call back from staff with recommendation on what he can be doing. Patient attempted to schedule appointment with [facility], but first availability not until 11/21/2022.     Patient's call back number [phone].

## 2022-11-01 ENCOUNTER — TELEPHONE (OUTPATIENT)
Dept: HEMATOLOGY/ONCOLOGY | Facility: CLINIC | Age: 77
End: 2022-11-01
Payer: MEDICARE

## 2022-11-01 NOTE — TELEPHONE ENCOUNTER
Updated patient on the hearing. Discussed keytruda may get denied. We can get patient assistance but that can take a long time. Patient understands. Discussed at some point we need to enroll in home hospice for pain/symptom management. He is still dealing with wound dehiscence. Will wait till wound problem resolves. Patient understands and agrees with the plan.

## 2022-11-18 ENCOUNTER — OFFICE VISIT (OUTPATIENT)
Dept: SURGERY | Facility: CLINIC | Age: 77
End: 2022-11-18
Payer: MEDICARE

## 2022-11-18 VITALS
SYSTOLIC BLOOD PRESSURE: 136 MMHG | OXYGEN SATURATION: 99 % | HEIGHT: 71 IN | BODY MASS INDEX: 26.31 KG/M2 | WEIGHT: 187.94 LBS | DIASTOLIC BLOOD PRESSURE: 77 MMHG

## 2022-11-18 DIAGNOSIS — C44.99 MYXOFIBROSARCOMA OF SKIN: Primary | ICD-10-CM

## 2022-11-18 PROCEDURE — 3078F PR MOST RECENT DIASTOLIC BLOOD PRESSURE < 80 MM HG: ICD-10-PCS | Mod: CPTII,S$GLB,, | Performed by: SURGERY

## 2022-11-18 PROCEDURE — 1101F PR PT FALLS ASSESS DOC 0-1 FALLS W/OUT INJ PAST YR: ICD-10-PCS | Mod: CPTII,S$GLB,, | Performed by: SURGERY

## 2022-11-18 PROCEDURE — 3075F SYST BP GE 130 - 139MM HG: CPT | Mod: CPTII,S$GLB,, | Performed by: SURGERY

## 2022-11-18 PROCEDURE — 99999 PR PBB SHADOW E&M-EST. PATIENT-LVL III: CPT | Mod: PBBFAC,,, | Performed by: SURGERY

## 2022-11-18 PROCEDURE — 1101F PT FALLS ASSESS-DOCD LE1/YR: CPT | Mod: CPTII,S$GLB,, | Performed by: SURGERY

## 2022-11-18 PROCEDURE — 1159F PR MEDICATION LIST DOCUMENTED IN MEDICAL RECORD: ICD-10-PCS | Mod: CPTII,S$GLB,, | Performed by: SURGERY

## 2022-11-18 PROCEDURE — 99999 PR PBB SHADOW E&M-EST. PATIENT-LVL III: ICD-10-PCS | Mod: PBBFAC,,, | Performed by: SURGERY

## 2022-11-18 PROCEDURE — 1126F PR PAIN SEVERITY QUANTIFIED, NO PAIN PRESENT: ICD-10-PCS | Mod: CPTII,S$GLB,, | Performed by: SURGERY

## 2022-11-18 PROCEDURE — 1159F MED LIST DOCD IN RCRD: CPT | Mod: CPTII,S$GLB,, | Performed by: SURGERY

## 2022-11-18 PROCEDURE — 3288F FALL RISK ASSESSMENT DOCD: CPT | Mod: CPTII,S$GLB,, | Performed by: SURGERY

## 2022-11-18 PROCEDURE — 99024 POSTOP FOLLOW-UP VISIT: CPT | Mod: S$GLB,,, | Performed by: SURGERY

## 2022-11-18 PROCEDURE — 1126F AMNT PAIN NOTED NONE PRSNT: CPT | Mod: CPTII,S$GLB,, | Performed by: SURGERY

## 2022-11-18 PROCEDURE — 3075F PR MOST RECENT SYSTOLIC BLOOD PRESS GE 130-139MM HG: ICD-10-PCS | Mod: CPTII,S$GLB,, | Performed by: SURGERY

## 2022-11-18 PROCEDURE — 99024 PR POST-OP FOLLOW-UP VISIT: ICD-10-PCS | Mod: S$GLB,,, | Performed by: SURGERY

## 2022-11-18 PROCEDURE — 3288F PR FALLS RISK ASSESSMENT DOCUMENTED: ICD-10-PCS | Mod: CPTII,S$GLB,, | Performed by: SURGERY

## 2022-11-18 PROCEDURE — 3078F DIAST BP <80 MM HG: CPT | Mod: CPTII,S$GLB,, | Performed by: SURGERY

## 2022-11-18 NOTE — PROGRESS NOTES
Surgery Clinic Note    Zac Plunkett is a 77 y.o. year old male in clinic today for follow up of right thigh excision of mass with wound dehiscence. The wound is healing well, has been seeing wound care and applying alginate and other dressings. He has some other masses in the areas, we discussed they are likely further spread of this metastatic sarcoma, and he understands and will inquire about further treatment if they become symptomatic.  No f/c/n/v/sob/cp    ROS:  Negative except above    PE:  Vitals:    11/18/22 1004   BP: 136/77       NAD  No belabored breathing  Abd soft nt nd  Incisions. Small granulation tissue at the inferior incision location of a dehiscence. No draiange.  Surrounding area with small palpable sub-q 1 cm masses.    A/P:  Zac Plunkett is a 77 y.o. 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  -we are readily available to perform further palliative surgeries. Patient understands, prefers to rtc prn.    Arun Mao  General Surgery - [facility]  11/18/2022

## 2023-01-03 ENCOUNTER — HOSPITAL ENCOUNTER (OUTPATIENT)
Dept: RADIOLOGY | Facility: HOSPITAL | Age: 78
Discharge: HOME | End: 2023-01-03
Attending: SURGERY
Payer: MEDICARE

## 2023-01-03 ENCOUNTER — OFFICE VISIT (OUTPATIENT)
Dept: SURGERY | Facility: CLINIC | Age: 78
End: 2023-01-03
Payer: MEDICARE

## 2023-01-03 VITALS
DIASTOLIC BLOOD PRESSURE: 90 MMHG | SYSTOLIC BLOOD PRESSURE: 159 MMHG | HEART RATE: 57 BPM | HEIGHT: 71 IN | WEIGHT: 186.31 LBS | OXYGEN SATURATION: 98 % | BODY MASS INDEX: 26.08 KG/M2

## 2023-01-03 DIAGNOSIS — C44.99 MYXOFIBROSARCOMA OF SKIN: ICD-10-CM

## 2023-01-03 DIAGNOSIS — C44.99 MYXOFIBROSARCOMA OF SKIN: Primary | ICD-10-CM

## 2023-01-03 PROCEDURE — 1125F AMNT PAIN NOTED PAIN PRSNT: CPT | Mod: HCNC,CPTII,S$GLB, | Performed by: SURGERY

## 2023-01-03 PROCEDURE — 73701 CT LOWER EXTREMITY W/DYE: CPT | Mod: TC,HCNC,RT

## 2023-01-03 PROCEDURE — 99214 OFFICE O/P EST MOD 30 MIN: CPT | Mod: HCNC,S$GLB,, | Performed by: SURGERY

## 2023-01-03 PROCEDURE — 3288F FALL RISK ASSESSMENT DOCD: CPT | Mod: HCNC,CPTII,S$GLB, | Performed by: SURGERY

## 2023-01-03 PROCEDURE — 72193 CT PELVIS W/DYE: CPT | Mod: 26,HCNC,, | Performed by: RADIOLOGY

## 2023-01-03 PROCEDURE — 3077F SYST BP >= 140 MM HG: CPT | Mod: HCNC,CPTII,S$GLB, | Performed by: SURGERY

## 2023-01-03 PROCEDURE — 1159F MED LIST DOCD IN RCRD: CPT | Mod: HCNC,CPTII,S$GLB, | Performed by: SURGERY

## 2023-01-03 PROCEDURE — 72193 CT PELVIS W/DYE: CPT | Mod: TC,HCNC

## 2023-01-03 PROCEDURE — 99214 PR OFFICE/OUTPT VISIT, EST, LEVL IV, 30-39 MIN: ICD-10-PCS | Mod: HCNC,S$GLB,, | Performed by: SURGERY

## 2023-01-03 PROCEDURE — 1101F PR PT FALLS ASSESS DOC 0-1 FALLS W/OUT INJ PAST YR: ICD-10-PCS | Mod: HCNC,CPTII,S$GLB, | Performed by: SURGERY

## 2023-01-03 PROCEDURE — 73701 CT THIGH WITH CONTRAST RIGHT: ICD-10-PCS | Mod: 26,HCNC,RT, | Performed by: RADIOLOGY

## 2023-01-03 PROCEDURE — 3288F PR FALLS RISK ASSESSMENT DOCUMENTED: ICD-10-PCS | Mod: HCNC,CPTII,S$GLB, | Performed by: SURGERY

## 2023-01-03 PROCEDURE — 1101F PT FALLS ASSESS-DOCD LE1/YR: CPT | Mod: HCNC,CPTII,S$GLB, | Performed by: SURGERY

## 2023-01-03 PROCEDURE — 1159F PR MEDICATION LIST DOCUMENTED IN MEDICAL RECORD: ICD-10-PCS | Mod: HCNC,CPTII,S$GLB, | Performed by: SURGERY

## 2023-01-03 PROCEDURE — 25500020 PHARM REV CODE 255: Mod: HCNC | Performed by: SURGERY

## 2023-01-03 PROCEDURE — 99999 PR PBB SHADOW E&M-EST. PATIENT-LVL III: ICD-10-PCS | Mod: PBBFAC,HCNC,, | Performed by: SURGERY

## 2023-01-03 PROCEDURE — 3080F PR MOST RECENT DIASTOLIC BLOOD PRESSURE >= 90 MM HG: ICD-10-PCS | Mod: HCNC,CPTII,S$GLB, | Performed by: SURGERY

## 2023-01-03 PROCEDURE — 3080F DIAST BP >= 90 MM HG: CPT | Mod: HCNC,CPTII,S$GLB, | Performed by: SURGERY

## 2023-01-03 PROCEDURE — 73701 CT LOWER EXTREMITY W/DYE: CPT | Mod: 26,HCNC,RT, | Performed by: RADIOLOGY

## 2023-01-03 PROCEDURE — 3077F PR MOST RECENT SYSTOLIC BLOOD PRESSURE >= 140 MM HG: ICD-10-PCS | Mod: HCNC,CPTII,S$GLB, | Performed by: SURGERY

## 2023-01-03 PROCEDURE — 1125F PR PAIN SEVERITY QUANTIFIED, PAIN PRESENT: ICD-10-PCS | Mod: HCNC,CPTII,S$GLB, | Performed by: SURGERY

## 2023-01-03 PROCEDURE — 99999 PR PBB SHADOW E&M-EST. PATIENT-LVL III: CPT | Mod: PBBFAC,HCNC,, | Performed by: SURGERY

## 2023-01-03 PROCEDURE — 72193 CT PELVIS WITH  CONTRAST: ICD-10-PCS | Mod: 26,HCNC,, | Performed by: RADIOLOGY

## 2023-01-03 RX ADMIN — IOHEXOL 100 ML: 350 INJECTION, SOLUTION INTRAVENOUS at 01:01

## 2023-01-03 NOTE — PROGRESS NOTES
Surgery Clinic Note    Zac Plunkett is a 77 y.o. year old male in clinic today for follow up of right thigh myxofibrosarcoma, stage 4, with multiple recurrences, now with a recurrence of the soft tissue, needing frequent wound care. Painful. Bleeds when not covered. Also has an additional LN palpable closer to the groin. He has lung lesions, concern for stage 4 dz, put on hold per patient.  No f/c/n/v/sob/cp    ROS:  Negative except above      PE:  Vitals:    01/03/23 1100   BP: (!) 159/90   Pulse: (!) 57       NAD  No belabored breathing  Abd soft nt nd  Right groin: palpable deep LN, tender.  The prior incision at the femoral area, with a pedunculated raised bleeding lesions, consistent with recurrence malignant mass. Tender.    A/P:  Zac Plunkett is a 77 y.o. year old male w recurrent right thigh/groin myxofibrosarcoma, high grade, and lung lesions likely mets    -CT pelvis and right femur/thigh to eval before pre op. Will assess before planning for resection, may end up needing referral to [facility] for advanced surg onc to coordinate with plastics given the likelihood of a large mass, and this may create a large wound needing complex closure. This may not even be offered given the aggressive recurrent nature of the disease, will discuss options  -rtc Friday after imaging.    Arun Mao  General Surgery - [facility]  1/3/2023

## 2023-01-06 ENCOUNTER — OFFICE VISIT (OUTPATIENT)
Dept: SURGERY | Facility: CLINIC | Age: 78
End: 2023-01-06
Payer: MEDICARE

## 2023-01-06 VITALS
SYSTOLIC BLOOD PRESSURE: 150 MMHG | HEART RATE: 58 BPM | OXYGEN SATURATION: 100 % | HEIGHT: 71 IN | WEIGHT: 186.31 LBS | DIASTOLIC BLOOD PRESSURE: 85 MMHG | BODY MASS INDEX: 26.08 KG/M2

## 2023-01-06 DIAGNOSIS — C44.99 MYXOFIBROSARCOMA OF SKIN: Primary | ICD-10-CM

## 2023-01-06 DIAGNOSIS — C49.9 PRIMARY MYXOFIBROSARCOMA: Primary | ICD-10-CM

## 2023-01-06 PROCEDURE — 99999 PR PBB SHADOW E&M-EST. PATIENT-LVL IV: CPT | Mod: PBBFAC,HCNC,, | Performed by: SURGERY

## 2023-01-06 PROCEDURE — 1125F PR PAIN SEVERITY QUANTIFIED, PAIN PRESENT: ICD-10-PCS | Mod: HCNC,CPTII,S$GLB, | Performed by: SURGERY

## 2023-01-06 PROCEDURE — 1101F PT FALLS ASSESS-DOCD LE1/YR: CPT | Mod: HCNC,CPTII,S$GLB, | Performed by: SURGERY

## 2023-01-06 PROCEDURE — 3077F SYST BP >= 140 MM HG: CPT | Mod: HCNC,CPTII,S$GLB, | Performed by: SURGERY

## 2023-01-06 PROCEDURE — 3079F DIAST BP 80-89 MM HG: CPT | Mod: HCNC,CPTII,S$GLB, | Performed by: SURGERY

## 2023-01-06 PROCEDURE — 99999 PR PBB SHADOW E&M-EST. PATIENT-LVL IV: ICD-10-PCS | Mod: PBBFAC,HCNC,, | Performed by: SURGERY

## 2023-01-06 PROCEDURE — 3288F PR FALLS RISK ASSESSMENT DOCUMENTED: ICD-10-PCS | Mod: HCNC,CPTII,S$GLB, | Performed by: SURGERY

## 2023-01-06 PROCEDURE — 1125F AMNT PAIN NOTED PAIN PRSNT: CPT | Mod: HCNC,CPTII,S$GLB, | Performed by: SURGERY

## 2023-01-06 PROCEDURE — 99214 PR OFFICE/OUTPT VISIT, EST, LEVL IV, 30-39 MIN: ICD-10-PCS | Mod: HCNC,S$GLB,, | Performed by: SURGERY

## 2023-01-06 PROCEDURE — 1159F MED LIST DOCD IN RCRD: CPT | Mod: HCNC,CPTII,S$GLB, | Performed by: SURGERY

## 2023-01-06 PROCEDURE — 3077F PR MOST RECENT SYSTOLIC BLOOD PRESSURE >= 140 MM HG: ICD-10-PCS | Mod: HCNC,CPTII,S$GLB, | Performed by: SURGERY

## 2023-01-06 PROCEDURE — 1101F PR PT FALLS ASSESS DOC 0-1 FALLS W/OUT INJ PAST YR: ICD-10-PCS | Mod: HCNC,CPTII,S$GLB, | Performed by: SURGERY

## 2023-01-06 PROCEDURE — 1159F PR MEDICATION LIST DOCUMENTED IN MEDICAL RECORD: ICD-10-PCS | Mod: HCNC,CPTII,S$GLB, | Performed by: SURGERY

## 2023-01-06 PROCEDURE — 3079F PR MOST RECENT DIASTOLIC BLOOD PRESSURE 80-89 MM HG: ICD-10-PCS | Mod: HCNC,CPTII,S$GLB, | Performed by: SURGERY

## 2023-01-06 PROCEDURE — 99214 OFFICE O/P EST MOD 30 MIN: CPT | Mod: HCNC,S$GLB,, | Performed by: SURGERY

## 2023-01-06 PROCEDURE — 3288F FALL RISK ASSESSMENT DOCD: CPT | Mod: HCNC,CPTII,S$GLB, | Performed by: SURGERY

## 2023-01-06 RX ORDER — HYDROCODONE BITARTRATE AND ACETAMINOPHEN 5; 325 MG/1; MG/1
1 TABLET ORAL EVERY 6 HOURS PRN
Qty: 30 TABLET | Refills: 0 | Status: SHIPPED | OUTPATIENT
Start: 2023-01-06 | End: 2023-02-20

## 2023-01-06 NOTE — PROGRESS NOTES
Surgery Clinic Note    Zac Plunkett is a 77 y.o. year old male in clinic today for follow up of right thigh myxofibrosarcoma. CT reviewed, extensive disease, deep  and lateral tumor extension. I explained that I cannot safely resect tumor without creating large wound which would likely not heal and would likely have recurrent tumor during healing process. Offered rad onc and referral to Surg Onc at [facility] for further treatment options. He is ok with this, wants pain meds for relief in the meantime. Wound continue to grow and bleed during treatments/wound care changes.  No f/c/n/v/sob/cp    ROS:  Negative except above    Imagin/3/23  Impression:     Heterogeneous enhancing soft tissue lesions and nodular foci at the anterior thigh and right groin highly concerning for worsening local recurrent disease with talon metastasis.    PE:  Vitals:    23 0951   BP: (!) 150/85   Pulse: (!) 58     NAD  No belabored breathing  Abd soft nt nd  Right thigh: large pedunculated mass, bleed with dressing change. Firm tissue laterally and a separate firm mass in the superior medial space near inguinal area.    A/P:  Zac Plunkett is a 77 y.o. year old male w recurrent myxofibrosarcoma of right thigh, w likely stage 4 mets to lung    -risks/benefits evaluated in my opinion not beneficial to resect this mass, either partially or completely. Discussed w partner at [facility], will defer to rad onc and [facility] surg onc for options, urgent referrals placed  -norco 5 mg 30 tabs, script provided.  -continue daily wound care.    Arun Mao  General Surgery - [facility]  2023

## 2023-01-10 ENCOUNTER — OFFICE VISIT (OUTPATIENT)
Dept: SURGERY | Facility: CLINIC | Age: 78
End: 2023-01-10
Payer: MEDICARE

## 2023-01-10 VITALS
WEIGHT: 187.69 LBS | SYSTOLIC BLOOD PRESSURE: 181 MMHG | DIASTOLIC BLOOD PRESSURE: 77 MMHG | BODY MASS INDEX: 26.27 KG/M2 | HEART RATE: 55 BPM | HEIGHT: 71 IN | OXYGEN SATURATION: 100 %

## 2023-01-10 DIAGNOSIS — C44.99 MYXOFIBROSARCOMA OF SKIN: ICD-10-CM

## 2023-01-10 PROCEDURE — 1159F MED LIST DOCD IN RCRD: CPT | Mod: HCNC,CPTII,S$GLB, | Performed by: SURGERY

## 2023-01-10 PROCEDURE — 3077F SYST BP >= 140 MM HG: CPT | Mod: HCNC,CPTII,S$GLB, | Performed by: SURGERY

## 2023-01-10 PROCEDURE — 3077F PR MOST RECENT SYSTOLIC BLOOD PRESSURE >= 140 MM HG: ICD-10-PCS | Mod: HCNC,CPTII,S$GLB, | Performed by: SURGERY

## 2023-01-10 PROCEDURE — 3078F PR MOST RECENT DIASTOLIC BLOOD PRESSURE < 80 MM HG: ICD-10-PCS | Mod: HCNC,CPTII,S$GLB, | Performed by: SURGERY

## 2023-01-10 PROCEDURE — 99203 PR OFFICE/OUTPT VISIT, NEW, LEVL III, 30-44 MIN: ICD-10-PCS | Mod: HCNC,S$GLB,, | Performed by: SURGERY

## 2023-01-10 PROCEDURE — 3288F FALL RISK ASSESSMENT DOCD: CPT | Mod: HCNC,CPTII,S$GLB, | Performed by: SURGERY

## 2023-01-10 PROCEDURE — 3078F DIAST BP <80 MM HG: CPT | Mod: HCNC,CPTII,S$GLB, | Performed by: SURGERY

## 2023-01-10 PROCEDURE — 99999 PR PBB SHADOW E&M-EST. PATIENT-LVL III: ICD-10-PCS | Mod: PBBFAC,HCNC,, | Performed by: SURGERY

## 2023-01-10 PROCEDURE — 1100F PTFALLS ASSESS-DOCD GE2>/YR: CPT | Mod: HCNC,CPTII,S$GLB, | Performed by: SURGERY

## 2023-01-10 PROCEDURE — 1125F AMNT PAIN NOTED PAIN PRSNT: CPT | Mod: HCNC,CPTII,S$GLB, | Performed by: SURGERY

## 2023-01-10 PROCEDURE — 3288F PR FALLS RISK ASSESSMENT DOCUMENTED: ICD-10-PCS | Mod: HCNC,CPTII,S$GLB, | Performed by: SURGERY

## 2023-01-10 PROCEDURE — 1125F PR PAIN SEVERITY QUANTIFIED, PAIN PRESENT: ICD-10-PCS | Mod: HCNC,CPTII,S$GLB, | Performed by: SURGERY

## 2023-01-10 PROCEDURE — 1159F PR MEDICATION LIST DOCUMENTED IN MEDICAL RECORD: ICD-10-PCS | Mod: HCNC,CPTII,S$GLB, | Performed by: SURGERY

## 2023-01-10 PROCEDURE — 1100F PR PT FALLS ASSESS DOC 2+ FALLS/FALL W/INJURY/YR: ICD-10-PCS | Mod: HCNC,CPTII,S$GLB, | Performed by: SURGERY

## 2023-01-10 PROCEDURE — 99999 PR PBB SHADOW E&M-EST. PATIENT-LVL III: CPT | Mod: PBBFAC,HCNC,, | Performed by: SURGERY

## 2023-01-10 PROCEDURE — 99203 OFFICE O/P NEW LOW 30 MIN: CPT | Mod: HCNC,S$GLB,, | Performed by: SURGERY

## 2023-01-10 NOTE — PROGRESS NOTES
"  Zac Plunkett is a 77 y.o. year old male in clinic today for follow up of right thigh myxofibrosarcoma, stage 4, with multiple recurrences, now with a recurrence of the soft tissue, needing frequent wound care. Painful. Bleeds when not covered. Also has an additional LN palpable closer to the groin. He has lung lesions, concern for stage 4 dz, put on hold per patient.  No f/c/n/v/sob/cp      Zac Plunkett is a 77 y.o. year old male in clinic today for follow up of right thigh myxofibrosarcoma. CT reviewed, extensive disease, deep  and lateral tumor extension. I explained that I cannot safely resect tumor without creating large wound which would likely not heal and would likely have recurrent tumor during healing process. Offered rad onc and referral to Surg Onc at [facility] for further treatment options. He is ok with this, wants pain meds for relief in the meantime. Wound continue to grow and bleed during treatments/wound care changes.  No f/c/n/v/sob/cp              Surgical Oncology Clinic Note  [facility]       Referring Provider: Dr. Arun Mao   PCP: Deondre Waters MD    Reason For Visit:   Chief Complaint   Patient presents with    Consult     Myxofibrosarcoma       Oncologic History:   2021  May              5/27- US right thigh: showed 2 hypoechoic masses in the subcutaneous tissues of the right anterior thigh. Largest measured 1.1 x 0.9 x 1 cm. Read at the time was concern for lymphadenopathy.   June 6/4 - CXR: "Bilateral reticular opacities are present in a perihilar distribution which appears slightly more pronounced compared to prior exams."               6/11 - underwent excisional biopsy of the right thigh; pathology came back positive for myxofibrosarcoma, high grade. Path a 1.8 x 1.1 x 0.9 cm mass with an adjacent 0.4 x 0.4 x 0.3 cm mass.   July/Aug              - IMRT with Dr. Mcadams  2022 January 1/12 - PET: locoregional recurrence and " concerning 0.9 cm RUL lesion              1/20 - Path: excision on 2 recurrent lesions:  Myxofibrosarcoma, 1.9 cm at longest length, PD-L1 25%              1/31 - CT thigh: successful removal w/o evidence of recurrent disease  April 4/28 - recurrence, locally with metastatic deposit in LN. S/p excision on 4/28/22. Path: right thigh superior mass: Myxofibrosarcoma, high grade with focal tumor necrosis, incompletely excised. A portion of the lesion is suggestive of an involved lymph node exhibiting metastatic sarcoma with extranodal extension.  Sarcoma is focally present within inked but otherwise unspecified peripheral margins.   June/July 7/19 - CT thigh showed local recurrence and a 0.7 cm R inguinal lymph node. CT chest with lung nodule concerning for metastasis  Sep   9/26 - s/p local excision. Path: Recurrent myxofibrosarcoma, high-grade, with tumor necrosis. High-grade myxofibrosarcoma extends to one peripheral soft tissue surgical margin. deep surgical margin is negative. Skin uninvolved     2023  Jacoby  1/3 - CT thigh, chest, pelvis showed Heterogeneous enhancing soft tissue lesions and nodular foci at the anterior thigh and right groin highly concerning for worsening local recurrent disease with talon metastasis. Lung nodule resolved, no new nodules       History of Present Illness    Zac Plunkett is a 77 y.o. male with history of right thigh myofibrosarcoma of the right anterior thigh with multiple local recurrences s/p multiple local excisions and local radiation to area distal to current wound/recurrence. Has not had radiation to current site. Presents today for evaluation and management of most recent recurrence following excision on 9/26/22.  CT thigh, chest, pelvis concerning for worsening local recurrence with talon metastasis.      Patient reports incision dehiscence after surgery shortly followed by return growth of the tumor.  Has pain at wound/recurrence site worse at night.  Takes tylenol daily and narcotics as needed at night. Wound bleeds often, especially at dressing changes. Says lesion is growing roughly 1cm/week since has returned. Previously has not been interested in pursuing chemotherapy dur to side effects and unlikelihood of successful treatment. Would consider radiation if would help decrease wound issues.  Would like to have lesion removed if possible.     Pathology:   Recurrent myxofibrosarcoma, high-grade, with tumor necrosis. High-grade myxofibrosarcoma extends to one peripheral soft tissue surgical margin. deep surgical margin is negative. Skin uninvolved       Current Outpatient Medications:     atorvastatin (LIPITOR) 80 MG tablet, Take 1 tablet (80 mg total) by mouth once daily., Disp: 90 tablet, Rfl: 3    finasteride (PROSCAR) 5 mg tablet, TAKE 1 TABLET BY MOUTH EVERY DAY, Disp: 90 tablet, Rfl: 0    hydroCHLOROthiazide (HYDRODIURIL) 25 MG tablet, TAKE 1 TABLET BY MOUTH DAILY, Disp: 90 tablet, Rfl: 0    HYDROcodone-acetaminophen (NORCO) 5-325 mg per tablet, Take 1 tablet by mouth every 8 (eight) hours as needed for Pain., Disp: 30 tablet, Rfl: 0    HYDROcodone-acetaminophen (NORCO) 5-325 mg per tablet, Take 1 tablet by mouth every 6 (six) hours as needed for Pain., Disp: 30 tablet, Rfl: 0    oxyCODONE (ROXICODONE) 5 MG immediate release tablet, Take 1 tablet (5 mg total) by mouth every 6 (six) hours as needed for Pain., Disp: 120 tablet, Rfl: 0    SELENIUM ORAL, Take by mouth., Disp: , Rfl:     TURMERIC ORAL, Take by mouth., Disp: , Rfl:     VITAMIN E ACETATE ORAL, Take by mouth., Disp: , Rfl:     HYDROcodone-acetaminophen (NORCO) 5-325 mg per tablet, Take 1 tablet by mouth every 6 (six) hours as needed for Pain., Disp: 20 tablet, Rfl: 0    Review of patient's allergies indicates:  No Known Allergies    Past Medical History:   Diagnosis Date    Anemia 8/7/2018    Cataract     Enlarged prostate     Gross hematuria     Hyperlipidemia     Hypertension     Kidney stone      Lumbar spondylosis     Lumbar spondylosis     PONV (postoperative nausea and vomiting)     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    Tobacco dependence        Past Surgical History:   Procedure Laterality Date    APPENDECTOMY      BACK SURGERY      HIP REPLACEMENT ARTHROPLASTY Left 8/6/2018    Procedure: ARTHROPLASTY, HIP REPLACEMENT;  Surgeon: Chapincito Sagastume MD;  Location: [facility] OR;  Service: Orthopedics;  Laterality: Left;    JOINT REPLACEMENT Left     hip    laminectomy l4-l5      SURGICAL REMOVAL OF LYMPH NODE Right 1/20/2022    Procedure: EXCISION, LYMPH NODE;  Surgeon: Arun Mao MD;  Location: [facility] OR;  Service: General;  Laterality: Right;  right thigh and right groin  RN PREOP 1/18/2022      PT CHOSE TO DO HOME TEST FOR COVID ON AM OF SURGERY       Family History   Problem Relation Age of Onset    Diabetes Mother     Heart disease Father     No Known Problems Sister     Cerebral palsy Brother     Epilepsy Brother     Melanoma Neg Hx     Psoriasis Neg Hx     Lupus Neg Hx     Eczema Neg Hx     Acne Neg Hx     Liver disease Neg Hx     Liver cancer Neg Hx     Cirrhosis Neg Hx     Colon polyps Neg Hx     Colon cancer Neg Hx        Social History     Socioeconomic History    Marital status:    Tobacco Use    Smoking status: Every Day     Types: Cigars    Smokeless tobacco: Never    Tobacco comments:     smokes cigars 2-3 a day, smoke cigarette for 15 years   Substance and Sexual Activity    Alcohol use: No    Drug use: No    Sexual activity: Yes     Partners: Female         Vitals:    01/10/23 1011   BP: (!) 181/77   Pulse: (!) 55       Physical Exam  Constitutional:       General: He is in acute distress.      Appearance: He is not ill-appearing.   HENT:      Head: Normocephalic and atraumatic.   Eyes:      Extraocular Movements: Extraocular movements intact.   Cardiovascular:      Rate and Rhythm: Tachycardia present.   Pulmonary:      Effort: Pulmonary effort is normal. No respiratory distress.    Skin:     General: Skin is warm and dry.      Findings: Lesion present.      Comments: Fixed, hard, bleeding nodule lesion on anterior right thigh.   Multiple scars from prior lesion excisions  Palpable, hard LN felt in right groin (x2)   Neurological:      General: No focal deficit present.      Mental Status: He is alert.   Psychiatric:         Mood and Affect: Mood normal.         Behavior: Behavior normal.        Labs:  Lab Results   Component Value Date    WBC 9.91 01/09/2023    HGB 14.4 01/09/2023    HCT 42.5 01/09/2023     01/09/2023    CHOL 135 06/28/2022    TRIG 76 06/28/2022    HDL 37 (L) 06/28/2022    LDLCALC 82.8 06/28/2022    ALT 18 01/09/2023    AST 20 01/09/2023     01/09/2023    K 3.8 01/09/2023     01/09/2023    CREATININE 0.87 01/09/2023    BUN 13 01/09/2023    CO2 29 01/09/2023    TSH 0.904 06/28/2022    PSA 0.63 06/28/2022    INR 1.0 07/24/2018    HGBA1C 5.8 (H) 06/28/2022           Radiology:   CT thigh, chest, pelvis - reviewed      ASSESSMENT & PLAN:  1. Myxofibrosarcoma of skin       Zac Plunkett is a 77 y.o. male with aggressive myofibrosarcoma of the right thigh with multiple local recurrences s/p multiple excisions s/p radiation to prior excision site more distal to current wound/recurrence site. CT scans with evidence of talon metastasis but not distant mets. Pt has had lesions excised x3 with progressively shortened time periods between recurrences. Most recently recurrence after only 2.5 months. Discussed with patient that excision of lesion would result in very large wound that would not heal and has extremity high risk of recurrence (likely as short as 1 month).  Patient is interested in considering local radiation to the lesion for symptom management.     - pt to see rad onc   - do not recommend local excision at this time  - continue local wound care  - RTC following rad onc julienne Morrissey MD  General Surgery, PGY-1  [facility]  Pager# [phone]

## 2023-01-17 NOTE — ADDENDUM NOTE
Addended by: MALAIKA QUEZADA on: 1/16/2023 09:35 PM     Modules accepted: Orders, Level of Service

## 2023-01-18 ENCOUNTER — OFFICE VISIT (OUTPATIENT)
Dept: HEMATOLOGY/ONCOLOGY | Facility: CLINIC | Age: 78
End: 2023-01-18
Payer: MEDICARE

## 2023-01-18 ENCOUNTER — PATIENT MESSAGE (OUTPATIENT)
Dept: HEMATOLOGY/ONCOLOGY | Facility: CLINIC | Age: 78
End: 2023-01-18

## 2023-01-18 VITALS
OXYGEN SATURATION: 98 % | HEIGHT: 71 IN | BODY MASS INDEX: 26.35 KG/M2 | DIASTOLIC BLOOD PRESSURE: 75 MMHG | SYSTOLIC BLOOD PRESSURE: 181 MMHG | HEART RATE: 59 BPM | RESPIRATION RATE: 18 BRPM | WEIGHT: 188.19 LBS | TEMPERATURE: 98 F

## 2023-01-18 DIAGNOSIS — R91.8 LUNG NODULES: ICD-10-CM

## 2023-01-18 DIAGNOSIS — G89.3 NEOPLASM RELATED PAIN: ICD-10-CM

## 2023-01-18 DIAGNOSIS — D49.9 IMMUNODEFICIENCY SECONDARY TO NEOPLASM: ICD-10-CM

## 2023-01-18 DIAGNOSIS — C44.99 MYXOFIBROSARCOMA OF SKIN: ICD-10-CM

## 2023-01-18 DIAGNOSIS — C49.9 PRIMARY MYXOFIBROSARCOMA: Primary | ICD-10-CM

## 2023-01-18 DIAGNOSIS — C77.4 SECONDARY MALIGNANCY OF INGUINAL LYMPH NODES: ICD-10-CM

## 2023-01-18 DIAGNOSIS — D84.81 IMMUNODEFICIENCY SECONDARY TO NEOPLASM: ICD-10-CM

## 2023-01-18 DIAGNOSIS — C49.9 SOFT TISSUE SARCOMA: ICD-10-CM

## 2023-01-18 PROCEDURE — 3078F DIAST BP <80 MM HG: CPT | Mod: HCNC,CPTII,S$GLB, | Performed by: INTERNAL MEDICINE

## 2023-01-18 PROCEDURE — 1101F PR PT FALLS ASSESS DOC 0-1 FALLS W/OUT INJ PAST YR: ICD-10-PCS | Mod: HCNC,CPTII,S$GLB, | Performed by: INTERNAL MEDICINE

## 2023-01-18 PROCEDURE — 3077F SYST BP >= 140 MM HG: CPT | Mod: HCNC,CPTII,S$GLB, | Performed by: INTERNAL MEDICINE

## 2023-01-18 PROCEDURE — 99215 PR OFFICE/OUTPT VISIT, EST, LEVL V, 40-54 MIN: ICD-10-PCS | Mod: HCNC,S$GLB,, | Performed by: INTERNAL MEDICINE

## 2023-01-18 PROCEDURE — 3288F FALL RISK ASSESSMENT DOCD: CPT | Mod: HCNC,CPTII,S$GLB, | Performed by: INTERNAL MEDICINE

## 2023-01-18 PROCEDURE — 1159F PR MEDICATION LIST DOCUMENTED IN MEDICAL RECORD: ICD-10-PCS | Mod: HCNC,CPTII,S$GLB, | Performed by: INTERNAL MEDICINE

## 2023-01-18 PROCEDURE — 99999 PR PBB SHADOW E&M-EST. PATIENT-LVL III: ICD-10-PCS | Mod: PBBFAC,HCNC,, | Performed by: INTERNAL MEDICINE

## 2023-01-18 PROCEDURE — 1160F RVW MEDS BY RX/DR IN RCRD: CPT | Mod: HCNC,CPTII,S$GLB, | Performed by: INTERNAL MEDICINE

## 2023-01-18 PROCEDURE — 3288F PR FALLS RISK ASSESSMENT DOCUMENTED: ICD-10-PCS | Mod: HCNC,CPTII,S$GLB, | Performed by: INTERNAL MEDICINE

## 2023-01-18 PROCEDURE — 1125F PR PAIN SEVERITY QUANTIFIED, PAIN PRESENT: ICD-10-PCS | Mod: HCNC,CPTII,S$GLB, | Performed by: INTERNAL MEDICINE

## 2023-01-18 PROCEDURE — 1101F PT FALLS ASSESS-DOCD LE1/YR: CPT | Mod: HCNC,CPTII,S$GLB, | Performed by: INTERNAL MEDICINE

## 2023-01-18 PROCEDURE — 1159F MED LIST DOCD IN RCRD: CPT | Mod: HCNC,CPTII,S$GLB, | Performed by: INTERNAL MEDICINE

## 2023-01-18 PROCEDURE — 99215 OFFICE O/P EST HI 40 MIN: CPT | Mod: HCNC,S$GLB,, | Performed by: INTERNAL MEDICINE

## 2023-01-18 PROCEDURE — 3078F PR MOST RECENT DIASTOLIC BLOOD PRESSURE < 80 MM HG: ICD-10-PCS | Mod: HCNC,CPTII,S$GLB, | Performed by: INTERNAL MEDICINE

## 2023-01-18 PROCEDURE — 1125F AMNT PAIN NOTED PAIN PRSNT: CPT | Mod: HCNC,CPTII,S$GLB, | Performed by: INTERNAL MEDICINE

## 2023-01-18 PROCEDURE — 1160F PR REVIEW ALL MEDS BY PRESCRIBER/CLIN PHARMACIST DOCUMENTED: ICD-10-PCS | Mod: HCNC,CPTII,S$GLB, | Performed by: INTERNAL MEDICINE

## 2023-01-18 PROCEDURE — 99999 PR PBB SHADOW E&M-EST. PATIENT-LVL III: CPT | Mod: PBBFAC,HCNC,, | Performed by: INTERNAL MEDICINE

## 2023-01-18 PROCEDURE — 3077F PR MOST RECENT SYSTOLIC BLOOD PRESSURE >= 140 MM HG: ICD-10-PCS | Mod: HCNC,CPTII,S$GLB, | Performed by: INTERNAL MEDICINE

## 2023-01-18 RX ORDER — MORPHINE SULFATE 15 MG/1
15 TABLET, FILM COATED, EXTENDED RELEASE ORAL 2 TIMES DAILY
Qty: 60 TABLET | Refills: 0 | Status: SHIPPED | OUTPATIENT
Start: 2023-01-18 | End: 2023-07-07

## 2023-01-18 RX ORDER — OXYCODONE HYDROCHLORIDE 10 MG/1
10 TABLET ORAL EVERY 6 HOURS PRN
Qty: 120 TABLET | Refills: 0 | Status: SHIPPED | OUTPATIENT
Start: 2023-01-18 | End: 2023-02-21 | Stop reason: SDUPTHER

## 2023-01-18 RX ORDER — NALOXONE HYDROCHLORIDE 4 MG/.1ML
SPRAY NASAL
Qty: 1 EACH | Refills: 11 | Status: SHIPPED | OUTPATIENT
Start: 2023-01-18

## 2023-01-18 NOTE — PROGRESS NOTES
"                                                         PROGRESS NOTE    Subjective:       Patient ID: Zac Plunkett is a 77 y.o. male.    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 JD. TMB 6.3    Oncologic History:  Prior patient of Dr Lucia's  2021  May              5/27- US right thigh: showed 2 hypoechoic masses in the subcutaneous tissues of the right anterior thigh. Largest measured 1.1 x 0.9 x 1 cm. Read at the time was concern for lymphadenopathy.   June 6/4 - CXR: "Bilateral reticular opacities are present in a perihilar distribution which appears slightly more pronounced compared to prior exams."               6/11 - underwent excisional biopsy of the right thigh; pathology came back positive for myxofibrosarcoma, high grade. Path a 1.8 x 1.1 x 0.9 cm mass with an adjacent 0.4 x 0.4 x 0.3 cm mass.   July/Aug              - IMRT with Dr. Mcadams  2022 January 1/12 - PET: locoregional recurrence and concerning 0.9 cm RUL lesion              1/20 - Path: excision on 2 recurrent lesions:  Myxofibrosarcoma, 1.9 cm at longest length, PD-L1 25%              1/31 - CT thigh: successful removal w/o evidence of recurrent disease  April 4/28 - recurrence, locally with metastatic deposit in LN. S/p excision on 4/28/22. Path: right thigh superior mass: Myxofibrosarcoma, high grade with focal tumor necrosis, incompletely excised. A portion of the lesion is suggestive of an involved lymph node exhibiting metastatic sarcoma with extranodal extension.  Sarcoma is focally present within inked but otherwise unspecified peripheral margins.   July 7/19 - CT thigh showed local recurrence and a 0.7 cm R inguinal lymph node    2023  Jacoby   1/3 - CT thigh/pelvis showed "Heterogeneous enhancing soft tissue lesions " "and nodular foci at the anterior thigh and right groin highly concerning for worsening local recurrent disease with talon metastasis"    - stable pulmonary nodules on CT chest    Interval History:   Mr Plunkett returns for follow up. His primary complaint is increase in size and pain of his tumor on the right hip. He is seeing Dr. Morales this week to discuss palliative RT. He otherwise feels well with no other complaints.     ECO    ROS:   A ten-point system review is obtained and negative except for what was stated in the Interval History.     Physical Examination:   Vital signs reviewed.   General: well hydrated, well developed, in no acute distress  HEENT: normocephalic, PERRLA, EOMI, anicteric sclerae, oropharynx clear  Neck: supple, no JVD, thyromegaly, cervical or supraclavicular lymphadenopathy  Lungs: clear breath sounds bilaterally, no wheezing, rales, or rhonchi  Heart: RRR, no M/R/G  Abdomen: soft, no tenderness, non-distended, no hepatosplenomegaly, mass, or hernia. BS present  Extremities: no clubbing, cyanosis, or edema  Skin: no rash, ulcer, or open wounds  Neuro: alert and oriented x 4, no focal neuro deficit  Psych: pleasant and appropriate mood and affect    Objective:     Laboratory Data:  Labs reviewed.     Imaging Data:  CT right thigh 22:  Postoperative changes from multifocal mass excision in the right anterior thigh.     New 0.7 cm rounded subcutaneous nodule in the right groin in the region of a previous morphologically normal lymph node, concerning for talon metastasis.  Attention on follow-up imaging is recommended.     Enlarging 1.7 cm subcutaneous nodule in the operative bed in the proximal anterior thigh, concerning for local recurrence.    CT chest 23:   In this patient with a history of sarcoma, a few previously seen pulmonary nodules have resolved while additional pulmonary nodules appear stable.  No new nodules are demonstrated.    CT thigh/pelvis 1/3/23: "   Heterogeneous enhancing soft tissue lesions and nodular foci at the anterior thigh and right groin highly concerning for worsening local recurrent disease with talon metastasis.  Further correlation advised.    Assessment and Plan:     1. Primary myxofibrosarcoma    2. Soft tissue sarcoma    3. 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 Immunodeficiency secondary to neoplasm    6. Lung nodules    7. Neoplasm related pain      1-5  - Mr Plunkett is a 76 yo man with recurrent myxofibrosarcoma of the right thigh, has had 3 resections and IMRT  - he was seen by Dr. Mao and Dr. Unger, they did not feel that he would be a good surgical candidate and recommended palliative RT  - he has an appointment with Dr. Morales this week to discuss RT  - Previously discussed systemic treatment options, including chemotherapy and immunotherapy. He was not interested in chemotherapy. Pembrolizumab has efficacy in phase II studies and is recommended on NCCN Guidelines for patients with myxofibrosarcoma. PD-L1 is 25%. However, his TMB is not high. Pembrolizumab was denied by insurance and then denied at hearing  - Votrient previously discussed, however not an option now due to non healing wound  - if pembrolizumab is denied again by insurance, will discuss hospice    6.  - followed by Dr De Anda.  - lung nodules stable on recent scan    7.  - He is taking hydrocodone-acetaminophen 5-325 twice daily   - will start MS contin 15mg bid with oxycodone 10mg prn breakthrough pain    Follow-up:     Return to clinic in one month    Discussed with Dr. Allison,   PGY-V  Hematology/Oncology Fellow    ATTESTATION:    I have personally seen, examined and talked to the patient. I have reviewed Dr Mahajan's note and agreed with the findings, assessment and plan.     Local progression of myxofibrosarcoma. Long discussion with patient. He has confirmed with me again that he does not want any  chemotherapy. We just applied for patient assistance for keytruda. Hopefully it can get approve. Long discussion re pain management. Try MS contin 15 mg bid. Oxycodone 10 mg q6h prn for breakthrough pain. See me in 3 weeks to see how pain is controlled.     I spent 50 minutes reviewing the chart, interpreting laboratory result and imaging data, coordinating patient's care, with at least 50% of the time on face-to-face counseling.     Ed Martines MD  Hematology and Medical Oncology  [facility]      Route Chart for Scheduling    Med Onc Chart Routing      Follow up with physician 3 weeks. see me in 3 weeks with CBC, CMP   Follow up with NICOLLE    Infusion scheduling note    Injection scheduling note    Labs    Imaging    Pharmacy appointment    Other referrals        Treatment Plan Information   OP PEMBROLIZUMAB 200MG Q3W   Ed Martines MD   Upcoming Treatment Dates - OP PEMBROLIZUMAB 200MG Q3W    8/25/2022       Chemotherapy       pembrolizumab (KEYTRUDA) 200 mg in sodium chloride 0.9% 108 mL infusion  9/15/2022       Chemotherapy       pembrolizumab (KEYTRUDA) 200 mg in sodium chloride 0.9% 108 mL infusion  10/6/2022       Chemotherapy       pembrolizumab (KEYTRUDA) 200 mg in sodium chloride 0.9% 108 mL infusion  10/27/2022       Chemotherapy       pembrolizumab (KEYTRUDA) 200 mg in sodium chloride 0.9% 108 mL infusion

## 2023-01-20 ENCOUNTER — OFFICE VISIT (OUTPATIENT)
Dept: RADIATION ONCOLOGY | Facility: CLINIC | Age: 78
End: 2023-01-20
Payer: MEDICARE

## 2023-01-20 VITALS
RESPIRATION RATE: 20 BRPM | HEART RATE: 62 BPM | TEMPERATURE: 97 F | DIASTOLIC BLOOD PRESSURE: 78 MMHG | WEIGHT: 188.25 LBS | SYSTOLIC BLOOD PRESSURE: 162 MMHG | HEIGHT: 71 IN | OXYGEN SATURATION: 97 % | BODY MASS INDEX: 26.35 KG/M2

## 2023-01-20 DIAGNOSIS — C44.99 MYXOFIBROSARCOMA OF SKIN: ICD-10-CM

## 2023-01-20 PROCEDURE — 3077F PR MOST RECENT SYSTOLIC BLOOD PRESSURE >= 140 MM HG: ICD-10-PCS | Mod: HCNC,CPTII,S$GLB, | Performed by: STUDENT IN AN ORGANIZED HEALTH CARE EDUCATION/TRAINING PROGRAM

## 2023-01-20 PROCEDURE — 1125F PR PAIN SEVERITY QUANTIFIED, PAIN PRESENT: ICD-10-PCS | Mod: HCNC,CPTII,S$GLB, | Performed by: STUDENT IN AN ORGANIZED HEALTH CARE EDUCATION/TRAINING PROGRAM

## 2023-01-20 PROCEDURE — 99999 PR PBB SHADOW E&M-EST. PATIENT-LVL IV: ICD-10-PCS | Mod: PBBFAC,HCNC,, | Performed by: STUDENT IN AN ORGANIZED HEALTH CARE EDUCATION/TRAINING PROGRAM

## 2023-01-20 PROCEDURE — 99999 PR PBB SHADOW E&M-EST. PATIENT-LVL IV: CPT | Mod: PBBFAC,HCNC,, | Performed by: STUDENT IN AN ORGANIZED HEALTH CARE EDUCATION/TRAINING PROGRAM

## 2023-01-20 PROCEDURE — 1125F AMNT PAIN NOTED PAIN PRSNT: CPT | Mod: HCNC,CPTII,S$GLB, | Performed by: STUDENT IN AN ORGANIZED HEALTH CARE EDUCATION/TRAINING PROGRAM

## 2023-01-20 PROCEDURE — 1101F PT FALLS ASSESS-DOCD LE1/YR: CPT | Mod: HCNC,CPTII,S$GLB, | Performed by: STUDENT IN AN ORGANIZED HEALTH CARE EDUCATION/TRAINING PROGRAM

## 2023-01-20 PROCEDURE — 3078F PR MOST RECENT DIASTOLIC BLOOD PRESSURE < 80 MM HG: ICD-10-PCS | Mod: HCNC,CPTII,S$GLB, | Performed by: STUDENT IN AN ORGANIZED HEALTH CARE EDUCATION/TRAINING PROGRAM

## 2023-01-20 PROCEDURE — 1101F PR PT FALLS ASSESS DOC 0-1 FALLS W/OUT INJ PAST YR: ICD-10-PCS | Mod: HCNC,CPTII,S$GLB, | Performed by: STUDENT IN AN ORGANIZED HEALTH CARE EDUCATION/TRAINING PROGRAM

## 2023-01-20 PROCEDURE — 99499 UNLISTED E&M SERVICE: CPT | Mod: HCNC,S$GLB,, | Performed by: STUDENT IN AN ORGANIZED HEALTH CARE EDUCATION/TRAINING PROGRAM

## 2023-01-20 PROCEDURE — 99214 PR OFFICE/OUTPT VISIT, EST, LEVL IV, 30-39 MIN: ICD-10-PCS | Mod: HCNC,S$GLB,, | Performed by: STUDENT IN AN ORGANIZED HEALTH CARE EDUCATION/TRAINING PROGRAM

## 2023-01-20 PROCEDURE — 3078F DIAST BP <80 MM HG: CPT | Mod: HCNC,CPTII,S$GLB, | Performed by: STUDENT IN AN ORGANIZED HEALTH CARE EDUCATION/TRAINING PROGRAM

## 2023-01-20 PROCEDURE — 1159F MED LIST DOCD IN RCRD: CPT | Mod: HCNC,CPTII,S$GLB, | Performed by: STUDENT IN AN ORGANIZED HEALTH CARE EDUCATION/TRAINING PROGRAM

## 2023-01-20 PROCEDURE — 3288F FALL RISK ASSESSMENT DOCD: CPT | Mod: HCNC,CPTII,S$GLB, | Performed by: STUDENT IN AN ORGANIZED HEALTH CARE EDUCATION/TRAINING PROGRAM

## 2023-01-20 PROCEDURE — 3077F SYST BP >= 140 MM HG: CPT | Mod: HCNC,CPTII,S$GLB, | Performed by: STUDENT IN AN ORGANIZED HEALTH CARE EDUCATION/TRAINING PROGRAM

## 2023-01-20 PROCEDURE — 99214 OFFICE O/P EST MOD 30 MIN: CPT | Mod: HCNC,S$GLB,, | Performed by: STUDENT IN AN ORGANIZED HEALTH CARE EDUCATION/TRAINING PROGRAM

## 2023-01-20 PROCEDURE — 99499 RISK ADDL DX/OHS AUDIT: ICD-10-PCS | Mod: HCNC,S$GLB,, | Performed by: STUDENT IN AN ORGANIZED HEALTH CARE EDUCATION/TRAINING PROGRAM

## 2023-01-20 PROCEDURE — 1159F PR MEDICATION LIST DOCUMENTED IN MEDICAL RECORD: ICD-10-PCS | Mod: HCNC,CPTII,S$GLB, | Performed by: STUDENT IN AN ORGANIZED HEALTH CARE EDUCATION/TRAINING PROGRAM

## 2023-01-20 PROCEDURE — 3288F PR FALLS RISK ASSESSMENT DOCUMENTED: ICD-10-PCS | Mod: HCNC,CPTII,S$GLB, | Performed by: STUDENT IN AN ORGANIZED HEALTH CARE EDUCATION/TRAINING PROGRAM

## 2023-01-20 NOTE — PROGRESS NOTES
01/20/2023    Radiation Oncology Follow-Up Visit    Prior Radiation History:  Treatment Summary  Course: C1 R leg 2021    Treatment Site Ref. ID Energy Dose/Fx (Gy) #Fx Dose Correction (Gy) Total Dose (Gy) Start Date End Date Elapsed Days   IM LEG_R 50Gy PTV 50 cyndi 6X 2 25 / 25 0 50 7/21/2021 8/25/2021 35   IM LEG_R 60Gy PTV 60jb 6X 2 1 / 5 0 2 8/26/2021 8/26/2021 0     HPI: As per previous notes, Mr. 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 He eventually underwent an ultrasound of the R thigh that demonstrated 2 hypoechoic masses in the subcutaneous tissues of the R anterior thigh, largest 1.1 cm.      He underwent excisional biopsy of the R thigh masses on 6/11/21 with Dr. Mao, with pathology demonstrating high grade myxofibrosarcoma. PET/CT demonstrated no evidence of recurrent or metastatic disease and he completed 52 Gy in 26 fractions (out of planned 60 Gy in 30 fractions) via IMRT, with treatment ending sooner than expected due to [place] and subsequent treatment delays.    He unfortunately developed recurrent disease in the R anterior thigh in 1/22 undergoing additional excision of his suspicious palpable lesions with Dr. 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 He has also followed with Dr. White regarding a RUL opacity 0.9 cm in size, with recommendations for continued surveillance imaging.    He underwent CT thigh and chest imaging on 4/8/22 and 4/13/22, which demonstrated no new disease in the chest but worsening adenopathy in the R thigh. He underwent additional resection with Dr. Mao on 4/28/22, with final pathology demonstrating recurrent high grade myxofibrosarcoma, with positive margins. He met with Dr. Mcadams who did not recommend any  additional local therapy at the time.        Interval history  At last visit we discussed radiation to a symptomatic right groin mass. Since that visit, he underwent resection of the symptomatic groin recurrence on 9/26/22. He developed a regional recurrence in November 2022, asymptomatic at the time. The recurrence progressed to needing frequent wound care. He has followed with surgery and heme onc, and was referred to Dr. Unger for consideration of re-resection, which was not recommended. Med onc is doing another appeal for [facility].     He has worsening pain and bleeding in the right groin. He is following with wound care, with ~daily bandage changes. The pain is worse with sitting and is at this time requiring 10mg of oxycodone every 6 hours to control the pain. The lesions are rapidly progressing and localized to the right groin. No other cutaneous lesions.           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 OR;  Service: General;  Laterality: Right;  right thigh and right groin  RN PREOP 1/18/2022      PT CHOSE TO DO HOME TEST FOR COVID ON AM OF SURGERY       Social History     Tobacco Use    Smoking status: Every Day     Types:  Cigars    Smokeless tobacco: Never    Tobacco comments:     smokes cigars 2-3 a day, smoke cigarette for 15 years   Substance Use Topics    Alcohol use: No    Drug use: No       Cancer-related family history is negative for Melanoma and Liver cancer.    Current Outpatient Medications on File Prior to Visit   Medication Sig Dispense Refill    atorvastatin (LIPITOR) 80 MG tablet Take 1 tablet (80 mg total) by mouth once daily. 90 tablet 3    finasteride (PROSCAR) 5 mg tablet TAKE 1 TABLET BY MOUTH EVERY DAY 90 tablet 0    hydroCHLOROthiazide (HYDRODIURIL) 25 MG tablet TAKE 1 TABLET BY MOUTH DAILY 90 tablet 0    HYDROcodone-acetaminophen (NORCO) 5-325 mg per tablet Take 1 tablet by mouth every 8 (eight) hours as needed for Pain. 30 tablet 0    HYDROcodone-acetaminophen (NORCO) 5-325 mg per tablet Take 1 tablet by mouth every 6 (six) hours as needed for Pain. 30 tablet 0    morphine (MS CONTIN) 15 MG 12 hr tablet Take 1 tablet (15 mg total) by mouth 2 (two) times daily. 60 tablet 0    naloxone (NARCAN) 4 mg/actuation Spry 4mg by nasal route as needed for opioid overdose; may repeat every 2-3 minutes in alternating nostrils until medical help arrives. Call 911 1 each 11    oxyCODONE (ROXICODONE) 10 mg Tab immediate release tablet Take 1 tablet (10 mg total) by mouth every 6 (six) hours as needed for Pain. 120 tablet 0    SELENIUM ORAL Take by mouth.      TURMERIC ORAL Take by mouth.      VITAMIN E ACETATE ORAL Take by mouth.       No current facility-administered medications on file prior to visit.       Review of patient's allergies indicates:  No Known Allergies    Review of Systems   Constitutional:  Negative for chills, fever, malaise/fatigue and weight loss.   Eyes:  Negative for blurred vision and double vision.   Respiratory:  Negative for cough, hemoptysis and sputum production.    Cardiovascular:  Negative for chest pain and orthopnea.   Gastrointestinal:  Positive for constipation. Negative for abdominal pain,  heartburn, nausea and vomiting.   Genitourinary:  Negative for dysuria and urgency.   Musculoskeletal:  Negative for back pain and neck pain.   Skin:  Negative for itching and rash.   Neurological:  Negative for dizziness, sensory change, focal weakness and weakness.   Psychiatric/Behavioral:  Negative for depression. The patient is not nervous/anxious.       Vital Signs:   There were no vitals filed for this visit.      ECOG Performance Status: 1 - Ambulates, capable of light work    Physical exam:  Constitutional:  Well-developed, well-nourished and in no acute distress.   Pulmonary: Work of breathing appropriate.  Cardiovascular: No appreciated edema.  GI: Non-distended.    Musculoskeletal: Range of motion appears normal in all 4 extremities.  Extremities  R groin with an exophytic, ulcerated ~4-5cm mass with a more superior and medial palpable inguinal node. The dominant lesion is surrounded by satellite nodules which involve the skin. No active bleeding. The lesions are tender to palpation.   Neurologic: Patient is alert and oriented x 3.  Normal mood and affect.      Data Review  Imaging: CT thigh from 1/3/23 was personally reviewed with Mr. Trejo      Assessment:  This is a 77 y.o. male with recurrent high grade extremity sarcoma status post surgery, adjuvant XRT and multiple additional surgeries for recurrent disease. He has a symptomatic, unresectable groin recurrence, presenting to discuss focal radiotherapy.    Plan:  Overall he is planning to appeal for pembro, if this is again denied, he will likely transition to hospice.   The groin is symptomatic, with pain and bleeding. I think palliative radiotherapy will be of benefit, but we did re-discuss the radioresistant nature of this histology.   I will plan to treat the symptomatic recurrence with ~30 Gy in 5 fractions or 40 Gy in 10 fractions using 3D conformal radiotherapy with tissue equivalent bolus.  On review of his prior RT plan, this should not  have significant overlap with his prior radiotherapy  CT sim with clinical markout on 1/23/23   Consent for radiotherapy was obtained and all questions answered to the best of my ability.    Shun Morales MD  Radiation Oncology

## 2023-01-23 ENCOUNTER — TELEPHONE (OUTPATIENT)
Dept: HEMATOLOGY/ONCOLOGY | Facility: CLINIC | Age: 78
End: 2023-01-23
Payer: MEDICARE

## 2023-01-23 NOTE — TELEPHONE ENCOUNTER
----- Message from Cinthia Lui sent at 1/23/2023 11:18 AM CST -----  Consult/Advisory    Name Of Caller:  with [employer]       Contact Preference?: [phone]  Case [id number]    [phone] (FAX)      What is the nature of the call?: missing provider's portion page 6 of the 2023 Keytruda enrollment form and the patient's portion is dated incorrectly

## 2023-01-25 NOTE — TELEPHONE ENCOUNTER
Tried to call again. Waited on hold for 17 mins.          Message routed to jolanta---(do y'all have the originals? They were evidently not completely entirely.)

## 2023-01-27 ENCOUNTER — TELEPHONE (OUTPATIENT)
Dept: HEMATOLOGY/ONCOLOGY | Facility: CLINIC | Age: 78
End: 2023-01-27
Payer: MEDICARE

## 2023-01-27 ENCOUNTER — HOSPITAL ENCOUNTER (OUTPATIENT)
Dept: RADIATION THERAPY | Facility: HOSPITAL | Age: 78
Discharge: HOME | End: 2023-01-27
Attending: STUDENT IN AN ORGANIZED HEALTH CARE EDUCATION/TRAINING PROGRAM
Payer: MEDICARE

## 2023-01-27 NOTE — TELEPHONE ENCOUNTER
Waited on hold for 20 mins and was disconnected by [facility].    Called back and waited on hold for 30 mins again.        Message routed to amelie---

## 2023-01-27 NOTE — TELEPHONE ENCOUNTER
----- Message from Cinthia Lui sent at 1/27/2023  8:42 AM CST -----  Consult/Advisory    Name Of Caller: Brendan      Contact Preference?: [employer] [phone] (FAX)  Case [id number]      Nature of Call? Need patient's physical address on enrollment form, page 1-5 is dated incorrectly, and missing page 6.

## 2023-01-30 ENCOUNTER — HOSPITAL ENCOUNTER (OUTPATIENT)
Dept: RADIATION THERAPY | Facility: HOSPITAL | Age: 78
Discharge: HOME | End: 2023-01-30
Attending: STUDENT IN AN ORGANIZED HEALTH CARE EDUCATION/TRAINING PROGRAM
Payer: MEDICARE

## 2023-01-30 PROCEDURE — 77263 THER RADIOLOGY TX PLNG CPLX: CPT | Mod: HCNC,,, | Performed by: STUDENT IN AN ORGANIZED HEALTH CARE EDUCATION/TRAINING PROGRAM

## 2023-01-30 PROCEDURE — 77290 THER RAD SIMULAJ FIELD CPLX: CPT | Mod: TC,HCNC | Performed by: STUDENT IN AN ORGANIZED HEALTH CARE EDUCATION/TRAINING PROGRAM

## 2023-01-30 PROCEDURE — 77290 PR  SET RADN THERAPY FIELD COMPLEX: ICD-10-PCS | Mod: 26,HCNC,, | Performed by: STUDENT IN AN ORGANIZED HEALTH CARE EDUCATION/TRAINING PROGRAM

## 2023-01-30 PROCEDURE — 77263 PR  RADIATION THERAPY PLAN COMPLEX: ICD-10-PCS | Mod: HCNC,,, | Performed by: STUDENT IN AN ORGANIZED HEALTH CARE EDUCATION/TRAINING PROGRAM

## 2023-01-30 PROCEDURE — 77334 RADIATION TREATMENT AID(S): CPT | Mod: 26,HCNC,, | Performed by: STUDENT IN AN ORGANIZED HEALTH CARE EDUCATION/TRAINING PROGRAM

## 2023-01-30 PROCEDURE — 77334 PR  RADN TREATMENT AID(S) COMPLX: ICD-10-PCS | Mod: 26,HCNC,, | Performed by: STUDENT IN AN ORGANIZED HEALTH CARE EDUCATION/TRAINING PROGRAM

## 2023-01-30 PROCEDURE — 77290 THER RAD SIMULAJ FIELD CPLX: CPT | Mod: 26,HCNC,, | Performed by: STUDENT IN AN ORGANIZED HEALTH CARE EDUCATION/TRAINING PROGRAM

## 2023-01-30 PROCEDURE — 77334 RADIATION TREATMENT AID(S): CPT | Mod: TC,HCNC | Performed by: STUDENT IN AN ORGANIZED HEALTH CARE EDUCATION/TRAINING PROGRAM

## 2023-01-30 PROCEDURE — 77014 HC CT GUIDANCE RADIATION THERAPY FLDS PLACEMENT: CPT | Mod: TC,HCNC | Performed by: STUDENT IN AN ORGANIZED HEALTH CARE EDUCATION/TRAINING PROGRAM

## 2023-02-01 ENCOUNTER — HOSPITAL ENCOUNTER (OUTPATIENT)
Dept: RADIATION THERAPY | Facility: HOSPITAL | Age: 78
Discharge: HOME | End: 2023-02-01
Attending: STUDENT IN AN ORGANIZED HEALTH CARE EDUCATION/TRAINING PROGRAM
Payer: MEDICARE

## 2023-02-01 PROCEDURE — 77334 RADIATION TREATMENT AID(S): CPT | Mod: TC,HCNC | Performed by: STUDENT IN AN ORGANIZED HEALTH CARE EDUCATION/TRAINING PROGRAM

## 2023-02-01 PROCEDURE — 77295 PR 3D RADIOTHERAPY PLAN: ICD-10-PCS | Mod: 26,HCNC,, | Performed by: STUDENT IN AN ORGANIZED HEALTH CARE EDUCATION/TRAINING PROGRAM

## 2023-02-01 PROCEDURE — 77334 PR  RADN TREATMENT AID(S) COMPLX: ICD-10-PCS | Mod: 26,HCNC,, | Performed by: STUDENT IN AN ORGANIZED HEALTH CARE EDUCATION/TRAINING PROGRAM

## 2023-02-01 PROCEDURE — 77295 3-D RADIOTHERAPY PLAN: CPT | Mod: TC,HCNC | Performed by: STUDENT IN AN ORGANIZED HEALTH CARE EDUCATION/TRAINING PROGRAM

## 2023-02-01 PROCEDURE — 77295 3-D RADIOTHERAPY PLAN: CPT | Mod: 26,HCNC,, | Performed by: STUDENT IN AN ORGANIZED HEALTH CARE EDUCATION/TRAINING PROGRAM

## 2023-02-01 PROCEDURE — 77334 RADIATION TREATMENT AID(S): CPT | Mod: 26,HCNC,, | Performed by: STUDENT IN AN ORGANIZED HEALTH CARE EDUCATION/TRAINING PROGRAM

## 2023-02-01 PROCEDURE — 77300 PR RADIATION THERAPY,DOSIMETRY PLAN: ICD-10-PCS | Mod: 26,HCNC,, | Performed by: STUDENT IN AN ORGANIZED HEALTH CARE EDUCATION/TRAINING PROGRAM

## 2023-02-01 PROCEDURE — 77300 RADIATION THERAPY DOSE PLAN: CPT | Mod: 26,HCNC,, | Performed by: STUDENT IN AN ORGANIZED HEALTH CARE EDUCATION/TRAINING PROGRAM

## 2023-02-01 PROCEDURE — 77300 RADIATION THERAPY DOSE PLAN: CPT | Mod: TC,HCNC | Performed by: STUDENT IN AN ORGANIZED HEALTH CARE EDUCATION/TRAINING PROGRAM

## 2023-02-02 PROCEDURE — 77417 THER RADIOLOGY PORT IMAGE(S): CPT | Mod: HCNC | Performed by: STUDENT IN AN ORGANIZED HEALTH CARE EDUCATION/TRAINING PROGRAM

## 2023-02-02 PROCEDURE — G6002 STEREOSCOPIC X-RAY GUIDANCE: HCPCS | Mod: 26,HCNC,, | Performed by: STUDENT IN AN ORGANIZED HEALTH CARE EDUCATION/TRAINING PROGRAM

## 2023-02-02 PROCEDURE — 77412 RADIATION TX DELIVERY LVL 3: CPT | Mod: HCNC | Performed by: STUDENT IN AN ORGANIZED HEALTH CARE EDUCATION/TRAINING PROGRAM

## 2023-02-02 PROCEDURE — 77387 GUIDANCE FOR RADJ TX DLVR: CPT | Mod: TC,HCNC | Performed by: STUDENT IN AN ORGANIZED HEALTH CARE EDUCATION/TRAINING PROGRAM

## 2023-02-02 PROCEDURE — G6002 PR STEREOSCOPIC XRAY GUIDE FOR RADIATION TX DELIV: ICD-10-PCS | Mod: 26,HCNC,, | Performed by: STUDENT IN AN ORGANIZED HEALTH CARE EDUCATION/TRAINING PROGRAM

## 2023-02-03 PROCEDURE — 77334 PR  RADN TREATMENT AID(S) COMPLX: ICD-10-PCS | Mod: 26,HCNC,, | Performed by: STUDENT IN AN ORGANIZED HEALTH CARE EDUCATION/TRAINING PROGRAM

## 2023-02-03 PROCEDURE — 77300 RADIATION THERAPY DOSE PLAN: CPT | Mod: TC,HCNC | Performed by: STUDENT IN AN ORGANIZED HEALTH CARE EDUCATION/TRAINING PROGRAM

## 2023-02-03 PROCEDURE — 77300 RADIATION THERAPY DOSE PLAN: CPT | Mod: 26,HCNC,, | Performed by: STUDENT IN AN ORGANIZED HEALTH CARE EDUCATION/TRAINING PROGRAM

## 2023-02-03 PROCEDURE — 77334 RADIATION TREATMENT AID(S): CPT | Mod: TC,HCNC | Performed by: STUDENT IN AN ORGANIZED HEALTH CARE EDUCATION/TRAINING PROGRAM

## 2023-02-03 PROCEDURE — 77334 RADIATION TREATMENT AID(S): CPT | Mod: 26,HCNC,, | Performed by: STUDENT IN AN ORGANIZED HEALTH CARE EDUCATION/TRAINING PROGRAM

## 2023-02-03 PROCEDURE — 77300 PR RADIATION THERAPY,DOSIMETRY PLAN: ICD-10-PCS | Mod: 26,HCNC,, | Performed by: STUDENT IN AN ORGANIZED HEALTH CARE EDUCATION/TRAINING PROGRAM

## 2023-02-06 PROCEDURE — G6002 PR STEREOSCOPIC XRAY GUIDE FOR RADIATION TX DELIV: ICD-10-PCS | Mod: 26,HCNC,, | Performed by: STUDENT IN AN ORGANIZED HEALTH CARE EDUCATION/TRAINING PROGRAM

## 2023-02-06 PROCEDURE — 77412 RADIATION TX DELIVERY LVL 3: CPT | Mod: HCNC | Performed by: STUDENT IN AN ORGANIZED HEALTH CARE EDUCATION/TRAINING PROGRAM

## 2023-02-06 PROCEDURE — 77387 GUIDANCE FOR RADJ TX DLVR: CPT | Mod: TC,HCNC | Performed by: STUDENT IN AN ORGANIZED HEALTH CARE EDUCATION/TRAINING PROGRAM

## 2023-02-06 PROCEDURE — 77417 THER RADIOLOGY PORT IMAGE(S): CPT | Mod: HCNC | Performed by: STUDENT IN AN ORGANIZED HEALTH CARE EDUCATION/TRAINING PROGRAM

## 2023-02-06 PROCEDURE — G6002 STEREOSCOPIC X-RAY GUIDANCE: HCPCS | Mod: 26,HCNC,, | Performed by: STUDENT IN AN ORGANIZED HEALTH CARE EDUCATION/TRAINING PROGRAM

## 2023-02-07 DIAGNOSIS — Z00.00 ENCOUNTER FOR MEDICARE ANNUAL WELLNESS EXAM: ICD-10-CM

## 2023-02-08 PROCEDURE — 77387 GUIDANCE FOR RADJ TX DLVR: CPT | Mod: TC,HCNC | Performed by: STUDENT IN AN ORGANIZED HEALTH CARE EDUCATION/TRAINING PROGRAM

## 2023-02-08 PROCEDURE — 77412 RADIATION TX DELIVERY LVL 3: CPT | Mod: HCNC | Performed by: STUDENT IN AN ORGANIZED HEALTH CARE EDUCATION/TRAINING PROGRAM

## 2023-02-08 PROCEDURE — G6002 PR STEREOSCOPIC XRAY GUIDE FOR RADIATION TX DELIV: ICD-10-PCS | Mod: 26,HCNC,, | Performed by: STUDENT IN AN ORGANIZED HEALTH CARE EDUCATION/TRAINING PROGRAM

## 2023-02-08 PROCEDURE — G6002 STEREOSCOPIC X-RAY GUIDANCE: HCPCS | Mod: 26,HCNC,, | Performed by: STUDENT IN AN ORGANIZED HEALTH CARE EDUCATION/TRAINING PROGRAM

## 2023-02-09 DIAGNOSIS — Z00.00 ENCOUNTER FOR MEDICARE ANNUAL WELLNESS EXAM: ICD-10-CM

## 2023-02-10 PROCEDURE — G6002 STEREOSCOPIC X-RAY GUIDANCE: HCPCS | Mod: 26,HCNC,, | Performed by: STUDENT IN AN ORGANIZED HEALTH CARE EDUCATION/TRAINING PROGRAM

## 2023-02-10 PROCEDURE — 77412 RADIATION TX DELIVERY LVL 3: CPT | Mod: HCNC | Performed by: STUDENT IN AN ORGANIZED HEALTH CARE EDUCATION/TRAINING PROGRAM

## 2023-02-10 PROCEDURE — G6002 PR STEREOSCOPIC XRAY GUIDE FOR RADIATION TX DELIV: ICD-10-PCS | Mod: 26,HCNC,, | Performed by: STUDENT IN AN ORGANIZED HEALTH CARE EDUCATION/TRAINING PROGRAM

## 2023-02-10 PROCEDURE — 77387 GUIDANCE FOR RADJ TX DLVR: CPT | Mod: TC,HCNC | Performed by: STUDENT IN AN ORGANIZED HEALTH CARE EDUCATION/TRAINING PROGRAM

## 2023-02-13 PROCEDURE — G6002 PR STEREOSCOPIC XRAY GUIDE FOR RADIATION TX DELIV: ICD-10-PCS | Mod: 26,HCNC,, | Performed by: STUDENT IN AN ORGANIZED HEALTH CARE EDUCATION/TRAINING PROGRAM

## 2023-02-13 PROCEDURE — 77336 RADIATION PHYSICS CONSULT: CPT | Mod: HCNC | Performed by: STUDENT IN AN ORGANIZED HEALTH CARE EDUCATION/TRAINING PROGRAM

## 2023-02-13 PROCEDURE — 77412 RADIATION TX DELIVERY LVL 3: CPT | Mod: HCNC | Performed by: STUDENT IN AN ORGANIZED HEALTH CARE EDUCATION/TRAINING PROGRAM

## 2023-02-13 PROCEDURE — 77387 GUIDANCE FOR RADJ TX DLVR: CPT | Mod: TC,HCNC | Performed by: STUDENT IN AN ORGANIZED HEALTH CARE EDUCATION/TRAINING PROGRAM

## 2023-02-13 PROCEDURE — G6002 STEREOSCOPIC X-RAY GUIDANCE: HCPCS | Mod: 26,HCNC,, | Performed by: STUDENT IN AN ORGANIZED HEALTH CARE EDUCATION/TRAINING PROGRAM

## 2023-02-14 ENCOUNTER — TELEPHONE (OUTPATIENT)
Dept: HEMATOLOGY/ONCOLOGY | Facility: CLINIC | Age: 78
End: 2023-02-14
Payer: MEDICARE

## 2023-02-17 ENCOUNTER — TELEPHONE (OUTPATIENT)
Dept: HEMATOLOGY/ONCOLOGY | Facility: CLINIC | Age: 78
End: 2023-02-17
Payer: MEDICARE

## 2023-02-17 NOTE — TELEPHONE ENCOUNTER
----- Message from Ritu Crawley sent at 2/17/2023  1:58 PM CST -----  Regarding: Consult/Advisory:      Name Of Caller: Ms. Paul sae/ [employer]      Contact Preference?:  [phone], Case ID #: [id number]      What is the nature of the call?: Calling to speak w/ nurse. In regards to the pt's Keytruda enrollment request. They need a prior authorization.

## 2023-02-17 NOTE — TELEPHONE ENCOUNTER
A new keytruda treatment plan needs to be entered so I can obtain PA, per [employer]. This is required before assistance can be processed.

## 2023-02-20 DIAGNOSIS — C49.9 PRIMARY MYXOFIBROSARCOMA: Primary | ICD-10-CM

## 2023-02-27 ENCOUNTER — LAB VISIT (OUTPATIENT)
Dept: LAB | Facility: HOSPITAL | Age: 78
End: 2023-02-27
Attending: INTERNAL MEDICINE
Payer: MEDICARE

## 2023-02-27 ENCOUNTER — OFFICE VISIT (OUTPATIENT)
Dept: RADIATION ONCOLOGY | Facility: CLINIC | Age: 78
End: 2023-02-27
Payer: MEDICARE

## 2023-02-27 ENCOUNTER — TELEPHONE (OUTPATIENT)
Dept: HEMATOLOGY/ONCOLOGY | Facility: CLINIC | Age: 78
End: 2023-02-27
Payer: MEDICARE

## 2023-02-27 VITALS
DIASTOLIC BLOOD PRESSURE: 63 MMHG | WEIGHT: 187.31 LBS | HEART RATE: 73 BPM | SYSTOLIC BLOOD PRESSURE: 126 MMHG | TEMPERATURE: 98 F | RESPIRATION RATE: 18 BRPM | BODY MASS INDEX: 26.12 KG/M2

## 2023-02-27 DIAGNOSIS — C49.9 PRIMARY MYXOFIBROSARCOMA: ICD-10-CM

## 2023-02-27 DIAGNOSIS — C49.9 PRIMARY MYXOFIBROSARCOMA: Primary | ICD-10-CM

## 2023-02-27 DIAGNOSIS — C49.9 SOFT TISSUE SARCOMA: ICD-10-CM

## 2023-02-27 LAB
ALBUMIN SERPL BCP-MCNC: 3 G/DL (ref 3.5–5.2)
ALP SERPL-CCNC: 132 U/L (ref 55–135)
ALT SERPL W/O P-5'-P-CCNC: 11 U/L (ref 10–44)
ANION GAP SERPL CALC-SCNC: 5 MMOL/L (ref 8–16)
AST SERPL-CCNC: 9 U/L (ref 10–40)
BASOPHILS # BLD AUTO: 0.09 K/UL (ref 0–0.2)
BASOPHILS NFR BLD: 0.9 % (ref 0–1.9)
BILIRUB SERPL-MCNC: 0.5 MG/DL (ref 0.1–1)
BUN SERPL-MCNC: 14 MG/DL (ref 8–23)
CALCIUM SERPL-MCNC: 9.7 MG/DL (ref 8.7–10.5)
CHLORIDE SERPL-SCNC: 98 MMOL/L (ref 95–110)
CO2 SERPL-SCNC: 29 MMOL/L (ref 23–29)
CREAT SERPL-MCNC: 0.9 MG/DL (ref 0.5–1.4)
DIFFERENTIAL METHOD: ABNORMAL
EOSINOPHIL # BLD AUTO: 0.3 K/UL (ref 0–0.5)
EOSINOPHIL NFR BLD: 3 % (ref 0–8)
ERYTHROCYTE [DISTWIDTH] IN BLOOD BY AUTOMATED COUNT: 13.9 % (ref 11.5–14.5)
EST. GFR  (NO RACE VARIABLE): >60 ML/MIN/1.73 M^2
GLUCOSE SERPL-MCNC: 118 MG/DL (ref 70–110)
HCT VFR BLD AUTO: 38.2 % (ref 40–54)
HGB BLD-MCNC: 12.3 G/DL (ref 14–18)
IMM GRANULOCYTES # BLD AUTO: 0.17 K/UL (ref 0–0.04)
IMM GRANULOCYTES NFR BLD AUTO: 1.6 % (ref 0–0.5)
LYMPHOCYTES # BLD AUTO: 1 K/UL (ref 1–4.8)
LYMPHOCYTES NFR BLD: 9 % (ref 18–48)
MCH RBC QN AUTO: 29 PG (ref 27–31)
MCHC RBC AUTO-ENTMCNC: 32.2 G/DL (ref 32–36)
MCV RBC AUTO: 90 FL (ref 82–98)
MONOCYTES # BLD AUTO: 0.6 K/UL (ref 0.3–1)
MONOCYTES NFR BLD: 5.5 % (ref 4–15)
NEUTROPHILS # BLD AUTO: 8.5 K/UL (ref 1.8–7.7)
NEUTROPHILS NFR BLD: 80 % (ref 38–73)
NRBC BLD-RTO: 0 /100 WBC
PLATELET # BLD AUTO: 228 K/UL (ref 150–450)
PMV BLD AUTO: 8.9 FL (ref 9.2–12.9)
POTASSIUM SERPL-SCNC: 3.9 MMOL/L (ref 3.5–5.1)
PROT SERPL-MCNC: 6.6 G/DL (ref 6–8.4)
RBC # BLD AUTO: 4.24 M/UL (ref 4.6–6.2)
SODIUM SERPL-SCNC: 132 MMOL/L (ref 136–145)
WBC # BLD AUTO: 10.56 K/UL (ref 3.9–12.7)

## 2023-02-27 PROCEDURE — 36415 COLL VENOUS BLD VENIPUNCTURE: CPT | Mod: HCNC | Performed by: INTERNAL MEDICINE

## 2023-02-27 PROCEDURE — 99499 UNLISTED E&M SERVICE: CPT | Mod: HCNC,S$GLB,, | Performed by: STUDENT IN AN ORGANIZED HEALTH CARE EDUCATION/TRAINING PROGRAM

## 2023-02-27 PROCEDURE — 1125F AMNT PAIN NOTED PAIN PRSNT: CPT | Mod: HCNC,CPTII,S$GLB, | Performed by: STUDENT IN AN ORGANIZED HEALTH CARE EDUCATION/TRAINING PROGRAM

## 2023-02-27 PROCEDURE — 1159F PR MEDICATION LIST DOCUMENTED IN MEDICAL RECORD: ICD-10-PCS | Mod: HCNC,CPTII,S$GLB, | Performed by: STUDENT IN AN ORGANIZED HEALTH CARE EDUCATION/TRAINING PROGRAM

## 2023-02-27 PROCEDURE — 1125F PR PAIN SEVERITY QUANTIFIED, PAIN PRESENT: ICD-10-PCS | Mod: HCNC,CPTII,S$GLB, | Performed by: STUDENT IN AN ORGANIZED HEALTH CARE EDUCATION/TRAINING PROGRAM

## 2023-02-27 PROCEDURE — 3078F PR MOST RECENT DIASTOLIC BLOOD PRESSURE < 80 MM HG: ICD-10-PCS | Mod: HCNC,CPTII,S$GLB, | Performed by: STUDENT IN AN ORGANIZED HEALTH CARE EDUCATION/TRAINING PROGRAM

## 2023-02-27 PROCEDURE — 3288F PR FALLS RISK ASSESSMENT DOCUMENTED: ICD-10-PCS | Mod: HCNC,CPTII,S$GLB, | Performed by: STUDENT IN AN ORGANIZED HEALTH CARE EDUCATION/TRAINING PROGRAM

## 2023-02-27 PROCEDURE — 1101F PR PT FALLS ASSESS DOC 0-1 FALLS W/OUT INJ PAST YR: ICD-10-PCS | Mod: HCNC,CPTII,S$GLB, | Performed by: STUDENT IN AN ORGANIZED HEALTH CARE EDUCATION/TRAINING PROGRAM

## 2023-02-27 PROCEDURE — 3288F FALL RISK ASSESSMENT DOCD: CPT | Mod: HCNC,CPTII,S$GLB, | Performed by: STUDENT IN AN ORGANIZED HEALTH CARE EDUCATION/TRAINING PROGRAM

## 2023-02-27 PROCEDURE — 3074F PR MOST RECENT SYSTOLIC BLOOD PRESSURE < 130 MM HG: ICD-10-PCS | Mod: HCNC,CPTII,S$GLB, | Performed by: STUDENT IN AN ORGANIZED HEALTH CARE EDUCATION/TRAINING PROGRAM

## 2023-02-27 PROCEDURE — 85025 COMPLETE CBC W/AUTO DIFF WBC: CPT | Mod: HCNC | Performed by: INTERNAL MEDICINE

## 2023-02-27 PROCEDURE — 3074F SYST BP LT 130 MM HG: CPT | Mod: HCNC,CPTII,S$GLB, | Performed by: STUDENT IN AN ORGANIZED HEALTH CARE EDUCATION/TRAINING PROGRAM

## 2023-02-27 PROCEDURE — 99999 PR PBB SHADOW E&M-EST. PATIENT-LVL III: CPT | Mod: PBBFAC,HCNC,, | Performed by: STUDENT IN AN ORGANIZED HEALTH CARE EDUCATION/TRAINING PROGRAM

## 2023-02-27 PROCEDURE — 99999 PR PBB SHADOW E&M-EST. PATIENT-LVL III: ICD-10-PCS | Mod: PBBFAC,HCNC,, | Performed by: STUDENT IN AN ORGANIZED HEALTH CARE EDUCATION/TRAINING PROGRAM

## 2023-02-27 PROCEDURE — 1159F MED LIST DOCD IN RCRD: CPT | Mod: HCNC,CPTII,S$GLB, | Performed by: STUDENT IN AN ORGANIZED HEALTH CARE EDUCATION/TRAINING PROGRAM

## 2023-02-27 PROCEDURE — 3078F DIAST BP <80 MM HG: CPT | Mod: HCNC,CPTII,S$GLB, | Performed by: STUDENT IN AN ORGANIZED HEALTH CARE EDUCATION/TRAINING PROGRAM

## 2023-02-27 PROCEDURE — 80053 COMPREHEN METABOLIC PANEL: CPT | Mod: HCNC | Performed by: INTERNAL MEDICINE

## 2023-02-27 PROCEDURE — 1101F PT FALLS ASSESS-DOCD LE1/YR: CPT | Mod: HCNC,CPTII,S$GLB, | Performed by: STUDENT IN AN ORGANIZED HEALTH CARE EDUCATION/TRAINING PROGRAM

## 2023-02-27 PROCEDURE — 99499 NO LOS: ICD-10-PCS | Mod: HCNC,S$GLB,, | Performed by: STUDENT IN AN ORGANIZED HEALTH CARE EDUCATION/TRAINING PROGRAM

## 2023-03-01 ENCOUNTER — OFFICE VISIT (OUTPATIENT)
Dept: HEMATOLOGY/ONCOLOGY | Facility: CLINIC | Age: 78
End: 2023-03-01
Payer: MEDICARE

## 2023-03-01 ENCOUNTER — DOCUMENTATION ONLY (OUTPATIENT)
Dept: HEMATOLOGY/ONCOLOGY | Facility: CLINIC | Age: 78
End: 2023-03-01
Payer: MEDICARE

## 2023-03-01 VITALS
TEMPERATURE: 98 F | BODY MASS INDEX: 25.34 KG/M2 | RESPIRATION RATE: 18 BRPM | WEIGHT: 181 LBS | DIASTOLIC BLOOD PRESSURE: 69 MMHG | HEIGHT: 71 IN | SYSTOLIC BLOOD PRESSURE: 166 MMHG | OXYGEN SATURATION: 96 % | HEART RATE: 65 BPM

## 2023-03-01 DIAGNOSIS — G89.3 CANCER RELATED PAIN: ICD-10-CM

## 2023-03-01 DIAGNOSIS — C49.9 SOFT TISSUE SARCOMA: ICD-10-CM

## 2023-03-01 DIAGNOSIS — C77.4 SECONDARY MALIGNANCY OF INGUINAL LYMPH NODES: ICD-10-CM

## 2023-03-01 DIAGNOSIS — D49.9 IMMUNODEFICIENCY SECONDARY TO NEOPLASM: ICD-10-CM

## 2023-03-01 DIAGNOSIS — D84.81 IMMUNODEFICIENCY SECONDARY TO NEOPLASM: ICD-10-CM

## 2023-03-01 DIAGNOSIS — R60.0 LEG EDEMA, RIGHT: ICD-10-CM

## 2023-03-01 DIAGNOSIS — C49.9 PRIMARY MYXOFIBROSARCOMA: Primary | ICD-10-CM

## 2023-03-01 PROCEDURE — 3078F PR MOST RECENT DIASTOLIC BLOOD PRESSURE < 80 MM HG: ICD-10-PCS | Mod: HCNC,CPTII,S$GLB, | Performed by: INTERNAL MEDICINE

## 2023-03-01 PROCEDURE — 99999 PR PBB SHADOW E&M-EST. PATIENT-LVL III: ICD-10-PCS | Mod: PBBFAC,HCNC,, | Performed by: INTERNAL MEDICINE

## 2023-03-01 PROCEDURE — 3078F DIAST BP <80 MM HG: CPT | Mod: HCNC,CPTII,S$GLB, | Performed by: INTERNAL MEDICINE

## 2023-03-01 PROCEDURE — 1159F MED LIST DOCD IN RCRD: CPT | Mod: HCNC,CPTII,S$GLB, | Performed by: INTERNAL MEDICINE

## 2023-03-01 PROCEDURE — 1125F PR PAIN SEVERITY QUANTIFIED, PAIN PRESENT: ICD-10-PCS | Mod: HCNC,CPTII,S$GLB, | Performed by: INTERNAL MEDICINE

## 2023-03-01 PROCEDURE — 99214 OFFICE O/P EST MOD 30 MIN: CPT | Mod: HCNC,S$GLB,, | Performed by: INTERNAL MEDICINE

## 2023-03-01 PROCEDURE — 3288F PR FALLS RISK ASSESSMENT DOCUMENTED: ICD-10-PCS | Mod: HCNC,CPTII,S$GLB, | Performed by: INTERNAL MEDICINE

## 2023-03-01 PROCEDURE — 1125F AMNT PAIN NOTED PAIN PRSNT: CPT | Mod: HCNC,CPTII,S$GLB, | Performed by: INTERNAL MEDICINE

## 2023-03-01 PROCEDURE — 1160F PR REVIEW ALL MEDS BY PRESCRIBER/CLIN PHARMACIST DOCUMENTED: ICD-10-PCS | Mod: HCNC,CPTII,S$GLB, | Performed by: INTERNAL MEDICINE

## 2023-03-01 PROCEDURE — 1101F PT FALLS ASSESS-DOCD LE1/YR: CPT | Mod: HCNC,CPTII,S$GLB, | Performed by: INTERNAL MEDICINE

## 2023-03-01 PROCEDURE — 3288F FALL RISK ASSESSMENT DOCD: CPT | Mod: HCNC,CPTII,S$GLB, | Performed by: INTERNAL MEDICINE

## 2023-03-01 PROCEDURE — 99999 PR PBB SHADOW E&M-EST. PATIENT-LVL III: CPT | Mod: PBBFAC,HCNC,, | Performed by: INTERNAL MEDICINE

## 2023-03-01 PROCEDURE — 99214 PR OFFICE/OUTPT VISIT, EST, LEVL IV, 30-39 MIN: ICD-10-PCS | Mod: HCNC,S$GLB,, | Performed by: INTERNAL MEDICINE

## 2023-03-01 PROCEDURE — 1159F PR MEDICATION LIST DOCUMENTED IN MEDICAL RECORD: ICD-10-PCS | Mod: HCNC,CPTII,S$GLB, | Performed by: INTERNAL MEDICINE

## 2023-03-01 PROCEDURE — 3077F PR MOST RECENT SYSTOLIC BLOOD PRESSURE >= 140 MM HG: ICD-10-PCS | Mod: HCNC,CPTII,S$GLB, | Performed by: INTERNAL MEDICINE

## 2023-03-01 PROCEDURE — 1160F RVW MEDS BY RX/DR IN RCRD: CPT | Mod: HCNC,CPTII,S$GLB, | Performed by: INTERNAL MEDICINE

## 2023-03-01 PROCEDURE — 3077F SYST BP >= 140 MM HG: CPT | Mod: HCNC,CPTII,S$GLB, | Performed by: INTERNAL MEDICINE

## 2023-03-01 PROCEDURE — 1101F PR PT FALLS ASSESS DOC 0-1 FALLS W/OUT INJ PAST YR: ICD-10-PCS | Mod: HCNC,CPTII,S$GLB, | Performed by: INTERNAL MEDICINE

## 2023-03-01 NOTE — PROGRESS NOTES
"                                                         PROGRESS NOTE    Subjective:       Patient ID: Zac Plunkett is a 77 y.o. male.    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 JD. TMB 6.3    Oncologic History:  Prior patient of Dr Lucia's  2021  May              5/27- US right thigh: showed 2 hypoechoic masses in the subcutaneous tissues of the right anterior thigh. Largest measured 1.1 x 0.9 x 1 cm. Read at the time was concern for lymphadenopathy.   June 6/4 - CXR: "Bilateral reticular opacities are present in a perihilar distribution which appears slightly more pronounced compared to prior exams."               6/11 - underwent excisional biopsy of the right thigh; pathology came back positive for myxofibrosarcoma, high grade. Path a 1.8 x 1.1 x 0.9 cm mass with an adjacent 0.4 x 0.4 x 0.3 cm mass.   July/Aug              - IMRT with Dr. Mcadams  2022 January 1/12 - PET: locoregional recurrence and concerning 0.9 cm RUL lesion              1/20 - Path: excision on 2 recurrent lesions:  Myxofibrosarcoma, 1.9 cm at longest length, PD-L1 25%              1/31 - CT thigh: successful removal w/o evidence of recurrent disease  April 4/28 - recurrence, locally with metastatic deposit in LN. S/p excision on 4/28/22. Path: right thigh superior mass: Myxofibrosarcoma, high grade with focal tumor necrosis, incompletely excised. A portion of the lesion is suggestive of an involved lymph node exhibiting metastatic sarcoma with extranodal extension.  Sarcoma is focally present within inked but otherwise unspecified peripheral margins.   July 7/19 - CT thigh showed local recurrence and a 0.7 cm R inguinal lymph node    2023  Jacoby   1/3 - CT thigh/pelvis showed "Heterogeneous enhancing soft tissue lesions " "and nodular foci at the anterior thigh and right groin highly concerning for worsening local recurrent disease with talon metastasis"    - stable pulmonary nodules on CT chest  Feb      - completed palliative radiation to right thigh    Interval History:   Mr Plunkett returns for follow up. He finished palliative radiation two weeks ago. Pain is unchanged. Controlled by taking oxycodone every 6 hours around the clock. He has not started MS contin yet due to concern of morphine being stronger. His wound is bleeding.     ECO    ROS:   A ten-point system review is obtained and negative except for what was stated in the Interval History.     Physical Examination:   Vital signs reviewed.   General: well hydrated, well developed, in no acute distress  HEENT: normocephalic, PERRLA, EOMI, anicteric sclerae, oropharynx clear  Neck: supple, no JVD, thyromegaly, cervical or supraclavicular lymphadenopathy  Lungs: clear breath sounds bilaterally, no wheezing, rales, or rhonchi  Heart: RRR, no M/R/G  Abdomen: soft, no tenderness, non-distended, no hepatosplenomegaly, mass, or hernia. BS present  Extremities: R LE 1+ edema  Skin: no rash, ulcer, or open wounds  Neuro: alert and oriented x 4, no focal neuro deficit  Psych: pleasant and appropriate mood and affect    Objective:     Laboratory Data:  Labs reviewed.     Imaging Data:  CT right thigh 22:  Postoperative changes from multifocal mass excision in the right anterior thigh.     New 0.7 cm rounded subcutaneous nodule in the right groin in the region of a previous morphologically normal lymph node, concerning for talon metastasis.  Attention on follow-up imaging is recommended.     Enlarging 1.7 cm subcutaneous nodule in the operative bed in the proximal anterior thigh, concerning for local recurrence.    CT chest 23:   In this patient with a history of sarcoma, a few previously seen pulmonary nodules have resolved while additional pulmonary nodules appear " stable.  No new nodules are demonstrated.    CT thigh/pelvis 1/3/23:   Heterogeneous enhancing soft tissue lesions and nodular foci at the anterior thigh and right groin highly concerning for worsening local recurrent disease with talon metastasis.  Further correlation advised.    Assessment and Plan:     1. 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 Soft tissue sarcoma    4. Immunodeficiency secondary to neoplasm    5. Cancer related pain    6. Leg edema, right        1-5  - Mr Plunkett is a 76 yo man with recurrent myxofibrosarcoma of the right thigh, has had 3 resections and IMRT  - he was seen by Dr. Mao and Dr. Unger, they did not feel that he would be a good surgical candidate and recommended palliative RT  - completed palliative RT completed 2/13/2023  - Previously discussed systemic treatment options, including chemotherapy and immunotherapy. He was not interested in chemotherapy including TKI. Pembrolizumab has efficacy in phase II studies and is recommended on NCCN Guidelines for patients with myxofibrosarcoma. PD-L1 is 25%. However, his TMB is not high. Pembrolizumab was denied by insurance and then denied at hearing. We applied for patient assistance. They spent a long time checking with insurance. They just got back to our office yesterday that insurance does not cover it. They will work on patient assistance and let us know their decision  - we previously discussed that if patient assistance denied, I recommend hospice. Wife asked about the rationale. We discussed that hospice does not necessarily mean that we are facing imminent death. In Mr Plunkett's case, he does not want chemotherapy. If [employer] denies patient assistance, then we are not able to get keytruda. In that case we don't have any treatment options. Hospice is for symptom management and pain control. Especially now that it is too hard for him to come to clinic due to pain. They understand and agree  with the plan  - discussed starting MS contin and take oxycodone for breakthrough pain. Discussed rationale. He understands and will start MS contin today  - virtual visit in 3 weeks for symptom management  - ordered wheelchair for home use. Messaged  Danna Morales.  - discussed US leg. But his bleeding tumor prohibits anticoagulation. Patient does not want to get US leg    Follow-up:     Virtual visit in 3 weeks    Ed Martines MD  Hematology and Medical Oncology  [facility]      Route Chart for Scheduling    Med Onc Chart Routing      Follow up with physician 3 weeks. please schedule patient for virtual visit with me in 3 weeks   Follow up with NICOLLE    Infusion scheduling note    Injection scheduling note    Labs    Imaging    Pharmacy appointment    Other referrals        Treatment Plan Information   OP PEMBROLIZUMAB 200MG Q3W   Ed Martines MD   Upcoming Treatment Dates - OP PEMBROLIZUMAB 200MG Q3W    8/25/2022       Chemotherapy       pembrolizumab (KEYTRUDA) 200 mg in sodium chloride 0.9% 108 mL infusion  9/15/2022       Chemotherapy       pembrolizumab (KEYTRUDA) 200 mg in sodium chloride 0.9% 108 mL infusion  10/6/2022       Chemotherapy       pembrolizumab (KEYTRUDA) 200 mg in sodium chloride 0.9% 108 mL infusion  10/27/2022       Chemotherapy       pembrolizumab (KEYTRUDA) 200 mg in sodium chloride 0.9% 108 mL infusion

## 2023-03-01 NOTE — PROGRESS NOTES
Received referral from clinic that patient is in need of a lightweight wheelchair. Referral has been sent to [facility].

## 2023-03-02 ENCOUNTER — DOCUMENTATION ONLY (OUTPATIENT)
Dept: HEMATOLOGY/ONCOLOGY | Facility: CLINIC | Age: 78
End: 2023-03-02
Payer: MEDICARE

## 2023-03-02 NOTE — PROGRESS NOTES
Received call from [facility] they cannot service his area. Sent the referral to [facility].

## 2023-03-07 ENCOUNTER — TELEPHONE (OUTPATIENT)
Dept: HEMATOLOGY/ONCOLOGY | Facility: CLINIC | Age: 78
End: 2023-03-07
Payer: MEDICARE

## 2023-03-07 NOTE — TELEPHONE ENCOUNTER
----- Message from Mary Diane sent at 3/7/2023 10:48 AM CST -----  Regarding: Consult/Advisory    Name Of Caller:  Bess from  [facility]      Contact Preference?:  [phone]      Nature of Call?  Patient has been approved to receive Keytruda free of cost through the [employer] Patient Assistance Program.  The pharmacist needs to speak to someone in the clinic about this patient so that they can send him the medication.

## 2023-03-07 NOTE — TELEPHONE ENCOUNTER
Patient is approved through assistance program for free keytruda.      Please let me know follow up---and we will get him scheduled--    Phone number to call to coordinate shipment of keytruda is [phone].

## 2023-03-08 ENCOUNTER — TELEPHONE (OUTPATIENT)
Dept: HEMATOLOGY/ONCOLOGY | Facility: CLINIC | Age: 78
End: 2023-03-08
Payer: MEDICARE

## 2023-03-08 NOTE — TELEPHONE ENCOUNTER
Spoke with patient.  He is calling to ask why his labs and keytruda were canceled, as patient was able to obtain free keytruda from .      Also, he is calling to make sure dr knox places a referral for the nursing administration of the medication as [facility] states they do not have this approved for him.      Message routed to dr knox, josie perez/emma, tarun, and pre-service (I have never heard of having to do this. Not sure what to do.)

## 2023-03-08 NOTE — TELEPHONE ENCOUNTER
----- Message from Corine Brooks sent at 3/8/2023  4:19 PM CST -----    Patient Returning Call        Who Called:pt  Does the patient know what this is regarding?:need nurse to give him a call about up coming appt  Would the patient rather a call back or a response via [facility]? call  Best Call Back Number:[phone]  Additional Information: call back

## 2023-03-10 NOTE — TELEPHONE ENCOUNTER
The patient states when he spoke with his insurance---they advised him to have his provider enter nursing admin charges/supplies referral---so he won't have to pay anything out of pocket.

## 2023-03-16 ENCOUNTER — TELEPHONE (OUTPATIENT)
Dept: HEMATOLOGY/ONCOLOGY | Facility: CLINIC | Age: 78
End: 2023-03-16
Payer: MEDICARE

## 2023-03-16 DIAGNOSIS — M79.89 RIGHT LEG SWELLING: Primary | ICD-10-CM

## 2023-03-16 DIAGNOSIS — R60.0 EDEMA OF RIGHT LOWER EXTREMITY: Primary | ICD-10-CM

## 2023-03-16 NOTE — TELEPHONE ENCOUNTER
Spoke with patient.  He notes r leg swollen more than usual. He is now wanting to have US performed. He is requesting for it to be scheduled at [place].      Also, he wants to make sure no administration charges will be dropped for keytruda on him, bc if they are he can't get the treatments, as he doesn't have income. He understands the drug is free---but wants to make sure nursing administration and supplies are free as well.        Message routed to amelie marquez, debbie, and franci (franci, how do I find out what charges will be dropped?)

## 2023-03-16 NOTE — TELEPHONE ENCOUNTER
----- Message from Joaquin Acevedo sent at 3/16/2023  3:05 PM CDT -----  Regarding: Orders  Name of Who is Calling: JARRET CONRAD [[id number]]           What is the request in detail: Patient is requesting a call back.  Patient is requesting orders for a leg ultrasound be placed.  Patient is also requesting to be contacted for scheduling in [place].  Please assist.         Can the clinic reply by [facility]: No           What Number to Call Back if not in [facility]: [phone]

## 2023-03-22 ENCOUNTER — TELEPHONE (OUTPATIENT)
Dept: HEMATOLOGY/ONCOLOGY | Facility: CLINIC | Age: 78
End: 2023-03-22
Payer: MEDICARE

## 2023-03-22 NOTE — TELEPHONE ENCOUNTER
Patient is calling to verify no administration charges will be dropped for his keytruda visits, bc if there are admin charges, he will not be able to afford the treatments, as he doesn't have income. He understands the drug is free---but wants to make sure nursing administration and supplies are free.      Message routed to lesa and dr knox (can you please let me know what charges will be dropped when he comes in for keytruda from .)

## 2023-03-22 NOTE — TELEPHONE ENCOUNTER
----- Message from Alexsandra Mckinnon sent at 3/22/2023 10:27 AM CDT -----  Contact: JARRET CONRAD [[id number]] [phone]  General Call    Patient indicates provider was going to check with his insurance to see if there were going to be any extra costs in regards to his upcoming visits this Fri 3/24/23. Patient asks for an update: [phone]

## 2023-03-24 ENCOUNTER — INFUSION (OUTPATIENT)
Dept: INFUSION THERAPY | Facility: HOSPITAL | Age: 78
End: 2023-03-24
Payer: MEDICARE

## 2023-03-24 ENCOUNTER — OFFICE VISIT (OUTPATIENT)
Dept: HEMATOLOGY/ONCOLOGY | Facility: CLINIC | Age: 78
End: 2023-03-24
Payer: MEDICARE

## 2023-03-24 VITALS
BODY MASS INDEX: 25.12 KG/M2 | RESPIRATION RATE: 18 BRPM | DIASTOLIC BLOOD PRESSURE: 69 MMHG | HEIGHT: 71 IN | HEART RATE: 59 BPM | SYSTOLIC BLOOD PRESSURE: 152 MMHG | TEMPERATURE: 99 F | WEIGHT: 179.44 LBS

## 2023-03-24 VITALS
HEART RATE: 59 BPM | DIASTOLIC BLOOD PRESSURE: 69 MMHG | BODY MASS INDEX: 25.12 KG/M2 | RESPIRATION RATE: 18 BRPM | OXYGEN SATURATION: 99 % | TEMPERATURE: 99 F | HEIGHT: 71 IN | SYSTOLIC BLOOD PRESSURE: 152 MMHG | WEIGHT: 179.44 LBS

## 2023-03-24 DIAGNOSIS — D49.9 IMMUNODEFICIENCY SECONDARY TO NEOPLASM: ICD-10-CM

## 2023-03-24 DIAGNOSIS — R53.83 FATIGUE, UNSPECIFIED TYPE: ICD-10-CM

## 2023-03-24 DIAGNOSIS — D84.81 IMMUNODEFICIENCY SECONDARY TO NEOPLASM: ICD-10-CM

## 2023-03-24 DIAGNOSIS — C49.9 PRIMARY MYXOFIBROSARCOMA: Primary | ICD-10-CM

## 2023-03-24 DIAGNOSIS — C49.9 SOFT TISSUE SARCOMA: ICD-10-CM

## 2023-03-24 DIAGNOSIS — G89.3 CANCER RELATED PAIN: ICD-10-CM

## 2023-03-24 DIAGNOSIS — C77.4 SECONDARY MALIGNANCY OF INGUINAL LYMPH NODES: ICD-10-CM

## 2023-03-24 DIAGNOSIS — G89.3 NEOPLASM RELATED PAIN: ICD-10-CM

## 2023-03-24 PROCEDURE — 1159F MED LIST DOCD IN RCRD: CPT | Mod: HCNC,CPTII,S$GLB, | Performed by: INTERNAL MEDICINE

## 2023-03-24 PROCEDURE — 1160F PR REVIEW ALL MEDS BY PRESCRIBER/CLIN PHARMACIST DOCUMENTED: ICD-10-PCS | Mod: HCNC,CPTII,S$GLB, | Performed by: INTERNAL MEDICINE

## 2023-03-24 PROCEDURE — 1126F PR PAIN SEVERITY QUANTIFIED, NO PAIN PRESENT: ICD-10-PCS | Mod: HCNC,CPTII,S$GLB, | Performed by: INTERNAL MEDICINE

## 2023-03-24 PROCEDURE — 1101F PR PT FALLS ASSESS DOC 0-1 FALLS W/OUT INJ PAST YR: ICD-10-PCS | Mod: HCNC,CPTII,S$GLB, | Performed by: INTERNAL MEDICINE

## 2023-03-24 PROCEDURE — 3288F PR FALLS RISK ASSESSMENT DOCUMENTED: ICD-10-PCS | Mod: HCNC,CPTII,S$GLB, | Performed by: INTERNAL MEDICINE

## 2023-03-24 PROCEDURE — 99215 OFFICE O/P EST HI 40 MIN: CPT | Mod: HCNC,S$GLB,, | Performed by: INTERNAL MEDICINE

## 2023-03-24 PROCEDURE — 96413 CHEMO IV INFUSION 1 HR: CPT | Mod: HCNC

## 2023-03-24 PROCEDURE — 1101F PT FALLS ASSESS-DOCD LE1/YR: CPT | Mod: HCNC,CPTII,S$GLB, | Performed by: INTERNAL MEDICINE

## 2023-03-24 PROCEDURE — 1126F AMNT PAIN NOTED NONE PRSNT: CPT | Mod: HCNC,CPTII,S$GLB, | Performed by: INTERNAL MEDICINE

## 2023-03-24 PROCEDURE — 3078F DIAST BP <80 MM HG: CPT | Mod: HCNC,CPTII,S$GLB, | Performed by: INTERNAL MEDICINE

## 2023-03-24 PROCEDURE — 99999 PR PBB SHADOW E&M-EST. PATIENT-LVL V: ICD-10-PCS | Mod: PBBFAC,HCNC,, | Performed by: INTERNAL MEDICINE

## 2023-03-24 PROCEDURE — 3077F PR MOST RECENT SYSTOLIC BLOOD PRESSURE >= 140 MM HG: ICD-10-PCS | Mod: HCNC,CPTII,S$GLB, | Performed by: INTERNAL MEDICINE

## 2023-03-24 PROCEDURE — 63600175 PHARM REV CODE 636 W HCPCS: Mod: JZ,JG,HCNC,RPL | Performed by: INTERNAL MEDICINE

## 2023-03-24 PROCEDURE — 3078F PR MOST RECENT DIASTOLIC BLOOD PRESSURE < 80 MM HG: ICD-10-PCS | Mod: HCNC,CPTII,S$GLB, | Performed by: INTERNAL MEDICINE

## 2023-03-24 PROCEDURE — 25000003 PHARM REV CODE 250: Mod: HCNC | Performed by: INTERNAL MEDICINE

## 2023-03-24 PROCEDURE — 99215 PR OFFICE/OUTPT VISIT, EST, LEVL V, 40-54 MIN: ICD-10-PCS | Mod: HCNC,S$GLB,, | Performed by: INTERNAL MEDICINE

## 2023-03-24 PROCEDURE — 99999 PR PBB SHADOW E&M-EST. PATIENT-LVL V: CPT | Mod: PBBFAC,HCNC,, | Performed by: INTERNAL MEDICINE

## 2023-03-24 PROCEDURE — 3077F SYST BP >= 140 MM HG: CPT | Mod: HCNC,CPTII,S$GLB, | Performed by: INTERNAL MEDICINE

## 2023-03-24 PROCEDURE — 1160F RVW MEDS BY RX/DR IN RCRD: CPT | Mod: HCNC,CPTII,S$GLB, | Performed by: INTERNAL MEDICINE

## 2023-03-24 PROCEDURE — 1159F PR MEDICATION LIST DOCUMENTED IN MEDICAL RECORD: ICD-10-PCS | Mod: HCNC,CPTII,S$GLB, | Performed by: INTERNAL MEDICINE

## 2023-03-24 PROCEDURE — 3288F FALL RISK ASSESSMENT DOCD: CPT | Mod: HCNC,CPTII,S$GLB, | Performed by: INTERNAL MEDICINE

## 2023-03-24 RX ORDER — HEPARIN 100 UNIT/ML
500 SYRINGE INTRAVENOUS
Status: CANCELLED | OUTPATIENT
Start: 2023-03-24

## 2023-03-24 RX ORDER — EPINEPHRINE 0.3 MG/.3ML
0.3 INJECTION SUBCUTANEOUS ONCE AS NEEDED
Status: CANCELLED | OUTPATIENT
Start: 2023-03-24

## 2023-03-24 RX ORDER — HEPARIN 100 UNIT/ML
500 SYRINGE INTRAVENOUS
Status: DISCONTINUED | OUTPATIENT
Start: 2023-03-24 | End: 2023-03-24 | Stop reason: HOSPADM

## 2023-03-24 RX ORDER — SODIUM CHLORIDE 0.9 % (FLUSH) 0.9 %
10 SYRINGE (ML) INJECTION
Status: CANCELLED | OUTPATIENT
Start: 2023-03-24

## 2023-03-24 RX ORDER — OXYCODONE HYDROCHLORIDE 10 MG/1
10 TABLET ORAL EVERY 6 HOURS PRN
Qty: 120 TABLET | Refills: 0 | Status: SHIPPED | OUTPATIENT
Start: 2023-03-24 | End: 2023-04-25 | Stop reason: SDUPTHER

## 2023-03-24 RX ORDER — SODIUM CHLORIDE 0.9 % (FLUSH) 0.9 %
10 SYRINGE (ML) INJECTION
Status: DISCONTINUED | OUTPATIENT
Start: 2023-03-24 | End: 2023-03-24 | Stop reason: HOSPADM

## 2023-03-24 RX ORDER — DIPHENHYDRAMINE HYDROCHLORIDE 50 MG/ML
50 INJECTION INTRAMUSCULAR; INTRAVENOUS ONCE AS NEEDED
Status: CANCELLED | OUTPATIENT
Start: 2023-03-24

## 2023-03-24 RX ORDER — EPINEPHRINE 0.3 MG/.3ML
0.3 INJECTION SUBCUTANEOUS ONCE AS NEEDED
Status: DISCONTINUED | OUTPATIENT
Start: 2023-03-24 | End: 2023-03-24 | Stop reason: HOSPADM

## 2023-03-24 RX ORDER — DIPHENHYDRAMINE HYDROCHLORIDE 50 MG/ML
50 INJECTION INTRAMUSCULAR; INTRAVENOUS ONCE AS NEEDED
Status: DISCONTINUED | OUTPATIENT
Start: 2023-03-24 | End: 2023-03-24 | Stop reason: HOSPADM

## 2023-03-24 RX ADMIN — SODIUM CHLORIDE 200 MG: 9 INJECTION, SOLUTION INTRAVENOUS at 12:03

## 2023-03-24 RX ADMIN — SODIUM CHLORIDE: 9 INJECTION, SOLUTION INTRAVENOUS at 11:03

## 2023-03-24 NOTE — PROGRESS NOTES
"                                                         PROGRESS NOTE    Subjective:       Patient ID: Zac Plunkett is a 77 y.o. male.    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 JD. TMB 6.3    Oncologic History:  Prior patient of Dr Lucia's  2021  May              5/27- US right thigh: showed 2 hypoechoic masses in the subcutaneous tissues of the right anterior thigh. Largest measured 1.1 x 0.9 x 1 cm. Read at the time was concern for lymphadenopathy.   June 6/4 - CXR: "Bilateral reticular opacities are present in a perihilar distribution which appears slightly more pronounced compared to prior exams."               6/11 - underwent excisional biopsy of the right thigh; pathology came back positive for myxofibrosarcoma, high grade. Path a 1.8 x 1.1 x 0.9 cm mass with an adjacent 0.4 x 0.4 x 0.3 cm mass.   July/Aug              - IMRT with Dr. Mcadams  2022 January 1/12 - PET: locoregional recurrence and concerning 0.9 cm RUL lesion              1/20 - Path: excision on 2 recurrent lesions:  Myxofibrosarcoma, 1.9 cm at longest length, PD-L1 25%              1/31 - CT thigh: successful removal w/o evidence of recurrent disease  April 4/28 - recurrence, locally with metastatic deposit in LN. S/p excision on 4/28/22. Path: right thigh superior mass: Myxofibrosarcoma, high grade with focal tumor necrosis, incompletely excised. A portion of the lesion is suggestive of an involved lymph node exhibiting metastatic sarcoma with extranodal extension.  Sarcoma is focally present within inked but otherwise unspecified peripheral margins.   July 7/19 - CT thigh showed local recurrence and a 0.7 cm R inguinal lymph node    2023  Jacoby   1/3 - CT thigh/pelvis showed "Heterogeneous enhancing soft tissue lesions " "and nodular foci at the anterior thigh and right groin highly concerning for worsening local recurrent disease with talon metastasis"    - stable pulmonary nodules on CT chest  - completed palliative radiation to right thigh  March  3/24- keytruda to be started    Interval History:   Mr Plunkett returns for follow up. His pain is controlled by taking oxycodone every 6 hours around the clock. He tried MS-Contin for 3 days but made him sleep all day, so he didn't continue.   Sees wound care for the wound, and doing daily dressing changes. Wound remains an issue, with foul odor and bleeding. He has fatigue   ECO    ROS:   A ten-point system review is obtained and negative except for what was stated in the Interval History.     Physical Examination:   Vital signs reviewed.   General: well hydrated, well developed, in no acute distress  HEENT: normocephalic, PERRLA, EOMI, anicteric sclerae, oropharynx clear  Neck: supple, no JVD, thyromegaly, cervical or supraclavicular lymphadenopathy  Lungs: clear breath sounds bilaterally, no wheezing, rales, or rhonchi  Heart: RRR, no M/R/G  Abdomen: soft, no tenderness, non-distended, no hepatosplenomegaly, mass, or hernia. BS present  Extremities: R LE 1+ edema  Skin: no rash, ulcer, or open wounds  Neuro: alert and oriented x 4, no focal neuro deficit  Psych: pleasant and appropriate mood and affect    Objective:     Laboratory Data:  Labs reviewed.     Imaging Data:  CT right thigh 22:  Postoperative changes from multifocal mass excision in the right anterior thigh.     New 0.7 cm rounded subcutaneous nodule in the right groin in the region of a previous morphologically normal lymph node, concerning for talon metastasis.  Attention on follow-up imaging is recommended.     Enlarging 1.7 cm subcutaneous nodule in the operative bed in the proximal anterior thigh, concerning for local recurrence.    CT chest 23:   In this patient with a history of sarcoma, " a few previously seen pulmonary nodules have resolved while additional pulmonary nodules appear stable.  No new nodules are demonstrated.    CT thigh/pelvis 1/3/23:   Heterogeneous enhancing soft tissue lesions and nodular foci at the anterior thigh and right groin highly concerning for worsening local recurrent disease with talon metastasis.  Further correlation advised.    Assessment and Plan:     1. 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 Soft tissue sarcoma    4. Immunodeficiency secondary to neoplasm    5. Cancer related pain        1-5  - Mr Plunkett is a 76 yo man with recurrent myxofibrosarcoma of the right thigh, has had 3 resections and IMRT  - he was seen by Dr. Mao and Dr. Unger, they did not feel that he would be a good surgical candidate and recommended palliative RT  - completed palliative RT completed 2/13/2023  - Previously discussed systemic treatment options, including chemotherapy and immunotherapy. He was not interested in chemotherapy including TKI. Pembrolizumab has efficacy in phase II studies and is recommended on NCCN Guidelines for patients with myxofibrosarcoma. PD-L1 is 25%. However, his TMB is not high. Pembrolizumab was denied by insurance and then denied at hearing. We applied for patient assistance. They spent a long time checking with insurance. They will work on patient assistance and let us know their decision  - we previously discussed that if patient assistance denied, I recommend hospice. Wife asked about the rationale. We discussed that hospice does not necessarily mean that we are facing imminent death. In Mr Plunkett's case, he does not want chemotherapy. If [employer] denies patient assistance, then we are not able to get keytruda. In that case we don't have any treatment options. Hospice is for symptom management and pain control. Especially now that it is too hard for him to come to clinic due to pain. They understand and agree with  the plan  - discussed starting MS contin and take oxycodone for breakthrough pain. Discussed rationale. He tried using it but made him drowsy.   -  Pain is controlled with current regimen and he doesn't want to change it.   - Keytruda was obtained from [employer] for compassionate use. He will get C1 today.   - The side effects of immunotherapy were discussed with patient, which include but are not limited to fatigue, weakness, decreased appetite, nausea, vomiting, diarrhea, skin reactions and rashes which can be severe, flu-like symptoms, fevers, infusion reactions, pain at the site of infusion, inflammation of organs including stomach, bowels, liver, brain, nervous system, lungs, liver, kidney, thyroid, and death.  Handouts provided to patient. Consent signed today and witnessed by a third party.   - Labs reviewed, proceed with C1 today   - Return in three weeks for C2.         Follow-up:     In 3 weeks    ATTESTATION:    I have personally seen, examined and talked to the patient. I have reviewed Dr Mendoza's  note and agreed with the findings, assessment and plan.     Will get baseline staging CT as the last one was almost 3 months ago. Start keytruda today.     Ed Martines MD  Hematology and Medical Oncology  [facility]      Route Chart for Scheduling    Med Onc Chart Routing  Urgent    Follow up with physician 3 weeks. please schedule patient for first available CT C/A/P and CT thigh and let patient know. see me in 3 weeks with labs then keytruda. see NICOLLE in 6 weeks with labs then keytruda   Follow up with NICOLLE    Infusion scheduling note keytruda every 3 weeks   Injection scheduling note    Labs CBC, CMP and TSH   Scheduling: Labs same day as infusion  Preferred lab:  Lab interval: every 3 weeks     Imaging CT chest abdomen pelvis and other   first available CT thigh, CT C/A/P   Pharmacy appointment    Other referrals           Treatment Plan Information   OP PEMBROLIZUMAB 200MG Q3W   Ed Martines MD    Upcoming Treatment Dates - OP PEMBROLIZUMAB 200MG Q3W    8/25/2022       Chemotherapy       pembrolizumab (KEYTRUDA) 200 mg in sodium chloride 0.9% 108 mL infusion  9/15/2022       Chemotherapy       pembrolizumab (KEYTRUDA) 200 mg in sodium chloride 0.9% 108 mL infusion  10/6/2022       Chemotherapy       pembrolizumab (KEYTRUDA) 200 mg in sodium chloride 0.9% 108 mL infusion  10/27/2022       Chemotherapy       pembrolizumab (KEYTRUDA) 200 mg in sodium chloride 0.9% 108 mL infusion

## 2023-03-24 NOTE — PLAN OF CARE
Pt arrived to unit ambulatory with cane from MD office for first dose Keytruda.  Oriented to unit and reviewed tx plan.  Pt without any questions regarding medication or side effects.  Pt tolerated tx without difficulty.  Removed PIV and pt ambulated off unit with cane without any questions, comments or concerns.

## 2023-03-28 NOTE — TELEPHONE ENCOUNTER
Pre-service, Is there a way to find out how to place nursing administration codes for authorization with insurance?    Alberta/franci, do you know how to get cpt codes for nursing admin?    Angelina

## 2023-03-29 NOTE — TELEPHONE ENCOUNTER
Located cpt codes---   (CPT®) - CT 2RMAL SALINE SOLUTION INFUS None  1 0    - CT ADRENALIN EPINEPHRINE INJECTION, 0.1MG None  1 0    (CPT®) - CT DIPHENHYDRAMINE HCL INJ UP TO 50 MG None  1 0    (CPT®) - CT HYDROCORTISONE SODIUM SUCC UP TO 100MG None  1 0    (CPT®) - CT INJ HEPARIN SODIUM PER 10 U None  1 0    (CPT®) - CT ALTEPLASE RECOMBINANT INJ, 1 MG None  1 0    - CT STERILE WATER/SALINE, 10 ML None  1 0   47465 (CPT®) - CT CHEMOTHER, IV INFUSION, 1 HR None  1 0   71853 (CPT®) - CT IV INFUSION, HYDRATION, 31-60 MIN None  1 0   00124 (CPT®) - CT INJECTION,THERAP/PROPH/DIAGNOST, IV PUSH, INITIAL DRUG None  1 0   49620 (CPT®) - CT INJECTION,THERAP/PROPH/DIAGNOST, IV PUSH, EA ADD, NEW DRUG None  1 0   97222 (CPT®) - CT IRRIG IMPLANTED DRUG DELIVERY DEVICE None  1 0       Spoke with billing  Nursing admin charges (17335, [id number], [id number])---approx $300 every dose self pay.  This does not include flushes.      Pre service, how can we place the admin charges for authorization?

## 2023-04-14 ENCOUNTER — INFUSION (OUTPATIENT)
Dept: INFUSION THERAPY | Facility: HOSPITAL | Age: 78
End: 2023-04-14
Payer: MEDICARE

## 2023-04-14 ENCOUNTER — OFFICE VISIT (OUTPATIENT)
Dept: HEMATOLOGY/ONCOLOGY | Facility: CLINIC | Age: 78
End: 2023-04-14
Payer: MEDICARE

## 2023-04-14 VITALS — DIASTOLIC BLOOD PRESSURE: 61 MMHG | RESPIRATION RATE: 16 BRPM | HEART RATE: 70 BPM | SYSTOLIC BLOOD PRESSURE: 131 MMHG

## 2023-04-14 VITALS
OXYGEN SATURATION: 97 % | DIASTOLIC BLOOD PRESSURE: 72 MMHG | BODY MASS INDEX: 24.61 KG/M2 | HEART RATE: 63 BPM | RESPIRATION RATE: 16 BRPM | WEIGHT: 175.81 LBS | HEIGHT: 71 IN | SYSTOLIC BLOOD PRESSURE: 146 MMHG | TEMPERATURE: 98 F

## 2023-04-14 DIAGNOSIS — I10 ESSENTIAL HYPERTENSION: ICD-10-CM

## 2023-04-14 DIAGNOSIS — C77.4 SECONDARY MALIGNANCY OF INGUINAL LYMPH NODES: ICD-10-CM

## 2023-04-14 DIAGNOSIS — G89.3 CANCER-RELATED PAIN: ICD-10-CM

## 2023-04-14 DIAGNOSIS — D49.9 IMMUNODEFICIENCY SECONDARY TO NEOPLASM: ICD-10-CM

## 2023-04-14 DIAGNOSIS — D84.81 IMMUNODEFICIENCY SECONDARY TO NEOPLASM: ICD-10-CM

## 2023-04-14 DIAGNOSIS — C49.9 PRIMARY MYXOFIBROSARCOMA: Primary | ICD-10-CM

## 2023-04-14 PROCEDURE — 25000003 PHARM REV CODE 250: Mod: HCNC | Performed by: INTERNAL MEDICINE

## 2023-04-14 PROCEDURE — 63600175 PHARM REV CODE 636 W HCPCS: Mod: JZ,JG,HCNC | Performed by: INTERNAL MEDICINE

## 2023-04-14 PROCEDURE — 1126F PR PAIN SEVERITY QUANTIFIED, NO PAIN PRESENT: ICD-10-PCS | Mod: HCNC,CPTII,S$GLB, | Performed by: INTERNAL MEDICINE

## 2023-04-14 PROCEDURE — 1126F AMNT PAIN NOTED NONE PRSNT: CPT | Mod: HCNC,CPTII,S$GLB, | Performed by: INTERNAL MEDICINE

## 2023-04-14 PROCEDURE — 1159F MED LIST DOCD IN RCRD: CPT | Mod: HCNC,CPTII,S$GLB, | Performed by: INTERNAL MEDICINE

## 2023-04-14 PROCEDURE — 99215 OFFICE O/P EST HI 40 MIN: CPT | Mod: HCNC,S$GLB,, | Performed by: INTERNAL MEDICINE

## 2023-04-14 PROCEDURE — 3288F PR FALLS RISK ASSESSMENT DOCUMENTED: ICD-10-PCS | Mod: HCNC,CPTII,S$GLB, | Performed by: INTERNAL MEDICINE

## 2023-04-14 PROCEDURE — 99215 PR OFFICE/OUTPT VISIT, EST, LEVL V, 40-54 MIN: ICD-10-PCS | Mod: HCNC,S$GLB,, | Performed by: INTERNAL MEDICINE

## 2023-04-14 PROCEDURE — 1160F PR REVIEW ALL MEDS BY PRESCRIBER/CLIN PHARMACIST DOCUMENTED: ICD-10-PCS | Mod: HCNC,CPTII,S$GLB, | Performed by: INTERNAL MEDICINE

## 2023-04-14 PROCEDURE — 3077F SYST BP >= 140 MM HG: CPT | Mod: HCNC,CPTII,S$GLB, | Performed by: INTERNAL MEDICINE

## 2023-04-14 PROCEDURE — 99999 PR PBB SHADOW E&M-EST. PATIENT-LVL IV: CPT | Mod: PBBFAC,HCNC,, | Performed by: INTERNAL MEDICINE

## 2023-04-14 PROCEDURE — 3288F FALL RISK ASSESSMENT DOCD: CPT | Mod: HCNC,CPTII,S$GLB, | Performed by: INTERNAL MEDICINE

## 2023-04-14 PROCEDURE — 1101F PT FALLS ASSESS-DOCD LE1/YR: CPT | Mod: HCNC,CPTII,S$GLB, | Performed by: INTERNAL MEDICINE

## 2023-04-14 PROCEDURE — 1160F RVW MEDS BY RX/DR IN RCRD: CPT | Mod: HCNC,CPTII,S$GLB, | Performed by: INTERNAL MEDICINE

## 2023-04-14 PROCEDURE — 1101F PR PT FALLS ASSESS DOC 0-1 FALLS W/OUT INJ PAST YR: ICD-10-PCS | Mod: HCNC,CPTII,S$GLB, | Performed by: INTERNAL MEDICINE

## 2023-04-14 PROCEDURE — 3078F DIAST BP <80 MM HG: CPT | Mod: HCNC,CPTII,S$GLB, | Performed by: INTERNAL MEDICINE

## 2023-04-14 PROCEDURE — 99999 PR PBB SHADOW E&M-EST. PATIENT-LVL IV: ICD-10-PCS | Mod: PBBFAC,HCNC,, | Performed by: INTERNAL MEDICINE

## 2023-04-14 PROCEDURE — 96413 CHEMO IV INFUSION 1 HR: CPT | Mod: HCNC

## 2023-04-14 PROCEDURE — 1159F PR MEDICATION LIST DOCUMENTED IN MEDICAL RECORD: ICD-10-PCS | Mod: HCNC,CPTII,S$GLB, | Performed by: INTERNAL MEDICINE

## 2023-04-14 PROCEDURE — 3077F PR MOST RECENT SYSTOLIC BLOOD PRESSURE >= 140 MM HG: ICD-10-PCS | Mod: HCNC,CPTII,S$GLB, | Performed by: INTERNAL MEDICINE

## 2023-04-14 PROCEDURE — 3078F PR MOST RECENT DIASTOLIC BLOOD PRESSURE < 80 MM HG: ICD-10-PCS | Mod: HCNC,CPTII,S$GLB, | Performed by: INTERNAL MEDICINE

## 2023-04-14 RX ORDER — SODIUM CHLORIDE 0.9 % (FLUSH) 0.9 %
10 SYRINGE (ML) INJECTION
Status: DISCONTINUED | OUTPATIENT
Start: 2023-04-14 | End: 2023-04-14 | Stop reason: HOSPADM

## 2023-04-14 RX ORDER — SODIUM CHLORIDE 0.9 % (FLUSH) 0.9 %
10 SYRINGE (ML) INJECTION
Status: CANCELLED | OUTPATIENT
Start: 2023-04-14

## 2023-04-14 RX ORDER — HEPARIN 100 UNIT/ML
500 SYRINGE INTRAVENOUS
Status: DISCONTINUED | OUTPATIENT
Start: 2023-04-14 | End: 2023-04-14 | Stop reason: HOSPADM

## 2023-04-14 RX ORDER — EPINEPHRINE 0.3 MG/.3ML
0.3 INJECTION SUBCUTANEOUS ONCE AS NEEDED
Status: CANCELLED | OUTPATIENT
Start: 2023-04-14

## 2023-04-14 RX ORDER — DIPHENHYDRAMINE HYDROCHLORIDE 50 MG/ML
50 INJECTION INTRAMUSCULAR; INTRAVENOUS ONCE AS NEEDED
Status: DISCONTINUED | OUTPATIENT
Start: 2023-04-14 | End: 2023-04-14 | Stop reason: HOSPADM

## 2023-04-14 RX ORDER — HEPARIN 100 UNIT/ML
500 SYRINGE INTRAVENOUS
Status: CANCELLED | OUTPATIENT
Start: 2023-04-14

## 2023-04-14 RX ORDER — DIPHENHYDRAMINE HYDROCHLORIDE 50 MG/ML
50 INJECTION INTRAMUSCULAR; INTRAVENOUS ONCE AS NEEDED
Status: CANCELLED | OUTPATIENT
Start: 2023-04-14

## 2023-04-14 RX ORDER — EPINEPHRINE 0.3 MG/.3ML
0.3 INJECTION SUBCUTANEOUS ONCE AS NEEDED
Status: DISCONTINUED | OUTPATIENT
Start: 2023-04-14 | End: 2023-04-14 | Stop reason: HOSPADM

## 2023-04-14 RX ADMIN — SODIUM CHLORIDE: 9 INJECTION, SOLUTION INTRAVENOUS at 12:04

## 2023-04-14 RX ADMIN — SODIUM CHLORIDE 200 MG: 9 INJECTION, SOLUTION INTRAVENOUS at 01:04

## 2023-04-14 NOTE — PROGRESS NOTES
"                                                         PROGRESS NOTE    Subjective:       Patient ID: Zac Plunkett is a 77 y.o. male.    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 JD. TMB 6.3    Oncologic History:  Prior patient of Dr Lucia's  2021  May              5/27- US right thigh: showed 2 hypoechoic masses in the subcutaneous tissues of the right anterior thigh. Largest measured 1.1 x 0.9 x 1 cm. Read at the time was concern for lymphadenopathy.   June 6/4 - CXR: "Bilateral reticular opacities are present in a perihilar distribution which appears slightly more pronounced compared to prior exams."               6/11 - underwent excisional biopsy of the right thigh; pathology came back positive for myxofibrosarcoma, high grade. Path a 1.8 x 1.1 x 0.9 cm mass with an adjacent 0.4 x 0.4 x 0.3 cm mass.   July/Aug              - IMRT with Dr. Mcadams  2022 January 1/12 - PET: locoregional recurrence and concerning 0.9 cm RUL lesion              1/20 - Path: excision on 2 recurrent lesions:  Myxofibrosarcoma, 1.9 cm at longest length, PD-L1 25%              1/31 - CT thigh: successful removal w/o evidence of recurrent disease  April 4/28 - recurrence, locally with metastatic deposit in LN. S/p excision on 4/28/22. Path: right thigh superior mass: Myxofibrosarcoma, high grade with focal tumor necrosis, incompletely excised. A portion of the lesion is suggestive of an involved lymph node exhibiting metastatic sarcoma with extranodal extension.  Sarcoma is focally present within inked but otherwise unspecified peripheral margins.   July 7/19 - CT thigh showed local recurrence and a 0.7 cm R inguinal lymph node    2023  Jacoby   1/3 - CT thigh/pelvis showed "Heterogeneous enhancing soft tissue lesions " "and nodular foci at the anterior thigh and right groin highly concerning for worsening local recurrent disease with talon metastasis"    - stable pulmonary nodules on CT chest  Fe- completed palliative radiation to right thigh  March  3/24- started keytruda, s/p 1 cycle    Interval History:   Mr Plunkett returns for follow up. Tumor started shrinking before he started keytruda. Continues to shrink. Pain is better. Trying to take oxy every 8 instead of every 6 hours    ECO    ROS:   A ten-point system review is obtained and negative except for what was stated in the Interval History.     Physical Examination:   Vital signs reviewed.   General: well hydrated, well developed, in no acute distress  HEENT: normocephalic, PERRLA, EOMI, anicteric sclerae, oropharynx clear  Neck: supple, no JVD, thyromegaly, cervical or supraclavicular lymphadenopathy  Lungs: clear breath sounds bilaterally, no wheezing, rales, or rhonchi  Heart: RRR, no M/R/G  Abdomen: soft, no tenderness, non-distended, no hepatosplenomegaly, mass, or hernia. BS present  Extremities: b/l LE no edema  Skin: no rash, ulcer, or open wounds  Neuro: alert and oriented x 4, no focal neuro deficit  Psych: pleasant and appropriate mood and affect    Objective:     Laboratory Data:  Labs reviewed.     Imaging Data:  CT C/A/P 3/29/23:  Impression:     1. 0.8 x 2.8-cm (previously 3.0 x 3.9-cm) right inguinal mass (series 3, image 171). 2.9 x 5.6-cm (previously 2.4 x 5.0-cm) exophytic right inguinal mass.  2. Multiple pulmonary nodules throughout the bilateral lung fields that are predominantly all stable.  There are approximately 2 pulmonary nodules that were not definitively seen on prior exam which may be related to technique.  3. Single enlarged precarinal space lymph node and prominent bilateral hilar lymph nodes that are not enlarged by radiologic size criteria, not significantly changed.  4. Approximately 3 punctate hepatic parenchymal " hypodensities that are too small to accurately characterize.    CT right thigh 3/29/23:  Interval increase in size of subcutaneous soft tissue mass within the right superior thigh.     Centrilobular emphysema.       Assessment and Plan:     1. 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 Immunodeficiency secondary to neoplasm    4. Cancer-related pain    5. Essential hypertension        1-3  - Mr Plunkett is a 76 yo man with recurrent myxofibrosarcoma of the right thigh, has had 3 resections and IMRT  - he was seen by Dr. Mao and Dr. Unger, they did not feel that he would be a good surgical candidate and recommended palliative RT  - completed palliative RT completed 2/13/2023  - Previously discussed systemic treatment options, including chemotherapy and immunotherapy. He was not interested in chemotherapy including TKI. Pembrolizumab has efficacy in phase II studies and is recommended on NCCN Guidelines for patients with myxofibrosarcoma. PD-L1 is 25%. However, his TMB is not high. Pembrolizumab was denied by insurance and then denied at hearing. He got patient assistance through [employer]  - started palliative keytruda on 3/24/23, s/p 1 cycle  - doing well. Pain better. Tolerating keytruda well  - c/w keytruda. Cycle 2 today  - reviewed CT scan results. These will serve as our baseline scans.   - return in 3 weeks for cycle 3    4.  - better  - c/w oxycodone prn    5.  - BP controlled  - c/w current medication    Follow-up:     In 3 weeks    dE Martines MD  Hematology and Medical Oncology  [facility]      Route Chart for Scheduling    Med Onc Chart Routing      Follow up with physician 6 weeks. see NICOLLE in 3 weeks with labs then keytruda. see me in 6 weeks with labs then keytruda   Follow up with NICOLLE 3 weeks.   Infusion scheduling note keytruda every 3 weeks   Injection scheduling note    Labs CBC, CMP and TSH   Scheduling: Labs same day as infusion  Preferred lab:  Lab  interval:     Imaging    Pharmacy appointment    Other referrals         Treatment Plan Information   OP PEMBROLIZUMAB 200MG Q3W   Ed Martines MD   Upcoming Treatment Dates - OP PEMBROLIZUMAB 200MG Q3W    4/14/2023       Chemotherapy       pembrolizumab (KEYTRUDA) 200 mg in sodium chloride 0.9% SolP 108 mL infusion  5/5/2023       Chemotherapy       pembrolizumab (KEYTRUDA) 200 mg in sodium chloride 0.9% SolP 108 mL infusion  5/26/2023       Chemotherapy       pembrolizumab (KEYTRUDA) 200 mg in sodium chloride 0.9% SolP 108 mL infusion  6/16/2023       Chemotherapy       pembrolizumab (KEYTRUDA) 200 mg in sodium chloride 0.9% SolP 108 mL infusion

## 2023-04-14 NOTE — PLAN OF CARE
1405-Pt tolerated Keytruda infusion well today, no complaints or complications. VSS.  Pt aware to call provider with any questions or concerns and is aware of upcoming appts. Pt ambulatory from clinic with steady gait, no distress noted.

## 2023-04-14 NOTE — PLAN OF CARE
1231-Labs, hx, and medications reviewed, pt meets parameters for treatment today. Assessment completed and plan of care reviewed. Pt verbalized understanding. PIV accessed with no complications. Pt voices no new complaints or concerns, will continue to monitor for safety.

## 2023-04-29 ENCOUNTER — PATIENT MESSAGE (OUTPATIENT)
Dept: HEMATOLOGY/ONCOLOGY | Facility: CLINIC | Age: 78
End: 2023-04-29
Payer: MEDICARE

## 2023-05-05 ENCOUNTER — OFFICE VISIT (OUTPATIENT)
Dept: HEMATOLOGY/ONCOLOGY | Facility: CLINIC | Age: 78
End: 2023-05-05
Payer: MEDICARE

## 2023-05-05 ENCOUNTER — INFUSION (OUTPATIENT)
Dept: INFUSION THERAPY | Facility: HOSPITAL | Age: 78
End: 2023-05-05
Payer: MEDICARE

## 2023-05-05 VITALS
SYSTOLIC BLOOD PRESSURE: 125 MMHG | DIASTOLIC BLOOD PRESSURE: 61 MMHG | OXYGEN SATURATION: 100 % | RESPIRATION RATE: 18 BRPM | HEIGHT: 71 IN | HEART RATE: 56 BPM | BODY MASS INDEX: 24.26 KG/M2 | WEIGHT: 173.31 LBS

## 2023-05-05 VITALS
HEART RATE: 58 BPM | SYSTOLIC BLOOD PRESSURE: 120 MMHG | DIASTOLIC BLOOD PRESSURE: 68 MMHG | TEMPERATURE: 98 F | RESPIRATION RATE: 18 BRPM

## 2023-05-05 DIAGNOSIS — C77.4 SECONDARY MALIGNANCY OF INGUINAL LYMPH NODES: ICD-10-CM

## 2023-05-05 DIAGNOSIS — R53.1 WEAKNESS: ICD-10-CM

## 2023-05-05 DIAGNOSIS — D84.81 IMMUNODEFICIENCY SECONDARY TO NEOPLASM: ICD-10-CM

## 2023-05-05 DIAGNOSIS — D49.9 IMMUNODEFICIENCY SECONDARY TO NEOPLASM: ICD-10-CM

## 2023-05-05 DIAGNOSIS — C49.9 PRIMARY MYXOFIBROSARCOMA: Primary | ICD-10-CM

## 2023-05-05 DIAGNOSIS — R53.83 FATIGUE, UNSPECIFIED TYPE: ICD-10-CM

## 2023-05-05 DIAGNOSIS — I10 ESSENTIAL HYPERTENSION: ICD-10-CM

## 2023-05-05 DIAGNOSIS — G89.3 CANCER-RELATED PAIN: ICD-10-CM

## 2023-05-05 PROCEDURE — 63600175 PHARM REV CODE 636 W HCPCS: Mod: JZ,JG | Performed by: PHYSICIAN ASSISTANT

## 2023-05-05 PROCEDURE — 96413 CHEMO IV INFUSION 1 HR: CPT

## 2023-05-05 PROCEDURE — 3078F PR MOST RECENT DIASTOLIC BLOOD PRESSURE < 80 MM HG: ICD-10-PCS | Mod: CPTII,S$GLB,, | Performed by: PHYSICIAN ASSISTANT

## 2023-05-05 PROCEDURE — 3288F FALL RISK ASSESSMENT DOCD: CPT | Mod: CPTII,S$GLB,, | Performed by: PHYSICIAN ASSISTANT

## 2023-05-05 PROCEDURE — 1160F PR REVIEW ALL MEDS BY PRESCRIBER/CLIN PHARMACIST DOCUMENTED: ICD-10-PCS | Mod: CPTII,S$GLB,, | Performed by: PHYSICIAN ASSISTANT

## 2023-05-05 PROCEDURE — 1101F PT FALLS ASSESS-DOCD LE1/YR: CPT | Mod: CPTII,S$GLB,, | Performed by: PHYSICIAN ASSISTANT

## 2023-05-05 PROCEDURE — 25000003 PHARM REV CODE 250: Performed by: PHYSICIAN ASSISTANT

## 2023-05-05 PROCEDURE — 1101F PR PT FALLS ASSESS DOC 0-1 FALLS W/OUT INJ PAST YR: ICD-10-PCS | Mod: CPTII,S$GLB,, | Performed by: PHYSICIAN ASSISTANT

## 2023-05-05 PROCEDURE — 99999 PR PBB SHADOW E&M-EST. PATIENT-LVL IV: CPT | Mod: PBBFAC,HCNC,, | Performed by: PHYSICIAN ASSISTANT

## 2023-05-05 PROCEDURE — 1159F MED LIST DOCD IN RCRD: CPT | Mod: CPTII,S$GLB,, | Performed by: PHYSICIAN ASSISTANT

## 2023-05-05 PROCEDURE — 3074F SYST BP LT 130 MM HG: CPT | Mod: CPTII,S$GLB,, | Performed by: PHYSICIAN ASSISTANT

## 2023-05-05 PROCEDURE — 3288F PR FALLS RISK ASSESSMENT DOCUMENTED: ICD-10-PCS | Mod: CPTII,S$GLB,, | Performed by: PHYSICIAN ASSISTANT

## 2023-05-05 PROCEDURE — 99215 PR OFFICE/OUTPT VISIT, EST, LEVL V, 40-54 MIN: ICD-10-PCS | Mod: S$GLB,,, | Performed by: PHYSICIAN ASSISTANT

## 2023-05-05 PROCEDURE — 3078F DIAST BP <80 MM HG: CPT | Mod: CPTII,S$GLB,, | Performed by: PHYSICIAN ASSISTANT

## 2023-05-05 PROCEDURE — 1126F PR PAIN SEVERITY QUANTIFIED, NO PAIN PRESENT: ICD-10-PCS | Mod: CPTII,S$GLB,, | Performed by: PHYSICIAN ASSISTANT

## 2023-05-05 PROCEDURE — 99999 PR PBB SHADOW E&M-EST. PATIENT-LVL IV: ICD-10-PCS | Mod: PBBFAC,HCNC,, | Performed by: PHYSICIAN ASSISTANT

## 2023-05-05 PROCEDURE — 3074F PR MOST RECENT SYSTOLIC BLOOD PRESSURE < 130 MM HG: ICD-10-PCS | Mod: CPTII,S$GLB,, | Performed by: PHYSICIAN ASSISTANT

## 2023-05-05 PROCEDURE — 1160F RVW MEDS BY RX/DR IN RCRD: CPT | Mod: CPTII,S$GLB,, | Performed by: PHYSICIAN ASSISTANT

## 2023-05-05 PROCEDURE — 1159F PR MEDICATION LIST DOCUMENTED IN MEDICAL RECORD: ICD-10-PCS | Mod: CPTII,S$GLB,, | Performed by: PHYSICIAN ASSISTANT

## 2023-05-05 PROCEDURE — 1126F AMNT PAIN NOTED NONE PRSNT: CPT | Mod: CPTII,S$GLB,, | Performed by: PHYSICIAN ASSISTANT

## 2023-05-05 PROCEDURE — 99215 OFFICE O/P EST HI 40 MIN: CPT | Mod: S$GLB,,, | Performed by: PHYSICIAN ASSISTANT

## 2023-05-05 RX ORDER — SODIUM CHLORIDE 0.9 % (FLUSH) 0.9 %
10 SYRINGE (ML) INJECTION
Status: DISCONTINUED | OUTPATIENT
Start: 2023-05-05 | End: 2023-05-05 | Stop reason: HOSPADM

## 2023-05-05 RX ORDER — EPINEPHRINE 0.3 MG/.3ML
0.3 INJECTION SUBCUTANEOUS ONCE AS NEEDED
Status: DISCONTINUED | OUTPATIENT
Start: 2023-05-05 | End: 2023-05-05 | Stop reason: HOSPADM

## 2023-05-05 RX ORDER — HEPARIN 100 UNIT/ML
500 SYRINGE INTRAVENOUS
Status: DISCONTINUED | OUTPATIENT
Start: 2023-05-05 | End: 2023-05-05 | Stop reason: HOSPADM

## 2023-05-05 RX ORDER — DIPHENHYDRAMINE HYDROCHLORIDE 50 MG/ML
50 INJECTION INTRAMUSCULAR; INTRAVENOUS ONCE AS NEEDED
Status: DISCONTINUED | OUTPATIENT
Start: 2023-05-05 | End: 2023-05-05 | Stop reason: HOSPADM

## 2023-05-05 RX ORDER — HEPARIN 100 UNIT/ML
500 SYRINGE INTRAVENOUS
Status: CANCELLED | OUTPATIENT
Start: 2023-05-05

## 2023-05-05 RX ORDER — EPINEPHRINE 0.3 MG/.3ML
0.3 INJECTION SUBCUTANEOUS ONCE AS NEEDED
Status: CANCELLED | OUTPATIENT
Start: 2023-05-05

## 2023-05-05 RX ORDER — SODIUM CHLORIDE 0.9 % (FLUSH) 0.9 %
10 SYRINGE (ML) INJECTION
Status: CANCELLED | OUTPATIENT
Start: 2023-05-05

## 2023-05-05 RX ORDER — DIPHENHYDRAMINE HYDROCHLORIDE 50 MG/ML
50 INJECTION INTRAMUSCULAR; INTRAVENOUS ONCE AS NEEDED
Status: CANCELLED | OUTPATIENT
Start: 2023-05-05

## 2023-05-05 RX ADMIN — SODIUM CHLORIDE 200 MG: 9 INJECTION, SOLUTION INTRAVENOUS at 11:05

## 2023-05-05 NOTE — PROGRESS NOTES
"                                                         PROGRESS NOTE    Subjective:       Patient ID: Zac Plunkett is a 77 y.o. male.    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 JD. TMB 6.3    Oncologic History copied from medical chart:   Prior patient of Dr Lucia's  2021  May              5/27- US right thigh: showed 2 hypoechoic masses in the subcutaneous tissues of the right anterior thigh. Largest measured 1.1 x 0.9 x 1 cm. Read at the time was concern for lymphadenopathy.   June 6/4 - CXR: "Bilateral reticular opacities are present in a perihilar distribution which appears slightly more pronounced compared to prior exams."               6/11 - underwent excisional biopsy of the right thigh; pathology came back positive for myxofibrosarcoma, high grade. Path a 1.8 x 1.1 x 0.9 cm mass with an adjacent 0.4 x 0.4 x 0.3 cm mass.   July/Aug              - IMRT with Dr. Mcadams  2022 January 1/12 - PET: locoregional recurrence and concerning 0.9 cm RUL lesion              1/20 - Path: excision on 2 recurrent lesions:  Myxofibrosarcoma, 1.9 cm at longest length, PD-L1 25%              1/31 - CT thigh: successful removal w/o evidence of recurrent disease  April 4/28 - recurrence, locally with metastatic deposit in LN. S/p excision on 4/28/22. Path: right thigh superior mass: Myxofibrosarcoma, high grade with focal tumor necrosis, incompletely excised. A portion of the lesion is suggestive of an involved lymph node exhibiting metastatic sarcoma with extranodal extension.  Sarcoma is focally present within inked but otherwise unspecified peripheral margins.   July 7/19 - CT thigh showed local recurrence and a 0.7 cm R inguinal lymph node    2023  Jacoby   1/3 - CT thigh/pelvis showed "Heterogeneous " "enhancing soft tissue lesions and nodular foci at the anterior thigh and right groin highly concerning for worsening local recurrent disease with talon metastasis"    - stable pulmonary nodules on CT chest  - completed palliative radiation to right thigh  March  3/24- started keytruda, s/p 2 cycles    Interval History:   Mr Plunkett returns for follow up. Tumor started shrinking before he started keytruda. Continues to shrink and he is s/p 2 cycles. Pain is better. Trying to take oxy every 8 instead of every 6 hours. He is doing so well. He is eating well and has a good appetite. His energy level has also improved. He is working in his yard and performing many projects around the house. No concerns or complaints today.     ECO. Presents alone today    ROS:   A ten-point system review is obtained and negative except for what was stated in the Interval History.     Physical Examination:   Vital signs reviewed.   General: well hydrated, well developed, in no acute distress  HEENT: normocephalic, EOMI, anicteric sclerae, oropharynx clear  Neck: supple, no JVD, thyromegaly, cervical or supraclavicular lymphadenopathy  Lungs: clear breath sounds bilaterally, no wheezing, rales, or rhonchi  Heart: RRR, no M/R/G  Abdomen: soft, no tenderness, non-distended. BS present  Extremities: b/l LE no edema  Skin: no rash, ulcer, or open wounds  Neuro: alert and oriented x 4, no focal neuro deficit  Psych: pleasant and appropriate mood and affect    Objective:     Laboratory Data:  Labs reviewed.     Imaging Data:  CT C/A/P 3/29/23:  Impression:     1. 0.8 x 2.8-cm (previously 3.0 x 3.9-cm) right inguinal mass (series 3, image 171). 2.9 x 5.6-cm (previously 2.4 x 5.0-cm) exophytic right inguinal mass.  2. Multiple pulmonary nodules throughout the bilateral lung fields that are predominantly all stable.  There are approximately 2 pulmonary nodules that were not definitively seen on prior exam which may be related to " technique.  3. Single enlarged precarinal space lymph node and prominent bilateral hilar lymph nodes that are not enlarged by radiologic size criteria, not significantly changed.  4. Approximately 3 punctate hepatic parenchymal hypodensities that are too small to accurately characterize.    CT right thigh 3/29/23:  Interval increase in size of subcutaneous soft tissue mass within the right superior thigh.     Centrilobular emphysema.       Assessment and Plan:     1. 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 Immunodeficiency secondary to neoplasm    4. Cancer-related pain    5. Essential hypertension    6. Fatigue, unspecified type        1-3  - Mr Plunkett is a 76 yo man with recurrent myxofibrosarcoma of the right thigh, has had 3 resections and IMRT  - he was seen by Dr. Mao and Dr. Unger, they did not feel that he would be a good surgical candidate and recommended palliative RT  - completed palliative RT completed 2/13/2023  - Previously discussed systemic treatment options, including chemotherapy and immunotherapy. He was not interested in chemotherapy including TKI. Pembrolizumab has efficacy in phase II studies and is recommended on NCCN Guidelines for patients with myxofibrosarcoma. PD-L1 is 25%. However, his TMB is not high. Pembrolizumab was denied by insurance and then denied at hearing. He got patient assistance through [employer]  - started palliative keytruda on 3/24/23, s/p 2 cycles  - doing very well. Pain better. Tolerating keytruda well  - c/w keytruda. Cycle 3 today    - return in 3 weeks for cycle 4    4.  - better  - c/w oxycodone prn    5.  - BP well controlled  - c/w current medication    6.   - TSH normal  - Will continue to monitor while receiving Keytruda    Follow-up:     In 3 weeks    Pte and family members displayed understanding of the above encounter and treatment plan. All thoughtful questions were answered to their satisfaction. Pte was advised to  notify the care team or proceed to the ER if signs and symptoms worsen.       ADEEL Hurley, PA-C  Physician Assistant Certified  Dept of Hematology/Oncology  PA-C to Dr. Martines, Dr. Nicolas and Dr. Sun     Route Chart for Scheduling    Med Onc Chart Routing      Follow up with physician 3 weeks. See Dr. Martines as scheduled in 3 weeks with lab work and cycle 4 of Keytruda   Follow up with NICOLLE 6 weeks. See NICOLLE in 6 weeks for cycle 5   Infusion scheduling note    Injection scheduling note    Labs CBC, CMP and TSH   Scheduling:  Preferred lab:  Lab interval: every 3 weeks     Imaging    Pharmacy appointment    Other referrals         Treatment Plan Information   OP PEMBROLIZUMAB 200MG Q3W   Ed Martines MD   Upcoming Treatment Dates - OP PEMBROLIZUMAB 200MG Q3W    5/26/2023       Chemotherapy       pembrolizumab (KEYTRUDA) 200 mg in sodium chloride 0.9% SolP 108 mL infusion  6/16/2023       Chemotherapy       pembrolizumab (KEYTRUDA) 200 mg in sodium chloride 0.9% SolP 108 mL infusion  7/7/2023       Chemotherapy       pembrolizumab (KEYTRUDA) 200 mg in sodium chloride 0.9% SolP 108 mL infusion  7/28/2023       Chemotherapy       pembrolizumab (KEYTRUDA) 200 mg in sodium chloride 0.9% SolP 108 mL infusion

## 2023-05-07 ENCOUNTER — OFFICE VISIT (OUTPATIENT)
Dept: URGENT CARE | Facility: CLINIC | Age: 78
End: 2023-05-07
Payer: MEDICARE

## 2023-05-07 VITALS
OXYGEN SATURATION: 96 % | HEART RATE: 63 BPM | HEIGHT: 71 IN | TEMPERATURE: 97 F | BODY MASS INDEX: 24.22 KG/M2 | RESPIRATION RATE: 20 BRPM | WEIGHT: 173 LBS | SYSTOLIC BLOOD PRESSURE: 113 MMHG | DIASTOLIC BLOOD PRESSURE: 65 MMHG
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
## 2023-05-07 DIAGNOSIS — S60.361A INSECT BITE OF RIGHT THUMB, INITIAL ENCOUNTER: Primary | ICD-10-CM

## 2023-05-07 PROCEDURE — 99213 PR OFFICE/OUTPT VISIT, EST, LEVL III, 20-29 MIN: ICD-10-PCS | Mod: S$GLB,,, | Performed by: PHYSICIAN ASSISTANT

## 2023-05-07 PROCEDURE — 99213 OFFICE O/P EST LOW 20 MIN: CPT | Mod: S$GLB,,, | Performed by: PHYSICIAN ASSISTANT

## 2023-05-07 RX ORDER — DOXYCYCLINE 100 MG/1
100 CAPSULE ORAL
Qty: 20 CAPSULE | Refills: 0 | Status: SHIPPED | OUTPATIENT
Start: 2023-05-07 | End: 2023-05-17

## 2023-05-07 RX ORDER — CALCIUM ACETATE/ALUMINUM SULF
1 TABLET, EFFERVESCENT TOPICAL 3 TIMES DAILY
Qty: 15 PACKET | Refills: 0 | Status: SHIPPED | OUTPATIENT
Start: 2023-05-07 | End: 2023-05-12

## 2023-05-07 NOTE — PROGRESS NOTES
"Subjective:      Patient ID: Zac Plunkett is a 77 y.o. male.    Vitals:  height is 5' 11" (1.803 m) and weight is 78.5 kg (173 lb). His oral temperature is 97.2 °F (36.2 °C). His blood pressure is 113/65 and his pulse is 63. His respiration is 20 and oxygen saturation is 96%.     Chief Complaint: Insect Bite    Pt complain of insect bite on right hand that happen 4 days ago. Pt did not take anything for relief.    Insect Bite  This is a new problem. Episode onset: 4 days ago. The problem has been gradually worsening. Associated symptoms include a rash. Pertinent negatives include no abdominal pain, anorexia, arthralgias, change in bowel habit, chest pain, chills, congestion, coughing, diaphoresis, fatigue, fever, headaches, joint swelling, myalgias, nausea, neck pain, numbness, sore throat, swollen glands, urinary symptoms, vertigo, visual change, vomiting or weakness. Nothing aggravates the symptoms. He has tried nothing for the symptoms. The treatment provided no relief.     Constitution: Negative for chills, sweating, fatigue and fever.   HENT:  Negative for congestion and sore throat.    Neck: Negative for neck pain.   Cardiovascular:  Negative for chest pain.   Respiratory:  Negative for cough.    Gastrointestinal:  Negative for abdominal pain, nausea and vomiting.   Musculoskeletal:  Negative for joint pain, joint swelling and muscle ache.   Skin:  Positive for rash. Negative for erythema.   Neurological:  Negative for history of vertigo, headaches and numbness.    Objective:     Physical Exam   Constitutional: He is oriented to person, place, and time. He appears well-developed.   HENT:   Head: Normocephalic and atraumatic. Head is without abrasion, without contusion and without laceration.   Ears:   Right Ear: External ear normal.   Left Ear: External ear normal.   Nose: Nose normal.   Mouth/Throat: Oropharynx is clear and moist and mucous membranes are normal.   Eyes: Conjunctivae, EOM and lids are " normal. Pupils are equal, round, and reactive to light.   Neck: Trachea normal and phonation normal. Neck supple.   Cardiovascular: Normal rate, regular rhythm and normal heart sounds.   Pulmonary/Chest: Effort normal and breath sounds normal. No stridor. No respiratory distress.   Musculoskeletal: Normal range of motion.         General: Normal range of motion.   Neurological: He is alert and oriented to person, place, and time.   Skin: Skin is warm, dry, intact and no rash. Capillary refill takes less than 2 seconds. No abrasion, No burn, No bruising, No erythema and No ecchymosis         Comments: Small area of erythema slightly raised over the right thumb metacarpophalangeal joint.  There is no tenderness and joint is not involved.   Psychiatric: His speech is normal and behavior is normal. Judgment and thought content normal.   Nursing note and vitals reviewed.    Assessment:     1. Insect bite of right thumb, initial encounter        Plan:       Insect bite of right thumb, initial encounter  -     calcium acetate-aluminum sulf 952-1,347 mg (DOMEBORO) 952-1,347 mg PwPk; Apply 1 packet topically 3 (three) times daily. for 5 days  Dispense: 15 packet; Refill: 0  -     doxycycline (VIBRAMYCIN) 100 MG Cap; Take 1 capsule (100 mg total) by mouth 2 times daily 2 hours after meal. for 10 days  Dispense: 20 capsule; Refill: 0      Follow up if symptoms worsen or fail to improve, for F/U with PCP or ED. There are no Patient Instructions on file for this visit.

## 2023-05-10 ENCOUNTER — TELEPHONE (OUTPATIENT)
Dept: URGENT CARE | Facility: CLINIC | Age: 78
End: 2023-05-10
Payer: MEDICARE

## 2023-05-10 NOTE — TELEPHONE ENCOUNTER
I spoke to the Pt, and his bite/lump is getting bigger. Pt is Still taking the medication that's prescribed.The pt do have cancer, and will contact his Doctor to see if the site cancer related.

## 2023-05-25 DIAGNOSIS — G89.3 NEOPLASM RELATED PAIN: ICD-10-CM

## 2023-05-25 RX ORDER — OXYCODONE HYDROCHLORIDE 10 MG/1
10 TABLET ORAL EVERY 6 HOURS PRN
Qty: 120 TABLET | Refills: 0 | Status: SHIPPED | OUTPATIENT
Start: 2023-05-25 | End: 2023-06-21 | Stop reason: SDUPTHER

## 2023-05-26 ENCOUNTER — OFFICE VISIT (OUTPATIENT)
Dept: HEMATOLOGY/ONCOLOGY | Facility: CLINIC | Age: 78
End: 2023-05-26
Payer: MEDICARE

## 2023-05-26 ENCOUNTER — INFUSION (OUTPATIENT)
Dept: INFUSION THERAPY | Facility: HOSPITAL | Age: 78
End: 2023-05-26
Payer: MEDICARE

## 2023-05-26 VITALS
WEIGHT: 173.31 LBS | OXYGEN SATURATION: 99 % | TEMPERATURE: 98 F | HEART RATE: 50 BPM | RESPIRATION RATE: 18 BRPM | HEIGHT: 71 IN | BODY MASS INDEX: 24.26 KG/M2 | DIASTOLIC BLOOD PRESSURE: 67 MMHG | SYSTOLIC BLOOD PRESSURE: 120 MMHG

## 2023-05-26 VITALS
RESPIRATION RATE: 16 BRPM | DIASTOLIC BLOOD PRESSURE: 65 MMHG | OXYGEN SATURATION: 98 % | TEMPERATURE: 98 F | SYSTOLIC BLOOD PRESSURE: 143 MMHG | HEART RATE: 55 BPM

## 2023-05-26 DIAGNOSIS — D49.9 IMMUNODEFICIENCY SECONDARY TO NEOPLASM: ICD-10-CM

## 2023-05-26 DIAGNOSIS — C49.9 PRIMARY MYXOFIBROSARCOMA: Primary | ICD-10-CM

## 2023-05-26 DIAGNOSIS — G89.3 CANCER-RELATED PAIN: ICD-10-CM

## 2023-05-26 DIAGNOSIS — D84.81 IMMUNODEFICIENCY SECONDARY TO NEOPLASM: ICD-10-CM

## 2023-05-26 DIAGNOSIS — I10 HYPERTENSION, BENIGN: Chronic | ICD-10-CM

## 2023-05-26 DIAGNOSIS — C77.4 SECONDARY MALIGNANCY OF INGUINAL LYMPH NODES: ICD-10-CM

## 2023-05-26 PROCEDURE — 25000003 PHARM REV CODE 250: Performed by: INTERNAL MEDICINE

## 2023-05-26 PROCEDURE — 63600175 PHARM REV CODE 636 W HCPCS: Mod: JZ,JG,RPL | Performed by: INTERNAL MEDICINE

## 2023-05-26 PROCEDURE — 3288F FALL RISK ASSESSMENT DOCD: CPT | Mod: CPTII,S$GLB,, | Performed by: INTERNAL MEDICINE

## 2023-05-26 PROCEDURE — 1159F MED LIST DOCD IN RCRD: CPT | Mod: CPTII,S$GLB,, | Performed by: INTERNAL MEDICINE

## 2023-05-26 PROCEDURE — 1125F PR PAIN SEVERITY QUANTIFIED, PAIN PRESENT: ICD-10-PCS | Mod: CPTII,S$GLB,, | Performed by: INTERNAL MEDICINE

## 2023-05-26 PROCEDURE — 1160F RVW MEDS BY RX/DR IN RCRD: CPT | Mod: CPTII,S$GLB,, | Performed by: INTERNAL MEDICINE

## 2023-05-26 PROCEDURE — 99999 PR PBB SHADOW E&M-EST. PATIENT-LVL IV: ICD-10-PCS | Mod: PBBFAC,,, | Performed by: INTERNAL MEDICINE

## 2023-05-26 PROCEDURE — 1125F AMNT PAIN NOTED PAIN PRSNT: CPT | Mod: CPTII,S$GLB,, | Performed by: INTERNAL MEDICINE

## 2023-05-26 PROCEDURE — 99215 OFFICE O/P EST HI 40 MIN: CPT | Mod: S$GLB,,, | Performed by: INTERNAL MEDICINE

## 2023-05-26 PROCEDURE — 1101F PT FALLS ASSESS-DOCD LE1/YR: CPT | Mod: CPTII,S$GLB,, | Performed by: INTERNAL MEDICINE

## 2023-05-26 PROCEDURE — 1101F PR PT FALLS ASSESS DOC 0-1 FALLS W/OUT INJ PAST YR: ICD-10-PCS | Mod: CPTII,S$GLB,, | Performed by: INTERNAL MEDICINE

## 2023-05-26 PROCEDURE — 96413 CHEMO IV INFUSION 1 HR: CPT

## 2023-05-26 PROCEDURE — 3288F PR FALLS RISK ASSESSMENT DOCUMENTED: ICD-10-PCS | Mod: CPTII,S$GLB,, | Performed by: INTERNAL MEDICINE

## 2023-05-26 PROCEDURE — 99999 PR PBB SHADOW E&M-EST. PATIENT-LVL IV: CPT | Mod: PBBFAC,,, | Performed by: INTERNAL MEDICINE

## 2023-05-26 PROCEDURE — 3074F SYST BP LT 130 MM HG: CPT | Mod: CPTII,S$GLB,, | Performed by: INTERNAL MEDICINE

## 2023-05-26 PROCEDURE — 3078F PR MOST RECENT DIASTOLIC BLOOD PRESSURE < 80 MM HG: ICD-10-PCS | Mod: CPTII,S$GLB,, | Performed by: INTERNAL MEDICINE

## 2023-05-26 PROCEDURE — 99215 PR OFFICE/OUTPT VISIT, EST, LEVL V, 40-54 MIN: ICD-10-PCS | Mod: S$GLB,,, | Performed by: INTERNAL MEDICINE

## 2023-05-26 PROCEDURE — 1160F PR REVIEW ALL MEDS BY PRESCRIBER/CLIN PHARMACIST DOCUMENTED: ICD-10-PCS | Mod: CPTII,S$GLB,, | Performed by: INTERNAL MEDICINE

## 2023-05-26 PROCEDURE — 3078F DIAST BP <80 MM HG: CPT | Mod: CPTII,S$GLB,, | Performed by: INTERNAL MEDICINE

## 2023-05-26 PROCEDURE — 3074F PR MOST RECENT SYSTOLIC BLOOD PRESSURE < 130 MM HG: ICD-10-PCS | Mod: CPTII,S$GLB,, | Performed by: INTERNAL MEDICINE

## 2023-05-26 PROCEDURE — 1159F PR MEDICATION LIST DOCUMENTED IN MEDICAL RECORD: ICD-10-PCS | Mod: CPTII,S$GLB,, | Performed by: INTERNAL MEDICINE

## 2023-05-26 RX ORDER — EPINEPHRINE 0.3 MG/.3ML
0.3 INJECTION SUBCUTANEOUS ONCE AS NEEDED
Status: CANCELLED | OUTPATIENT
Start: 2023-05-26

## 2023-05-26 RX ORDER — SODIUM CHLORIDE 0.9 % (FLUSH) 0.9 %
10 SYRINGE (ML) INJECTION
Status: CANCELLED | OUTPATIENT
Start: 2023-05-26

## 2023-05-26 RX ORDER — HEPARIN 100 UNIT/ML
500 SYRINGE INTRAVENOUS
Status: CANCELLED | OUTPATIENT
Start: 2023-05-26

## 2023-05-26 RX ORDER — DIPHENHYDRAMINE HYDROCHLORIDE 50 MG/ML
50 INJECTION INTRAMUSCULAR; INTRAVENOUS ONCE AS NEEDED
Status: DISCONTINUED | OUTPATIENT
Start: 2023-05-26 | End: 2023-05-26 | Stop reason: HOSPADM

## 2023-05-26 RX ORDER — EPINEPHRINE 0.3 MG/.3ML
0.3 INJECTION SUBCUTANEOUS ONCE AS NEEDED
Status: DISCONTINUED | OUTPATIENT
Start: 2023-05-26 | End: 2023-05-26 | Stop reason: HOSPADM

## 2023-05-26 RX ORDER — DIPHENHYDRAMINE HYDROCHLORIDE 50 MG/ML
50 INJECTION INTRAMUSCULAR; INTRAVENOUS ONCE AS NEEDED
Status: CANCELLED | OUTPATIENT
Start: 2023-05-26

## 2023-05-26 RX ADMIN — SODIUM CHLORIDE 200 MG: 9 INJECTION, SOLUTION INTRAVENOUS at 12:05

## 2023-05-26 RX ADMIN — SODIUM CHLORIDE: 9 INJECTION, SOLUTION INTRAVENOUS at 12:05

## 2023-05-26 NOTE — PROGRESS NOTES
"                                                         PROGRESS NOTE    Subjective:       Patient ID: Zac Plunkett is a 77 y.o. male.    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 JD. TMB 6.3    Oncologic History:  Prior patient of Dr Lucia's  2021  May              5/27- US right thigh: showed 2 hypoechoic masses in the subcutaneous tissues of the right anterior thigh. Largest measured 1.1 x 0.9 x 1 cm. Read at the time was concern for lymphadenopathy.   June 6/4 - CXR: "Bilateral reticular opacities are present in a perihilar distribution which appears slightly more pronounced compared to prior exams."               6/11 - underwent excisional biopsy of the right thigh; pathology came back positive for myxofibrosarcoma, high grade. Path a 1.8 x 1.1 x 0.9 cm mass with an adjacent 0.4 x 0.4 x 0.3 cm mass.   July/Aug              - IMRT with Dr. Mcadams  2022 January 1/12 - PET: locoregional recurrence and concerning 0.9 cm RUL lesion              1/20 - Path: excision on 2 recurrent lesions:  Myxofibrosarcoma, 1.9 cm at longest length, PD-L1 25%              1/31 - CT thigh: successful removal w/o evidence of recurrent disease  April 4/28 - recurrence, locally with metastatic deposit in LN. S/p excision on 4/28/22. Path: right thigh superior mass: Myxofibrosarcoma, high grade with focal tumor necrosis, incompletely excised. A portion of the lesion is suggestive of an involved lymph node exhibiting metastatic sarcoma with extranodal extension.  Sarcoma is focally present within inked but otherwise unspecified peripheral margins.   July 7/19 - CT thigh showed local recurrence and a 0.7 cm R inguinal lymph node    2023  [facility]   1/3 - CT thigh/pelvis showed "Heterogeneous enhancing soft tissue lesions " "and nodular foci at the anterior thigh and right groin highly concerning for worsening local recurrent disease with talon metastasis"    - stable pulmonary nodules on CT chest  - completed palliative radiation to right thigh  March  3/24- started keytruda, s/p 3 cycles    Interval History:   Mr Plunkett returns for follow up. Tumor is not growing. Pain stable.    ECO    ROS:   A ten-point system review is obtained and negative except for what was stated in the Interval History.     Physical Examination:   Vital signs reviewed.   General: well hydrated, well developed, in no acute distress  HEENT: normocephalic, PERRLA, EOMI, anicteric sclerae, oropharynx clear  Neck: supple, no JVD, thyromegaly, cervical or supraclavicular lymphadenopathy  Lungs: clear breath sounds bilaterally, no wheezing, rales, or rhonchi  Heart: RRR, no M/R/G  Abdomen: soft, no tenderness, non-distended, no hepatosplenomegaly, mass, or hernia. BS present  Extremities: b/l LE trace edema  Skin: no rash, ulcer, or open wounds  Neuro: alert and oriented x 4, no focal neuro deficit  Psych: pleasant and appropriate mood and affect    Objective:     Laboratory Data:  Labs reviewed. Adequate for treatment    Imaging Data:  CT C/A/P 3/29/23:  Impression:     1. 0.8 x 2.8-cm (previously 3.0 x 3.9-cm) right inguinal mass (series 3, image 171). 2.9 x 5.6-cm (previously 2.4 x 5.0-cm) exophytic right inguinal mass.  2. Multiple pulmonary nodules throughout the bilateral lung fields that are predominantly all stable.  There are approximately 2 pulmonary nodules that were not definitively seen on prior exam which may be related to technique.  3. Single enlarged precarinal space lymph node and prominent bilateral hilar lymph nodes that are not enlarged by radiologic size criteria, not significantly changed.  4. Approximately 3 punctate hepatic parenchymal hypodensities that are too small to accurately characterize.    CT right thigh " 3/29/23:  Interval increase in size of subcutaneous soft tissue mass within the right superior thigh.     Centrilobular emphysema.       Assessment and Plan:     1. 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 Immunodeficiency secondary to neoplasm    4. Hypertension, benign    5. Cancer-related pain        1-3  - Mr Plunkett is a 76 yo man with recurrent myxofibrosarcoma of the right thigh, has had 3 resections and IMRT  - he was seen by Dr. Mao and Dr. Unger, they did not feel that he would be a good surgical candidate and recommended palliative RT  - completed palliative RT completed 2/13/2023  - Previously discussed systemic treatment options, including chemotherapy and immunotherapy. He was not interested in chemotherapy including TKI. Pembrolizumab has efficacy in phase II studies and is recommended on NCCN Guidelines for patients with myxofibrosarcoma. PD-L1 is 25%. However, his TMB is not high. Pembrolizumab was denied by insurance and then denied at hearing. He got patient assistance through [employer]  - started palliative keytruda on 3/24/23, s/p 3 cycles  - doing well. Cycle 4 keytruda today  - return in 3 weeks with restaging scan at [facility] prior    4.  - BP controlled  - c/w current medication    5.  - stable  - refilled oxycodone      Follow-up:     In 3 weeks    Ed Martines MD  Hematology and Medical Oncology  [facility]      Route Chart for Scheduling    Med Onc Chart Routing      Follow up with physician 3 weeks. get CBC, CMP, TSH, CT right thigh, CT C/A/P at [facility] on 6/15, see me on 6/16 then get keytruda   Follow up with NICOLLE    Infusion scheduling note keytruda every 3 weeks   Injection scheduling note    Labs CBC, CMP and TSH   Scheduling:  Preferred lab:  Lab interval: every 3 weeks     Imaging CT chest abdomen pelvis and other   CT right thigh, CT C/A/P at [facility] on 6/15   Pharmacy appointment    Other referrals       Treatment Plan  Information   OP PEMBROLIZUMAB 200MG Q3W   Ed Martines MD   Upcoming Treatment Dates - OP PEMBROLIZUMAB 200MG Q3W    5/26/2023       Chemotherapy       pembrolizumab (KEYTRUDA) 200 mg in sodium chloride 0.9% SolP 108 mL infusion  6/16/2023       Chemotherapy       pembrolizumab (KEYTRUDA) 200 mg in sodium chloride 0.9% SolP 108 mL infusion  7/7/2023       Chemotherapy       pembrolizumab (KEYTRUDA) 200 mg in sodium chloride 0.9% SolP 108 mL infusion  7/28/2023       Chemotherapy       pembrolizumab (KEYTRUDA) 200 mg in sodium chloride 0.9% SolP 108 mL infusion

## 2023-05-26 NOTE — PLAN OF CARE
Pt here for Keytruda infusion. Assessment complete and labs reviewed. VSS. PIV initiated to right forearm. Pt tolerated infusion well; no reaction suspected. PIV removed prior to d/c. No questions or concerns. Pt ambulated out of unit using cane.

## 2023-06-16 ENCOUNTER — INFUSION (OUTPATIENT)
Dept: INFUSION THERAPY | Facility: HOSPITAL | Age: 78
End: 2023-06-16
Attending: INTERNAL MEDICINE
Payer: MEDICARE

## 2023-06-16 ENCOUNTER — OFFICE VISIT (OUTPATIENT)
Dept: HEMATOLOGY/ONCOLOGY | Facility: CLINIC | Age: 78
End: 2023-06-16
Payer: MEDICARE

## 2023-06-16 VITALS
RESPIRATION RATE: 18 BRPM | HEART RATE: 60 BPM | DIASTOLIC BLOOD PRESSURE: 75 MMHG | BODY MASS INDEX: 23.81 KG/M2 | HEIGHT: 71 IN | TEMPERATURE: 97 F | SYSTOLIC BLOOD PRESSURE: 156 MMHG | WEIGHT: 170.06 LBS

## 2023-06-16 VITALS
WEIGHT: 169.63 LBS | BODY MASS INDEX: 23.75 KG/M2 | HEART RATE: 55 BPM | SYSTOLIC BLOOD PRESSURE: 149 MMHG | DIASTOLIC BLOOD PRESSURE: 67 MMHG | OXYGEN SATURATION: 100 % | TEMPERATURE: 98 F | HEIGHT: 71 IN | RESPIRATION RATE: 18 BRPM

## 2023-06-16 DIAGNOSIS — C49.9 PRIMARY MYXOFIBROSARCOMA: Primary | ICD-10-CM

## 2023-06-16 DIAGNOSIS — D49.9 IMMUNODEFICIENCY SECONDARY TO NEOPLASM: ICD-10-CM

## 2023-06-16 DIAGNOSIS — D84.81 IMMUNODEFICIENCY SECONDARY TO NEOPLASM: ICD-10-CM

## 2023-06-16 DIAGNOSIS — C77.4 SECONDARY MALIGNANCY OF INGUINAL LYMPH NODES: ICD-10-CM

## 2023-06-16 DIAGNOSIS — G89.3 CANCER RELATED PAIN: ICD-10-CM

## 2023-06-16 DIAGNOSIS — R91.8 LUNG NODULES: ICD-10-CM

## 2023-06-16 DIAGNOSIS — C49.9 SOFT TISSUE SARCOMA: ICD-10-CM

## 2023-06-16 DIAGNOSIS — I10 ESSENTIAL HYPERTENSION: ICD-10-CM

## 2023-06-16 PROCEDURE — 3077F SYST BP >= 140 MM HG: CPT | Mod: CPTII,S$GLB,, | Performed by: INTERNAL MEDICINE

## 2023-06-16 PROCEDURE — 99999 PR PBB SHADOW E&M-EST. PATIENT-LVL IV: CPT | Mod: PBBFAC,,, | Performed by: INTERNAL MEDICINE

## 2023-06-16 PROCEDURE — 1159F PR MEDICATION LIST DOCUMENTED IN MEDICAL RECORD: ICD-10-PCS | Mod: CPTII,S$GLB,, | Performed by: INTERNAL MEDICINE

## 2023-06-16 PROCEDURE — 1160F RVW MEDS BY RX/DR IN RCRD: CPT | Mod: CPTII,S$GLB,, | Performed by: INTERNAL MEDICINE

## 2023-06-16 PROCEDURE — 63600175 PHARM REV CODE 636 W HCPCS: Mod: JZ,JG | Performed by: INTERNAL MEDICINE

## 2023-06-16 PROCEDURE — 99215 PR OFFICE/OUTPT VISIT, EST, LEVL V, 40-54 MIN: ICD-10-PCS | Mod: S$GLB,,, | Performed by: INTERNAL MEDICINE

## 2023-06-16 PROCEDURE — 99999 PR PBB SHADOW E&M-EST. PATIENT-LVL IV: ICD-10-PCS | Mod: PBBFAC,,, | Performed by: INTERNAL MEDICINE

## 2023-06-16 PROCEDURE — 1160F PR REVIEW ALL MEDS BY PRESCRIBER/CLIN PHARMACIST DOCUMENTED: ICD-10-PCS | Mod: CPTII,S$GLB,, | Performed by: INTERNAL MEDICINE

## 2023-06-16 PROCEDURE — 3078F PR MOST RECENT DIASTOLIC BLOOD PRESSURE < 80 MM HG: ICD-10-PCS | Mod: CPTII,S$GLB,, | Performed by: INTERNAL MEDICINE

## 2023-06-16 PROCEDURE — 3078F DIAST BP <80 MM HG: CPT | Mod: CPTII,S$GLB,, | Performed by: INTERNAL MEDICINE

## 2023-06-16 PROCEDURE — 25000003 PHARM REV CODE 250: Performed by: INTERNAL MEDICINE

## 2023-06-16 PROCEDURE — 3077F PR MOST RECENT SYSTOLIC BLOOD PRESSURE >= 140 MM HG: ICD-10-PCS | Mod: CPTII,S$GLB,, | Performed by: INTERNAL MEDICINE

## 2023-06-16 PROCEDURE — 1101F PR PT FALLS ASSESS DOC 0-1 FALLS W/OUT INJ PAST YR: ICD-10-PCS | Mod: CPTII,S$GLB,, | Performed by: INTERNAL MEDICINE

## 2023-06-16 PROCEDURE — 1159F MED LIST DOCD IN RCRD: CPT | Mod: CPTII,S$GLB,, | Performed by: INTERNAL MEDICINE

## 2023-06-16 PROCEDURE — 99215 OFFICE O/P EST HI 40 MIN: CPT | Mod: S$GLB,,, | Performed by: INTERNAL MEDICINE

## 2023-06-16 PROCEDURE — 1101F PT FALLS ASSESS-DOCD LE1/YR: CPT | Mod: CPTII,S$GLB,, | Performed by: INTERNAL MEDICINE

## 2023-06-16 PROCEDURE — 3288F FALL RISK ASSESSMENT DOCD: CPT | Mod: CPTII,S$GLB,, | Performed by: INTERNAL MEDICINE

## 2023-06-16 PROCEDURE — 96413 CHEMO IV INFUSION 1 HR: CPT

## 2023-06-16 PROCEDURE — 3288F PR FALLS RISK ASSESSMENT DOCUMENTED: ICD-10-PCS | Mod: CPTII,S$GLB,, | Performed by: INTERNAL MEDICINE

## 2023-06-16 PROCEDURE — 1126F PR PAIN SEVERITY QUANTIFIED, NO PAIN PRESENT: ICD-10-PCS | Mod: CPTII,S$GLB,, | Performed by: INTERNAL MEDICINE

## 2023-06-16 PROCEDURE — 1126F AMNT PAIN NOTED NONE PRSNT: CPT | Mod: CPTII,S$GLB,, | Performed by: INTERNAL MEDICINE

## 2023-06-16 RX ORDER — SODIUM CHLORIDE 0.9 % (FLUSH) 0.9 %
10 SYRINGE (ML) INJECTION
Status: CANCELLED | OUTPATIENT
Start: 2023-06-16

## 2023-06-16 RX ORDER — SODIUM CHLORIDE 0.9 % (FLUSH) 0.9 %
10 SYRINGE (ML) INJECTION
Status: DISCONTINUED | OUTPATIENT
Start: 2023-06-16 | End: 2023-06-16 | Stop reason: HOSPADM

## 2023-06-16 RX ORDER — EPINEPHRINE 0.3 MG/.3ML
0.3 INJECTION SUBCUTANEOUS ONCE AS NEEDED
Status: CANCELLED | OUTPATIENT
Start: 2023-06-16

## 2023-06-16 RX ORDER — DIPHENHYDRAMINE HYDROCHLORIDE 50 MG/ML
50 INJECTION INTRAMUSCULAR; INTRAVENOUS ONCE AS NEEDED
Status: DISCONTINUED | OUTPATIENT
Start: 2023-06-16 | End: 2023-06-16 | Stop reason: HOSPADM

## 2023-06-16 RX ORDER — HEPARIN 100 UNIT/ML
500 SYRINGE INTRAVENOUS
Status: DISCONTINUED | OUTPATIENT
Start: 2023-06-16 | End: 2023-06-16 | Stop reason: HOSPADM

## 2023-06-16 RX ORDER — DIPHENHYDRAMINE HYDROCHLORIDE 50 MG/ML
50 INJECTION INTRAMUSCULAR; INTRAVENOUS ONCE AS NEEDED
Status: CANCELLED | OUTPATIENT
Start: 2023-06-16

## 2023-06-16 RX ORDER — EPINEPHRINE 0.3 MG/.3ML
0.3 INJECTION SUBCUTANEOUS ONCE AS NEEDED
Status: DISCONTINUED | OUTPATIENT
Start: 2023-06-16 | End: 2023-06-16 | Stop reason: HOSPADM

## 2023-06-16 RX ORDER — HEPARIN 100 UNIT/ML
500 SYRINGE INTRAVENOUS
Status: CANCELLED | OUTPATIENT
Start: 2023-06-16

## 2023-06-16 RX ADMIN — SODIUM CHLORIDE 200 MG: 9 INJECTION, SOLUTION INTRAVENOUS at 10:06

## 2023-06-16 RX ADMIN — SODIUM CHLORIDE: 9 INJECTION, SOLUTION INTRAVENOUS at 10:06

## 2023-06-16 NOTE — PROGRESS NOTES
"                                                         PROGRESS NOTE    Subjective:       Patient ID: Zac Plunkett is a 77 y.o. male.    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 JD. TMB 6.3    Oncologic History:  Prior patient of Dr Lucia's  2021  May              5/27- US right thigh: showed 2 hypoechoic masses in the subcutaneous tissues of the right anterior thigh. Largest measured 1.1 x 0.9 x 1 cm. Read at the time was concern for lymphadenopathy.   June 6/4 - CXR: "Bilateral reticular opacities are present in a perihilar distribution which appears slightly more pronounced compared to prior exams."               6/11 - underwent excisional biopsy of the right thigh; pathology came back positive for myxofibrosarcoma, high grade. Path a 1.8 x 1.1 x 0.9 cm mass with an adjacent 0.4 x 0.4 x 0.3 cm mass.   July/Aug              - IMRT with Dr. Mcadams  2022 January 1/12 - PET: locoregional recurrence and concerning 0.9 cm RUL lesion              1/20 - Path: excision on 2 recurrent lesions:  Myxofibrosarcoma, 1.9 cm at longest length, PD-L1 25%              1/31 - CT thigh: successful removal w/o evidence of recurrent disease  April 4/28 - recurrence, locally with metastatic deposit in LN. S/p excision on 4/28/22. Path: right thigh superior mass: Myxofibrosarcoma, high grade with focal tumor necrosis, incompletely excised. A portion of the lesion is suggestive of an involved lymph node exhibiting metastatic sarcoma with extranodal extension.  Sarcoma is focally present within inked but otherwise unspecified peripheral margins.   July 7/19 - CT thigh showed local recurrence and a 0.7 cm R inguinal lymph node    2023  [facility]   1/3 - CT thigh/pelvis showed "Heterogeneous enhancing soft tissue lesions " "and nodular foci at the anterior thigh and right groin highly concerning for worsening local recurrent disease with talon metastasis"    - stable pulmonary nodules on CT chest  - completed palliative radiation to right thigh  March  3/24- started keytruda, s/p 4 cycles    Interval History:   Mr Plunkett returns for follow up. Tumor is shrinking. Right thigh wound is almost healed. Tolerated keytruda well.     ECO    ROS:   A ten-point system review is obtained and negative except for what was stated in the Interval History.     Physical Examination:   Vital signs reviewed.   General: well hydrated, well developed, in no acute distress  HEENT: normocephalic, PERRLA, EOMI, anicteric sclerae  Neck: supple, no JVD, thyromegaly, cervical or supraclavicular lymphadenopathy  Lungs: clear breath sounds bilaterally, no wheezing, rales, or rhonchi  Heart: RRR, no M/R/G  Abdomen: soft, no tenderness, non-distended, no hepatosplenomegaly, mass, or hernia. BS present  Extremities: no edema in b/l LE. Right upper thigh wound very superficial now  Skin: no rash, ulcer, or open wounds  Neuro: alert and oriented x 4, no focal neuro deficit  Psych: pleasant and appropriate mood and affect    Objective:     Laboratory Data:  Labs reviewed. Adequate for treatment    Imaging Data:  CT C/A/P 3/29/23:  Impression:     1. 0.8 x 2.8-cm (previously 3.0 x 3.9-cm) right inguinal mass (series 3, image 171). 2.9 x 5.6-cm (previously 2.4 x 5.0-cm) exophytic right inguinal mass.  2. Multiple pulmonary nodules throughout the bilateral lung fields that are predominantly all stable.  There are approximately 2 pulmonary nodules that were not definitively seen on prior exam which may be related to technique.  3. Single enlarged precarinal space lymph node and prominent bilateral hilar lymph nodes that are not enlarged by radiologic size criteria, not significantly changed.  4. Approximately 3 punctate hepatic parenchymal " hypodensities that are too small to accurately characterize.    CT right thigh 3/29/23:  Interval increase in size of subcutaneous soft tissue mass within the right superior thigh.     Centrilobular emphysema.     CT right thigh, chest/abdomen/pelvis 6/15/23:  Impression:     1. 0.9 x 2.0-cm (previously 1.9 x 2.9-cm) right inguinal mass.  0.9 x 3.0-cm (previously 2.8 x 5.7-cm) exophytic right inguinal mass.  2. Multiple pulmonary nodules throughout the bilateral lung fields with 3 that of appear to resolved in the interim and remainder of which are predominantly stable in size, density and distribution.  No appreciable new or enlarging pulmonary nodules.  3. Mediastinal and bilateral hilar lymphadenopathy, not significantly changed.  4. Only 1 of the previously noted 3 punctate hepatic parenchymal hypodensities is appreciated on current exam which is still too small to accurately characterize.  This report was flagged in Epic as abnormal.  Assessment and Plan:     1. 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 Soft tissue sarcoma    4. Immunodeficiency secondary to neoplasm    5. Lung nodules    6. Essential hypertension    7. Cancer related pain        1-4  - Mr Plunkett is a 76 yo man with recurrent myxofibrosarcoma of the right thigh, has had 3 resections and IMRT  - he was seen by Dr. Mao and Dr. Unger, they did not feel that he would be a good surgical candidate and recommended palliative RT  - completed palliative RT completed 2/13/2023  - Previously discussed systemic treatment options, including chemotherapy and immunotherapy. He was not interested in chemotherapy including TKI. Pembrolizumab has efficacy in phase II studies and is recommended on NCCN Guidelines for patients with myxofibrosarcoma. PD-L1 is 25%. However, his TMB is not high. Pembrolizumab was denied by insurance and then denied at hearing. He got patient assistance through [employer]  - started palliative  keytruda on 3/24/23, s/p 4 cycles  - reviewed scan results. Cancers continue to shrink  - c/w keytruda every 3 weeks. Cycle 5 today  - return in 3 weeks for cycle 6.     5.  - unlikely to be met. They did not grow when his R inguinal sarcoma was growing rapidly  - monitor    6.  - BP controlled  - c/w current medication    5.  - stable  - c/w oxycodone prn    Follow-up:     In 3 weeks    Ed Martines MD  Hematology and Medical Oncology  [facility]      Route Chart for Scheduling    Med Onc Chart Routing      Follow up with physician 3 weeks and 6 weeks. see me in 3 and 6 weeks with labs then keytruda   Follow up with NICOLLE    Infusion scheduling note   keytruda every 3 weeks   Injection scheduling note    Labs CBC, CMP and TSH   Scheduling: Labs same day as infusion  Preferred lab:  Lab interval:     Imaging    Pharmacy appointment    Other referrals        Treatment Plan Information   OP PEMBROLIZUMAB 200MG Q3W   Ed Martines MD   Upcoming Treatment Dates - OP PEMBROLIZUMAB 200MG Q3W    6/16/2023       Chemotherapy       pembrolizumab (KEYTRUDA) 200 mg in sodium chloride 0.9% SolP 108 mL infusion  7/7/2023       Chemotherapy       pembrolizumab (KEYTRUDA) 200 mg in sodium chloride 0.9% SolP 108 mL infusion  7/28/2023       Chemotherapy       pembrolizumab (KEYTRUDA) 200 mg in sodium chloride 0.9% SolP 108 mL infusion  8/18/2023       Chemotherapy       pembrolizumab (KEYTRUDA) 200 mg in sodium chloride 0.9% SolP 108 mL infusion

## 2023-06-16 NOTE — PLAN OF CARE
Problem: Adult Inpatient Plan of Care  Goal: Plan of Care Review  Outcome: Ongoing, Progressing   Patient tolerated infusion well today. NAD noted. Discharged home and ambulates independently

## 2023-06-21 DIAGNOSIS — G89.3 NEOPLASM RELATED PAIN: ICD-10-CM

## 2023-06-22 RX ORDER — OXYCODONE HYDROCHLORIDE 10 MG/1
10 TABLET ORAL EVERY 6 HOURS PRN
Qty: 120 TABLET | Refills: 0 | Status: SHIPPED | OUTPATIENT
Start: 2023-06-22 | End: 2023-07-23 | Stop reason: SDUPTHER

## 2023-07-07 ENCOUNTER — INFUSION (OUTPATIENT)
Dept: INFUSION THERAPY | Facility: HOSPITAL | Age: 78
End: 2023-07-07
Attending: INTERNAL MEDICINE
Payer: MEDICARE

## 2023-07-07 ENCOUNTER — OFFICE VISIT (OUTPATIENT)
Dept: HEMATOLOGY/ONCOLOGY | Facility: CLINIC | Age: 78
End: 2023-07-07
Payer: MEDICARE

## 2023-07-07 VITALS
RESPIRATION RATE: 18 BRPM | SYSTOLIC BLOOD PRESSURE: 122 MMHG | TEMPERATURE: 98 F | OXYGEN SATURATION: 99 % | HEART RATE: 76 BPM | DIASTOLIC BLOOD PRESSURE: 62 MMHG

## 2023-07-07 VITALS
SYSTOLIC BLOOD PRESSURE: 140 MMHG | HEART RATE: 57 BPM | OXYGEN SATURATION: 99 % | WEIGHT: 167.88 LBS | HEIGHT: 71 IN | BODY MASS INDEX: 23.5 KG/M2 | DIASTOLIC BLOOD PRESSURE: 88 MMHG | TEMPERATURE: 98 F | RESPIRATION RATE: 18 BRPM

## 2023-07-07 DIAGNOSIS — D49.9 IMMUNODEFICIENCY SECONDARY TO NEOPLASM: ICD-10-CM

## 2023-07-07 DIAGNOSIS — D84.81 IMMUNODEFICIENCY SECONDARY TO NEOPLASM: ICD-10-CM

## 2023-07-07 DIAGNOSIS — C49.9 PRIMARY MYXOFIBROSARCOMA: Primary | ICD-10-CM

## 2023-07-07 DIAGNOSIS — D84.821 IMMUNODEFICIENCY DUE TO DRUGS: ICD-10-CM

## 2023-07-07 DIAGNOSIS — R91.8 LUNG NODULES: ICD-10-CM

## 2023-07-07 DIAGNOSIS — Z79.899 IMMUNODEFICIENCY DUE TO DRUGS: ICD-10-CM

## 2023-07-07 DIAGNOSIS — G89.3 CANCER-RELATED PAIN: ICD-10-CM

## 2023-07-07 DIAGNOSIS — C49.9 SOFT TISSUE SARCOMA: ICD-10-CM

## 2023-07-07 DIAGNOSIS — C77.4 SECONDARY MALIGNANCY OF INGUINAL LYMPH NODES: ICD-10-CM

## 2023-07-07 DIAGNOSIS — I10 HYPERTENSION, BENIGN: Chronic | ICD-10-CM

## 2023-07-07 PROCEDURE — 3077F SYST BP >= 140 MM HG: CPT | Mod: HCNC,CPTII,S$GLB, | Performed by: INTERNAL MEDICINE

## 2023-07-07 PROCEDURE — 3079F PR MOST RECENT DIASTOLIC BLOOD PRESSURE 80-89 MM HG: ICD-10-PCS | Mod: HCNC,CPTII,S$GLB, | Performed by: INTERNAL MEDICINE

## 2023-07-07 PROCEDURE — 99999 PR PBB SHADOW E&M-EST. PATIENT-LVL IV: ICD-10-PCS | Mod: PBBFAC,HCNC,, | Performed by: INTERNAL MEDICINE

## 2023-07-07 PROCEDURE — 1101F PT FALLS ASSESS-DOCD LE1/YR: CPT | Mod: HCNC,CPTII,S$GLB, | Performed by: INTERNAL MEDICINE

## 2023-07-07 PROCEDURE — 99999 PR PBB SHADOW E&M-EST. PATIENT-LVL IV: CPT | Mod: PBBFAC,HCNC,, | Performed by: INTERNAL MEDICINE

## 2023-07-07 PROCEDURE — 99215 PR OFFICE/OUTPT VISIT, EST, LEVL V, 40-54 MIN: ICD-10-PCS | Mod: HCNC,S$GLB,, | Performed by: INTERNAL MEDICINE

## 2023-07-07 PROCEDURE — 96413 CHEMO IV INFUSION 1 HR: CPT | Mod: HCNC

## 2023-07-07 PROCEDURE — 1101F PR PT FALLS ASSESS DOC 0-1 FALLS W/OUT INJ PAST YR: ICD-10-PCS | Mod: HCNC,CPTII,S$GLB, | Performed by: INTERNAL MEDICINE

## 2023-07-07 PROCEDURE — 3077F PR MOST RECENT SYSTOLIC BLOOD PRESSURE >= 140 MM HG: ICD-10-PCS | Mod: HCNC,CPTII,S$GLB, | Performed by: INTERNAL MEDICINE

## 2023-07-07 PROCEDURE — 3079F DIAST BP 80-89 MM HG: CPT | Mod: HCNC,CPTII,S$GLB, | Performed by: INTERNAL MEDICINE

## 2023-07-07 PROCEDURE — 3288F PR FALLS RISK ASSESSMENT DOCUMENTED: ICD-10-PCS | Mod: HCNC,CPTII,S$GLB, | Performed by: INTERNAL MEDICINE

## 2023-07-07 PROCEDURE — 1126F AMNT PAIN NOTED NONE PRSNT: CPT | Mod: HCNC,CPTII,S$GLB, | Performed by: INTERNAL MEDICINE

## 2023-07-07 PROCEDURE — 1160F RVW MEDS BY RX/DR IN RCRD: CPT | Mod: HCNC,CPTII,S$GLB, | Performed by: INTERNAL MEDICINE

## 2023-07-07 PROCEDURE — 99215 OFFICE O/P EST HI 40 MIN: CPT | Mod: HCNC,S$GLB,, | Performed by: INTERNAL MEDICINE

## 2023-07-07 PROCEDURE — 1159F PR MEDICATION LIST DOCUMENTED IN MEDICAL RECORD: ICD-10-PCS | Mod: HCNC,CPTII,S$GLB, | Performed by: INTERNAL MEDICINE

## 2023-07-07 PROCEDURE — 1160F PR REVIEW ALL MEDS BY PRESCRIBER/CLIN PHARMACIST DOCUMENTED: ICD-10-PCS | Mod: HCNC,CPTII,S$GLB, | Performed by: INTERNAL MEDICINE

## 2023-07-07 PROCEDURE — 25000003 PHARM REV CODE 250: Mod: HCNC | Performed by: INTERNAL MEDICINE

## 2023-07-07 PROCEDURE — 1159F MED LIST DOCD IN RCRD: CPT | Mod: HCNC,CPTII,S$GLB, | Performed by: INTERNAL MEDICINE

## 2023-07-07 PROCEDURE — 63600175 PHARM REV CODE 636 W HCPCS: Mod: JZ,JG,HCNC,RPL | Performed by: INTERNAL MEDICINE

## 2023-07-07 PROCEDURE — 3288F FALL RISK ASSESSMENT DOCD: CPT | Mod: HCNC,CPTII,S$GLB, | Performed by: INTERNAL MEDICINE

## 2023-07-07 PROCEDURE — 1126F PR PAIN SEVERITY QUANTIFIED, NO PAIN PRESENT: ICD-10-PCS | Mod: HCNC,CPTII,S$GLB, | Performed by: INTERNAL MEDICINE

## 2023-07-07 RX ORDER — HEPARIN 100 UNIT/ML
500 SYRINGE INTRAVENOUS
Status: CANCELLED | OUTPATIENT
Start: 2023-07-07

## 2023-07-07 RX ORDER — EPINEPHRINE 0.3 MG/.3ML
0.3 INJECTION SUBCUTANEOUS ONCE AS NEEDED
Status: CANCELLED | OUTPATIENT
Start: 2023-07-07

## 2023-07-07 RX ORDER — SODIUM CHLORIDE 0.9 % (FLUSH) 0.9 %
10 SYRINGE (ML) INJECTION
Status: DISCONTINUED | OUTPATIENT
Start: 2023-07-07 | End: 2023-07-07 | Stop reason: HOSPADM

## 2023-07-07 RX ORDER — DIPHENHYDRAMINE HYDROCHLORIDE 50 MG/ML
50 INJECTION INTRAMUSCULAR; INTRAVENOUS ONCE AS NEEDED
Status: CANCELLED | OUTPATIENT
Start: 2023-07-07

## 2023-07-07 RX ORDER — SODIUM CHLORIDE 0.9 % (FLUSH) 0.9 %
10 SYRINGE (ML) INJECTION
Status: CANCELLED | OUTPATIENT
Start: 2023-07-07

## 2023-07-07 RX ORDER — HEPARIN 100 UNIT/ML
500 SYRINGE INTRAVENOUS
Status: DISCONTINUED | OUTPATIENT
Start: 2023-07-07 | End: 2023-07-07 | Stop reason: HOSPADM

## 2023-07-07 RX ORDER — DIPHENHYDRAMINE HYDROCHLORIDE 50 MG/ML
50 INJECTION INTRAMUSCULAR; INTRAVENOUS ONCE AS NEEDED
Status: DISCONTINUED | OUTPATIENT
Start: 2023-07-07 | End: 2023-07-07 | Stop reason: HOSPADM

## 2023-07-07 RX ORDER — EPINEPHRINE 0.3 MG/.3ML
0.3 INJECTION SUBCUTANEOUS ONCE AS NEEDED
Status: DISCONTINUED | OUTPATIENT
Start: 2023-07-07 | End: 2023-07-07 | Stop reason: HOSPADM

## 2023-07-07 RX ADMIN — SODIUM CHLORIDE 200 MG: 9 INJECTION, SOLUTION INTRAVENOUS at 09:07

## 2023-07-07 RX ADMIN — SODIUM CHLORIDE: 9 INJECTION, SOLUTION INTRAVENOUS at 09:07

## 2023-07-07 NOTE — PLAN OF CARE
0920 Patient here for C6 Keytruda. Labs, meds and hx reviewed. Patient seen this am by Dr. Martines and orders signed for treatment today. PIV inserted and NS started at 25ml/hr. Coffee, snack and blanket provided.

## 2023-07-07 NOTE — PLAN OF CARE
1025 Patient tolerated keytruda with no s/s of reaction and no complaints. VSS after treatment. PIV removed and catheter tip intact. Instructed to call MD office for any concerns. He declined the AVS and ambulated out.

## 2023-07-07 NOTE — PROGRESS NOTES
"                                                         PROGRESS NOTE    Subjective:       Patient ID: Zac Plunkett is a 78 y.o. male.    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 JD. TMB 6.3    Oncologic History:  Prior patient of Dr Lucia's  2021  May              5/27- US right thigh: showed 2 hypoechoic masses in the subcutaneous tissues of the right anterior thigh. Largest measured 1.1 x 0.9 x 1 cm. Read at the time was concern for lymphadenopathy.   June 6/4 - CXR: "Bilateral reticular opacities are present in a perihilar distribution which appears slightly more pronounced compared to prior exams."               6/11 - underwent excisional biopsy of the right thigh; pathology came back positive for myxofibrosarcoma, high grade. Path a 1.8 x 1.1 x 0.9 cm mass with an adjacent 0.4 x 0.4 x 0.3 cm mass.   July/Aug              - IMRT with Dr. Mcadams  2022 January 1/12 - PET: locoregional recurrence and concerning 0.9 cm RUL lesion              1/20 - Path: excision on 2 recurrent lesions:  Myxofibrosarcoma, 1.9 cm at longest length, PD-L1 25%              1/31 - CT thigh: successful removal w/o evidence of recurrent disease  April 4/28 - recurrence, locally with metastatic deposit in LN. S/p excision on 4/28/22. Path: right thigh superior mass: Myxofibrosarcoma, high grade with focal tumor necrosis, incompletely excised. A portion of the lesion is suggestive of an involved lymph node exhibiting metastatic sarcoma with extranodal extension.  Sarcoma is focally present within inked but otherwise unspecified peripheral margins.   July 7/19 - CT thigh showed local recurrence and a 0.7 cm R inguinal lymph node    2023  [facility]   1/3 - CT thigh/pelvis showed "Heterogeneous enhancing soft tissue lesions " "and nodular foci at the anterior thigh and right groin highly concerning for worsening local recurrent disease with talon metastasis"    - stable pulmonary nodules on CT chest  - completed palliative radiation to right thigh  March  3/24- started keytruda, s/p 5 cycles    Interval History:   Mr Plunkett returns for follow up. Tumor is not growing. Pain controlled on oxycodone 4 times a day.     ECO    ROS:   A ten-point system review is obtained and negative except for what was stated in the Interval History.     Physical Examination:   Vital signs reviewed.   General: well hydrated, well developed, in no acute distress  HEENT: normocephalic, PERRLA, EOMI, anicteric sclerae  Neck: supple, no JVD, thyromegaly, cervical or supraclavicular lymphadenopathy  Lungs: clear breath sounds bilaterally, no wheezing, rales, or rhonchi  Heart: RRR, no M/R/G  Abdomen: soft, no tenderness, non-distended, no hepatosplenomegaly, mass, or hernia. BS present  Extremities: mild edema in R leg. No edema in left leg.   Skin: no rash, ulcer, or open wounds  Neuro: alert and oriented x 4, no focal neuro deficit  Psych: pleasant and appropriate mood and affect    Objective:     Laboratory Data:  Labs reviewed. Adequate for treatment    Imaging Data:  CT C/A/P 3/29/23:  Impression:     1. 0.8 x 2.8-cm (previously 3.0 x 3.9-cm) right inguinal mass (series 3, image 171). 2.9 x 5.6-cm (previously 2.4 x 5.0-cm) exophytic right inguinal mass.  2. Multiple pulmonary nodules throughout the bilateral lung fields that are predominantly all stable.  There are approximately 2 pulmonary nodules that were not definitively seen on prior exam which may be related to technique.  3. Single enlarged precarinal space lymph node and prominent bilateral hilar lymph nodes that are not enlarged by radiologic size criteria, not significantly changed.  4. Approximately 3 punctate hepatic parenchymal hypodensities that are too small to accurately " characterize.    CT right thigh 3/29/23:  Interval increase in size of subcutaneous soft tissue mass within the right superior thigh.     Centrilobular emphysema.     CT right thigh, chest/abdomen/pelvis 6/15/23:  Impression:     1. 0.9 x 2.0-cm (previously 1.9 x 2.9-cm) right inguinal mass.  0.9 x 3.0-cm (previously 2.8 x 5.7-cm) exophytic right inguinal mass.  2. Multiple pulmonary nodules throughout the bilateral lung fields with 3 that of appear to resolved in the interim and remainder of which are predominantly stable in size, density and distribution.  No appreciable new or enlarging pulmonary nodules.  3. Mediastinal and bilateral hilar lymphadenopathy, not significantly changed.  4. Only 1 of the previously noted 3 punctate hepatic parenchymal hypodensities is appreciated on current exam which is still too small to accurately characterize.  This report was flagged in Epic as abnormal.    Assessment and Plan:     1. Primary myxofibrosarcoma    2. 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 Lung nodules    7. Hypertension, benign    8. Cancer-related pain        1-5  - Mr Plunkett is a 76 yo man with recurrent myxofibrosarcoma of the right thigh, has had 3 resections and IMRT  - he was seen by Dr. Mao and Dr. Unger, they did not feel that he would be a good surgical candidate and recommended palliative RT  - completed palliative RT completed 2/13/2023  - Previously discussed systemic treatment options, including chemotherapy and immunotherapy. He was not interested in chemotherapy including TKI. Pembrolizumab has efficacy in phase II studies and is recommended on NCCN Guidelines for patients with myxofibrosarcoma. PD-L1 is 25%. However, his TMB is not high. Pembrolizumab was denied by insurance and then denied at hearing. He got patient assistance through [employer]  - started palliative keytruda on  3/24/23, s/p 5 cycles  - last CT in June 2023 showed continued response  - c/w keytruda every 3 weeks. Cycle 6 today  - return in 3 weeks for cycle 7.   - next scan in Sep 2023    6.  - unlikely to be met. They did not grow when his R inguinal sarcoma was growing rapidly  - monitor    7.  - BP controlled  - c/w current medication  8.  - stable  - c/w oxycodone prn    Follow-up:     In 3 weeks    Ed Martines MD  Hematology and Medical Oncology  [facility]      Route Chart for Scheduling    Med Onc Chart Routing      Follow up with physician 3 weeks and 6 weeks. appts start at 10 am. see me in 3 and 6 weeks with labs then treatment   Follow up with NICOLLE    Infusion scheduling note   keytruda every 3 weeks   Injection scheduling note    Labs CBC, CMP and TSH   Scheduling: Labs same day as infusion  Preferred lab:  Lab interval:     Imaging    Pharmacy appointment    Other referrals        Treatment Plan Information   OP PEMBROLIZUMAB 200MG Q3W   Ed Martines MD   Upcoming Treatment Dates - OP PEMBROLIZUMAB 200MG Q3W    7/7/2023       Chemotherapy       pembrolizumab (KEYTRUDA) 200 mg in sodium chloride 0.9% SolP 108 mL infusion  7/28/2023       Chemotherapy       pembrolizumab (KEYTRUDA) 200 mg in sodium chloride 0.9% SolP 108 mL infusion  8/18/2023       Chemotherapy       pembrolizumab (KEYTRUDA) 200 mg in sodium chloride 0.9% SolP 108 mL infusion  9/8/2023       Chemotherapy       pembrolizumab (KEYTRUDA) 200 mg in sodium chloride 0.9% SolP 108 mL infusion

## 2023-07-12 DIAGNOSIS — R53.83 FATIGUE, UNSPECIFIED TYPE: ICD-10-CM

## 2023-07-12 DIAGNOSIS — C49.9 PRIMARY MYXOFIBROSARCOMA: Primary | ICD-10-CM

## 2023-07-23 DIAGNOSIS — G89.3 NEOPLASM RELATED PAIN: ICD-10-CM

## 2023-07-25 RX ORDER — OXYCODONE HYDROCHLORIDE 10 MG/1
10 TABLET ORAL EVERY 6 HOURS PRN
Qty: 120 TABLET | Refills: 0 | Status: SHIPPED | OUTPATIENT
Start: 2023-07-25 | End: 2023-08-23 | Stop reason: SDUPTHER

## 2023-07-28 ENCOUNTER — INFUSION (OUTPATIENT)
Dept: INFUSION THERAPY | Facility: HOSPITAL | Age: 78
End: 2023-07-28
Attending: INTERNAL MEDICINE
Payer: MEDICARE

## 2023-07-28 ENCOUNTER — OFFICE VISIT (OUTPATIENT)
Dept: HEMATOLOGY/ONCOLOGY | Facility: CLINIC | Age: 78
End: 2023-07-28
Payer: MEDICARE

## 2023-07-28 VITALS
RESPIRATION RATE: 18 BRPM | BODY MASS INDEX: 23.41 KG/M2 | HEART RATE: 60 BPM | HEIGHT: 71 IN | TEMPERATURE: 98 F | WEIGHT: 167.25 LBS | OXYGEN SATURATION: 99 % | SYSTOLIC BLOOD PRESSURE: 149 MMHG | DIASTOLIC BLOOD PRESSURE: 69 MMHG

## 2023-07-28 VITALS
HEIGHT: 71 IN | DIASTOLIC BLOOD PRESSURE: 68 MMHG | TEMPERATURE: 98 F | HEART RATE: 68 BPM | WEIGHT: 167.25 LBS | OXYGEN SATURATION: 99 % | RESPIRATION RATE: 18 BRPM | SYSTOLIC BLOOD PRESSURE: 134 MMHG | BODY MASS INDEX: 23.41 KG/M2

## 2023-07-28 DIAGNOSIS — R53.83 FATIGUE, UNSPECIFIED TYPE: ICD-10-CM

## 2023-07-28 DIAGNOSIS — C77.4 SECONDARY MALIGNANCY OF INGUINAL LYMPH NODES: ICD-10-CM

## 2023-07-28 DIAGNOSIS — I10 ESSENTIAL HYPERTENSION: ICD-10-CM

## 2023-07-28 DIAGNOSIS — C49.9 PRIMARY MYXOFIBROSARCOMA: Primary | ICD-10-CM

## 2023-07-28 DIAGNOSIS — D49.9 IMMUNODEFICIENCY SECONDARY TO NEOPLASM: ICD-10-CM

## 2023-07-28 DIAGNOSIS — Z79.899 IMMUNODEFICIENCY DUE TO DRUGS: ICD-10-CM

## 2023-07-28 DIAGNOSIS — C49.9 SOFT TISSUE SARCOMA: ICD-10-CM

## 2023-07-28 DIAGNOSIS — D84.821 IMMUNODEFICIENCY DUE TO DRUGS: ICD-10-CM

## 2023-07-28 DIAGNOSIS — D84.81 IMMUNODEFICIENCY SECONDARY TO NEOPLASM: ICD-10-CM

## 2023-07-28 DIAGNOSIS — G89.3 CANCER RELATED PAIN: ICD-10-CM

## 2023-07-28 PROCEDURE — 25000003 PHARM REV CODE 250: Mod: HCNC | Performed by: INTERNAL MEDICINE

## 2023-07-28 PROCEDURE — 99215 OFFICE O/P EST HI 40 MIN: CPT | Mod: HCNC,S$GLB,, | Performed by: INTERNAL MEDICINE

## 2023-07-28 PROCEDURE — 99999 PR PBB SHADOW E&M-EST. PATIENT-LVL IV: CPT | Mod: PBBFAC,HCNC,, | Performed by: INTERNAL MEDICINE

## 2023-07-28 PROCEDURE — 1160F PR REVIEW ALL MEDS BY PRESCRIBER/CLIN PHARMACIST DOCUMENTED: ICD-10-PCS | Mod: HCNC,CPTII,S$GLB, | Performed by: INTERNAL MEDICINE

## 2023-07-28 PROCEDURE — 3078F PR MOST RECENT DIASTOLIC BLOOD PRESSURE < 80 MM HG: ICD-10-PCS | Mod: HCNC,CPTII,S$GLB, | Performed by: INTERNAL MEDICINE

## 2023-07-28 PROCEDURE — 3288F PR FALLS RISK ASSESSMENT DOCUMENTED: ICD-10-PCS | Mod: HCNC,CPTII,S$GLB, | Performed by: INTERNAL MEDICINE

## 2023-07-28 PROCEDURE — 3077F PR MOST RECENT SYSTOLIC BLOOD PRESSURE >= 140 MM HG: ICD-10-PCS | Mod: HCNC,CPTII,S$GLB, | Performed by: INTERNAL MEDICINE

## 2023-07-28 PROCEDURE — 96413 CHEMO IV INFUSION 1 HR: CPT | Mod: HCNC

## 2023-07-28 PROCEDURE — 3078F DIAST BP <80 MM HG: CPT | Mod: HCNC,CPTII,S$GLB, | Performed by: INTERNAL MEDICINE

## 2023-07-28 PROCEDURE — 1159F PR MEDICATION LIST DOCUMENTED IN MEDICAL RECORD: ICD-10-PCS | Mod: HCNC,CPTII,S$GLB, | Performed by: INTERNAL MEDICINE

## 2023-07-28 PROCEDURE — 1126F PR PAIN SEVERITY QUANTIFIED, NO PAIN PRESENT: ICD-10-PCS | Mod: HCNC,CPTII,S$GLB, | Performed by: INTERNAL MEDICINE

## 2023-07-28 PROCEDURE — 99999 PR PBB SHADOW E&M-EST. PATIENT-LVL IV: ICD-10-PCS | Mod: PBBFAC,HCNC,, | Performed by: INTERNAL MEDICINE

## 2023-07-28 PROCEDURE — 3077F SYST BP >= 140 MM HG: CPT | Mod: HCNC,CPTII,S$GLB, | Performed by: INTERNAL MEDICINE

## 2023-07-28 PROCEDURE — 1160F RVW MEDS BY RX/DR IN RCRD: CPT | Mod: HCNC,CPTII,S$GLB, | Performed by: INTERNAL MEDICINE

## 2023-07-28 PROCEDURE — 3288F FALL RISK ASSESSMENT DOCD: CPT | Mod: HCNC,CPTII,S$GLB, | Performed by: INTERNAL MEDICINE

## 2023-07-28 PROCEDURE — 99215 PR OFFICE/OUTPT VISIT, EST, LEVL V, 40-54 MIN: ICD-10-PCS | Mod: HCNC,S$GLB,, | Performed by: INTERNAL MEDICINE

## 2023-07-28 PROCEDURE — 1159F MED LIST DOCD IN RCRD: CPT | Mod: HCNC,CPTII,S$GLB, | Performed by: INTERNAL MEDICINE

## 2023-07-28 PROCEDURE — 63600175 PHARM REV CODE 636 W HCPCS: Mod: JZ,JG,HCNC | Performed by: INTERNAL MEDICINE

## 2023-07-28 PROCEDURE — 1101F PR PT FALLS ASSESS DOC 0-1 FALLS W/OUT INJ PAST YR: ICD-10-PCS | Mod: HCNC,CPTII,S$GLB, | Performed by: INTERNAL MEDICINE

## 2023-07-28 PROCEDURE — 1101F PT FALLS ASSESS-DOCD LE1/YR: CPT | Mod: HCNC,CPTII,S$GLB, | Performed by: INTERNAL MEDICINE

## 2023-07-28 PROCEDURE — 1126F AMNT PAIN NOTED NONE PRSNT: CPT | Mod: HCNC,CPTII,S$GLB, | Performed by: INTERNAL MEDICINE

## 2023-07-28 RX ORDER — EPINEPHRINE 0.3 MG/.3ML
0.3 INJECTION SUBCUTANEOUS ONCE AS NEEDED
Status: DISCONTINUED | OUTPATIENT
Start: 2023-07-28 | End: 2023-07-28 | Stop reason: HOSPADM

## 2023-07-28 RX ORDER — SODIUM CHLORIDE 0.9 % (FLUSH) 0.9 %
10 SYRINGE (ML) INJECTION
Status: CANCELLED | OUTPATIENT
Start: 2023-07-28

## 2023-07-28 RX ORDER — HEPARIN 100 UNIT/ML
500 SYRINGE INTRAVENOUS
Status: DISCONTINUED | OUTPATIENT
Start: 2023-07-28 | End: 2023-07-28 | Stop reason: HOSPADM

## 2023-07-28 RX ORDER — EPINEPHRINE 0.3 MG/.3ML
0.3 INJECTION SUBCUTANEOUS ONCE AS NEEDED
Status: CANCELLED | OUTPATIENT
Start: 2023-07-28

## 2023-07-28 RX ORDER — SODIUM CHLORIDE 0.9 % (FLUSH) 0.9 %
10 SYRINGE (ML) INJECTION
Status: DISCONTINUED | OUTPATIENT
Start: 2023-07-28 | End: 2023-07-28 | Stop reason: HOSPADM

## 2023-07-28 RX ORDER — DIPHENHYDRAMINE HYDROCHLORIDE 50 MG/ML
50 INJECTION INTRAMUSCULAR; INTRAVENOUS ONCE AS NEEDED
Status: DISCONTINUED | OUTPATIENT
Start: 2023-07-28 | End: 2023-07-28 | Stop reason: HOSPADM

## 2023-07-28 RX ORDER — HEPARIN 100 UNIT/ML
500 SYRINGE INTRAVENOUS
Status: CANCELLED | OUTPATIENT
Start: 2023-07-28

## 2023-07-28 RX ORDER — DIPHENHYDRAMINE HYDROCHLORIDE 50 MG/ML
50 INJECTION INTRAMUSCULAR; INTRAVENOUS ONCE AS NEEDED
Status: CANCELLED | OUTPATIENT
Start: 2023-07-28

## 2023-07-28 RX ADMIN — SODIUM CHLORIDE 200 MG: 9 INJECTION, SOLUTION INTRAVENOUS at 11:07

## 2023-07-28 RX ADMIN — SODIUM CHLORIDE: 9 INJECTION, SOLUTION INTRAVENOUS at 11:07

## 2023-07-28 NOTE — PROGRESS NOTES
"                                                         PROGRESS NOTE    Subjective:       Patient ID: Zac Plunkett is a 78 y.o. male.    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 JD. TMB 6.3    Oncologic History:  Prior patient of Dr Lucia's  2021  May              5/27- US right thigh: showed 2 hypoechoic masses in the subcutaneous tissues of the right anterior thigh. Largest measured 1.1 x 0.9 x 1 cm. Read at the time was concern for lymphadenopathy.   June 6/4 - CXR: "Bilateral reticular opacities are present in a perihilar distribution which appears slightly more pronounced compared to prior exams."               6/11 - underwent excisional biopsy of the right thigh; pathology came back positive for myxofibrosarcoma, high grade. Path a 1.8 x 1.1 x 0.9 cm mass with an adjacent 0.4 x 0.4 x 0.3 cm mass.   July/Aug              - IMRT with Dr. Mcadams  2022 January 1/12 - PET: locoregional recurrence and concerning 0.9 cm RUL lesion              1/20 - Path: excision on 2 recurrent lesions:  Myxofibrosarcoma, 1.9 cm at longest length, PD-L1 25%              1/31 - CT thigh: successful removal w/o evidence of recurrent disease  April 4/28 - recurrence, locally with metastatic deposit in LN. S/p excision on 4/28/22. Path: right thigh superior mass: Myxofibrosarcoma, high grade with focal tumor necrosis, incompletely excised. A portion of the lesion is suggestive of an involved lymph node exhibiting metastatic sarcoma with extranodal extension.  Sarcoma is focally present within inked but otherwise unspecified peripheral margins.   July 7/19 - CT thigh showed local recurrence and a 0.7 cm R inguinal lymph node    2023  [facility]   1/3 - CT thigh/pelvis showed "Heterogeneous enhancing soft tissue lesions " "and nodular foci at the anterior thigh and right groin highly concerning for worsening local recurrent disease with talon metastasis"    - stable pulmonary nodules on CT chest  - completed palliative radiation to right thigh  March  3/24- started keytruda, s/p 5 cycles    Interval History:   Mr Plunkett returns for follow up. Tumor is not growing. Pain controlled on oxycodone.    ECO    ROS:   A ten-point system review is obtained and negative except for what was stated in the Interval History.     Physical Examination:   Vital signs reviewed.   General: well hydrated, well developed, in no acute distress  HEENT: normocephalic, EOMI, anicteric sclerae  Neck: supple, no JVD, thyromegaly, cervical or supraclavicular lymphadenopathy  Lungs: clear breath sounds bilaterally, no wheezing, rales, or rhonchi  Heart: RRR, no M/R/G  Abdomen: soft, no tenderness, non-distended, no hepatosplenomegaly, mass, or hernia. BS present  Extremities: no edema  Skin: no rash, ulcer, or open wounds  Neuro: alert and oriented x 4, no focal neuro deficit  Psych: pleasant and appropriate mood and affect    Objective:     Laboratory Data:  Labs reviewed. Adequate for treatment    Imaging Data:  CT C/A/P 3/29/23:  Impression:     1. 0.8 x 2.8-cm (previously 3.0 x 3.9-cm) right inguinal mass (series 3, image 171). 2.9 x 5.6-cm (previously 2.4 x 5.0-cm) exophytic right inguinal mass.  2. Multiple pulmonary nodules throughout the bilateral lung fields that are predominantly all stable.  There are approximately 2 pulmonary nodules that were not definitively seen on prior exam which may be related to technique.  3. Single enlarged precarinal space lymph node and prominent bilateral hilar lymph nodes that are not enlarged by radiologic size criteria, not significantly changed.  4. Approximately 3 punctate hepatic parenchymal hypodensities that are too small to accurately characterize.    CT right thigh 3/29/23:  Interval increase " in size of subcutaneous soft tissue mass within the right superior thigh.     Centrilobular emphysema.     CT right thigh, chest/abdomen/pelvis 6/15/23:  Impression:     1. 0.9 x 2.0-cm (previously 1.9 x 2.9-cm) right inguinal mass.  0.9 x 3.0-cm (previously 2.8 x 5.7-cm) exophytic right inguinal mass.  2. Multiple pulmonary nodules throughout the bilateral lung fields with 3 that of appear to resolved in the interim and remainder of which are predominantly stable in size, density and distribution.  No appreciable new or enlarging pulmonary nodules.  3. Mediastinal and bilateral hilar lymphadenopathy, not significantly changed.  4. Only 1 of the previously noted 3 punctate hepatic parenchymal hypodensities is appreciated on current exam which is still too small to accurately characterize.  This report was flagged in Epic as abnormal.    Assessment and Plan:     1. Primary myxofibrosarcoma    2. 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 Fatigue, unspecified type    7. Essential hypertension    8. Cancer related pain      1-5  - Mr Plunkett is a 78 yo man with recurrent myxofibrosarcoma of the right thigh, has had 3 resections and IMRT  - he was seen by Dr. Mao and Dr. Unger, they did not feel that he would be a good surgical candidate and recommended palliative RT  - completed palliative RT completed 2/13/2023  - Previously discussed systemic treatment options, including chemotherapy and immunotherapy. He was not interested in chemotherapy including TKI. Pembrolizumab has efficacy in phase II studies and is recommended on NCCN Guidelines for patients with myxofibrosarcoma. PD-L1 is 25%. However, his TMB is not high. Pembrolizumab was denied by insurance and then denied at hearing. He got patient assistance through [employer]  - started palliative keytruda on 3/24/23, s/p 6 cycles  - last CT in June 2023  showed continued response  - c/w keytruda every 3 weeks. Cycle 7 today  - return in 3 weeks for cycle 8.   - next scan in Sep 2023    6.  - monitor TSH when on immunotherapy    7.  - BP controlled  - c/w current medication    8.  - stable  - c/w oxycodone prn    Follow-up:     In 3 weeks    Ed Martines MD  Hematology and Medical Oncology  [facility]      Route Chart for Scheduling    Med Onc Chart Routing      Follow up with physician 6 weeks. see me on 9/8 with labs then keytruda. labs and CT C/A/P and CT thigh at [place] on 9/28, see me on 9/29 then keytruda   Follow up with NICOLLE    Infusion scheduling note   keytruda every 3 weeks   Injection scheduling note    Labs CBC, CMP and TSH   Scheduling:  Preferred lab:  Lab interval:  every 3 week   Imaging CT chest abdomen pelvis and other   CT right thigh, CT C/A/P on 9/28 at [place]   Pharmacy appointment    Other referrals        Treatment Plan Information   OP PEMBROLIZUMAB 200MG Q3W   Ed Martines MD   Upcoming Treatment Dates - OP PEMBROLIZUMAB 200MG Q3W    7/28/2023       Chemotherapy       pembrolizumab (KEYTRUDA) 200 mg in sodium chloride 0.9% SolP 108 mL infusion  8/18/2023       Chemotherapy       pembrolizumab (KEYTRUDA) 200 mg in sodium chloride 0.9% SolP 108 mL infusion  9/8/2023       Chemotherapy       pembrolizumab (KEYTRUDA) 200 mg in sodium chloride 0.9% SolP 108 mL infusion  9/29/2023       Chemotherapy       pembrolizumab (KEYTRUDA) 200 mg in sodium chloride 0.9% SolP 108 mL infusion

## 2023-07-28 NOTE — PLAN OF CARE
Pt admitted for C7 Keytruda. Labs reviewed and assessment performed. Fatigue addressed. Pt tolerated treatment  well. PIV removed and Pt discharged home

## 2023-08-18 ENCOUNTER — OFFICE VISIT (OUTPATIENT)
Dept: HEMATOLOGY/ONCOLOGY | Facility: CLINIC | Age: 78
End: 2023-08-18
Payer: MEDICARE

## 2023-08-18 ENCOUNTER — INFUSION (OUTPATIENT)
Dept: INFUSION THERAPY | Facility: HOSPITAL | Age: 78
End: 2023-08-18
Attending: INTERNAL MEDICINE
Payer: MEDICARE

## 2023-08-18 VITALS
BODY MASS INDEX: 23.15 KG/M2 | HEIGHT: 71 IN | BODY MASS INDEX: 23.16 KG/M2 | HEART RATE: 63 BPM | SYSTOLIC BLOOD PRESSURE: 154 MMHG | RESPIRATION RATE: 18 BRPM | DIASTOLIC BLOOD PRESSURE: 70 MMHG | DIASTOLIC BLOOD PRESSURE: 74 MMHG | TEMPERATURE: 98 F | WEIGHT: 165.38 LBS | SYSTOLIC BLOOD PRESSURE: 153 MMHG | HEIGHT: 71 IN | HEART RATE: 58 BPM | RESPIRATION RATE: 18 BRPM | OXYGEN SATURATION: 99 % | WEIGHT: 165.44 LBS

## 2023-08-18 DIAGNOSIS — R53.83 FATIGUE, UNSPECIFIED TYPE: ICD-10-CM

## 2023-08-18 DIAGNOSIS — D84.821 IMMUNODEFICIENCY DUE TO DRUGS: ICD-10-CM

## 2023-08-18 DIAGNOSIS — D49.9 IMMUNODEFICIENCY SECONDARY TO NEOPLASM: ICD-10-CM

## 2023-08-18 DIAGNOSIS — D84.81 IMMUNODEFICIENCY SECONDARY TO NEOPLASM: ICD-10-CM

## 2023-08-18 DIAGNOSIS — C49.9 PRIMARY MYXOFIBROSARCOMA: Primary | ICD-10-CM

## 2023-08-18 DIAGNOSIS — Z79.899 IMMUNODEFICIENCY DUE TO DRUGS: ICD-10-CM

## 2023-08-18 DIAGNOSIS — I10 ESSENTIAL HYPERTENSION: ICD-10-CM

## 2023-08-18 DIAGNOSIS — G89.3 CANCER RELATED PAIN: ICD-10-CM

## 2023-08-18 DIAGNOSIS — C77.4 SECONDARY MALIGNANCY OF INGUINAL LYMPH NODES: ICD-10-CM

## 2023-08-18 DIAGNOSIS — C49.9 SOFT TISSUE SARCOMA: ICD-10-CM

## 2023-08-18 DIAGNOSIS — R91.8 LUNG NODULES: ICD-10-CM

## 2023-08-18 PROCEDURE — 1126F PR PAIN SEVERITY QUANTIFIED, NO PAIN PRESENT: ICD-10-PCS | Mod: HCNC,CPTII,S$GLB, | Performed by: INTERNAL MEDICINE

## 2023-08-18 PROCEDURE — 3077F PR MOST RECENT SYSTOLIC BLOOD PRESSURE >= 140 MM HG: ICD-10-PCS | Mod: HCNC,CPTII,S$GLB, | Performed by: INTERNAL MEDICINE

## 2023-08-18 PROCEDURE — 63600175 PHARM REV CODE 636 W HCPCS: Mod: JZ,JG,HCNC | Performed by: INTERNAL MEDICINE

## 2023-08-18 PROCEDURE — 3077F SYST BP >= 140 MM HG: CPT | Mod: HCNC,CPTII,S$GLB, | Performed by: INTERNAL MEDICINE

## 2023-08-18 PROCEDURE — 99999 PR PBB SHADOW E&M-EST. PATIENT-LVL III: CPT | Mod: PBBFAC,HCNC,, | Performed by: INTERNAL MEDICINE

## 2023-08-18 PROCEDURE — 1159F PR MEDICATION LIST DOCUMENTED IN MEDICAL RECORD: ICD-10-PCS | Mod: HCNC,CPTII,S$GLB, | Performed by: INTERNAL MEDICINE

## 2023-08-18 PROCEDURE — 1159F MED LIST DOCD IN RCRD: CPT | Mod: HCNC,CPTII,S$GLB, | Performed by: INTERNAL MEDICINE

## 2023-08-18 PROCEDURE — 1101F PT FALLS ASSESS-DOCD LE1/YR: CPT | Mod: HCNC,CPTII,S$GLB, | Performed by: INTERNAL MEDICINE

## 2023-08-18 PROCEDURE — 99999 PR PBB SHADOW E&M-EST. PATIENT-LVL III: ICD-10-PCS | Mod: PBBFAC,HCNC,, | Performed by: INTERNAL MEDICINE

## 2023-08-18 PROCEDURE — 1101F PR PT FALLS ASSESS DOC 0-1 FALLS W/OUT INJ PAST YR: ICD-10-PCS | Mod: HCNC,CPTII,S$GLB, | Performed by: INTERNAL MEDICINE

## 2023-08-18 PROCEDURE — 3078F DIAST BP <80 MM HG: CPT | Mod: HCNC,CPTII,S$GLB, | Performed by: INTERNAL MEDICINE

## 2023-08-18 PROCEDURE — 1160F PR REVIEW ALL MEDS BY PRESCRIBER/CLIN PHARMACIST DOCUMENTED: ICD-10-PCS | Mod: HCNC,CPTII,S$GLB, | Performed by: INTERNAL MEDICINE

## 2023-08-18 PROCEDURE — 1126F AMNT PAIN NOTED NONE PRSNT: CPT | Mod: HCNC,CPTII,S$GLB, | Performed by: INTERNAL MEDICINE

## 2023-08-18 PROCEDURE — 1160F RVW MEDS BY RX/DR IN RCRD: CPT | Mod: HCNC,CPTII,S$GLB, | Performed by: INTERNAL MEDICINE

## 2023-08-18 PROCEDURE — 3288F FALL RISK ASSESSMENT DOCD: CPT | Mod: HCNC,CPTII,S$GLB, | Performed by: INTERNAL MEDICINE

## 2023-08-18 PROCEDURE — 99215 PR OFFICE/OUTPT VISIT, EST, LEVL V, 40-54 MIN: ICD-10-PCS | Mod: HCNC,S$GLB,, | Performed by: INTERNAL MEDICINE

## 2023-08-18 PROCEDURE — 3288F PR FALLS RISK ASSESSMENT DOCUMENTED: ICD-10-PCS | Mod: HCNC,CPTII,S$GLB, | Performed by: INTERNAL MEDICINE

## 2023-08-18 PROCEDURE — 96413 CHEMO IV INFUSION 1 HR: CPT | Mod: HCNC

## 2023-08-18 PROCEDURE — 25000003 PHARM REV CODE 250: Mod: HCNC | Performed by: INTERNAL MEDICINE

## 2023-08-18 PROCEDURE — 99215 OFFICE O/P EST HI 40 MIN: CPT | Mod: HCNC,S$GLB,, | Performed by: INTERNAL MEDICINE

## 2023-08-18 PROCEDURE — 3078F PR MOST RECENT DIASTOLIC BLOOD PRESSURE < 80 MM HG: ICD-10-PCS | Mod: HCNC,CPTII,S$GLB, | Performed by: INTERNAL MEDICINE

## 2023-08-18 RX ORDER — DIPHENHYDRAMINE HYDROCHLORIDE 50 MG/ML
50 INJECTION INTRAMUSCULAR; INTRAVENOUS ONCE AS NEEDED
Status: DISCONTINUED | OUTPATIENT
Start: 2023-08-18 | End: 2023-08-18 | Stop reason: HOSPADM

## 2023-08-18 RX ORDER — HEPARIN 100 UNIT/ML
500 SYRINGE INTRAVENOUS
Status: CANCELLED | OUTPATIENT
Start: 2023-08-18

## 2023-08-18 RX ORDER — SODIUM CHLORIDE 0.9 % (FLUSH) 0.9 %
10 SYRINGE (ML) INJECTION
Status: DISCONTINUED | OUTPATIENT
Start: 2023-08-18 | End: 2023-08-18 | Stop reason: HOSPADM

## 2023-08-18 RX ORDER — SODIUM CHLORIDE 0.9 % (FLUSH) 0.9 %
10 SYRINGE (ML) INJECTION
Status: CANCELLED | OUTPATIENT
Start: 2023-08-18

## 2023-08-18 RX ORDER — EPINEPHRINE 0.3 MG/.3ML
0.3 INJECTION SUBCUTANEOUS ONCE AS NEEDED
Status: DISCONTINUED | OUTPATIENT
Start: 2023-08-18 | End: 2023-08-18 | Stop reason: HOSPADM

## 2023-08-18 RX ORDER — HEPARIN 100 UNIT/ML
500 SYRINGE INTRAVENOUS
Status: DISCONTINUED | OUTPATIENT
Start: 2023-08-18 | End: 2023-08-18 | Stop reason: HOSPADM

## 2023-08-18 RX ORDER — DIPHENHYDRAMINE HYDROCHLORIDE 50 MG/ML
50 INJECTION INTRAMUSCULAR; INTRAVENOUS ONCE AS NEEDED
Status: CANCELLED | OUTPATIENT
Start: 2023-08-18

## 2023-08-18 RX ORDER — EPINEPHRINE 0.3 MG/.3ML
0.3 INJECTION SUBCUTANEOUS ONCE AS NEEDED
Status: CANCELLED | OUTPATIENT
Start: 2023-08-18

## 2023-08-18 RX ADMIN — SODIUM CHLORIDE 200 MG: 9 INJECTION, SOLUTION INTRAVENOUS at 02:08

## 2023-08-18 NOTE — PROGRESS NOTES
"                                                         PROGRESS NOTE    Subjective:       Patient ID: Zac Plunkett is a 78 y.o. male.    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 JD. TMB 6.3    Oncologic History:  Prior patient of Dr Lucia's  2021  May              5/27- US right thigh: showed 2 hypoechoic masses in the subcutaneous tissues of the right anterior thigh. Largest measured 1.1 x 0.9 x 1 cm. Read at the time was concern for lymphadenopathy.   June 6/4 - CXR: "Bilateral reticular opacities are present in a perihilar distribution which appears slightly more pronounced compared to prior exams."               6/11 - underwent excisional biopsy of the right thigh; pathology came back positive for myxofibrosarcoma, high grade. Path a 1.8 x 1.1 x 0.9 cm mass with an adjacent 0.4 x 0.4 x 0.3 cm mass.   July/Aug              - IMRT with Dr. Mcadams  2022 January 1/12 - PET: locoregional recurrence and concerning 0.9 cm RUL lesion              1/20 - Path: excision on 2 recurrent lesions:  Myxofibrosarcoma, 1.9 cm at longest length, PD-L1 25%              1/31 - CT thigh: successful removal w/o evidence of recurrent disease  April 4/28 - recurrence, locally with metastatic deposit in LN. S/p excision on 4/28/22. Path: right thigh superior mass: Myxofibrosarcoma, high grade with focal tumor necrosis, incompletely excised. A portion of the lesion is suggestive of an involved lymph node exhibiting metastatic sarcoma with extranodal extension.  Sarcoma is focally present within inked but otherwise unspecified peripheral margins.   July 7/19 - CT thigh showed local recurrence and a 0.7 cm R inguinal lymph node    2023  [facility]   1/3 - CT thigh/pelvis showed "Heterogeneous enhancing soft tissue lesions " "and nodular foci at the anterior thigh and right groin highly concerning for worsening local recurrent disease with talon metastasis"    - stable pulmonary nodules on CT chest  - completed palliative radiation to right thigh  March  3/24- started keytruda, s/p 7 cycles    Interval History:   Mr Plunkett returns for follow up. Tumor is not growing. Pain controlled on oxycodone. Noted he has 30 min period of feeling cold every day. No fever.     ECO    ROS:   A ten-point system review is obtained and negative except for what was stated in the Interval History.     Physical Examination:   Vital signs reviewed.   General: well hydrated, well developed, in no acute distress  HEENT: normocephalic, EOMI, anicteric sclerae  Neck: supple, no JVD, thyromegaly, cervical or supraclavicular lymphadenopathy  Lungs: clear breath sounds bilaterally, no wheezing, rales, or rhonchi  Heart: RRR, no M/R/G  Abdomen: soft, no tenderness, non-distended, no hepatosplenomegaly, mass, or hernia. BS present  Extremities: no edema  Skin: no rash, ulcer, or open wounds  Neuro: alert and oriented x 4, no focal neuro deficit  Psych: pleasant and appropriate mood and affect    Objective:     Laboratory Data:  Labs reviewed. Adequate for treatment. TSH was normal in the past    Imaging Data:  CT C/A/P 3/29/23:  Impression:     1. 0.8 x 2.8-cm (previously 3.0 x 3.9-cm) right inguinal mass (series 3, image 171). 2.9 x 5.6-cm (previously 2.4 x 5.0-cm) exophytic right inguinal mass.  2. Multiple pulmonary nodules throughout the bilateral lung fields that are predominantly all stable.  There are approximately 2 pulmonary nodules that were not definitively seen on prior exam which may be related to technique.  3. Single enlarged precarinal space lymph node and prominent bilateral hilar lymph nodes that are not enlarged by radiologic size criteria, not significantly changed.  4. Approximately 3 punctate hepatic parenchymal hypodensities " that are too small to accurately characterize.    CT right thigh 3/29/23:  Interval increase in size of subcutaneous soft tissue mass within the right superior thigh.     Centrilobular emphysema.     CT right thigh, chest/abdomen/pelvis 6/15/23:  Impression:     1. 0.9 x 2.0-cm (previously 1.9 x 2.9-cm) right inguinal mass.  0.9 x 3.0-cm (previously 2.8 x 5.7-cm) exophytic right inguinal mass.  2. Multiple pulmonary nodules throughout the bilateral lung fields with 3 that of appear to resolved in the interim and remainder of which are predominantly stable in size, density and distribution.  No appreciable new or enlarging pulmonary nodules.  3. Mediastinal and bilateral hilar lymphadenopathy, not significantly changed.  4. Only 1 of the previously noted 3 punctate hepatic parenchymal hypodensities is appreciated on current exam which is still too small to accurately characterize.  This report was flagged in Epic as abnormal.    Assessment and Plan:     1. Primary myxofibrosarcoma    2. 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 Essential hypertension    7. Cancer related pain    8. Lung nodules    9. Fatigue, unspecified type      1-5  - Mr Plunkett is a 79 yo man with recurrent myxofibrosarcoma of the right thigh, has had 3 resections and IMRT  - he was seen by Dr. Mao and Dr. Unger, they did not feel that he would be a good surgical candidate and recommended palliative RT  - completed palliative RT completed 2/13/2023  - Previously discussed systemic treatment options, including chemotherapy and immunotherapy. He was not interested in chemotherapy including TKI. Pembrolizumab has efficacy in phase II studies and is recommended on NCCN Guidelines for patients with myxofibrosarcoma. PD-L1 is 25%. However, his TMB is not high. Pembrolizumab was denied by insurance and then denied at hearing. He got  patient assistance through [employer]  - started palliative keytruda on 3/24/23, s/p 7 cycles  - last CT in June 2023 showed continued response  - c/w keytruda every 3 weeks. Cycle 8 today  - return in 3 weeks for cycle 9  - asked patient to monitor his temperatures when he feels cold  - next scan in Sep 2023    6.  - BP slightly elevated  - monitor    7.  - stable  - c/w oxycodone prn    8.  - monitor    9.  - monitor TSH when on immunotherapy    Follow-up:     In 3 weeks    Ed Martines MD  Hematology and Medical Oncology  [facility]      Route Chart for Scheduling    Med Onc Chart Routing      Follow up with physician . Keep scheduled appts. see me on 10/20 with labs then keytruda   Follow up with NICOLLE    Infusion scheduling note   keytruda every 3 weeks   Injection scheduling note    Labs CBC, CMP and TSH   Scheduling: Labs same day as infusion  Preferred lab:  Lab interval:     Imaging CT chest abdomen pelvis   scheduled   Pharmacy appointment    Other referrals        Treatment Plan Information   OP PEMBROLIZUMAB 200MG Q3W   Ed Martines MD   Upcoming Treatment Dates - OP PEMBROLIZUMAB 200MG Q3W    8/18/2023       Chemotherapy       pembrolizumab (KEYTRUDA) 200 mg in sodium chloride 0.9% SolP 108 mL infusion  9/8/2023       Chemotherapy       pembrolizumab (KEYTRUDA) 200 mg in sodium chloride 0.9% SolP 108 mL infusion  9/29/2023       Chemotherapy       pembrolizumab (KEYTRUDA) 200 mg in sodium chloride 0.9% SolP 108 mL infusion  10/20/2023       Chemotherapy       pembrolizumab (KEYTRUDA) 200 mg in sodium chloride 0.9% SolP 108 mL infusion

## 2023-08-23 DIAGNOSIS — G89.3 NEOPLASM RELATED PAIN: ICD-10-CM

## 2023-08-23 RX ORDER — OXYCODONE HYDROCHLORIDE 10 MG/1
10 TABLET ORAL EVERY 6 HOURS PRN
Qty: 120 TABLET | Refills: 0 | Status: SHIPPED | OUTPATIENT
Start: 2023-08-23 | End: 2023-09-21 | Stop reason: SDUPTHER

## 2023-09-08 ENCOUNTER — OFFICE VISIT (OUTPATIENT)
Dept: HEMATOLOGY/ONCOLOGY | Facility: CLINIC | Age: 78
End: 2023-09-08
Payer: MEDICARE

## 2023-09-08 ENCOUNTER — INFUSION (OUTPATIENT)
Dept: INFUSION THERAPY | Facility: HOSPITAL | Age: 78
End: 2023-09-08
Attending: INTERNAL MEDICINE
Payer: MEDICARE

## 2023-09-08 VITALS
WEIGHT: 165 LBS | DIASTOLIC BLOOD PRESSURE: 58 MMHG | OXYGEN SATURATION: 99 % | RESPIRATION RATE: 18 BRPM | SYSTOLIC BLOOD PRESSURE: 127 MMHG | HEART RATE: 63 BPM | BODY MASS INDEX: 23.1 KG/M2 | HEIGHT: 71 IN | TEMPERATURE: 98 F

## 2023-09-08 VITALS
DIASTOLIC BLOOD PRESSURE: 65 MMHG | HEIGHT: 71 IN | SYSTOLIC BLOOD PRESSURE: 136 MMHG | OXYGEN SATURATION: 100 % | WEIGHT: 165 LBS | RESPIRATION RATE: 18 BRPM | HEART RATE: 58 BPM | TEMPERATURE: 98 F | BODY MASS INDEX: 23.1 KG/M2

## 2023-09-08 DIAGNOSIS — I10 ESSENTIAL HYPERTENSION: ICD-10-CM

## 2023-09-08 DIAGNOSIS — D84.81 IMMUNODEFICIENCY SECONDARY TO NEOPLASM: ICD-10-CM

## 2023-09-08 DIAGNOSIS — C49.9 PRIMARY MYXOFIBROSARCOMA: Primary | ICD-10-CM

## 2023-09-08 DIAGNOSIS — G89.3 CANCER RELATED PAIN: ICD-10-CM

## 2023-09-08 DIAGNOSIS — R91.8 LUNG NODULES: ICD-10-CM

## 2023-09-08 DIAGNOSIS — D49.9 IMMUNODEFICIENCY SECONDARY TO NEOPLASM: ICD-10-CM

## 2023-09-08 DIAGNOSIS — Z79.899 IMMUNODEFICIENCY DUE TO DRUGS: ICD-10-CM

## 2023-09-08 DIAGNOSIS — C77.4 SECONDARY MALIGNANCY OF INGUINAL LYMPH NODES: ICD-10-CM

## 2023-09-08 DIAGNOSIS — D84.821 IMMUNODEFICIENCY DUE TO DRUGS: ICD-10-CM

## 2023-09-08 PROCEDURE — 1126F AMNT PAIN NOTED NONE PRSNT: CPT | Mod: HCNC,CPTII,S$GLB, | Performed by: INTERNAL MEDICINE

## 2023-09-08 PROCEDURE — 3288F PR FALLS RISK ASSESSMENT DOCUMENTED: ICD-10-PCS | Mod: HCNC,CPTII,S$GLB, | Performed by: INTERNAL MEDICINE

## 2023-09-08 PROCEDURE — 3078F DIAST BP <80 MM HG: CPT | Mod: HCNC,CPTII,S$GLB, | Performed by: INTERNAL MEDICINE

## 2023-09-08 PROCEDURE — 3288F FALL RISK ASSESSMENT DOCD: CPT | Mod: HCNC,CPTII,S$GLB, | Performed by: INTERNAL MEDICINE

## 2023-09-08 PROCEDURE — 3074F SYST BP LT 130 MM HG: CPT | Mod: HCNC,CPTII,S$GLB, | Performed by: INTERNAL MEDICINE

## 2023-09-08 PROCEDURE — 99215 PR OFFICE/OUTPT VISIT, EST, LEVL V, 40-54 MIN: ICD-10-PCS | Mod: HCNC,S$GLB,, | Performed by: INTERNAL MEDICINE

## 2023-09-08 PROCEDURE — 25000003 PHARM REV CODE 250: Mod: HCNC | Performed by: INTERNAL MEDICINE

## 2023-09-08 PROCEDURE — 1159F MED LIST DOCD IN RCRD: CPT | Mod: HCNC,CPTII,S$GLB, | Performed by: INTERNAL MEDICINE

## 2023-09-08 PROCEDURE — 1160F PR REVIEW ALL MEDS BY PRESCRIBER/CLIN PHARMACIST DOCUMENTED: ICD-10-PCS | Mod: HCNC,CPTII,S$GLB, | Performed by: INTERNAL MEDICINE

## 2023-09-08 PROCEDURE — 1101F PR PT FALLS ASSESS DOC 0-1 FALLS W/OUT INJ PAST YR: ICD-10-PCS | Mod: HCNC,CPTII,S$GLB, | Performed by: INTERNAL MEDICINE

## 2023-09-08 PROCEDURE — 96413 CHEMO IV INFUSION 1 HR: CPT | Mod: HCNC

## 2023-09-08 PROCEDURE — 1101F PT FALLS ASSESS-DOCD LE1/YR: CPT | Mod: HCNC,CPTII,S$GLB, | Performed by: INTERNAL MEDICINE

## 2023-09-08 PROCEDURE — 99999 PR PBB SHADOW E&M-EST. PATIENT-LVL IV: CPT | Mod: PBBFAC,HCNC,, | Performed by: INTERNAL MEDICINE

## 2023-09-08 PROCEDURE — 63600175 PHARM REV CODE 636 W HCPCS: Mod: JZ,JG,HCNC,RPL | Performed by: INTERNAL MEDICINE

## 2023-09-08 PROCEDURE — 1126F PR PAIN SEVERITY QUANTIFIED, NO PAIN PRESENT: ICD-10-PCS | Mod: HCNC,CPTII,S$GLB, | Performed by: INTERNAL MEDICINE

## 2023-09-08 PROCEDURE — 3074F PR MOST RECENT SYSTOLIC BLOOD PRESSURE < 130 MM HG: ICD-10-PCS | Mod: HCNC,CPTII,S$GLB, | Performed by: INTERNAL MEDICINE

## 2023-09-08 PROCEDURE — 99215 OFFICE O/P EST HI 40 MIN: CPT | Mod: HCNC,S$GLB,, | Performed by: INTERNAL MEDICINE

## 2023-09-08 PROCEDURE — 1160F RVW MEDS BY RX/DR IN RCRD: CPT | Mod: HCNC,CPTII,S$GLB, | Performed by: INTERNAL MEDICINE

## 2023-09-08 PROCEDURE — 1159F PR MEDICATION LIST DOCUMENTED IN MEDICAL RECORD: ICD-10-PCS | Mod: HCNC,CPTII,S$GLB, | Performed by: INTERNAL MEDICINE

## 2023-09-08 PROCEDURE — 99999 PR PBB SHADOW E&M-EST. PATIENT-LVL IV: ICD-10-PCS | Mod: PBBFAC,HCNC,, | Performed by: INTERNAL MEDICINE

## 2023-09-08 PROCEDURE — 3078F PR MOST RECENT DIASTOLIC BLOOD PRESSURE < 80 MM HG: ICD-10-PCS | Mod: HCNC,CPTII,S$GLB, | Performed by: INTERNAL MEDICINE

## 2023-09-08 RX ORDER — HEPARIN 100 UNIT/ML
500 SYRINGE INTRAVENOUS
Status: DISCONTINUED | OUTPATIENT
Start: 2023-09-08 | End: 2023-09-08 | Stop reason: HOSPADM

## 2023-09-08 RX ORDER — DIPHENHYDRAMINE HYDROCHLORIDE 50 MG/ML
50 INJECTION INTRAMUSCULAR; INTRAVENOUS ONCE AS NEEDED
Status: DISCONTINUED | OUTPATIENT
Start: 2023-09-08 | End: 2023-09-08 | Stop reason: HOSPADM

## 2023-09-08 RX ORDER — SODIUM CHLORIDE 0.9 % (FLUSH) 0.9 %
10 SYRINGE (ML) INJECTION
Status: DISCONTINUED | OUTPATIENT
Start: 2023-09-08 | End: 2023-09-08 | Stop reason: HOSPADM

## 2023-09-08 RX ORDER — EPINEPHRINE 0.3 MG/.3ML
0.3 INJECTION SUBCUTANEOUS ONCE AS NEEDED
Status: CANCELLED | OUTPATIENT
Start: 2023-09-08

## 2023-09-08 RX ORDER — HEPARIN 100 UNIT/ML
500 SYRINGE INTRAVENOUS
Status: CANCELLED | OUTPATIENT
Start: 2023-09-08

## 2023-09-08 RX ORDER — EPINEPHRINE 0.3 MG/.3ML
0.3 INJECTION SUBCUTANEOUS ONCE AS NEEDED
Status: DISCONTINUED | OUTPATIENT
Start: 2023-09-08 | End: 2023-09-08 | Stop reason: HOSPADM

## 2023-09-08 RX ORDER — SODIUM CHLORIDE 0.9 % (FLUSH) 0.9 %
10 SYRINGE (ML) INJECTION
Status: CANCELLED | OUTPATIENT
Start: 2023-09-08

## 2023-09-08 RX ORDER — DIPHENHYDRAMINE HYDROCHLORIDE 50 MG/ML
50 INJECTION INTRAMUSCULAR; INTRAVENOUS ONCE AS NEEDED
Status: CANCELLED | OUTPATIENT
Start: 2023-09-08

## 2023-09-08 RX ADMIN — SODIUM CHLORIDE 200 MG: 9 INJECTION, SOLUTION INTRAVENOUS at 03:09

## 2023-09-08 RX ADMIN — SODIUM CHLORIDE: 9 INJECTION, SOLUTION INTRAVENOUS at 03:09

## 2023-09-08 NOTE — PLAN OF CARE
1440 Patient here for C9 keytruda. Labs, meds and hx reviewed. Patient seen in MD office today and orders signed for treatment today. PIV inserted and NS started at 25ml/hr. [place] and snack provided.

## 2023-09-08 NOTE — PROGRESS NOTES
"                                                         PROGRESS NOTE    Subjective:       Patient ID: Zac Plunkett is a 78 y.o. male.    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 JD. TMB 6.3    Oncologic History:  Prior patient of Dr Lucia's  2021  May              5/27- US right thigh: showed 2 hypoechoic masses in the subcutaneous tissues of the right anterior thigh. Largest measured 1.1 x 0.9 x 1 cm. Read at the time was concern for lymphadenopathy.   June 6/4 - CXR: "Bilateral reticular opacities are present in a perihilar distribution which appears slightly more pronounced compared to prior exams."               6/11 - underwent excisional biopsy of the right thigh; pathology came back positive for myxofibrosarcoma, high grade. Path a 1.8 x 1.1 x 0.9 cm mass with an adjacent 0.4 x 0.4 x 0.3 cm mass.   July/Aug              - IMRT with Dr. Mcadams  2022 January 1/12 - PET: locoregional recurrence and concerning 0.9 cm RUL lesion              1/20 - Path: excision on 2 recurrent lesions:  Myxofibrosarcoma, 1.9 cm at longest length, PD-L1 25%              1/31 - CT thigh: successful removal w/o evidence of recurrent disease  April 4/28 - recurrence, locally with metastatic deposit in LN. S/p excision on 4/28/22. Path: right thigh superior mass: Myxofibrosarcoma, high grade with focal tumor necrosis, incompletely excised. A portion of the lesion is suggestive of an involved lymph node exhibiting metastatic sarcoma with extranodal extension.  Sarcoma is focally present within inked but otherwise unspecified peripheral margins.   July 7/19 - CT thigh showed local recurrence and a 0.7 cm R inguinal lymph node    2023  [facility]   1/3 - CT thigh/pelvis showed "Heterogeneous enhancing soft tissue lesions " "and nodular foci at the anterior thigh and right groin highly concerning for worsening local recurrent disease with talon metastasis"    - stable pulmonary nodules on CT chest  - completed palliative radiation to right thigh  March  3/24- started keytruda, s/p 8 cycles    Interval History:   Mr Plunkett returns for follow up. Tumor is not growing. Pain controlled on oxycodone. Noted chills that last for 20 min once every few days. Checked his temp and no fever.    ECO    ROS:   A ten-point system review is obtained and negative except for what was stated in the Interval History.     Physical Examination:   Vital signs reviewed.   General: well hydrated, well developed, in no acute distress  HEENT: normocephalic, EOMI, anicteric sclerae  Neck: supple, no JVD, thyromegaly, cervical or supraclavicular lymphadenopathy  Lungs: clear breath sounds bilaterally, no wheezing, rales, or rhonchi  Heart: RRR, no M/R/G  Abdomen: soft, no tenderness, non-distended, no hepatosplenomegaly, mass, or hernia. BS present  Extremities: no edema  Skin: no rash, ulcer, or open wounds  Neuro: alert and oriented x 4, no focal neuro deficit  Psych: pleasant and appropriate mood and affect    Objective:     Laboratory Data:  Labs reviewed. Adequate for treatment. TSH was normal in the past    Imaging Data:  CT C/A/P 3/29/23:  Impression:     1. 0.8 x 2.8-cm (previously 3.0 x 3.9-cm) right inguinal mass (series 3, image 171). 2.9 x 5.6-cm (previously 2.4 x 5.0-cm) exophytic right inguinal mass.  2. Multiple pulmonary nodules throughout the bilateral lung fields that are predominantly all stable.  There are approximately 2 pulmonary nodules that were not definitively seen on prior exam which may be related to technique.  3. Single enlarged precarinal space lymph node and prominent bilateral hilar lymph nodes that are not enlarged by radiologic size criteria, not significantly changed.  4. Approximately 3 punctate hepatic " parenchymal hypodensities that are too small to accurately characterize.    CT right thigh 3/29/23:  Interval increase in size of subcutaneous soft tissue mass within the right superior thigh.     Centrilobular emphysema.     CT right thigh, chest/abdomen/pelvis 6/15/23:  Impression:     1. 0.9 x 2.0-cm (previously 1.9 x 2.9-cm) right inguinal mass.  0.9 x 3.0-cm (previously 2.8 x 5.7-cm) exophytic right inguinal mass.  2. Multiple pulmonary nodules throughout the bilateral lung fields with 3 that of appear to resolved in the interim and remainder of which are predominantly stable in size, density and distribution.  No appreciable new or enlarging pulmonary nodules.  3. Mediastinal and bilateral hilar lymphadenopathy, not significantly changed.  4. Only 1 of the previously noted 3 punctate hepatic parenchymal hypodensities is appreciated on current exam which is still too small to accurately characterize.  This report was flagged in Epic as abnormal.    Assessment and Plan:     1. 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 Essential hypertension    6. Cancer related pain    7. Lung nodules        1-4  - Mr Plunkett is a 79 yo man with recurrent myxofibrosarcoma of the right thigh, has had 3 resections and IMRT  - he was seen by Dr. Mao and Dr. Unger, they did not feel that he would be a good surgical candidate and recommended palliative RT  - completed palliative RT completed 2/13/2023  - Previously discussed systemic treatment options, including chemotherapy and immunotherapy. He was not interested in chemotherapy including TKI. Pembrolizumab has efficacy in phase II studies and is recommended on NCCN Guidelines for patients with myxofibrosarcoma. PD-L1 is 25%. However, his TMB is not high. Pembrolizumab was denied by insurance and then denied at hearing. He got patient assistance through  [employer]  - started palliative keytruda on 3/24/23, s/p 8 cycles  - last CT in June 2023 showed continued response  - c/w keytruda every 3 weeks. Cycle 9 today  - return in 3 weeks for cycle 10 and restaging scan prior  - asked patient to monitor his temperatures when he feels cold and to go to ER if he has a fever.     5.  - BP controlled  - c/w current medication    6.  - stable  - c/w oxycodone prn    7.  - monitor      Follow-up:     In 3 weeks    Ed Martines MD  Hematology and Medical Oncology  [facility]      Route Chart for Scheduling    Med Onc Chart Routing      Follow up with physician . Keep scheduled appts. see me on 10/20 with labs then keytruda   Follow up with NICOLLE    Infusion scheduling note   keytruda every 3 weeks   Injection scheduling note    Labs CBC, CMP and TSH   Scheduling:  Preferred lab:  Lab interval: every 3 weeks     Imaging    Pharmacy appointment    Other referrals        Treatment Plan Information   OP PEMBROLIZUMAB 200MG Q3W   Ed Martines MD   Upcoming Treatment Dates - OP PEMBROLIZUMAB 200MG Q3W    9/8/2023       Chemotherapy       pembrolizumab (KEYTRUDA) 200 mg in sodium chloride 0.9% SolP 108 mL infusion  9/29/2023       Chemotherapy       pembrolizumab (KEYTRUDA) 200 mg in sodium chloride 0.9% SolP 108 mL infusion  10/20/2023       Chemotherapy       pembrolizumab (KEYTRUDA) 200 mg in sodium chloride 0.9% SolP 108 mL infusion  11/10/2023       Chemotherapy       pembrolizumab (KEYTRUDA) 200 mg in sodium chloride 0.9% SolP 108 mL infusion

## 2023-09-08 NOTE — PLAN OF CARE
1600 Patient tolerated keytruda with no s/s of reaction and no complaints. PIV removed and catheter tip intact. He declined the AVS and ambulated out.

## 2023-09-21 DIAGNOSIS — G89.3 NEOPLASM RELATED PAIN: ICD-10-CM

## 2023-09-22 RX ORDER — OXYCODONE HYDROCHLORIDE 10 MG/1
10 TABLET ORAL EVERY 6 HOURS PRN
Qty: 120 TABLET | Refills: 0 | Status: SHIPPED | OUTPATIENT
Start: 2023-09-22 | End: 2023-10-18 | Stop reason: SDUPTHER

## 2023-09-26 ENCOUNTER — TELEPHONE (OUTPATIENT)
Dept: HEMATOLOGY/ONCOLOGY | Facility: CLINIC | Age: 78
End: 2023-09-26
Payer: MEDICARE

## 2023-09-26 NOTE — TELEPHONE ENCOUNTER
Patient said he has been fighting a cold - Sx started 8 days ago.   He said he's been feeling better past few days.    He will give it 2 more days and decide if he needs to resched .      ----- Message from Ghada Cabrera sent at 9/26/2023 11:03 AM CDT -----  Type:  Patient Returning Call    Who Called:Pt  Would the patient rather a call back or a response via [facility]? Call  Best Call Back Number:[phone]  Additional Information: pt states he has infusion appt on Friday. He states he doesn't feel really well and  wants to know if he should keep the appointment or reschedule. Please advise

## 2023-09-29 ENCOUNTER — OFFICE VISIT (OUTPATIENT)
Dept: HEMATOLOGY/ONCOLOGY | Facility: CLINIC | Age: 78
End: 2023-09-29
Payer: MEDICARE

## 2023-09-29 VITALS
WEIGHT: 163.56 LBS | OXYGEN SATURATION: 98 % | DIASTOLIC BLOOD PRESSURE: 62 MMHG | HEIGHT: 71 IN | HEART RATE: 63 BPM | BODY MASS INDEX: 22.9 KG/M2 | SYSTOLIC BLOOD PRESSURE: 136 MMHG | TEMPERATURE: 98 F | RESPIRATION RATE: 18 BRPM

## 2023-09-29 DIAGNOSIS — R91.8 LUNG NODULES: ICD-10-CM

## 2023-09-29 DIAGNOSIS — C77.4 SECONDARY MALIGNANCY OF INGUINAL LYMPH NODES: ICD-10-CM

## 2023-09-29 DIAGNOSIS — D49.9 IMMUNODEFICIENCY SECONDARY TO NEOPLASM: ICD-10-CM

## 2023-09-29 DIAGNOSIS — G89.3 CANCER RELATED PAIN: ICD-10-CM

## 2023-09-29 DIAGNOSIS — C49.9 PRIMARY MYXOFIBROSARCOMA: Primary | ICD-10-CM

## 2023-09-29 DIAGNOSIS — R53.83 FATIGUE, UNSPECIFIED TYPE: ICD-10-CM

## 2023-09-29 DIAGNOSIS — D84.81 IMMUNODEFICIENCY SECONDARY TO NEOPLASM: ICD-10-CM

## 2023-09-29 DIAGNOSIS — I10 ESSENTIAL HYPERTENSION: ICD-10-CM

## 2023-09-29 DIAGNOSIS — Z79.899 IMMUNODEFICIENCY DUE TO DRUGS: ICD-10-CM

## 2023-09-29 DIAGNOSIS — D84.821 IMMUNODEFICIENCY DUE TO DRUGS: ICD-10-CM

## 2023-09-29 PROCEDURE — 99215 OFFICE O/P EST HI 40 MIN: CPT | Mod: HCNC,S$GLB,, | Performed by: INTERNAL MEDICINE

## 2023-09-29 PROCEDURE — 1160F RVW MEDS BY RX/DR IN RCRD: CPT | Mod: HCNC,CPTII,S$GLB, | Performed by: INTERNAL MEDICINE

## 2023-09-29 PROCEDURE — 3288F FALL RISK ASSESSMENT DOCD: CPT | Mod: HCNC,CPTII,S$GLB, | Performed by: INTERNAL MEDICINE

## 2023-09-29 PROCEDURE — 1159F MED LIST DOCD IN RCRD: CPT | Mod: HCNC,CPTII,S$GLB, | Performed by: INTERNAL MEDICINE

## 2023-09-29 PROCEDURE — 1159F PR MEDICATION LIST DOCUMENTED IN MEDICAL RECORD: ICD-10-PCS | Mod: HCNC,CPTII,S$GLB, | Performed by: INTERNAL MEDICINE

## 2023-09-29 PROCEDURE — 1101F PR PT FALLS ASSESS DOC 0-1 FALLS W/OUT INJ PAST YR: ICD-10-PCS | Mod: HCNC,CPTII,S$GLB, | Performed by: INTERNAL MEDICINE

## 2023-09-29 PROCEDURE — 1125F AMNT PAIN NOTED PAIN PRSNT: CPT | Mod: HCNC,CPTII,S$GLB, | Performed by: INTERNAL MEDICINE

## 2023-09-29 PROCEDURE — 1101F PT FALLS ASSESS-DOCD LE1/YR: CPT | Mod: HCNC,CPTII,S$GLB, | Performed by: INTERNAL MEDICINE

## 2023-09-29 PROCEDURE — 3078F PR MOST RECENT DIASTOLIC BLOOD PRESSURE < 80 MM HG: ICD-10-PCS | Mod: HCNC,CPTII,S$GLB, | Performed by: INTERNAL MEDICINE

## 2023-09-29 PROCEDURE — 99215 PR OFFICE/OUTPT VISIT, EST, LEVL V, 40-54 MIN: ICD-10-PCS | Mod: HCNC,S$GLB,, | Performed by: INTERNAL MEDICINE

## 2023-09-29 PROCEDURE — 99999 PR PBB SHADOW E&M-EST. PATIENT-LVL IV: ICD-10-PCS | Mod: PBBFAC,HCNC,, | Performed by: INTERNAL MEDICINE

## 2023-09-29 PROCEDURE — 3078F DIAST BP <80 MM HG: CPT | Mod: HCNC,CPTII,S$GLB, | Performed by: INTERNAL MEDICINE

## 2023-09-29 PROCEDURE — 3075F SYST BP GE 130 - 139MM HG: CPT | Mod: HCNC,CPTII,S$GLB, | Performed by: INTERNAL MEDICINE

## 2023-09-29 PROCEDURE — 3075F PR MOST RECENT SYSTOLIC BLOOD PRESS GE 130-139MM HG: ICD-10-PCS | Mod: HCNC,CPTII,S$GLB, | Performed by: INTERNAL MEDICINE

## 2023-09-29 PROCEDURE — 99999 PR PBB SHADOW E&M-EST. PATIENT-LVL IV: CPT | Mod: PBBFAC,HCNC,, | Performed by: INTERNAL MEDICINE

## 2023-09-29 PROCEDURE — 1160F PR REVIEW ALL MEDS BY PRESCRIBER/CLIN PHARMACIST DOCUMENTED: ICD-10-PCS | Mod: HCNC,CPTII,S$GLB, | Performed by: INTERNAL MEDICINE

## 2023-09-29 PROCEDURE — 3288F PR FALLS RISK ASSESSMENT DOCUMENTED: ICD-10-PCS | Mod: HCNC,CPTII,S$GLB, | Performed by: INTERNAL MEDICINE

## 2023-09-29 PROCEDURE — 1125F PR PAIN SEVERITY QUANTIFIED, PAIN PRESENT: ICD-10-PCS | Mod: HCNC,CPTII,S$GLB, | Performed by: INTERNAL MEDICINE

## 2023-09-29 RX ORDER — SODIUM CHLORIDE 0.9 % (FLUSH) 0.9 %
10 SYRINGE (ML) INJECTION
Status: CANCELLED | OUTPATIENT
Start: 2023-09-29

## 2023-09-29 RX ORDER — DIPHENHYDRAMINE HYDROCHLORIDE 50 MG/ML
50 INJECTION INTRAMUSCULAR; INTRAVENOUS ONCE AS NEEDED
Status: CANCELLED | OUTPATIENT
Start: 2023-09-29

## 2023-09-29 RX ORDER — EPINEPHRINE 0.3 MG/.3ML
0.3 INJECTION SUBCUTANEOUS ONCE AS NEEDED
Status: CANCELLED | OUTPATIENT
Start: 2023-09-29

## 2023-09-29 RX ORDER — HEPARIN 100 UNIT/ML
500 SYRINGE INTRAVENOUS
Status: CANCELLED | OUTPATIENT
Start: 2023-09-29

## 2023-09-29 NOTE — PROGRESS NOTES
"                                                         PROGRESS NOTE    Subjective:       Patient ID: Zac Plunkett is a 78 y.o. male.    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 JD. TMB 6.3    Oncologic History:  Prior patient of Dr Lucia's  2021  May              5/27- US right thigh: showed 2 hypoechoic masses in the subcutaneous tissues of the right anterior thigh. Largest measured 1.1 x 0.9 x 1 cm. Read at the time was concern for lymphadenopathy.   June 6/4 - CXR: "Bilateral reticular opacities are present in a perihilar distribution which appears slightly more pronounced compared to prior exams."               6/11 - underwent excisional biopsy of the right thigh; pathology came back positive for myxofibrosarcoma, high grade. Path a 1.8 x 1.1 x 0.9 cm mass with an adjacent 0.4 x 0.4 x 0.3 cm mass.   July/Aug              - IMRT with Dr. Mcadams  2022 January 1/12 - PET: locoregional recurrence and concerning 0.9 cm RUL lesion              1/20 - Path: excision on 2 recurrent lesions:  Myxofibrosarcoma, 1.9 cm at longest length, PD-L1 25%              1/31 - CT thigh: successful removal w/o evidence of recurrent disease  April 4/28 - recurrence, locally with metastatic deposit in LN. S/p excision on 4/28/22. Path: right thigh superior mass: Myxofibrosarcoma, high grade with focal tumor necrosis, incompletely excised. A portion of the lesion is suggestive of an involved lymph node exhibiting metastatic sarcoma with extranodal extension.  Sarcoma is focally present within inked but otherwise unspecified peripheral margins.   July 7/19 - CT thigh showed local recurrence and a 0.7 cm R inguinal lymph node    2023  [facility]   1/3 - CT thigh/pelvis showed "Heterogeneous enhancing soft tissue lesions " "and nodular foci at the anterior thigh and right groin highly concerning for worsening local recurrent disease with talon metastasis"    - stable pulmonary nodules on CT chest  - completed palliative radiation to right thigh  March  3/24- started keytruda, s/p 9 cycles    Interval History:   Mr Plunkett returns for follow up. Got a cold 1 1/2 weeks ago and has been feeling weak. Improving every day but slow. Feels very tired.     ECO    ROS:   A ten-point system review is obtained and negative except for what was stated in the Interval History.     Physical Examination:   Vital signs reviewed.   General: well hydrated, well developed, in no acute distress  HEENT: normocephalic, EOMI, anicteric sclerae  Neck: supple, no JVD, thyromegaly, cervical or supraclavicular lymphadenopathy  Lungs: clear breath sounds bilaterally, no wheezing, rales, or rhonchi  Heart: RRR, no M/R/G  Abdomen: soft, no tenderness, non-distended, no hepatosplenomegaly, mass, or hernia. BS present  Extremities: no edema  Skin: no rash, ulcer, or open wounds  Neuro: alert and oriented x 4, no focal neuro deficit  Psych: pleasant and appropriate mood and affect    Objective:     Laboratory Data:  Labs reviewed. Adequate for treatment.     Imaging Data:  CT C/A/P 3/29/23:  Impression:     1. 0.8 x 2.8-cm (previously 3.0 x 3.9-cm) right inguinal mass (series 3, image 171). 2.9 x 5.6-cm (previously 2.4 x 5.0-cm) exophytic right inguinal mass.  2. Multiple pulmonary nodules throughout the bilateral lung fields that are predominantly all stable.  There are approximately 2 pulmonary nodules that were not definitively seen on prior exam which may be related to technique.  3. Single enlarged precarinal space lymph node and prominent bilateral hilar lymph nodes that are not enlarged by radiologic size criteria, not significantly changed.  4. Approximately 3 punctate hepatic parenchymal hypodensities that are too small to accurately " characterize.    CT right thigh 3/29/23:  Interval increase in size of subcutaneous soft tissue mass within the right superior thigh.     Centrilobular emphysema.     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     FINDINGS:  The visualized intra-abdominal content is unremarkable.  There is a small sebaceous cyst involving the right inferior buttocks.  There is significant scattered arterial plaque.  There is fatty stranding believed to represent postoperative change involving the anterior superior right thigh.  This is within the area of previously described exophytic mass.  There is no evidence of residual mass.  There is significant scattered arterial plaque.  There is a knee joint effusion.     Impression:     No evidence of recurrent or residual mass.     CT C/A/P 9/28/23:  FINDINGS:  The visualized portion of the base of the lungs is significant for a subcentimeter hypodensity within the left lobe of the thyroid too small to characterize.  The visualized portion of the mediastinum including the heart and great vessels is significant for calcification of the aorta, aortic annulus, and coronary arteries.  The trachea is patent and free of any intraluminal filling defects.  The lungs are well expanded.  There is bilateral apical pleural thickening and/or scar.  There is centrilobular emphysema.  Scattered pulmonary nodules are unchanged compared to previous performed 06/15/2023.  There is bilateral dependent atelectatic versus fibrotic change.  There is no pleural or pericardial fluid.     The stomach is unremarkable.  There is a large duodenal diverticulum.  The spleen is unremarkable.  There is fatty atrophy of the pancreas.  The liver, gallbladder, and adrenal glands are unremarkable.  There is abdominal aortic plaque which  extends into its branches.  There is severe narrowing of the left common iliac artery.  The right kidney is unremarkable.  There is a large left renal cyst.  The bladder is unremarkable.  There is a left inguinal hernia containing a small short segment of bowel.  The bowel is significant for diverticulosis coli.  The appendix is not visualized.  The osseous structures demonstrate degenerative change.  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  There is grade 1 anterolisthesis of L4 in relation to L5.     Impression:     No evidence of metastatic disease.       Assessment and Plan:     1. 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 Essential hypertension    6. Cancer related pain    7. Lung nodules    8. Fatigue, unspecified type        1-4  - Mr Plunkett is a 79 yo man with recurrent myxofibrosarcoma of the right thigh, has had 3 resections and IMRT  - he was seen by Dr. Mao and Dr. Unger, they did not feel that he would be a good surgical candidate and recommended palliative RT  - completed palliative RT 2/13/2023  - Previously discussed systemic treatment options, including chemotherapy and immunotherapy. He was not interested in chemotherapy including TKI. Pembrolizumab has efficacy in phase II studies and is recommended on NCCN Guidelines for patients with myxofibrosarcoma. PD-L1 is 25%. However, his TMB is not high. Pembrolizumab was denied by insurance and then denied at hearing. He got patient assistance through [employer]  - started palliative keytruda on 3/24/23, s/p 9 cycles  - reviewed CT scan results. Complete response  - hold keytruda today given that he feels week after a viral infection  - RTC in 2-3 weeks for cycle 10    5.  - BP controlled  - c/w current medication    6.  - improving  - c/w oxycodone prn    7.  - monitor    8.  - monitor TSH when on keytruda  Follow-up:     In 2-3  weeks    Ed Martines MD  Hematology and Medical Oncology  [facility]      Route Chart for Scheduling    Med Onc Chart Routing      Follow up with physician 3 weeks. return for first available keytruda with CBC, CMP, TSH, see me. then see me every 3 weeks with CBC, CMP, TSH, then keytruda   Follow up with NICOLLE    Infusion scheduling note   keytruda every 3 weeks   Injection scheduling note    Labs CBC, CMP and TSH   Scheduling:  Preferred lab:  Lab interval: every 3 weeks     Imaging    Pharmacy appointment    Other referrals            Treatment Plan Information   OP PEMBROLIZUMAB 200MG Q3W   Ed Martines MD   Upcoming Treatment Dates - OP PEMBROLIZUMAB 200MG Q3W    9/29/2023       Chemotherapy       pembrolizumab (KEYTRUDA) 200 mg in sodium chloride 0.9% SolP 108 mL infusion  10/20/2023       Chemotherapy       pembrolizumab (KEYTRUDA) 200 mg in sodium chloride 0.9% SolP 108 mL infusion  11/10/2023       Chemotherapy       pembrolizumab (KEYTRUDA) 200 mg in sodium chloride 0.9% SolP 108 mL infusion  12/1/2023       Chemotherapy       pembrolizumab (KEYTRUDA) 200 mg in sodium chloride 0.9% SolP 108 mL infusion

## 2023-10-18 DIAGNOSIS — G89.3 NEOPLASM RELATED PAIN: ICD-10-CM

## 2023-10-19 RX ORDER — OXYCODONE HYDROCHLORIDE 10 MG/1
10 TABLET ORAL EVERY 6 HOURS PRN
Qty: 120 TABLET | Refills: 0 | Status: SHIPPED | OUTPATIENT
Start: 2023-10-19 | End: 2023-11-21 | Stop reason: SDUPTHER

## 2023-10-20 ENCOUNTER — INFUSION (OUTPATIENT)
Dept: INFUSION THERAPY | Facility: HOSPITAL | Age: 78
End: 2023-10-20
Attending: INTERNAL MEDICINE
Payer: MEDICARE

## 2023-10-20 ENCOUNTER — OFFICE VISIT (OUTPATIENT)
Dept: HEMATOLOGY/ONCOLOGY | Facility: CLINIC | Age: 78
End: 2023-10-20
Payer: MEDICARE

## 2023-10-20 VITALS
DIASTOLIC BLOOD PRESSURE: 67 MMHG | TEMPERATURE: 98 F | RESPIRATION RATE: 18 BRPM | HEART RATE: 58 BPM | BODY MASS INDEX: 22.35 KG/M2 | WEIGHT: 159.63 LBS | SYSTOLIC BLOOD PRESSURE: 147 MMHG | HEIGHT: 71 IN

## 2023-10-20 VITALS
RESPIRATION RATE: 18 BRPM | WEIGHT: 159.75 LBS | BODY MASS INDEX: 22.36 KG/M2 | HEIGHT: 71 IN | OXYGEN SATURATION: 100 % | SYSTOLIC BLOOD PRESSURE: 166 MMHG | HEART RATE: 56 BPM | TEMPERATURE: 98 F | DIASTOLIC BLOOD PRESSURE: 74 MMHG

## 2023-10-20 DIAGNOSIS — C49.9 SOFT TISSUE SARCOMA: ICD-10-CM

## 2023-10-20 DIAGNOSIS — D84.81 IMMUNODEFICIENCY SECONDARY TO NEOPLASM: ICD-10-CM

## 2023-10-20 DIAGNOSIS — C49.9 PRIMARY MYXOFIBROSARCOMA: Primary | ICD-10-CM

## 2023-10-20 DIAGNOSIS — G89.3 CANCER RELATED PAIN: ICD-10-CM

## 2023-10-20 DIAGNOSIS — R91.8 LUNG NODULES: ICD-10-CM

## 2023-10-20 DIAGNOSIS — C77.4 SECONDARY MALIGNANCY OF INGUINAL LYMPH NODES: ICD-10-CM

## 2023-10-20 DIAGNOSIS — R53.83 FATIGUE, UNSPECIFIED TYPE: ICD-10-CM

## 2023-10-20 DIAGNOSIS — I10 ESSENTIAL HYPERTENSION: ICD-10-CM

## 2023-10-20 DIAGNOSIS — D84.821 IMMUNODEFICIENCY DUE TO DRUGS: ICD-10-CM

## 2023-10-20 DIAGNOSIS — D49.9 IMMUNODEFICIENCY SECONDARY TO NEOPLASM: ICD-10-CM

## 2023-10-20 DIAGNOSIS — Z79.899 IMMUNODEFICIENCY DUE TO DRUGS: ICD-10-CM

## 2023-10-20 PROCEDURE — 63600175 PHARM REV CODE 636 W HCPCS: Mod: JZ,JG,HCNC | Performed by: INTERNAL MEDICINE

## 2023-10-20 PROCEDURE — 1159F MED LIST DOCD IN RCRD: CPT | Mod: HCNC,CPTII,S$GLB, | Performed by: INTERNAL MEDICINE

## 2023-10-20 PROCEDURE — 25000003 PHARM REV CODE 250: Mod: HCNC | Performed by: INTERNAL MEDICINE

## 2023-10-20 PROCEDURE — 1159F PR MEDICATION LIST DOCUMENTED IN MEDICAL RECORD: ICD-10-PCS | Mod: HCNC,CPTII,S$GLB, | Performed by: INTERNAL MEDICINE

## 2023-10-20 PROCEDURE — 99215 OFFICE O/P EST HI 40 MIN: CPT | Mod: HCNC,S$GLB,, | Performed by: INTERNAL MEDICINE

## 2023-10-20 PROCEDURE — 3288F PR FALLS RISK ASSESSMENT DOCUMENTED: ICD-10-PCS | Mod: HCNC,CPTII,S$GLB, | Performed by: INTERNAL MEDICINE

## 2023-10-20 PROCEDURE — 1101F PR PT FALLS ASSESS DOC 0-1 FALLS W/OUT INJ PAST YR: ICD-10-PCS | Mod: HCNC,CPTII,S$GLB, | Performed by: INTERNAL MEDICINE

## 2023-10-20 PROCEDURE — 1126F AMNT PAIN NOTED NONE PRSNT: CPT | Mod: HCNC,CPTII,S$GLB, | Performed by: INTERNAL MEDICINE

## 2023-10-20 PROCEDURE — 3077F SYST BP >= 140 MM HG: CPT | Mod: HCNC,CPTII,S$GLB, | Performed by: INTERNAL MEDICINE

## 2023-10-20 PROCEDURE — 99999 PR PBB SHADOW E&M-EST. PATIENT-LVL IV: CPT | Mod: PBBFAC,HCNC,, | Performed by: INTERNAL MEDICINE

## 2023-10-20 PROCEDURE — 3078F DIAST BP <80 MM HG: CPT | Mod: HCNC,CPTII,S$GLB, | Performed by: INTERNAL MEDICINE

## 2023-10-20 PROCEDURE — 99999 PR PBB SHADOW E&M-EST. PATIENT-LVL IV: ICD-10-PCS | Mod: PBBFAC,HCNC,, | Performed by: INTERNAL MEDICINE

## 2023-10-20 PROCEDURE — 1160F PR REVIEW ALL MEDS BY PRESCRIBER/CLIN PHARMACIST DOCUMENTED: ICD-10-PCS | Mod: HCNC,CPTII,S$GLB, | Performed by: INTERNAL MEDICINE

## 2023-10-20 PROCEDURE — 1160F RVW MEDS BY RX/DR IN RCRD: CPT | Mod: HCNC,CPTII,S$GLB, | Performed by: INTERNAL MEDICINE

## 2023-10-20 PROCEDURE — 3288F FALL RISK ASSESSMENT DOCD: CPT | Mod: HCNC,CPTII,S$GLB, | Performed by: INTERNAL MEDICINE

## 2023-10-20 PROCEDURE — 1126F PR PAIN SEVERITY QUANTIFIED, NO PAIN PRESENT: ICD-10-PCS | Mod: HCNC,CPTII,S$GLB, | Performed by: INTERNAL MEDICINE

## 2023-10-20 PROCEDURE — 3077F PR MOST RECENT SYSTOLIC BLOOD PRESSURE >= 140 MM HG: ICD-10-PCS | Mod: HCNC,CPTII,S$GLB, | Performed by: INTERNAL MEDICINE

## 2023-10-20 PROCEDURE — 99215 PR OFFICE/OUTPT VISIT, EST, LEVL V, 40-54 MIN: ICD-10-PCS | Mod: HCNC,S$GLB,, | Performed by: INTERNAL MEDICINE

## 2023-10-20 PROCEDURE — 3078F PR MOST RECENT DIASTOLIC BLOOD PRESSURE < 80 MM HG: ICD-10-PCS | Mod: HCNC,CPTII,S$GLB, | Performed by: INTERNAL MEDICINE

## 2023-10-20 PROCEDURE — 1101F PT FALLS ASSESS-DOCD LE1/YR: CPT | Mod: HCNC,CPTII,S$GLB, | Performed by: INTERNAL MEDICINE

## 2023-10-20 PROCEDURE — 96413 CHEMO IV INFUSION 1 HR: CPT | Mod: HCNC

## 2023-10-20 RX ORDER — DIPHENHYDRAMINE HYDROCHLORIDE 50 MG/ML
50 INJECTION INTRAMUSCULAR; INTRAVENOUS ONCE AS NEEDED
Status: DISCONTINUED | OUTPATIENT
Start: 2023-10-20 | End: 2023-10-20 | Stop reason: HOSPADM

## 2023-10-20 RX ORDER — SODIUM CHLORIDE 0.9 % (FLUSH) 0.9 %
10 SYRINGE (ML) INJECTION
Status: DISCONTINUED | OUTPATIENT
Start: 2023-10-20 | End: 2023-10-20 | Stop reason: HOSPADM

## 2023-10-20 RX ORDER — EPINEPHRINE 0.3 MG/.3ML
0.3 INJECTION SUBCUTANEOUS ONCE AS NEEDED
Status: DISCONTINUED | OUTPATIENT
Start: 2023-10-20 | End: 2023-10-20 | Stop reason: HOSPADM

## 2023-10-20 RX ORDER — HEPARIN 100 UNIT/ML
500 SYRINGE INTRAVENOUS
Status: DISCONTINUED | OUTPATIENT
Start: 2023-10-20 | End: 2023-10-20 | Stop reason: HOSPADM

## 2023-10-20 RX ADMIN — SODIUM CHLORIDE 200 MG: 9 INJECTION, SOLUTION INTRAVENOUS at 01:10

## 2023-10-20 NOTE — PROGRESS NOTES
"                                                         PROGRESS NOTE    Subjective:       Patient ID: Zac Plunkett is a 78 y.o. male.    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 JD. TMB 6.3    Oncologic History:  Prior patient of Dr Lucia's  2021  May              5/27- US right thigh: showed 2 hypoechoic masses in the subcutaneous tissues of the right anterior thigh. Largest measured 1.1 x 0.9 x 1 cm. Read at the time was concern for lymphadenopathy.   June 6/4 - CXR: "Bilateral reticular opacities are present in a perihilar distribution which appears slightly more pronounced compared to prior exams."               6/11 - underwent excisional biopsy of the right thigh; pathology came back positive for myxofibrosarcoma, high grade. Path a 1.8 x 1.1 x 0.9 cm mass with an adjacent 0.4 x 0.4 x 0.3 cm mass.   July/Aug              - IMRT with Dr. Mcadams  2022 January 1/12 - PET: locoregional recurrence and concerning 0.9 cm RUL lesion              1/20 - Path: excision on 2 recurrent lesions:  Myxofibrosarcoma, 1.9 cm at longest length, PD-L1 25%              1/31 - CT thigh: successful removal w/o evidence of recurrent disease  April 4/28 - recurrence, locally with metastatic deposit in LN. S/p excision on 4/28/22. Path: right thigh superior mass: Myxofibrosarcoma, high grade with focal tumor necrosis, incompletely excised. A portion of the lesion is suggestive of an involved lymph node exhibiting metastatic sarcoma with extranodal extension.  Sarcoma is focally present within inked but otherwise unspecified peripheral margins.   July 7/19 - CT thigh showed local recurrence and a 0.7 cm R inguinal lymph node    2023  [facility]   1/3 - CT thigh/pelvis showed "Heterogeneous enhancing soft tissue lesions " "and nodular foci at the anterior thigh and right groin highly concerning for worsening local recurrent disease with talon metastasis"    - stable pulmonary nodules on CT chest  - completed palliative radiation to right thigh  March  3/24- started keytruda, s/p 9 cycles    Interval History:   Mr Plunkett returns for follow up. Feeling well. Pain stable    ECO    ROS:   A ten-point system review is obtained and negative except for what was stated in the Interval History.     Physical Examination:   Vital signs reviewed.   General: well hydrated, well developed, in no acute distress  HEENT: normocephalic, EOMI, anicteric sclerae  Neck: supple, no JVD, thyromegaly, cervical or supraclavicular lymphadenopathy  Lungs: clear breath sounds bilaterally, no wheezing, rales, or rhonchi  Heart: RRR, no M/R/G  Abdomen: soft, no tenderness, non-distended, no hepatosplenomegaly, mass, or hernia. BS present  Extremities: no edema  Skin: no rash, ulcer, or open wounds  Neuro: alert and oriented x 4, no focal neuro deficit  Psych: pleasant and appropriate mood and affect    Objective:     Laboratory Data:  Labs reviewed. Adequate for treatment.     Imaging Data:  CT C/A/P 3/29/23:  Impression:     1. 0.8 x 2.8-cm (previously 3.0 x 3.9-cm) right inguinal mass (series 3, image 171). 2.9 x 5.6-cm (previously 2.4 x 5.0-cm) exophytic right inguinal mass.  2. Multiple pulmonary nodules throughout the bilateral lung fields that are predominantly all stable.  There are approximately 2 pulmonary nodules that were not definitively seen on prior exam which may be related to technique.  3. Single enlarged precarinal space lymph node and prominent bilateral hilar lymph nodes that are not enlarged by radiologic size criteria, not significantly changed.  4. Approximately 3 punctate hepatic parenchymal hypodensities that are too small to accurately characterize.    CT right thigh 3/29/23:  Interval increase in size of subcutaneous " soft tissue mass within the right superior thigh.     Centrilobular emphysema.     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     FINDINGS:  The visualized intra-abdominal content is unremarkable.  There is a small sebaceous cyst involving the right inferior buttocks.  There is significant scattered arterial plaque.  There is fatty stranding believed to represent postoperative change involving the anterior superior right thigh.  This is within the area of previously described exophytic mass.  There is no evidence of residual mass.  There is significant scattered arterial plaque.  There is a knee joint effusion.     Impression:     No evidence of recurrent or residual mass.     CT C/A/P 9/28/23:  FINDINGS:  The visualized portion of the base of the lungs is significant for a subcentimeter hypodensity within the left lobe of the thyroid too small to characterize.  The visualized portion of the mediastinum including the heart and great vessels is significant for calcification of the aorta, aortic annulus, and coronary arteries.  The trachea is patent and free of any intraluminal filling defects.  The lungs are well expanded.  There is bilateral apical pleural thickening and/or scar.  There is centrilobular emphysema.  Scattered pulmonary nodules are unchanged compared to previous performed 06/15/2023.  There is bilateral dependent atelectatic versus fibrotic change.  There is no pleural or pericardial fluid.     The stomach is unremarkable.  There is a large duodenal diverticulum.  The spleen is unremarkable.  There is fatty atrophy of the pancreas.  The liver, gallbladder, and adrenal glands are unremarkable.  There is abdominal aortic plaque which extends into its branches.  There is severe narrowing of the left common iliac artery.   The right kidney is unremarkable.  There is a large left renal cyst.  The bladder is unremarkable.  There is a left inguinal hernia containing a small short segment of bowel.  The bowel is significant for diverticulosis coli.  The appendix is not visualized.  The osseous structures demonstrate degenerative change.  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  There is grade 1 anterolisthesis of L4 in relation to L5.     Impression:     No evidence of metastatic disease.       Assessment and Plan:     1. Primary myxofibrosarcoma    2. 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 Essential hypertension    7. Cancer related pain    8. Lung nodules    9. Fatigue, unspecified type      1-5  - Mr Plunkett is a 77 yo man with recurrent myxofibrosarcoma of the right thigh, has had 3 resections and IMRT  - he was seen by Dr. aMo and Dr. Unger, they did not feel that he would be a good surgical candidate and recommended palliative RT  - completed palliative RT 2/13/2023  - Previously discussed systemic treatment options, including chemotherapy and immunotherapy. He was not interested in chemotherapy including TKI. Pembrolizumab has efficacy in phase II studies and is recommended on NCCN Guidelines for patients with myxofibrosarcoma. PD-L1 is 25%. However, his TMB is not high. Pembrolizumab was denied by insurance and then denied at hearing. He got patient assistance through [employer]  - started palliative keytruda on 3/24/23, s/p 9 cycles. He has had a complete response  - doing well. Cycle 10 keytruda today  - return in 3 weeks for cycle 11    6.  - Did not take BP meds this morning. Asked patient to resume BP meds and monitor BP at home. Patient understands and agrees with the plan.     7.  - stable  - c/w oxycodone prn    8.  - monitor    9.  - monitor TSH when on keytruda  Follow-up:     In 3 weeks    Ed Martines  MD  Hematology and Medical Oncology  [facility]      Route Chart for Scheduling    Med Onc Chart Routing      Follow up with physician . Keep scheduled appointments   Follow up with NICOLLE    Infusion scheduling note    Injection scheduling note    Labs    Imaging    Pharmacy appointment    Other referrals            Treatment Plan Information   OP PEMBROLIZUMAB 200MG Q3W   Ed Martines MD   Upcoming Treatment Dates - OP PEMBROLIZUMAB 200MG Q3W    9/29/2023       Chemotherapy       pembrolizumab (KEYTRUDA) 200 mg in sodium chloride 0.9% SolP 108 mL infusion  10/20/2023       Chemotherapy       pembrolizumab (KEYTRUDA) 200 mg in sodium chloride 0.9% SolP 108 mL infusion  11/10/2023       Chemotherapy       pembrolizumab (KEYTRUDA) 200 mg in sodium chloride 0.9% SolP 108 mL infusion  12/1/2023       Chemotherapy       pembrolizumab (KEYTRUDA) 200 mg in sodium chloride 0.9% SolP 108 mL infusion

## 2023-11-10 ENCOUNTER — OFFICE VISIT (OUTPATIENT)
Dept: HEMATOLOGY/ONCOLOGY | Facility: CLINIC | Age: 78
End: 2023-11-10
Payer: MEDICARE

## 2023-11-10 ENCOUNTER — INFUSION (OUTPATIENT)
Dept: INFUSION THERAPY | Facility: HOSPITAL | Age: 78
End: 2023-11-10
Attending: INTERNAL MEDICINE
Payer: MEDICARE

## 2023-11-10 ENCOUNTER — TELEPHONE (OUTPATIENT)
Dept: HEMATOLOGY/ONCOLOGY | Facility: CLINIC | Age: 78
End: 2023-11-10

## 2023-11-10 VITALS
SYSTOLIC BLOOD PRESSURE: 133 MMHG | HEIGHT: 71 IN | HEART RATE: 61 BPM | WEIGHT: 163.25 LBS | OXYGEN SATURATION: 99 % | BODY MASS INDEX: 22.85 KG/M2 | DIASTOLIC BLOOD PRESSURE: 64 MMHG | RESPIRATION RATE: 18 BRPM

## 2023-11-10 VITALS
HEART RATE: 61 BPM | SYSTOLIC BLOOD PRESSURE: 139 MMHG | DIASTOLIC BLOOD PRESSURE: 66 MMHG | BODY MASS INDEX: 22.85 KG/M2 | RESPIRATION RATE: 18 BRPM | WEIGHT: 163.25 LBS | TEMPERATURE: 98 F | HEIGHT: 71 IN

## 2023-11-10 DIAGNOSIS — D49.9 IMMUNODEFICIENCY SECONDARY TO NEOPLASM: ICD-10-CM

## 2023-11-10 DIAGNOSIS — C49.9 SOFT TISSUE SARCOMA: ICD-10-CM

## 2023-11-10 DIAGNOSIS — C49.9 PRIMARY MYXOFIBROSARCOMA: Primary | ICD-10-CM

## 2023-11-10 DIAGNOSIS — D84.821 IMMUNODEFICIENCY DUE TO DRUGS: ICD-10-CM

## 2023-11-10 DIAGNOSIS — D84.81 IMMUNODEFICIENCY SECONDARY TO NEOPLASM: ICD-10-CM

## 2023-11-10 DIAGNOSIS — I10 ESSENTIAL HYPERTENSION: ICD-10-CM

## 2023-11-10 DIAGNOSIS — G89.3 CANCER RELATED PAIN: ICD-10-CM

## 2023-11-10 DIAGNOSIS — R91.8 LUNG NODULES: ICD-10-CM

## 2023-11-10 DIAGNOSIS — Z79.899 IMMUNODEFICIENCY DUE TO DRUGS: ICD-10-CM

## 2023-11-10 DIAGNOSIS — C77.4 SECONDARY MALIGNANCY OF INGUINAL LYMPH NODES: ICD-10-CM

## 2023-11-10 PROCEDURE — 1159F PR MEDICATION LIST DOCUMENTED IN MEDICAL RECORD: ICD-10-PCS | Mod: HCNC,CPTII,S$GLB, | Performed by: INTERNAL MEDICINE

## 2023-11-10 PROCEDURE — 1101F PR PT FALLS ASSESS DOC 0-1 FALLS W/OUT INJ PAST YR: ICD-10-PCS | Mod: HCNC,CPTII,S$GLB, | Performed by: INTERNAL MEDICINE

## 2023-11-10 PROCEDURE — 63600175 PHARM REV CODE 636 W HCPCS: Mod: JZ,JG,HCNC | Performed by: INTERNAL MEDICINE

## 2023-11-10 PROCEDURE — 99215 PR OFFICE/OUTPT VISIT, EST, LEVL V, 40-54 MIN: ICD-10-PCS | Mod: HCNC,S$GLB,, | Performed by: INTERNAL MEDICINE

## 2023-11-10 PROCEDURE — 3078F DIAST BP <80 MM HG: CPT | Mod: HCNC,CPTII,S$GLB, | Performed by: INTERNAL MEDICINE

## 2023-11-10 PROCEDURE — 3288F PR FALLS RISK ASSESSMENT DOCUMENTED: ICD-10-PCS | Mod: HCNC,CPTII,S$GLB, | Performed by: INTERNAL MEDICINE

## 2023-11-10 PROCEDURE — 3288F FALL RISK ASSESSMENT DOCD: CPT | Mod: HCNC,CPTII,S$GLB, | Performed by: INTERNAL MEDICINE

## 2023-11-10 PROCEDURE — 3078F PR MOST RECENT DIASTOLIC BLOOD PRESSURE < 80 MM HG: ICD-10-PCS | Mod: HCNC,CPTII,S$GLB, | Performed by: INTERNAL MEDICINE

## 2023-11-10 PROCEDURE — 99215 OFFICE O/P EST HI 40 MIN: CPT | Mod: HCNC,S$GLB,, | Performed by: INTERNAL MEDICINE

## 2023-11-10 PROCEDURE — 1126F PR PAIN SEVERITY QUANTIFIED, NO PAIN PRESENT: ICD-10-PCS | Mod: HCNC,CPTII,S$GLB, | Performed by: INTERNAL MEDICINE

## 2023-11-10 PROCEDURE — 3075F PR MOST RECENT SYSTOLIC BLOOD PRESS GE 130-139MM HG: ICD-10-PCS | Mod: HCNC,CPTII,S$GLB, | Performed by: INTERNAL MEDICINE

## 2023-11-10 PROCEDURE — 3075F SYST BP GE 130 - 139MM HG: CPT | Mod: HCNC,CPTII,S$GLB, | Performed by: INTERNAL MEDICINE

## 2023-11-10 PROCEDURE — 1101F PT FALLS ASSESS-DOCD LE1/YR: CPT | Mod: HCNC,CPTII,S$GLB, | Performed by: INTERNAL MEDICINE

## 2023-11-10 PROCEDURE — 1160F PR REVIEW ALL MEDS BY PRESCRIBER/CLIN PHARMACIST DOCUMENTED: ICD-10-PCS | Mod: HCNC,CPTII,S$GLB, | Performed by: INTERNAL MEDICINE

## 2023-11-10 PROCEDURE — 1159F MED LIST DOCD IN RCRD: CPT | Mod: HCNC,CPTII,S$GLB, | Performed by: INTERNAL MEDICINE

## 2023-11-10 PROCEDURE — 1126F AMNT PAIN NOTED NONE PRSNT: CPT | Mod: HCNC,CPTII,S$GLB, | Performed by: INTERNAL MEDICINE

## 2023-11-10 PROCEDURE — 99999 PR PBB SHADOW E&M-EST. PATIENT-LVL III: CPT | Mod: PBBFAC,HCNC,, | Performed by: INTERNAL MEDICINE

## 2023-11-10 PROCEDURE — 25000003 PHARM REV CODE 250: Mod: HCNC | Performed by: INTERNAL MEDICINE

## 2023-11-10 PROCEDURE — 96413 CHEMO IV INFUSION 1 HR: CPT | Mod: HCNC

## 2023-11-10 PROCEDURE — 1160F RVW MEDS BY RX/DR IN RCRD: CPT | Mod: HCNC,CPTII,S$GLB, | Performed by: INTERNAL MEDICINE

## 2023-11-10 PROCEDURE — 99999 PR PBB SHADOW E&M-EST. PATIENT-LVL III: ICD-10-PCS | Mod: PBBFAC,HCNC,, | Performed by: INTERNAL MEDICINE

## 2023-11-10 RX ORDER — DIPHENHYDRAMINE HYDROCHLORIDE 50 MG/ML
50 INJECTION INTRAMUSCULAR; INTRAVENOUS ONCE AS NEEDED
Status: CANCELLED | OUTPATIENT
Start: 2023-11-10

## 2023-11-10 RX ORDER — EPINEPHRINE 0.3 MG/.3ML
0.3 INJECTION SUBCUTANEOUS ONCE AS NEEDED
Status: CANCELLED | OUTPATIENT
Start: 2023-11-10

## 2023-11-10 RX ORDER — EPINEPHRINE 0.3 MG/.3ML
0.3 INJECTION SUBCUTANEOUS ONCE AS NEEDED
Status: DISCONTINUED | OUTPATIENT
Start: 2023-11-10 | End: 2023-11-10 | Stop reason: HOSPADM

## 2023-11-10 RX ORDER — HEPARIN 100 UNIT/ML
500 SYRINGE INTRAVENOUS
Status: DISCONTINUED | OUTPATIENT
Start: 2023-11-10 | End: 2023-11-10 | Stop reason: HOSPADM

## 2023-11-10 RX ORDER — HEPARIN 100 UNIT/ML
500 SYRINGE INTRAVENOUS
Status: CANCELLED | OUTPATIENT
Start: 2023-11-10

## 2023-11-10 RX ORDER — DIPHENHYDRAMINE HYDROCHLORIDE 50 MG/ML
50 INJECTION INTRAMUSCULAR; INTRAVENOUS ONCE AS NEEDED
Status: DISCONTINUED | OUTPATIENT
Start: 2023-11-10 | End: 2023-11-10 | Stop reason: HOSPADM

## 2023-11-10 RX ORDER — SODIUM CHLORIDE 0.9 % (FLUSH) 0.9 %
10 SYRINGE (ML) INJECTION
Status: CANCELLED | OUTPATIENT
Start: 2023-11-10

## 2023-11-10 RX ORDER — SODIUM CHLORIDE 0.9 % (FLUSH) 0.9 %
10 SYRINGE (ML) INJECTION
Status: DISCONTINUED | OUTPATIENT
Start: 2023-11-10 | End: 2023-11-10 | Stop reason: HOSPADM

## 2023-11-10 RX ADMIN — SODIUM CHLORIDE: 9 INJECTION, SOLUTION INTRAVENOUS at 01:11

## 2023-11-10 RX ADMIN — SODIUM CHLORIDE 200 MG: 9 INJECTION, SOLUTION INTRAVENOUS at 02:11

## 2023-11-10 NOTE — PROGRESS NOTES
"                                                         PROGRESS NOTE    Subjective:       Patient ID: Zac Plunkett is a 78 y.o. male.    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 JD. TMB 6.3    Oncologic History:  Prior patient of Dr Lucia's  2021  May              5/27- US right thigh: showed 2 hypoechoic masses in the subcutaneous tissues of the right anterior thigh. Largest measured 1.1 x 0.9 x 1 cm. Read at the time was concern for lymphadenopathy.   June 6/4 - CXR: "Bilateral reticular opacities are present in a perihilar distribution which appears slightly more pronounced compared to prior exams."               6/11 - underwent excisional biopsy of the right thigh; pathology came back positive for myxofibrosarcoma, high grade. Path a 1.8 x 1.1 x 0.9 cm mass with an adjacent 0.4 x 0.4 x 0.3 cm mass.   July/Aug              - IMRT with Dr. Mcadams  2022 January 1/12 - PET: locoregional recurrence and concerning 0.9 cm RUL lesion              1/20 - Path: excision on 2 recurrent lesions:  Myxofibrosarcoma, 1.9 cm at longest length, PD-L1 25%              1/31 - CT thigh: successful removal w/o evidence of recurrent disease  April 4/28 - recurrence, locally with metastatic deposit in LN. S/p excision on 4/28/22. Path: right thigh superior mass: Myxofibrosarcoma, high grade with focal tumor necrosis, incompletely excised. A portion of the lesion is suggestive of an involved lymph node exhibiting metastatic sarcoma with extranodal extension.  Sarcoma is focally present within inked but otherwise unspecified peripheral margins.   July 7/19 - CT thigh showed local recurrence and a 0.7 cm R inguinal lymph node    2023  [facility]   1/3 - CT thigh/pelvis showed "Heterogeneous enhancing soft tissue lesions " "and nodular foci at the anterior thigh and right groin highly concerning for worsening local recurrent disease with talon metastasis"    - stable pulmonary nodules on CT chest  - completed palliative radiation to right thigh  March  3/24- started keytruda, s/p 10 cycles    Interval History:   Mr Plunkett returns for follow up. Feeling well. Pain stable    ECO    ROS:   A ten-point system review is obtained and negative except for what was stated in the Interval History.     Physical Examination:   Vital signs reviewed.   General: well hydrated, well developed, in no acute distress  HEENT: normocephalic, EOMI, anicteric sclerae  Neck: supple, no JVD, thyromegaly, cervical or supraclavicular lymphadenopathy  Lungs: clear breath sounds bilaterally, no wheezing, rales, or rhonchi  Heart: RRR, no M/R/G  Abdomen: soft, no tenderness, non-distended, no hepatosplenomegaly, mass, or hernia. BS present  Extremities: no edema  Skin: no rash, ulcer, or open wounds  Neuro: alert and oriented x 4, no focal neuro deficit  Psych: pleasant and appropriate mood and affect    Objective:     Laboratory Data:  Labs reviewed. Adequate for treatment.     Imaging Data:  CT C/A/P 3/29/23:  Impression:     1. 0.8 x 2.8-cm (previously 3.0 x 3.9-cm) right inguinal mass (series 3, image 171). 2.9 x 5.6-cm (previously 2.4 x 5.0-cm) exophytic right inguinal mass.  2. Multiple pulmonary nodules throughout the bilateral lung fields that are predominantly all stable.  There are approximately 2 pulmonary nodules that were not definitively seen on prior exam which may be related to technique.  3. Single enlarged precarinal space lymph node and prominent bilateral hilar lymph nodes that are not enlarged by radiologic size criteria, not significantly changed.  4. Approximately 3 punctate hepatic parenchymal hypodensities that are too small to accurately characterize.    CT right thigh 3/29/23:  Interval increase in size of " subcutaneous soft tissue mass within the right superior thigh.     Centrilobular emphysema.     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     FINDINGS:  The visualized intra-abdominal content is unremarkable.  There is a small sebaceous cyst involving the right inferior buttocks.  There is significant scattered arterial plaque.  There is fatty stranding believed to represent postoperative change involving the anterior superior right thigh.  This is within the area of previously described exophytic mass.  There is no evidence of residual mass.  There is significant scattered arterial plaque.  There is a knee joint effusion.     Impression:     No evidence of recurrent or residual mass.     CT C/A/P 9/28/23:  FINDINGS:  The visualized portion of the base of the lungs is significant for a subcentimeter hypodensity within the left lobe of the thyroid too small to characterize.  The visualized portion of the mediastinum including the heart and great vessels is significant for calcification of the aorta, aortic annulus, and coronary arteries.  The trachea is patent and free of any intraluminal filling defects.  The lungs are well expanded.  There is bilateral apical pleural thickening and/or scar.  There is centrilobular emphysema.  Scattered pulmonary nodules are unchanged compared to previous performed 06/15/2023.  There is bilateral dependent atelectatic versus fibrotic change.  There is no pleural or pericardial fluid.     The stomach is unremarkable.  There is a large duodenal diverticulum.  The spleen is unremarkable.  There is fatty atrophy of the pancreas.  The liver, gallbladder, and adrenal glands are unremarkable.  There is abdominal aortic plaque which extends into its branches.  There is severe narrowing of the left common  iliac artery.  The right kidney is unremarkable.  There is a large left renal cyst.  The bladder is unremarkable.  There is a left inguinal hernia containing a small short segment of bowel.  The bowel is significant for diverticulosis coli.  The appendix is not visualized.  The osseous structures demonstrate degenerative change.  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  There is grade 1 anterolisthesis of L4 in relation to L5.     Impression:     No evidence of metastatic disease.       Assessment and Plan:     1. Primary myxofibrosarcoma    2. 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 Essential hypertension    7. Cancer related pain    8. Lung nodules        1-5  - Mr Plunkett is a 79 yo man with recurrent myxofibrosarcoma of the right thigh, has had 3 resections and IMRT  - he was seen by Dr. Mao and Dr. Unger, they did not feel that he would be a good surgical candidate and recommended palliative RT  - completed palliative RT 2/13/2023  - Previously discussed systemic treatment options, including chemotherapy and immunotherapy. He was not interested in chemotherapy including TKI. Pembrolizumab has efficacy in phase II studies and is recommended on NCCN Guidelines for patients with myxofibrosarcoma. PD-L1 is 25%. However, his TMB is not high. Pembrolizumab was denied by insurance and then denied at hearing. He got patient assistance through [employer]  - started palliative keytruda on 3/24/23, s/p 10 cycles. He has had a complete response  - doing well. Cycle 11 keytruda today  - return in 3 weeks for cycle 12    6.  - BP controlled  - c/w current medication    7.  - stable  - c/w oxycodone prn    8.  - monitor    Follow-up:     In 3 weeks    Ed Martines MD  Hematology and Medical Oncology  [facility]      Route Chart for Scheduling    Med Onc Chart Routing      Follow up with physician .  Keep scheduled appts. see NICOLLE on 12/22 with labs then keytruda. get labs and CT C/A/P, CT thigh on 1/9, see me on 1/10 then keytruda   Follow up with NICOLLE 6 weeks.   Infusion scheduling note   keytruda every 3 weeks   Injection scheduling note    Labs CBC, CMP and TSH   Scheduling:  Preferred lab:  Lab interval: every 3 weeks     Imaging CT chest abdomen pelvis   on 1/9 at [place]   Pharmacy appointment    Other referrals            Treatment Plan Information   OP PEMBROLIZUMAB 200MG Q3W   Ed Martines MD   Upcoming Treatment Dates - OP PEMBROLIZUMAB 200MG Q3W    10/21/2023       Chemotherapy       pembrolizumab (KEYTRUDA) 200 mg in sodium chloride 0.9% SolP 108 mL infusion  11/11/2023       Chemotherapy       pembrolizumab (KEYTRUDA) 200 mg in sodium chloride 0.9% SolP 108 mL infusion  12/2/2023       Chemotherapy       pembrolizumab (KEYTRUDA) 200 mg in sodium chloride 0.9% SolP 108 mL infusion  12/23/2023       Chemotherapy       pembrolizumab (KEYTRUDA) 200 mg in sodium chloride 0.9% SolP 108 mL infusion

## 2023-11-10 NOTE — PLAN OF CARE
1448  Infusion completed, pt tolerated; pt instructed to remain well hydrated; discussed when to contact MD, when to report to ED; pt verbalized understanding of all discussed and when to report next

## 2023-11-10 NOTE — NURSING
1305  Pt here for Keytruda infusion, no new complaints or concerns at present; discussed treatment plan for today, all questions answered and pt agrees to proceed

## 2023-11-14 ENCOUNTER — OFFICE VISIT (OUTPATIENT)
Dept: FAMILY MEDICINE | Facility: CLINIC | Age: 78
End: 2023-11-14
Payer: MEDICARE

## 2023-11-14 VITALS
HEART RATE: 61 BPM | SYSTOLIC BLOOD PRESSURE: 112 MMHG | BODY MASS INDEX: 23.5 KG/M2 | OXYGEN SATURATION: 97 % | WEIGHT: 167.88 LBS | HEIGHT: 71 IN | DIASTOLIC BLOOD PRESSURE: 74 MMHG | TEMPERATURE: 98 F

## 2023-11-14 DIAGNOSIS — R79.9 ABNORMAL FINDING OF BLOOD CHEMISTRY, UNSPECIFIED: ICD-10-CM

## 2023-11-14 DIAGNOSIS — C49.9 SOFT TISSUE SARCOMA: ICD-10-CM

## 2023-11-14 DIAGNOSIS — Z00.00 ROUTINE PHYSICAL EXAMINATION: Primary | ICD-10-CM

## 2023-11-14 DIAGNOSIS — Z12.5 ENCOUNTER FOR SCREENING FOR MALIGNANT NEOPLASM OF PROSTATE: ICD-10-CM

## 2023-11-14 DIAGNOSIS — E78.5 HYPERLIPIDEMIA, UNSPECIFIED HYPERLIPIDEMIA TYPE: ICD-10-CM

## 2023-11-14 DIAGNOSIS — I10 HYPERTENSION, BENIGN: Chronic | ICD-10-CM

## 2023-11-14 PROCEDURE — 90694 FLU VACCINE - QUADRIVALENT - ADJUVANTED: ICD-10-PCS | Mod: HCNC,S$GLB,, | Performed by: FAMILY MEDICINE

## 2023-11-14 PROCEDURE — 3288F PR FALLS RISK ASSESSMENT DOCUMENTED: ICD-10-PCS | Mod: HCNC,CPTII,S$GLB, | Performed by: FAMILY MEDICINE

## 2023-11-14 PROCEDURE — 3078F PR MOST RECENT DIASTOLIC BLOOD PRESSURE < 80 MM HG: ICD-10-PCS | Mod: HCNC,CPTII,S$GLB, | Performed by: FAMILY MEDICINE

## 2023-11-14 PROCEDURE — 1126F PR PAIN SEVERITY QUANTIFIED, NO PAIN PRESENT: ICD-10-PCS | Mod: HCNC,CPTII,S$GLB, | Performed by: FAMILY MEDICINE

## 2023-11-14 PROCEDURE — 99999 PR PBB SHADOW E&M-EST. PATIENT-LVL III: CPT | Mod: PBBFAC,HCNC,, | Performed by: FAMILY MEDICINE

## 2023-11-14 PROCEDURE — 3288F FALL RISK ASSESSMENT DOCD: CPT | Mod: HCNC,CPTII,S$GLB, | Performed by: FAMILY MEDICINE

## 2023-11-14 PROCEDURE — G0008 ADMIN INFLUENZA VIRUS VAC: HCPCS | Mod: HCNC,S$GLB,, | Performed by: FAMILY MEDICINE

## 2023-11-14 PROCEDURE — 99397 PER PM REEVAL EST PAT 65+ YR: CPT | Mod: HCNC,S$GLB,, | Performed by: FAMILY MEDICINE

## 2023-11-14 PROCEDURE — 1159F MED LIST DOCD IN RCRD: CPT | Mod: HCNC,CPTII,S$GLB, | Performed by: FAMILY MEDICINE

## 2023-11-14 PROCEDURE — 1159F PR MEDICATION LIST DOCUMENTED IN MEDICAL RECORD: ICD-10-PCS | Mod: HCNC,CPTII,S$GLB, | Performed by: FAMILY MEDICINE

## 2023-11-14 PROCEDURE — 3074F SYST BP LT 130 MM HG: CPT | Mod: HCNC,CPTII,S$GLB, | Performed by: FAMILY MEDICINE

## 2023-11-14 PROCEDURE — 3074F PR MOST RECENT SYSTOLIC BLOOD PRESSURE < 130 MM HG: ICD-10-PCS | Mod: HCNC,CPTII,S$GLB, | Performed by: FAMILY MEDICINE

## 2023-11-14 PROCEDURE — 90694 VACC AIIV4 NO PRSRV 0.5ML IM: CPT | Mod: HCNC,S$GLB,, | Performed by: FAMILY MEDICINE

## 2023-11-14 PROCEDURE — 99397 PR PREVENTIVE VISIT,EST,65 & OVER: ICD-10-PCS | Mod: HCNC,S$GLB,, | Performed by: FAMILY MEDICINE

## 2023-11-14 PROCEDURE — 3078F DIAST BP <80 MM HG: CPT | Mod: HCNC,CPTII,S$GLB, | Performed by: FAMILY MEDICINE

## 2023-11-14 PROCEDURE — G0008 FLU VACCINE - QUADRIVALENT - ADJUVANTED: ICD-10-PCS | Mod: HCNC,S$GLB,, | Performed by: FAMILY MEDICINE

## 2023-11-14 PROCEDURE — 1101F PT FALLS ASSESS-DOCD LE1/YR: CPT | Mod: HCNC,CPTII,S$GLB, | Performed by: FAMILY MEDICINE

## 2023-11-14 PROCEDURE — 1101F PR PT FALLS ASSESS DOC 0-1 FALLS W/OUT INJ PAST YR: ICD-10-PCS | Mod: HCNC,CPTII,S$GLB, | Performed by: FAMILY MEDICINE

## 2023-11-14 PROCEDURE — 99999 PR PBB SHADOW E&M-EST. PATIENT-LVL III: ICD-10-PCS | Mod: PBBFAC,HCNC,, | Performed by: FAMILY MEDICINE

## 2023-11-14 PROCEDURE — 1126F AMNT PAIN NOTED NONE PRSNT: CPT | Mod: HCNC,CPTII,S$GLB, | Performed by: FAMILY MEDICINE

## 2023-11-14 NOTE — PROGRESS NOTES
[facility]  Progress Note    SUBJECTIVE:     Chief Complaint   Patient presents with    Annual Exam       HPI   Zac Plunkett  is a 78 y.o. male here for physical exam. Patient has no other new complaints/problems at this time.      Review of patient's allergies indicates:  No Known Allergies    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    Kidney stone     Lumbar spondylosis     Lumbar spondylosis     PONV (postoperative nausea and vomiting)     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    Tobacco dependence      Past Surgical History:   Procedure Laterality Date    APPENDECTOMY      BACK SURGERY      HIP REPLACEMENT ARTHROPLASTY Left 8/6/2018    Procedure: ARTHROPLASTY, HIP REPLACEMENT;  Surgeon: Chapincito Sagastume MD;  Location: [facility] OR;  Service: Orthopedics;  Laterality: Left;    JOINT REPLACEMENT Left     hip    laminectomy l4-l5      SURGICAL REMOVAL OF LYMPH NODE Right 1/20/2022    Procedure: EXCISION, LYMPH NODE;  Surgeon: Arun Mao MD;  Location: [facility] OR;  Service: General;  Laterality: Right;  right thigh and right groin  RN PREOP 1/18/2022      PT CHOSE TO DO HOME TEST FOR COVID ON AM OF SURGERY     Family History   Problem Relation Age of Onset    Diabetes Mother     Heart disease Father     No Known Problems Sister     Cerebral palsy Brother     Epilepsy Brother     Melanoma Neg Hx     Psoriasis Neg Hx     Lupus Neg Hx     Eczema Neg Hx     Acne Neg Hx     Liver disease Neg Hx     Liver cancer Neg Hx     Cirrhosis Neg Hx     Colon polyps Neg Hx     Colon cancer Neg Hx      Social History     Tobacco Use    Smoking status: Every Day     Types: Cigars    Smokeless tobacco: Never    Tobacco comments:     smokes cigars 2-3 a day, smoke cigarette for 15 years   Substance Use Topics    Alcohol use: No    Drug use: No        Review of  Systems   Constitutional:  Negative for chills, diaphoresis and fever.   HENT:  Negative for congestion, ear pain, hearing loss and sore throat.    Eyes:  Negative for photophobia and discharge.   Respiratory:  Negative for cough, shortness of breath and wheezing.    Cardiovascular:  Negative for chest pain and palpitations.   Gastrointestinal:  Positive for constipation. Negative for abdominal pain, blood in stool, diarrhea, nausea and vomiting.   Genitourinary:  Negative for dysuria, hematuria and urgency.   Musculoskeletal:  Negative for back pain, myalgias and neck pain.   Skin:  Negative for itching and rash.   Neurological:  Negative for dizziness, sensory change, focal weakness, weakness and headaches.   Endo/Heme/Allergies:  Negative for polydipsia.   All other systems reviewed and are negative.    OBJECTIVE:     Vitals:    11/14/23 0921   BP: 112/74   Pulse: 61   Temp: 97.9 °F (36.6 °C)     Body mass index is 23.41 kg/m².    Physical Exam  Constitutional:       General: He is not in acute distress.     Appearance: He is not diaphoretic.   HENT:      Head: Normocephalic and atraumatic.      Right Ear: Tympanic membrane and ear canal normal. No hemotympanum. Tympanic membrane is not perforated, erythematous or bulging.      Left Ear: Tympanic membrane and ear canal normal. No hemotympanum. Tympanic membrane is not perforated, erythematous or bulging.   Eyes:      Conjunctiva/sclera: Conjunctivae normal.      Pupils: Pupils are equal, round, and reactive to light.   Neck:      Thyroid: No thyromegaly.   Cardiovascular:      Rate and Rhythm: Normal rate and regular rhythm.      Heart sounds: Normal heart sounds. No murmur heard.     No friction rub. No gallop.   Pulmonary:      Effort: Pulmonary effort is normal. No respiratory distress.      Breath sounds: Normal breath sounds. No wheezing or rales.   Abdominal:      General: Bowel sounds are normal. There is no distension.      Palpations: Abdomen is soft.       Tenderness: There is no abdominal tenderness. There is no guarding or rebound.   Musculoskeletal:         General: No tenderness. Normal range of motion.   Skin:     General: Skin is warm.      Findings: No erythema or rash.   Neurological:      Mental Status: He is alert and oriented to person, place, and time.         Old records were reviewed. Labs and/or images were independently reviewed.    ASSESSMENT     1. Routine physical examination    2. Hypertension, benign    3. Soft tissue sarcoma    4. Hyperlipidemia, unspecified hyperlipidemia type    5. Abnormal finding of blood chemistry, unspecified    6. Encounter for screening for malignant neoplasm of prostate        PLAN:     Routine physical examination  -     Influenza - Quadrivalent (Adjuvanted)  -     Comprehensive Metabolic Panel; Future  -     CBC Auto Differential; Future  -     Hemoglobin A1C; Future  -     Lipid Panel; Future  -     PSA, Screening; Future; Expected date: 11/14/2023  -     We briefly discussed diet, exercise, and routine preventive exams. All questions and comments addressed.    Hypertension, benign  -     Influenza - Quadrivalent (Adjuvanted)  -     Comprehensive Metabolic Panel; Future  -     CBC Auto Differential; Future  -     Hemoglobin A1C; Future  -     Lipid Panel; Future  -     PSA, Screening; Future; Expected date: 11/14/2023  -     Stable. Continue current regimen.    Soft tissue sarcoma   -      followed by oncology.    Hyperlipidemia, unspecified hyperlipidemia type   -     Counseled patient about healthy diet, exercise habits, and to increase physical activity.    Encounter for screening for malignant neoplasm of prostate  -     PSA, Screening; Future; Expected date: 11/14/2023      RTC CHARBEL Waters MD  11/14/2023 9:33 AM

## 2023-11-15 ENCOUNTER — PATIENT MESSAGE (OUTPATIENT)
Dept: HEMATOLOGY/ONCOLOGY | Facility: CLINIC | Age: 78
End: 2023-11-15
Payer: MEDICARE

## 2023-11-19 ENCOUNTER — PATIENT MESSAGE (OUTPATIENT)
Dept: HEMATOLOGY/ONCOLOGY | Facility: CLINIC | Age: 78
End: 2023-11-19
Payer: MEDICARE

## 2023-11-21 DIAGNOSIS — G89.3 NEOPLASM RELATED PAIN: ICD-10-CM

## 2023-11-21 RX ORDER — OXYCODONE HYDROCHLORIDE 10 MG/1
10 TABLET ORAL EVERY 6 HOURS PRN
Qty: 120 TABLET | Refills: 0 | Status: SHIPPED | OUTPATIENT
Start: 2023-11-21 | End: 2023-12-18 | Stop reason: SDUPTHER

## 2023-11-30 DIAGNOSIS — C44.99 MYXOFIBROSARCOMA OF SKIN: Primary | ICD-10-CM

## 2023-11-30 DIAGNOSIS — C49.9 PRIMARY MYXOFIBROSARCOMA: ICD-10-CM

## 2023-12-01 ENCOUNTER — OFFICE VISIT (OUTPATIENT)
Dept: HEMATOLOGY/ONCOLOGY | Facility: CLINIC | Age: 78
End: 2023-12-01
Payer: MEDICARE

## 2023-12-01 ENCOUNTER — INFUSION (OUTPATIENT)
Dept: INFUSION THERAPY | Facility: HOSPITAL | Age: 78
End: 2023-12-01
Attending: INTERNAL MEDICINE
Payer: MEDICARE

## 2023-12-01 VITALS
SYSTOLIC BLOOD PRESSURE: 140 MMHG | DIASTOLIC BLOOD PRESSURE: 66 MMHG | RESPIRATION RATE: 18 BRPM | HEART RATE: 66 BPM | WEIGHT: 164.44 LBS | HEIGHT: 71 IN | BODY MASS INDEX: 23.02 KG/M2 | OXYGEN SATURATION: 100 %

## 2023-12-01 VITALS
DIASTOLIC BLOOD PRESSURE: 64 MMHG | RESPIRATION RATE: 18 BRPM | WEIGHT: 164.44 LBS | HEIGHT: 71 IN | SYSTOLIC BLOOD PRESSURE: 144 MMHG | OXYGEN SATURATION: 99 % | HEART RATE: 56 BPM | TEMPERATURE: 98 F | BODY MASS INDEX: 23.02 KG/M2

## 2023-12-01 DIAGNOSIS — C49.9 PRIMARY MYXOFIBROSARCOMA: Primary | ICD-10-CM

## 2023-12-01 DIAGNOSIS — Z79.899 IMMUNODEFICIENCY DUE TO DRUGS: ICD-10-CM

## 2023-12-01 DIAGNOSIS — C49.9 SOFT TISSUE SARCOMA: ICD-10-CM

## 2023-12-01 DIAGNOSIS — D84.81 IMMUNODEFICIENCY SECONDARY TO NEOPLASM: ICD-10-CM

## 2023-12-01 DIAGNOSIS — D84.821 IMMUNODEFICIENCY DUE TO DRUGS: ICD-10-CM

## 2023-12-01 DIAGNOSIS — I10 ESSENTIAL HYPERTENSION: ICD-10-CM

## 2023-12-01 DIAGNOSIS — C77.4 SECONDARY MALIGNANCY OF INGUINAL LYMPH NODES: ICD-10-CM

## 2023-12-01 DIAGNOSIS — R91.8 LUNG NODULES: ICD-10-CM

## 2023-12-01 DIAGNOSIS — G89.3 CANCER RELATED PAIN: ICD-10-CM

## 2023-12-01 DIAGNOSIS — R53.83 FATIGUE, UNSPECIFIED TYPE: ICD-10-CM

## 2023-12-01 DIAGNOSIS — D49.9 IMMUNODEFICIENCY SECONDARY TO NEOPLASM: ICD-10-CM

## 2023-12-01 PROCEDURE — 1126F PR PAIN SEVERITY QUANTIFIED, NO PAIN PRESENT: ICD-10-PCS | Mod: HCNC,CPTII,S$GLB, | Performed by: INTERNAL MEDICINE

## 2023-12-01 PROCEDURE — 99215 PR OFFICE/OUTPT VISIT, EST, LEVL V, 40-54 MIN: ICD-10-PCS | Mod: HCNC,S$GLB,, | Performed by: INTERNAL MEDICINE

## 2023-12-01 PROCEDURE — 63600175 PHARM REV CODE 636 W HCPCS: Mod: JZ,JG,HCNC | Performed by: INTERNAL MEDICINE

## 2023-12-01 PROCEDURE — 1159F MED LIST DOCD IN RCRD: CPT | Mod: HCNC,CPTII,S$GLB, | Performed by: INTERNAL MEDICINE

## 2023-12-01 PROCEDURE — 99215 OFFICE O/P EST HI 40 MIN: CPT | Mod: HCNC,S$GLB,, | Performed by: INTERNAL MEDICINE

## 2023-12-01 PROCEDURE — A4216 STERILE WATER/SALINE, 10 ML: HCPCS | Mod: HCNC | Performed by: INTERNAL MEDICINE

## 2023-12-01 PROCEDURE — 1159F PR MEDICATION LIST DOCUMENTED IN MEDICAL RECORD: ICD-10-PCS | Mod: HCNC,CPTII,S$GLB, | Performed by: INTERNAL MEDICINE

## 2023-12-01 PROCEDURE — 99999 PR PBB SHADOW E&M-EST. PATIENT-LVL III: CPT | Mod: PBBFAC,HCNC,, | Performed by: INTERNAL MEDICINE

## 2023-12-01 PROCEDURE — 3288F PR FALLS RISK ASSESSMENT DOCUMENTED: ICD-10-PCS | Mod: HCNC,CPTII,S$GLB, | Performed by: INTERNAL MEDICINE

## 2023-12-01 PROCEDURE — 3077F PR MOST RECENT SYSTOLIC BLOOD PRESSURE >= 140 MM HG: ICD-10-PCS | Mod: HCNC,CPTII,S$GLB, | Performed by: INTERNAL MEDICINE

## 2023-12-01 PROCEDURE — 1160F PR REVIEW ALL MEDS BY PRESCRIBER/CLIN PHARMACIST DOCUMENTED: ICD-10-PCS | Mod: HCNC,CPTII,S$GLB, | Performed by: INTERNAL MEDICINE

## 2023-12-01 PROCEDURE — 1160F RVW MEDS BY RX/DR IN RCRD: CPT | Mod: HCNC,CPTII,S$GLB, | Performed by: INTERNAL MEDICINE

## 2023-12-01 PROCEDURE — 3078F PR MOST RECENT DIASTOLIC BLOOD PRESSURE < 80 MM HG: ICD-10-PCS | Mod: HCNC,CPTII,S$GLB, | Performed by: INTERNAL MEDICINE

## 2023-12-01 PROCEDURE — 99999 PR PBB SHADOW E&M-EST. PATIENT-LVL III: ICD-10-PCS | Mod: PBBFAC,HCNC,, | Performed by: INTERNAL MEDICINE

## 2023-12-01 PROCEDURE — 3077F SYST BP >= 140 MM HG: CPT | Mod: HCNC,CPTII,S$GLB, | Performed by: INTERNAL MEDICINE

## 2023-12-01 PROCEDURE — 3078F DIAST BP <80 MM HG: CPT | Mod: HCNC,CPTII,S$GLB, | Performed by: INTERNAL MEDICINE

## 2023-12-01 PROCEDURE — 1101F PR PT FALLS ASSESS DOC 0-1 FALLS W/OUT INJ PAST YR: ICD-10-PCS | Mod: HCNC,CPTII,S$GLB, | Performed by: INTERNAL MEDICINE

## 2023-12-01 PROCEDURE — 25000003 PHARM REV CODE 250: Mod: HCNC | Performed by: INTERNAL MEDICINE

## 2023-12-01 PROCEDURE — 3288F FALL RISK ASSESSMENT DOCD: CPT | Mod: HCNC,CPTII,S$GLB, | Performed by: INTERNAL MEDICINE

## 2023-12-01 PROCEDURE — 96413 CHEMO IV INFUSION 1 HR: CPT | Mod: HCNC

## 2023-12-01 PROCEDURE — 1126F AMNT PAIN NOTED NONE PRSNT: CPT | Mod: HCNC,CPTII,S$GLB, | Performed by: INTERNAL MEDICINE

## 2023-12-01 PROCEDURE — 1101F PT FALLS ASSESS-DOCD LE1/YR: CPT | Mod: HCNC,CPTII,S$GLB, | Performed by: INTERNAL MEDICINE

## 2023-12-01 RX ORDER — SODIUM CHLORIDE 0.9 % (FLUSH) 0.9 %
10 SYRINGE (ML) INJECTION
Status: DISCONTINUED | OUTPATIENT
Start: 2023-12-01 | End: 2023-12-01 | Stop reason: HOSPADM

## 2023-12-01 RX ORDER — DIPHENHYDRAMINE HYDROCHLORIDE 50 MG/ML
50 INJECTION INTRAMUSCULAR; INTRAVENOUS ONCE AS NEEDED
Status: DISCONTINUED | OUTPATIENT
Start: 2023-12-01 | End: 2023-12-01 | Stop reason: HOSPADM

## 2023-12-01 RX ORDER — SODIUM CHLORIDE 0.9 % (FLUSH) 0.9 %
10 SYRINGE (ML) INJECTION
Status: CANCELLED | OUTPATIENT
Start: 2023-12-01

## 2023-12-01 RX ORDER — HEPARIN 100 UNIT/ML
500 SYRINGE INTRAVENOUS
Status: CANCELLED | OUTPATIENT
Start: 2023-12-01

## 2023-12-01 RX ORDER — EPINEPHRINE 0.3 MG/.3ML
0.3 INJECTION SUBCUTANEOUS ONCE AS NEEDED
Status: DISCONTINUED | OUTPATIENT
Start: 2023-12-01 | End: 2023-12-01 | Stop reason: HOSPADM

## 2023-12-01 RX ORDER — EPINEPHRINE 0.3 MG/.3ML
0.3 INJECTION SUBCUTANEOUS ONCE AS NEEDED
Status: CANCELLED | OUTPATIENT
Start: 2023-12-01

## 2023-12-01 RX ORDER — DIPHENHYDRAMINE HYDROCHLORIDE 50 MG/ML
50 INJECTION INTRAMUSCULAR; INTRAVENOUS ONCE AS NEEDED
Status: CANCELLED | OUTPATIENT
Start: 2023-12-01

## 2023-12-01 RX ADMIN — SODIUM CHLORIDE: 9 INJECTION, SOLUTION INTRAVENOUS at 02:12

## 2023-12-01 RX ADMIN — Medication 10 ML: at 03:12

## 2023-12-01 RX ADMIN — SODIUM CHLORIDE 200 MG: 9 INJECTION, SOLUTION INTRAVENOUS at 03:12

## 2023-12-01 NOTE — PROGRESS NOTES
"                                                         PROGRESS NOTE    Subjective:       Patient ID: Zac Plunkett is a 78 y.o. male.    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 JD. TMB 6.3    Oncologic History:  Prior patient of Dr Lucia's  2021  May              5/27- US right thigh: showed 2 hypoechoic masses in the subcutaneous tissues of the right anterior thigh. Largest measured 1.1 x 0.9 x 1 cm. Read at the time was concern for lymphadenopathy.   June 6/4 - CXR: "Bilateral reticular opacities are present in a perihilar distribution which appears slightly more pronounced compared to prior exams."               6/11 - underwent excisional biopsy of the right thigh; pathology came back positive for myxofibrosarcoma, high grade. Path a 1.8 x 1.1 x 0.9 cm mass with an adjacent 0.4 x 0.4 x 0.3 cm mass.   July/Aug              - IMRT with Dr. Mcadams  2022 January 1/12 - PET: locoregional recurrence and concerning 0.9 cm RUL lesion              1/20 - Path: excision on 2 recurrent lesions:  Myxofibrosarcoma, 1.9 cm at longest length, PD-L1 25%              1/31 - CT thigh: successful removal w/o evidence of recurrent disease  April 4/28 - recurrence, locally with metastatic deposit in LN. S/p excision on 4/28/22. Path: right thigh superior mass: Myxofibrosarcoma, high grade with focal tumor necrosis, incompletely excised. A portion of the lesion is suggestive of an involved lymph node exhibiting metastatic sarcoma with extranodal extension.  Sarcoma is focally present within inked but otherwise unspecified peripheral margins.   July 7/19 - CT thigh showed local recurrence and a 0.7 cm R inguinal lymph node    2023  [facility]   1/3 - CT thigh/pelvis showed "Heterogeneous enhancing soft tissue lesions " "and nodular foci at the anterior thigh and right groin highly concerning for worsening local recurrent disease with talon metastasis"    - stable pulmonary nodules on CT chest  Fe- completed palliative radiation to right thigh  March  3/24- started keytruda, s/p 11 cycles    Interval History:   Mr Plunkett returns for follow up. Feeling well. Pain stable    ECO    ROS:   A ten-point system review is obtained and negative except for what was stated in the Interval History.     Physical Examination:   Vital signs reviewed.   General: well hydrated, well developed, in no acute distress  HEENT: normocephalic, EOMI, anicteric sclerae  Neck: supple, no JVD, thyromegaly, cervical or supraclavicular lymphadenopathy  Lungs: clear breath sounds bilaterally, no wheezing, rales, or rhonchi  Heart: RRR, no M/R/G  Abdomen: soft, no tenderness, non-distended, no hepatosplenomegaly, mass, or hernia. BS present  Extremities: no edema  Skin: no rash, ulcer, or open wounds  Neuro: alert and oriented x 4, no focal neuro deficit  Psych: pleasant and appropriate mood and affect    Objective:     Laboratory Data:  Labs reviewed. Adequate for treatment.     Imaging Data:  CT C/A/P 3/29/23:  Impression:     1. 0.8 x 2.8-cm (previously 3.0 x 3.9-cm) right inguinal mass (series 3, image 171). 2.9 x 5.6-cm (previously 2.4 x 5.0-cm) exophytic right inguinal mass.  2. Multiple pulmonary nodules throughout the bilateral lung fields that are predominantly all stable.  There are approximately 2 pulmonary nodules that were not definitively seen on prior exam which may be related to technique.  3. Single enlarged precarinal space lymph node and prominent bilateral hilar lymph nodes that are not enlarged by radiologic size criteria, not significantly changed.  4. Approximately 3 punctate hepatic parenchymal hypodensities that are too small to accurately characterize.    CT right thigh 3/29/23:  Interval increase in size of " subcutaneous soft tissue mass within the right superior thigh.     Centrilobular emphysema.     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     FINDINGS:  The visualized intra-abdominal content is unremarkable.  There is a small sebaceous cyst involving the right inferior buttocks.  There is significant scattered arterial plaque.  There is fatty stranding believed to represent postoperative change involving the anterior superior right thigh.  This is within the area of previously described exophytic mass.  There is no evidence of residual mass.  There is significant scattered arterial plaque.  There is a knee joint effusion.     Impression:     No evidence of recurrent or residual mass.     CT C/A/P 9/28/23:  FINDINGS:  The visualized portion of the base of the lungs is significant for a subcentimeter hypodensity within the left lobe of the thyroid too small to characterize.  The visualized portion of the mediastinum including the heart and great vessels is significant for calcification of the aorta, aortic annulus, and coronary arteries.  The trachea is patent and free of any intraluminal filling defects.  The lungs are well expanded.  There is bilateral apical pleural thickening and/or scar.  There is centrilobular emphysema.  Scattered pulmonary nodules are unchanged compared to previous performed 06/15/2023.  There is bilateral dependent atelectatic versus fibrotic change.  There is no pleural or pericardial fluid.     The stomach is unremarkable.  There is a large duodenal diverticulum.  The spleen is unremarkable.  There is fatty atrophy of the pancreas.  The liver, gallbladder, and adrenal glands are unremarkable.  There is abdominal aortic plaque which extends into its branches.  There is severe narrowing of the left common  iliac artery.  The right kidney is unremarkable.  There is a large left renal cyst.  The bladder is unremarkable.  There is a left inguinal hernia containing a small short segment of bowel.  The bowel is significant for diverticulosis coli.  The appendix is not visualized.  The osseous structures demonstrate degenerative change.  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  There is grade 1 anterolisthesis of L4 in relation to L5.     Impression:     No evidence of metastatic disease.       Assessment and Plan:     1. Primary myxofibrosarcoma    2. 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 Essential hypertension    7. Cancer related pain    8. Lung nodules    9. Fatigue, unspecified type        1-5  - Mr Plunkett is a 77 yo man with recurrent myxofibrosarcoma of the right thigh, has had 3 resections and IMRT  - he was seen by Dr. Mao and Dr. Unger, they did not feel that he would be a good surgical candidate and recommended palliative RT  - completed palliative RT 2/13/2023  - Previously discussed systemic treatment options, including chemotherapy and immunotherapy. He was not interested in chemotherapy including TKI. Pembrolizumab has efficacy in phase II studies and is recommended on NCCN Guidelines for patients with myxofibrosarcoma. PD-L1 is 25%. However, his TMB is not high. Pembrolizumab was denied by insurance and then denied at hearing. He got patient assistance through [employer]  - started palliative keytruda on 3/24/23, s/p 11 cycles. He has had a complete response  - doing well. Cycle 12 keytruda today  - return in 3 weeks for cycle 13 if patient assistance through [employer] approved    6.  - BP controlled  - c/w current medication    7.  - stable  - c/w oxycodone prn    8.  - monitor    9.  - monitor TSH    Follow-up:     In 3 weeks    Ed Martines MD  Hematology and Medical Oncology  [facility]      Route Chart for Scheduling    Med Onc Chart Routing      Follow up with physician 3 weeks. see me in 3 weeks with labs then chemo   Follow up with NICOLLE    Infusion scheduling note   keytruda every 3 weeks   Injection scheduling note    Labs CBC, CMP and TSH   Scheduling:  Preferred lab:  Lab interval:     Imaging CT chest abdomen pelvis   scheduled   Pharmacy appointment    Other referrals          Treatment Plan Information   OP PEMBROLIZUMAB 200MG Q3W   Ed Martines MD   Upcoming Treatment Dates - OP PEMBROLIZUMAB 200MG Q3W    11/11/2023       Chemotherapy       pembrolizumab (KEYTRUDA) 200 mg in sodium chloride 0.9% SolP 108 mL infusion  12/2/2023       Chemotherapy       pembrolizumab (KEYTRUDA) 200 mg in sodium chloride 0.9% SolP 108 mL infusion  12/23/2023       Chemotherapy       pembrolizumab (KEYTRUDA) 200 mg in sodium chloride 0.9% SolP 108 mL infusion  1/13/2024       Chemotherapy       pembrolizumab (KEYTRUDA) 200 mg in sodium chloride 0.9% SolP 108 mL infusion

## 2023-12-01 NOTE — PLAN OF CARE
Pt tolerated Keytruda well. No adverse reaction noted. Pt education reinforced on chemo regimen, side effects, what to expect, and when to call . Pt verbalized understanding. I reviewed pt calendar w/ pt and understanding verbalized.

## 2023-12-07 ENCOUNTER — TELEPHONE (OUTPATIENT)
Dept: HEMATOLOGY/ONCOLOGY | Facility: CLINIC | Age: 78
End: 2023-12-07
Payer: MEDICARE

## 2023-12-07 NOTE — TELEPHONE ENCOUNTER
Called Inez back but spoke with another [employer] staff. They're checking in dx codes C49.21 vs C44.99.  Went over last fax of 12/4 where there was only C44.99 (forms matched up).  Also mentioned to them that pt due for next infusion 12/21.  They will see if they can proceed tomorrow with benefit investigation to expedite process      ----- Message from Peyton Rehman sent at 12/7/2023  2:20 PM CST -----  Regarding: Enrollment INfo  Contact: [phone]  Type:  Needs Medical Advice    Who Called:  Denisha quevedo/[employer] access program  Would the patient rather a call back or a response via [facility]? call  Best Call Back Number: [phone]  Additional Information: Need dx codes for enrollment into Keyuda

## 2023-12-18 ENCOUNTER — TELEPHONE (OUTPATIENT)
Dept: HEMATOLOGY/ONCOLOGY | Facility: CLINIC | Age: 78
End: 2023-12-18
Payer: MEDICARE

## 2023-12-18 DIAGNOSIS — G89.3 NEOPLASM RELATED PAIN: ICD-10-CM

## 2023-12-18 NOTE — TELEPHONE ENCOUNTER
Received fax that Keytruda Intake worksheet had Dx codes not matching.  Told Malorie that I spoke to another person at [facility] 3 weeks ago.  Was already told they found the recent fax where codes matched.  Malorie will follow up and see it process can proceed.  They will call me if they need data

## 2023-12-19 RX ORDER — OXYCODONE HYDROCHLORIDE 10 MG/1
10 TABLET ORAL EVERY 6 HOURS PRN
Qty: 120 TABLET | Refills: 0 | Status: SHIPPED | OUTPATIENT
Start: 2023-12-19 | End: 2024-01-17 | Stop reason: SDUPTHER

## 2023-12-21 ENCOUNTER — OFFICE VISIT (OUTPATIENT)
Dept: HEMATOLOGY/ONCOLOGY | Facility: CLINIC | Age: 78
End: 2023-12-21
Payer: MEDICARE

## 2023-12-21 ENCOUNTER — INFUSION (OUTPATIENT)
Dept: INFUSION THERAPY | Facility: HOSPITAL | Age: 78
End: 2023-12-21
Attending: INTERNAL MEDICINE
Payer: MEDICARE

## 2023-12-21 VITALS
RESPIRATION RATE: 18 BRPM | HEART RATE: 63 BPM | HEIGHT: 71 IN | SYSTOLIC BLOOD PRESSURE: 141 MMHG | DIASTOLIC BLOOD PRESSURE: 64 MMHG | BODY MASS INDEX: 23.24 KG/M2 | WEIGHT: 166 LBS | OXYGEN SATURATION: 100 %

## 2023-12-21 VITALS
WEIGHT: 166 LBS | BODY MASS INDEX: 23.24 KG/M2 | RESPIRATION RATE: 18 BRPM | OXYGEN SATURATION: 100 % | HEART RATE: 57 BPM | SYSTOLIC BLOOD PRESSURE: 116 MMHG | HEIGHT: 71 IN | DIASTOLIC BLOOD PRESSURE: 59 MMHG

## 2023-12-21 DIAGNOSIS — R53.83 FATIGUE, UNSPECIFIED TYPE: ICD-10-CM

## 2023-12-21 DIAGNOSIS — D84.821 IMMUNODEFICIENCY DUE TO DRUGS: ICD-10-CM

## 2023-12-21 DIAGNOSIS — C49.9 PRIMARY MYXOFIBROSARCOMA: Primary | ICD-10-CM

## 2023-12-21 DIAGNOSIS — C49.9 SOFT TISSUE SARCOMA: ICD-10-CM

## 2023-12-21 DIAGNOSIS — D49.9 IMMUNODEFICIENCY SECONDARY TO NEOPLASM: ICD-10-CM

## 2023-12-21 DIAGNOSIS — I10 ESSENTIAL HYPERTENSION: ICD-10-CM

## 2023-12-21 DIAGNOSIS — Z79.899 IMMUNODEFICIENCY DUE TO DRUGS: ICD-10-CM

## 2023-12-21 DIAGNOSIS — R91.8 LUNG NODULES: ICD-10-CM

## 2023-12-21 DIAGNOSIS — D84.81 IMMUNODEFICIENCY SECONDARY TO NEOPLASM: ICD-10-CM

## 2023-12-21 DIAGNOSIS — C77.4 SECONDARY MALIGNANCY OF INGUINAL LYMPH NODES: ICD-10-CM

## 2023-12-21 DIAGNOSIS — G89.3 CANCER RELATED PAIN: ICD-10-CM

## 2023-12-21 PROCEDURE — 3077F SYST BP >= 140 MM HG: CPT | Mod: HCNC,CPTII,S$GLB, | Performed by: PHYSICIAN ASSISTANT

## 2023-12-21 PROCEDURE — 99999 PR PBB SHADOW E&M-EST. PATIENT-LVL III: ICD-10-PCS | Mod: PBBFAC,HCNC,, | Performed by: PHYSICIAN ASSISTANT

## 2023-12-21 PROCEDURE — 1126F AMNT PAIN NOTED NONE PRSNT: CPT | Mod: HCNC,CPTII,S$GLB, | Performed by: PHYSICIAN ASSISTANT

## 2023-12-21 PROCEDURE — 1160F RVW MEDS BY RX/DR IN RCRD: CPT | Mod: HCNC,CPTII,S$GLB, | Performed by: PHYSICIAN ASSISTANT

## 2023-12-21 PROCEDURE — 3288F FALL RISK ASSESSMENT DOCD: CPT | Mod: HCNC,CPTII,S$GLB, | Performed by: PHYSICIAN ASSISTANT

## 2023-12-21 PROCEDURE — 1159F MED LIST DOCD IN RCRD: CPT | Mod: HCNC,CPTII,S$GLB, | Performed by: PHYSICIAN ASSISTANT

## 2023-12-21 PROCEDURE — 1101F PT FALLS ASSESS-DOCD LE1/YR: CPT | Mod: HCNC,CPTII,S$GLB, | Performed by: PHYSICIAN ASSISTANT

## 2023-12-21 PROCEDURE — 99999 PR PBB SHADOW E&M-EST. PATIENT-LVL III: CPT | Mod: PBBFAC,HCNC,, | Performed by: PHYSICIAN ASSISTANT

## 2023-12-21 PROCEDURE — 1126F PR PAIN SEVERITY QUANTIFIED, NO PAIN PRESENT: ICD-10-PCS | Mod: HCNC,CPTII,S$GLB, | Performed by: PHYSICIAN ASSISTANT

## 2023-12-21 PROCEDURE — 1160F PR REVIEW ALL MEDS BY PRESCRIBER/CLIN PHARMACIST DOCUMENTED: ICD-10-PCS | Mod: HCNC,CPTII,S$GLB, | Performed by: PHYSICIAN ASSISTANT

## 2023-12-21 PROCEDURE — 3078F DIAST BP <80 MM HG: CPT | Mod: HCNC,CPTII,S$GLB, | Performed by: PHYSICIAN ASSISTANT

## 2023-12-21 PROCEDURE — 3077F PR MOST RECENT SYSTOLIC BLOOD PRESSURE >= 140 MM HG: ICD-10-PCS | Mod: HCNC,CPTII,S$GLB, | Performed by: PHYSICIAN ASSISTANT

## 2023-12-21 PROCEDURE — 96413 CHEMO IV INFUSION 1 HR: CPT | Mod: HCNC

## 2023-12-21 PROCEDURE — 3288F PR FALLS RISK ASSESSMENT DOCUMENTED: ICD-10-PCS | Mod: HCNC,CPTII,S$GLB, | Performed by: PHYSICIAN ASSISTANT

## 2023-12-21 PROCEDURE — 63600175 PHARM REV CODE 636 W HCPCS: Mod: JZ,JG,HCNC | Performed by: PHYSICIAN ASSISTANT

## 2023-12-21 PROCEDURE — 1101F PR PT FALLS ASSESS DOC 0-1 FALLS W/OUT INJ PAST YR: ICD-10-PCS | Mod: HCNC,CPTII,S$GLB, | Performed by: PHYSICIAN ASSISTANT

## 2023-12-21 PROCEDURE — 1159F PR MEDICATION LIST DOCUMENTED IN MEDICAL RECORD: ICD-10-PCS | Mod: HCNC,CPTII,S$GLB, | Performed by: PHYSICIAN ASSISTANT

## 2023-12-21 PROCEDURE — 25000003 PHARM REV CODE 250: Mod: HCNC | Performed by: PHYSICIAN ASSISTANT

## 2023-12-21 PROCEDURE — 99215 OFFICE O/P EST HI 40 MIN: CPT | Mod: HCNC,S$GLB,, | Performed by: PHYSICIAN ASSISTANT

## 2023-12-21 PROCEDURE — 99215 PR OFFICE/OUTPT VISIT, EST, LEVL V, 40-54 MIN: ICD-10-PCS | Mod: HCNC,S$GLB,, | Performed by: PHYSICIAN ASSISTANT

## 2023-12-21 PROCEDURE — 3078F PR MOST RECENT DIASTOLIC BLOOD PRESSURE < 80 MM HG: ICD-10-PCS | Mod: HCNC,CPTII,S$GLB, | Performed by: PHYSICIAN ASSISTANT

## 2023-12-21 RX ORDER — SODIUM CHLORIDE 0.9 % (FLUSH) 0.9 %
10 SYRINGE (ML) INJECTION
Status: CANCELLED | OUTPATIENT
Start: 2023-12-21

## 2023-12-21 RX ORDER — EPINEPHRINE 0.3 MG/.3ML
0.3 INJECTION SUBCUTANEOUS ONCE AS NEEDED
Status: CANCELLED | OUTPATIENT
Start: 2023-12-21

## 2023-12-21 RX ORDER — DIPHENHYDRAMINE HYDROCHLORIDE 50 MG/ML
50 INJECTION INTRAMUSCULAR; INTRAVENOUS ONCE AS NEEDED
Status: CANCELLED | OUTPATIENT
Start: 2023-12-21

## 2023-12-21 RX ORDER — HEPARIN 100 UNIT/ML
500 SYRINGE INTRAVENOUS
Status: CANCELLED | OUTPATIENT
Start: 2023-12-21

## 2023-12-21 RX ADMIN — SODIUM CHLORIDE: 9 INJECTION, SOLUTION INTRAVENOUS at 01:12

## 2023-12-21 RX ADMIN — SODIUM CHLORIDE 200 MG: 9 INJECTION, SOLUTION INTRAVENOUS at 01:12

## 2023-12-21 NOTE — PLAN OF CARE
"  Problem: Fatigue  Goal: Improved Activity Tolerance  Outcome: Ongoing, Progressing  Intervention: Promote Improved Energy  Flowsheets (Taken 12/21/2023 1319)  Fatigue Management:   activity schedule adjusted   activity assistance provided   fatigue-related activity identified   frequent rest breaks encouraged   paced activity encouraged  Sleep/Rest Enhancement:   awakenings minimized   consistent schedule promoted   natural light exposure provided   noise level reduced   reading promoted   regular sleep/rest pattern promoted   relaxation techniques promoted  Activity Management:   Ambulated -L4   Up in chair - L3  BP (!) 141/64 (Patient Position: Sitting)   Pulse 63   Resp 18   Ht 5' 11" (1.803 m)   Wt 75.3 kg (166 lb)   SpO2 100%   BMI 23.15 kg/m² Pleasant, alert and oriented patient to Chemo Infusion for C13 Ketruda per self - VSS and PIV started, karyn observed, site secured and patient tolerated procedure well - Keytruda infused with medication reactions, PIV discontinued, pressure dressing applied, and patient discharged to home per self with no concerns - RTC 1/10/24         "

## 2023-12-21 NOTE — PROGRESS NOTES
"                                                         PROGRESS NOTE    Subjective:       Patient ID: Zac Plunkett is a 78 y.o. male.    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 JD. TMB 6.3    Oncologic History copied from medical chart:  Prior patient of Dr Lucia's  2021  May              5/27- US right thigh: showed 2 hypoechoic masses in the subcutaneous tissues of the right anterior thigh. Largest measured 1.1 x 0.9 x 1 cm. Read at the time was concern for lymphadenopathy.   June 6/4 - CXR: "Bilateral reticular opacities are present in a perihilar distribution which appears slightly more pronounced compared to prior exams."               6/11 - underwent excisional biopsy of the right thigh; pathology came back positive for myxofibrosarcoma, high grade. Path a 1.8 x 1.1 x 0.9 cm mass with an adjacent 0.4 x 0.4 x 0.3 cm mass.   July/Aug              - IMRT with Dr. Mcadams  2022 January 1/12 - PET: locoregional recurrence and concerning 0.9 cm RUL lesion              1/20 - Path: excision on 2 recurrent lesions:  Myxofibrosarcoma, 1.9 cm at longest length, PD-L1 25%              1/31 - CT thigh: successful removal w/o evidence of recurrent disease  April 4/28 - recurrence, locally with metastatic deposit in LN. S/p excision on 4/28/22. Path: right thigh superior mass: Myxofibrosarcoma, high grade with focal tumor necrosis, incompletely excised. A portion of the lesion is suggestive of an involved lymph node exhibiting metastatic sarcoma with extranodal extension.  Sarcoma is focally present within inked but otherwise unspecified peripheral margins.   July 7/19 - CT thigh showed local recurrence and a 0.7 cm R inguinal lymph node    2023  [facility]   1/3 - CT thigh/pelvis showed "Heterogeneous " "enhancing soft tissue lesions and nodular foci at the anterior thigh and right groin highly concerning for worsening local recurrent disease with talon metastasis"    - stable pulmonary nodules on CT chest  - completed palliative radiation to right thigh  March  3/24- started keytruda, s/p 11 cycles    Interval History:   Mr Plunkett returns for follow up. Feeling well but does report a single episode of a lower abdominal discomfort. The pain was described as sore, worsened with walking and improved with laying down. He did have to take pain medication for this; however, the pain resolved the next day.     ECO. Presents with his wife and brother (a good friend) today    ROS:   A ten-point system review is obtained and negative except for what was stated in the Interval History.     Physical Examination:   Vital signs reviewed.   General: well hydrated, well developed, in no acute distress  HEENT: normocephalic, EOMI, anicteric sclerae  Neck: supple, no JVD  Lungs: clear breath sounds bilaterally, no wheezing, rales, or rhonchi  Heart: RRR, no M/R/G  Abdomen: soft, no tenderness, non-distended. BS present  Extremities: no edema  Skin: no rash, ulcer, or open wounds  Neuro: alert and oriented x 4, no focal neuro deficit  Psych: pleasant and appropriate mood and affect    Objective:     Laboratory Data:  CBC reviewed and adequate for treatment.   CMP and TSH pending    Imaging Data:  CT C/A/P 3/29/23:  Impression:     1. 0.8 x 2.8-cm (previously 3.0 x 3.9-cm) right inguinal mass (series 3, image 171). 2.9 x 5.6-cm (previously 2.4 x 5.0-cm) exophytic right inguinal mass.  2. Multiple pulmonary nodules throughout the bilateral lung fields that are predominantly all stable.  There are approximately 2 pulmonary nodules that were not definitively seen on prior exam which may be related to technique.  3. Single enlarged precarinal space lymph node and prominent bilateral hilar lymph nodes that are not " enlarged by radiologic size criteria, not significantly changed.  4. Approximately 3 punctate hepatic parenchymal hypodensities that are too small to accurately characterize.    CT right thigh 3/29/23:  Interval increase in size of subcutaneous soft tissue mass within the right superior thigh.     Centrilobular emphysema.     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     FINDINGS:  The visualized intra-abdominal content is unremarkable.  There is a small sebaceous cyst involving the right inferior buttocks.  There is significant scattered arterial plaque.  There is fatty stranding believed to represent postoperative change involving the anterior superior right thigh.  This is within the area of previously described exophytic mass.  There is no evidence of residual mass.  There is significant scattered arterial plaque.  There is a knee joint effusion.     Impression:     No evidence of recurrent or residual mass.     CT C/A/P 9/28/23:  FINDINGS:  The visualized portion of the base of the lungs is significant for a subcentimeter hypodensity within the left lobe of the thyroid too small to characterize.  The visualized portion of the mediastinum including the heart and great vessels is significant for calcification of the aorta, aortic annulus, and coronary arteries.  The trachea is patent and free of any intraluminal filling defects.  The lungs are well expanded.  There is bilateral apical pleural thickening and/or scar.  There is centrilobular emphysema.  Scattered pulmonary nodules are unchanged compared to previous performed 06/15/2023.  There is bilateral dependent atelectatic versus fibrotic change.  There is no pleural or pericardial fluid.     The stomach is unremarkable.  There is a large duodenal diverticulum.  The spleen is  unremarkable.  There is fatty atrophy of the pancreas.  The liver, gallbladder, and adrenal glands are unremarkable.  There is abdominal aortic plaque which extends into its branches.  There is severe narrowing of the left common iliac artery.  The right kidney is unremarkable.  There is a large left renal cyst.  The bladder is unremarkable.  There is a left inguinal hernia containing a small short segment of bowel.  The bowel is significant for diverticulosis coli.  The appendix is not visualized.  The osseous structures demonstrate degenerative change.  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  There is grade 1 anterolisthesis of L4 in relation to L5.     Impression:     No evidence of metastatic disease.       Assessment and Plan:     1. Primary myxofibrosarcoma    2. 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 Essential hypertension    7. Cancer related pain    8. Lung nodules    9. Fatigue, unspecified type          1-5  - Mr Plunkett is a 79 yo man with recurrent myxofibrosarcoma of the right thigh, has had 3 resections and IMRT  - he was seen by Dr. Mao and Dr. Unger, they did not feel that he would be a good surgical candidate and recommended palliative RT  - completed palliative RT 2/13/2023  - Previously discussed systemic treatment options, including chemotherapy and immunotherapy. He was not interested in chemotherapy including TKI. Pembrolizumab has efficacy in phase II studies and is recommended on NCCN Guidelines for patients with myxofibrosarcoma. PD-L1 is 25%. However, his TMB is not high. Pembrolizumab was denied by insurance and then denied at hearing. He got patient assistance through [employer]  - started palliative keytruda on 3/24/23, s/p 12 cycles. He has had a complete response    - doing well. Cycle 13 keytruda today  - return in 3 weeks for cycle 14 if patient assistance through  [employer] approved, with scans and treatment discussion    6.  - BP controlled  - c/w current medication    7.  - stable  - c/w oxycodone prn    8.  - monitor    9.  - monitor TSH    Follow-up:     In 3 weeks        Route Chart for Scheduling    Med Onc Chart Routing      Follow up with physician 3 weeks and 6 weeks. See Dr Martines as scheduled with lab work, scans and treatment discussion with next cycle of Pembro. See Dr Martines in 6 weeks with lab work and pembro.   Follow up with NICOLLE . See NICOLLE or Dr Martines in 9 weeks with lab work and next cycle of Pembro   Infusion scheduling note    Injection scheduling note    Labs CBC, CMP, TSH and free T4   Scheduling:  Preferred lab:  Lab interval: every 3 weeks     Imaging    Pharmacy appointment    Other referrals              Treatment Plan Information   OP PEMBROLIZUMAB 200MG Q3W   Ed Martines MD   Upcoming Treatment Dates - OP PEMBROLIZUMAB 200MG Q3W    12/2/2023       Chemotherapy       pembrolizumab (KEYTRUDA) 200 mg in sodium chloride 0.9% SolP 108 mL infusion  12/23/2023       Chemotherapy       pembrolizumab (KEYTRUDA) 200 mg in sodium chloride 0.9% SolP 108 mL infusion  1/13/2024       Chemotherapy       pembrolizumab (KEYTRUDA) 200 mg in sodium chloride 0.9% SolP 108 mL infusion  2/3/2024       Chemotherapy       pembrolizumab (KEYTRUDA) 200 mg in sodium chloride 0.9% SolP 108 mL infusion

## 2024-01-04 ENCOUNTER — PATIENT MESSAGE (OUTPATIENT)
Dept: HEMATOLOGY/ONCOLOGY | Facility: CLINIC | Age: 79
End: 2024-01-04
Payer: MEDICARE

## 2024-01-17 ENCOUNTER — PATIENT MESSAGE (OUTPATIENT)
Dept: HEMATOLOGY/ONCOLOGY | Facility: CLINIC | Age: 79
End: 2024-01-17
Payer: MEDICARE

## 2024-01-17 ENCOUNTER — TELEPHONE (OUTPATIENT)
Dept: HEMATOLOGY/ONCOLOGY | Facility: CLINIC | Age: 79
End: 2024-01-17

## 2024-01-17 DIAGNOSIS — G89.3 NEOPLASM RELATED PAIN: ICD-10-CM

## 2024-01-17 NOTE — TELEPHONE ENCOUNTER
Spoke to Chanell and she placed conference call.  Got confirmation of new enrollment date from present to 12-.  Will let them know [phone] when we have Infusion date

## 2024-01-21 RX ORDER — OXYCODONE HYDROCHLORIDE 10 MG/1
10 TABLET ORAL EVERY 6 HOURS PRN
Qty: 120 TABLET | Refills: 0 | Status: SHIPPED | OUTPATIENT
Start: 2024-01-21 | End: 2024-02-17 | Stop reason: SDUPTHER

## 2024-01-31 ENCOUNTER — INFUSION (OUTPATIENT)
Dept: INFUSION THERAPY | Facility: HOSPITAL | Age: 79
End: 2024-01-31
Attending: INTERNAL MEDICINE
Payer: MEDICARE

## 2024-01-31 ENCOUNTER — OFFICE VISIT (OUTPATIENT)
Dept: HEMATOLOGY/ONCOLOGY | Facility: CLINIC | Age: 79
End: 2024-01-31
Payer: MEDICARE

## 2024-01-31 VITALS
DIASTOLIC BLOOD PRESSURE: 68 MMHG | TEMPERATURE: 98 F | BODY MASS INDEX: 23.5 KG/M2 | HEIGHT: 71 IN | WEIGHT: 167.88 LBS | OXYGEN SATURATION: 99 % | HEART RATE: 60 BPM | SYSTOLIC BLOOD PRESSURE: 149 MMHG

## 2024-01-31 VITALS
RESPIRATION RATE: 18 BRPM | SYSTOLIC BLOOD PRESSURE: 146 MMHG | HEIGHT: 71 IN | BODY MASS INDEX: 23.5 KG/M2 | TEMPERATURE: 98 F | OXYGEN SATURATION: 98 % | DIASTOLIC BLOOD PRESSURE: 65 MMHG | WEIGHT: 167.88 LBS | HEART RATE: 65 BPM

## 2024-01-31 DIAGNOSIS — C49.9 PRIMARY MYXOFIBROSARCOMA: Primary | ICD-10-CM

## 2024-01-31 DIAGNOSIS — D84.821 IMMUNODEFICIENCY DUE TO DRUGS: ICD-10-CM

## 2024-01-31 DIAGNOSIS — D49.9 IMMUNODEFICIENCY SECONDARY TO NEOPLASM: ICD-10-CM

## 2024-01-31 DIAGNOSIS — Z79.899 IMMUNODEFICIENCY DUE TO DRUGS: ICD-10-CM

## 2024-01-31 DIAGNOSIS — D84.81 IMMUNODEFICIENCY SECONDARY TO NEOPLASM: ICD-10-CM

## 2024-01-31 DIAGNOSIS — R23.3 BRUISING, SPONTANEOUS: ICD-10-CM

## 2024-01-31 DIAGNOSIS — L29.9 ITCHING: ICD-10-CM

## 2024-01-31 DIAGNOSIS — G89.3 CANCER RELATED PAIN: ICD-10-CM

## 2024-01-31 DIAGNOSIS — R91.8 LUNG NODULES: ICD-10-CM

## 2024-01-31 DIAGNOSIS — C49.9 SOFT TISSUE SARCOMA: ICD-10-CM

## 2024-01-31 DIAGNOSIS — R53.83 FATIGUE, UNSPECIFIED TYPE: ICD-10-CM

## 2024-01-31 DIAGNOSIS — I10 ESSENTIAL HYPERTENSION: ICD-10-CM

## 2024-01-31 DIAGNOSIS — C77.4 SECONDARY MALIGNANCY OF INGUINAL LYMPH NODES: ICD-10-CM

## 2024-01-31 PROCEDURE — 1160F RVW MEDS BY RX/DR IN RCRD: CPT | Mod: HCNC,CPTII,S$GLB, | Performed by: PHYSICIAN ASSISTANT

## 2024-01-31 PROCEDURE — 96413 CHEMO IV INFUSION 1 HR: CPT | Mod: HCNC

## 2024-01-31 PROCEDURE — 3078F DIAST BP <80 MM HG: CPT | Mod: HCNC,CPTII,S$GLB, | Performed by: PHYSICIAN ASSISTANT

## 2024-01-31 PROCEDURE — 3077F SYST BP >= 140 MM HG: CPT | Mod: HCNC,CPTII,S$GLB, | Performed by: PHYSICIAN ASSISTANT

## 2024-01-31 PROCEDURE — 1101F PT FALLS ASSESS-DOCD LE1/YR: CPT | Mod: HCNC,CPTII,S$GLB, | Performed by: PHYSICIAN ASSISTANT

## 2024-01-31 PROCEDURE — 99215 OFFICE O/P EST HI 40 MIN: CPT | Mod: HCNC,S$GLB,, | Performed by: PHYSICIAN ASSISTANT

## 2024-01-31 PROCEDURE — 63600175 PHARM REV CODE 636 W HCPCS: Mod: JZ,JG,HCNC | Performed by: PHYSICIAN ASSISTANT

## 2024-01-31 PROCEDURE — 25000003 PHARM REV CODE 250: Mod: HCNC | Performed by: PHYSICIAN ASSISTANT

## 2024-01-31 PROCEDURE — 99999 PR PBB SHADOW E&M-EST. PATIENT-LVL III: CPT | Mod: PBBFAC,HCNC,, | Performed by: PHYSICIAN ASSISTANT

## 2024-01-31 PROCEDURE — 1159F MED LIST DOCD IN RCRD: CPT | Mod: HCNC,CPTII,S$GLB, | Performed by: PHYSICIAN ASSISTANT

## 2024-01-31 PROCEDURE — 3288F FALL RISK ASSESSMENT DOCD: CPT | Mod: HCNC,CPTII,S$GLB, | Performed by: PHYSICIAN ASSISTANT

## 2024-01-31 PROCEDURE — 1125F AMNT PAIN NOTED PAIN PRSNT: CPT | Mod: HCNC,CPTII,S$GLB, | Performed by: PHYSICIAN ASSISTANT

## 2024-01-31 RX ORDER — EPINEPHRINE 0.3 MG/.3ML
0.3 INJECTION SUBCUTANEOUS ONCE AS NEEDED
Status: DISCONTINUED | OUTPATIENT
Start: 2024-01-31 | End: 2024-01-31 | Stop reason: HOSPADM

## 2024-01-31 RX ORDER — INFLUENZA A VIRUS A/VICTORIA/4897/2022 IVR-238 (H1N1) ANTIGEN (FORMALDEHYDE INACTIVATED), INFLUENZA A VIRUS A/DARWIN/6/2021 IVR-227 (H3N2) ANTIGEN (FORMALDEHYDE INACTIVATED), INFLUENZA B VIRUS B/AUSTRIA/1359417/2021 BVR-26 ANTIGEN (FORMALDEHYDE INACTIVATED), INFLUENZA B VIRUS B/PHUKET/3073/2013 BVR-1B ANTIGEN (FORMALDEHYDE INACTIVATED) 15; 15; 15; 15 UG/.5ML; UG/.5ML; UG/.5ML; UG/.5ML
INJECTION, SUSPENSION INTRAMUSCULAR
COMMUNITY
Start: 2023-11-14 | End: 2024-06-13

## 2024-01-31 RX ORDER — EPINEPHRINE 0.3 MG/.3ML
0.3 INJECTION SUBCUTANEOUS ONCE AS NEEDED
Status: CANCELLED | OUTPATIENT
Start: 2024-01-31

## 2024-01-31 RX ORDER — HEPARIN 100 UNIT/ML
500 SYRINGE INTRAVENOUS
Status: DISCONTINUED | OUTPATIENT
Start: 2024-01-31 | End: 2024-01-31 | Stop reason: HOSPADM

## 2024-01-31 RX ORDER — SODIUM CHLORIDE 0.9 % (FLUSH) 0.9 %
10 SYRINGE (ML) INJECTION
Status: DISCONTINUED | OUTPATIENT
Start: 2024-01-31 | End: 2024-01-31 | Stop reason: HOSPADM

## 2024-01-31 RX ORDER — HYDROXYZINE HYDROCHLORIDE 10 MG/1
25 TABLET, FILM COATED ORAL 3 TIMES DAILY PRN
Qty: 42 TABLET | Refills: 1 | Status: SHIPPED | OUTPATIENT
Start: 2024-01-31

## 2024-01-31 RX ORDER — DIPHENHYDRAMINE HYDROCHLORIDE 50 MG/ML
50 INJECTION INTRAMUSCULAR; INTRAVENOUS ONCE AS NEEDED
Status: DISCONTINUED | OUTPATIENT
Start: 2024-01-31 | End: 2024-01-31 | Stop reason: HOSPADM

## 2024-01-31 RX ORDER — SODIUM CHLORIDE 0.9 % (FLUSH) 0.9 %
10 SYRINGE (ML) INJECTION
Status: CANCELLED | OUTPATIENT
Start: 2024-01-31

## 2024-01-31 RX ORDER — HEPARIN 100 UNIT/ML
500 SYRINGE INTRAVENOUS
Status: CANCELLED | OUTPATIENT
Start: 2024-01-31

## 2024-01-31 RX ORDER — DIPHENHYDRAMINE HYDROCHLORIDE 50 MG/ML
50 INJECTION INTRAMUSCULAR; INTRAVENOUS ONCE AS NEEDED
Status: CANCELLED | OUTPATIENT
Start: 2024-01-31

## 2024-01-31 RX ADMIN — SODIUM CHLORIDE: 9 INJECTION, SOLUTION INTRAVENOUS at 02:01

## 2024-01-31 RX ADMIN — SODIUM CHLORIDE 200 MG: 9 INJECTION, SOLUTION INTRAVENOUS at 03:01

## 2024-01-31 NOTE — PLAN OF CARE
1445 Patient here for C14 keytruda. Labs, meds and hx reviewed. Patient was seen in the MD office today and is appropriate for treatment today. PIV inserted and NS started at 25ml/hr. [place]den and snack provided.

## 2024-01-31 NOTE — PLAN OF CARE
1545 Patient tolerated keytruda with no s/s of reaction and no complaints. PIV removed and catheter tip intact. He declined the AVS and ambulated out.

## 2024-01-31 NOTE — PROGRESS NOTES
"                                                         PROGRESS NOTE    Subjective:       Patient ID: Zac Plunkett is a 78 y.o. male.    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 JD. TMB 6.3    Oncologic History copied from medical chart:  Prior patient of Dr Lucia's  2021  May              5/27- US right thigh: showed 2 hypoechoic masses in the subcutaneous tissues of the right anterior thigh. Largest measured 1.1 x 0.9 x 1 cm. Read at the time was concern for lymphadenopathy.   June 6/4 - CXR: "Bilateral reticular opacities are present in a perihilar distribution which appears slightly more pronounced compared to prior exams."               6/11 - underwent excisional biopsy of the right thigh; pathology came back positive for myxofibrosarcoma, high grade. Path a 1.8 x 1.1 x 0.9 cm mass with an adjacent 0.4 x 0.4 x 0.3 cm mass.   July/Aug              - IMRT with Dr. Mcadams  2022 January 1/12 - PET: locoregional recurrence and concerning 0.9 cm RUL lesion              1/20 - Path: excision on 2 recurrent lesions:  Myxofibrosarcoma, 1.9 cm at longest length, PD-L1 25%              1/31 - CT thigh: successful removal w/o evidence of recurrent disease  April 4/28 - recurrence, locally with metastatic deposit in LN. S/p excision on 4/28/22. Path: right thigh superior mass: Myxofibrosarcoma, high grade with focal tumor necrosis, incompletely excised. A portion of the lesion is suggestive of an involved lymph node exhibiting metastatic sarcoma with extranodal extension.  Sarcoma is focally present within inked but otherwise unspecified peripheral margins.   July 7/19 - CT thigh showed local recurrence and a 0.7 cm R inguinal lymph node    2023  [facility]   1/3 - CT thigh/pelvis showed "Heterogeneous " "enhancing soft tissue lesions and nodular foci at the anterior thigh and right groin highly concerning for worsening local recurrent disease with talon metastasis"    - stable pulmonary nodules on CT chest  - completed palliative radiation to right thigh  March  3/24- started keytruda, s/p 11 cycles    Interval History:   Mr Plunkett returns for follow up. Feeling well but does report a single episode of a lower abdominal discomfort. The pain was described as sore, worsened with walking and improved with laying down. He did have to take pain medication for this; however, the pain resolved the next day.     ECO. Presents with his wife and brother (a good friend) today    ROS:   A ten-point system review is obtained and negative except for what was stated in the Interval History.     Physical Examination:   Vital signs reviewed.   General: well hydrated, well developed, in no acute distress  HEENT: normocephalic, EOMI, anicteric sclerae  Neck: supple, no JVD  Lungs: clear breath sounds bilaterally, no wheezing, rales, or rhonchi  Heart: RRR, no M/R/G  Abdomen: soft, no tenderness, non-distended. BS present  Extremities: no edema  Skin: no rash, ulcer, or open wounds  Neuro: alert and oriented x 4, no focal neuro deficit  Psych: pleasant and appropriate mood and affect    Objective:     Laboratory Data:  CBC reviewed and adequate for treatment.   CMP and TSH pending    Imaging Data:  CT C/A/P 3/29/23:  Impression:     1. 0.8 x 2.8-cm (previously 3.0 x 3.9-cm) right inguinal mass (series 3, image 171). 2.9 x 5.6-cm (previously 2.4 x 5.0-cm) exophytic right inguinal mass.  2. Multiple pulmonary nodules throughout the bilateral lung fields that are predominantly all stable.  There are approximately 2 pulmonary nodules that were not definitively seen on prior exam which may be related to technique.  3. Single enlarged precarinal space lymph node and prominent bilateral hilar lymph nodes that are not " enlarged by radiologic size criteria, not significantly changed.  4. Approximately 3 punctate hepatic parenchymal hypodensities that are too small to accurately characterize.    CT right thigh 3/29/23:  Interval increase in size of subcutaneous soft tissue mass within the right superior thigh.     Centrilobular emphysema.     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     FINDINGS:  The visualized intra-abdominal content is unremarkable.  There is a small sebaceous cyst involving the right inferior buttocks.  There is significant scattered arterial plaque.  There is fatty stranding believed to represent postoperative change involving the anterior superior right thigh.  This is within the area of previously described exophytic mass.  There is no evidence of residual mass.  There is significant scattered arterial plaque.  There is a knee joint effusion.     Impression:     No evidence of recurrent or residual mass.     CT C/A/P 9/28/23:  FINDINGS:  The visualized portion of the base of the lungs is significant for a subcentimeter hypodensity within the left lobe of the thyroid too small to characterize.  The visualized portion of the mediastinum including the heart and great vessels is significant for calcification of the aorta, aortic annulus, and coronary arteries.  The trachea is patent and free of any intraluminal filling defects.  The lungs are well expanded.  There is bilateral apical pleural thickening and/or scar.  There is centrilobular emphysema.  Scattered pulmonary nodules are unchanged compared to previous performed 06/15/2023.  There is bilateral dependent atelectatic versus fibrotic change.  There is no pleural or pericardial fluid.     The stomach is unremarkable.  There is a large duodenal diverticulum.  The spleen is  unremarkable.  There is fatty atrophy of the pancreas.  The liver, gallbladder, and adrenal glands are unremarkable.  There is abdominal aortic plaque which extends into its branches.  There is severe narrowing of the left common iliac artery.  The right kidney is unremarkable.  There is a large left renal cyst.  The bladder is unremarkable.  There is a left inguinal hernia containing a small short segment of bowel.  The bowel is significant for diverticulosis coli.  The appendix is not visualized.  The osseous structures demonstrate degenerative change.  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  There is grade 1 anterolisthesis of L4 in relation to L5.     Impression:     No evidence of metastatic disease.    CT CAP: 1/8/2024    Impression:     1. Mediastinal and bilateral hilar enlarged lymph node, not significantly changed.  2. 0.9-cm ground-glass nodule left lower lobe with apparent slight interval increased in density which may be artifactual.  A few additional pulmonary nodules throughout the bilateral lung fields are not significantly changed.  3. Three hepatic parenchymal punctate hypodensities are too small to accurately characterize, not significantly changed.  4. Punctate indeterminate hypodensity in the lateral limb of the left adrenal gland is too small to characterize but not significantly changed.    CT Thigh:      Impression:     No acute findings with no detrimental interval change compared to previous performed 09/28/2023.    Assessment and Plan:     1. Primary myxofibrosarcoma    2. 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 Essential hypertension    7. Cancer related pain    8. Lung nodules    9. Fatigue, unspecified type    10. Itching    11. Bruising, spontaneous            1-5, 11  - Mr Plunkett is a 77 yo man with recurrent myxofibrosarcoma of the right thigh, has had 3 resections  and IMRT  - he was seen by Dr. Mao and Dr. Unger, they did not feel that he would be a good surgical candidate and recommended palliative RT  - completed palliative RT 2/13/2023  - Previously discussed systemic treatment options, including chemotherapy and immunotherapy. He was not interested in chemotherapy including TKI. Pembrolizumab has efficacy in phase II studies and is recommended on NCCN Guidelines for patients with myxofibrosarcoma. PD-L1 is 25%. However, his TMB is not high. Pembrolizumab was denied by insurance and then denied at hearing. He got patient assistance through [employer]  - started palliative keytruda on 3/24/23, s/p 13 cycles. He has had a complete response    - doing well. Eating well and has a good appetite. Does have concerns with increased itching and bruising. Will discuss further with Dr. Martines during next visit. Will place consult for him to see the Immuno Toxicity clinic as well. He wants to proceed with treatment today.   - Lab work reviewed as well as his CT scans. The imaging studies are stable without evidence for progression of disease. Good response. Recc to continue with Keytruda today.   - Cycle 14 keytruda today    - return in 3 weeks for cycle 15    6.  - BP controlled  - c/w current medication    7.  - stable  - c/w oxycodone prn    8.  - monitor    9.  - monitor TSH    10.   - Will have him try Atarax to help with the itching. Can be 2/2 immunotherapy. Will refer to Immuno toxicity clinic    Follow-up:     In 3 weeks      Route Chart for Scheduling    Med Onc Chart Routing  Urgent    Follow up with physician 3 weeks and 6 weeks. See Dr Martines in 3 weeks as scheduled. See Dr. Martines in 6 weeks with lab work and Keytruda   Follow up with NICOLLE . See NICOLLE or Dr Martines in 9 weeks with lab work and Keytruda   Infusion scheduling note    Injection scheduling note    Labs CBC, CMP, TSH and free T4   Scheduling:  Preferred lab:  Lab interval: every 3 weeks     Imaging    Pharmacy appointment    Other  referrals         Will place referral to the Immuno toxicity clinic         Treatment Plan Information   OP PEMBROLIZUMAB 200MG Q3W   Ed Martines MD   Upcoming Treatment Dates - OP PEMBROLIZUMAB 200MG Q3W    12/23/2023       Chemotherapy       pembrolizumab (KEYTRUDA) 200 mg in sodium chloride 0.9% SolP 108 mL infusion  1/13/2024       Chemotherapy       pembrolizumab (KEYTRUDA) 200 mg in sodium chloride 0.9% SolP 108 mL infusion  2/3/2024       Chemotherapy       pembrolizumab (KEYTRUDA) 200 mg in sodium chloride 0.9% SolP 108 mL infusion  2/24/2024       Chemotherapy       pembrolizumab (KEYTRUDA) 200 mg in sodium chloride 0.9% SolP 108 mL infusion

## 2024-02-21 ENCOUNTER — OFFICE VISIT (OUTPATIENT)
Dept: HEMATOLOGY/ONCOLOGY | Facility: CLINIC | Age: 79
End: 2024-02-21
Payer: MEDICARE

## 2024-02-21 ENCOUNTER — LAB VISIT (OUTPATIENT)
Dept: LAB | Facility: HOSPITAL | Age: 79
End: 2024-02-21
Attending: INTERNAL MEDICINE
Payer: MEDICARE

## 2024-02-21 VITALS
DIASTOLIC BLOOD PRESSURE: 67 MMHG | HEART RATE: 61 BPM | SYSTOLIC BLOOD PRESSURE: 167 MMHG | WEIGHT: 168.19 LBS | HEIGHT: 71 IN | OXYGEN SATURATION: 98 % | BODY MASS INDEX: 23.55 KG/M2 | RESPIRATION RATE: 18 BRPM

## 2024-02-21 DIAGNOSIS — I10 ESSENTIAL HYPERTENSION: ICD-10-CM

## 2024-02-21 DIAGNOSIS — R91.8 LUNG NODULES: ICD-10-CM

## 2024-02-21 DIAGNOSIS — C77.4 SECONDARY MALIGNANCY OF INGUINAL LYMPH NODES: ICD-10-CM

## 2024-02-21 DIAGNOSIS — C49.9 PRIMARY MYXOFIBROSARCOMA: ICD-10-CM

## 2024-02-21 DIAGNOSIS — D49.9 IMMUNODEFICIENCY SECONDARY TO NEOPLASM: ICD-10-CM

## 2024-02-21 DIAGNOSIS — D84.81 IMMUNODEFICIENCY SECONDARY TO NEOPLASM: ICD-10-CM

## 2024-02-21 DIAGNOSIS — D84.821 IMMUNODEFICIENCY DUE TO DRUGS: ICD-10-CM

## 2024-02-21 DIAGNOSIS — G89.3 CANCER RELATED PAIN: ICD-10-CM

## 2024-02-21 DIAGNOSIS — C49.9 PRIMARY MYXOFIBROSARCOMA: Primary | ICD-10-CM

## 2024-02-21 DIAGNOSIS — C49.9 SOFT TISSUE SARCOMA: ICD-10-CM

## 2024-02-21 DIAGNOSIS — Z79.899 IMMUNODEFICIENCY DUE TO DRUGS: ICD-10-CM

## 2024-02-21 DIAGNOSIS — R53.83 FATIGUE, UNSPECIFIED TYPE: ICD-10-CM

## 2024-02-21 LAB
ALBUMIN SERPL BCP-MCNC: 3.5 G/DL (ref 3.5–5.2)
ALP SERPL-CCNC: 121 U/L (ref 55–135)
ALT SERPL W/O P-5'-P-CCNC: 11 U/L (ref 10–44)
ANION GAP SERPL CALC-SCNC: 9 MMOL/L (ref 8–16)
AST SERPL-CCNC: 12 U/L (ref 10–40)
BASOPHILS # BLD AUTO: 0.13 K/UL (ref 0–0.2)
BASOPHILS NFR BLD: 1.6 % (ref 0–1.9)
BILIRUB SERPL-MCNC: 0.4 MG/DL (ref 0.1–1)
BUN SERPL-MCNC: 13 MG/DL (ref 8–23)
CALCIUM SERPL-MCNC: 9.7 MG/DL (ref 8.7–10.5)
CHLORIDE SERPL-SCNC: 102 MMOL/L (ref 95–110)
CO2 SERPL-SCNC: 24 MMOL/L (ref 23–29)
CREAT SERPL-MCNC: 0.9 MG/DL (ref 0.5–1.4)
DIFFERENTIAL METHOD BLD: ABNORMAL
EOSINOPHIL # BLD AUTO: 0.3 K/UL (ref 0–0.5)
EOSINOPHIL NFR BLD: 3.3 % (ref 0–8)
ERYTHROCYTE [DISTWIDTH] IN BLOOD BY AUTOMATED COUNT: 15 % (ref 11.5–14.5)
EST. GFR  (NO RACE VARIABLE): >60 ML/MIN/1.73 M^2
GLUCOSE SERPL-MCNC: 101 MG/DL (ref 70–110)
HCT VFR BLD AUTO: 43.6 % (ref 40–54)
HGB BLD-MCNC: 14.2 G/DL (ref 14–18)
IMM GRANULOCYTES # BLD AUTO: 0.14 K/UL (ref 0–0.04)
IMM GRANULOCYTES NFR BLD AUTO: 1.8 % (ref 0–0.5)
LYMPHOCYTES # BLD AUTO: 1 K/UL (ref 1–4.8)
LYMPHOCYTES NFR BLD: 12.6 % (ref 18–48)
MCH RBC QN AUTO: 29.2 PG (ref 27–31)
MCHC RBC AUTO-ENTMCNC: 32.6 G/DL (ref 32–36)
MCV RBC AUTO: 90 FL (ref 82–98)
MONOCYTES # BLD AUTO: 0.5 K/UL (ref 0.3–1)
MONOCYTES NFR BLD: 6.2 % (ref 4–15)
NEUTROPHILS # BLD AUTO: 5.9 K/UL (ref 1.8–7.7)
NEUTROPHILS NFR BLD: 74.5 % (ref 38–73)
NRBC BLD-RTO: 0 /100 WBC
PLATELET # BLD AUTO: 254 K/UL (ref 150–450)
PMV BLD AUTO: 9.4 FL (ref 9.2–12.9)
POTASSIUM SERPL-SCNC: 4.3 MMOL/L (ref 3.5–5.1)
PROT SERPL-MCNC: 6.9 G/DL (ref 6–8.4)
RBC # BLD AUTO: 4.86 M/UL (ref 4.6–6.2)
SODIUM SERPL-SCNC: 135 MMOL/L (ref 136–145)
T4 FREE SERPL-MCNC: 0.98 NG/DL (ref 0.71–1.51)
TSH SERPL DL<=0.005 MIU/L-ACNC: 0.6 UIU/ML (ref 0.4–4)
WBC # BLD AUTO: 7.91 K/UL (ref 3.9–12.7)

## 2024-02-21 PROCEDURE — 84439 ASSAY OF FREE THYROXINE: CPT | Mod: HCNC | Performed by: PHYSICIAN ASSISTANT

## 2024-02-21 PROCEDURE — 1159F MED LIST DOCD IN RCRD: CPT | Mod: HCNC,CPTII,S$GLB, | Performed by: INTERNAL MEDICINE

## 2024-02-21 PROCEDURE — 3078F DIAST BP <80 MM HG: CPT | Mod: HCNC,CPTII,S$GLB, | Performed by: INTERNAL MEDICINE

## 2024-02-21 PROCEDURE — 99215 OFFICE O/P EST HI 40 MIN: CPT | Mod: HCNC,S$GLB,, | Performed by: INTERNAL MEDICINE

## 2024-02-21 PROCEDURE — 1126F AMNT PAIN NOTED NONE PRSNT: CPT | Mod: HCNC,CPTII,S$GLB, | Performed by: INTERNAL MEDICINE

## 2024-02-21 PROCEDURE — 3288F FALL RISK ASSESSMENT DOCD: CPT | Mod: HCNC,CPTII,S$GLB, | Performed by: INTERNAL MEDICINE

## 2024-02-21 PROCEDURE — 80053 COMPREHEN METABOLIC PANEL: CPT | Mod: HCNC | Performed by: INTERNAL MEDICINE

## 2024-02-21 PROCEDURE — 85025 COMPLETE CBC W/AUTO DIFF WBC: CPT | Mod: HCNC | Performed by: INTERNAL MEDICINE

## 2024-02-21 PROCEDURE — 1101F PT FALLS ASSESS-DOCD LE1/YR: CPT | Mod: HCNC,CPTII,S$GLB, | Performed by: INTERNAL MEDICINE

## 2024-02-21 PROCEDURE — G2211 COMPLEX E/M VISIT ADD ON: HCPCS | Mod: HCNC,S$GLB,, | Performed by: INTERNAL MEDICINE

## 2024-02-21 PROCEDURE — 36415 COLL VENOUS BLD VENIPUNCTURE: CPT | Mod: HCNC | Performed by: INTERNAL MEDICINE

## 2024-02-21 PROCEDURE — 1160F RVW MEDS BY RX/DR IN RCRD: CPT | Mod: HCNC,CPTII,S$GLB, | Performed by: INTERNAL MEDICINE

## 2024-02-21 PROCEDURE — 3077F SYST BP >= 140 MM HG: CPT | Mod: HCNC,CPTII,S$GLB, | Performed by: INTERNAL MEDICINE

## 2024-02-21 PROCEDURE — 84443 ASSAY THYROID STIM HORMONE: CPT | Mod: HCNC | Performed by: INTERNAL MEDICINE

## 2024-02-21 PROCEDURE — 99999 PR PBB SHADOW E&M-EST. PATIENT-LVL IV: CPT | Mod: PBBFAC,HCNC,, | Performed by: INTERNAL MEDICINE

## 2024-02-21 RX ORDER — EPINEPHRINE 0.3 MG/.3ML
0.3 INJECTION SUBCUTANEOUS ONCE AS NEEDED
Status: CANCELLED | OUTPATIENT
Start: 2024-02-21

## 2024-02-21 RX ORDER — SODIUM CHLORIDE 0.9 % (FLUSH) 0.9 %
10 SYRINGE (ML) INJECTION
Status: CANCELLED | OUTPATIENT
Start: 2024-02-21

## 2024-02-21 RX ORDER — HEPARIN 100 UNIT/ML
500 SYRINGE INTRAVENOUS
Status: CANCELLED | OUTPATIENT
Start: 2024-02-21

## 2024-02-21 RX ORDER — DIPHENHYDRAMINE HYDROCHLORIDE 50 MG/ML
50 INJECTION INTRAMUSCULAR; INTRAVENOUS ONCE AS NEEDED
Status: CANCELLED | OUTPATIENT
Start: 2024-02-21

## 2024-02-21 NOTE — PROGRESS NOTES
"                                                         PROGRESS NOTE    Subjective:       Patient ID: Zac Plunkett is a 78 y.o. male.    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 JD. TMB 6.3    Oncologic History:  Prior patient of Dr Lucia's  2021  May              5/27- US right thigh: showed 2 hypoechoic masses in the subcutaneous tissues of the right anterior thigh. Largest measured 1.1 x 0.9 x 1 cm. Read at the time was concern for lymphadenopathy.   June 6/4 - CXR: "Bilateral reticular opacities are present in a perihilar distribution which appears slightly more pronounced compared to prior exams."               6/11 - underwent excisional biopsy of the right thigh; pathology came back positive for myxofibrosarcoma, high grade. Path a 1.8 x 1.1 x 0.9 cm mass with an adjacent 0.4 x 0.4 x 0.3 cm mass.   July/Aug              - IMRT with Dr. Mcadams  2022 January 1/12 - PET: locoregional recurrence and concerning 0.9 cm RUL lesion              1/20 - Path: excision on 2 recurrent lesions:  Myxofibrosarcoma, 1.9 cm at longest length, PD-L1 25%              1/31 - CT thigh: successful removal w/o evidence of recurrent disease  April 4/28 - recurrence, locally with metastatic deposit in LN. S/p excision on 4/28/22. Path: right thigh superior mass: Myxofibrosarcoma, high grade with focal tumor necrosis, incompletely excised. A portion of the lesion is suggestive of an involved lymph node exhibiting metastatic sarcoma with extranodal extension.  Sarcoma is focally present within inked but otherwise unspecified peripheral margins.   July 7/19 - CT thigh showed local recurrence and a 0.7 cm R inguinal lymph node    2023  [facility]   1/3 - CT thigh/pelvis showed "Heterogeneous enhancing soft tissue lesions " "and nodular foci at the anterior thigh and right groin highly concerning for worsening local recurrent disease with talon metastasis"    - stable pulmonary nodules on CT chest  - completed palliative radiation to right thigh  March  3/24- started keytruda, s/p 14 cycles    Interval History:   Mr Plunkett returns for follow up. Feeling well. His right leg gave out 1 1/2 weeks ago when he was waiting for his wife' surgery to finish. He fell in front of a nursing station. He is feeling well now.     ECO    ROS:   A ten-point system review is obtained and negative except for what was stated in the Interval History.     Physical Examination:   Vital signs reviewed.   General: well hydrated, well developed, in no acute distress  HEENT: normocephalic, EOMI, anicteric sclerae  Neck: supple, no JVD, thyromegaly, cervical or supraclavicular lymphadenopathy  Lungs: clear breath sounds bilaterally, no wheezing, rales, or rhonchi  Heart: RRR, no M/R/G  Abdomen: soft, no tenderness, non-distended, no hepatosplenomegaly, mass, or hernia. BS present  Extremities: no edema  Skin: no rash, ulcer, or open wounds  Neuro: alert and oriented x 4, no focal neuro deficit  Psych: pleasant and appropriate mood and affect    Objective:     Laboratory Data:  Labs from today pending    Imaging Data:  CT C/A/P 3/29/23:  Impression:     1. 0.8 x 2.8-cm (previously 3.0 x 3.9-cm) right inguinal mass (series 3, image 171). 2.9 x 5.6-cm (previously 2.4 x 5.0-cm) exophytic right inguinal mass.  2. Multiple pulmonary nodules throughout the bilateral lung fields that are predominantly all stable.  There are approximately 2 pulmonary nodules that were not definitively seen on prior exam which may be related to technique.  3. Single enlarged precarinal space lymph node and prominent bilateral hilar lymph nodes that are not enlarged by radiologic size criteria, not significantly changed.  4. Approximately 3 punctate hepatic parenchymal " hypodensities that are too small to accurately characterize.    CT right thigh 3/29/23:  Interval increase in size of subcutaneous soft tissue mass within the right superior thigh.     Centrilobular emphysema.     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     FINDINGS:  The visualized intra-abdominal content is unremarkable.  There is a small sebaceous cyst involving the right inferior buttocks.  There is significant scattered arterial plaque.  There is fatty stranding believed to represent postoperative change involving the anterior superior right thigh.  This is within the area of previously described exophytic mass.  There is no evidence of residual mass.  There is significant scattered arterial plaque.  There is a knee joint effusion.     Impression:     No evidence of recurrent or residual mass.     CT C/A/P 9/28/23:  FINDINGS:  The visualized portion of the base of the lungs is significant for a subcentimeter hypodensity within the left lobe of the thyroid too small to characterize.  The visualized portion of the mediastinum including the heart and great vessels is significant for calcification of the aorta, aortic annulus, and coronary arteries.  The trachea is patent and free of any intraluminal filling defects.  The lungs are well expanded.  There is bilateral apical pleural thickening and/or scar.  There is centrilobular emphysema.  Scattered pulmonary nodules are unchanged compared to previous performed 06/15/2023.  There is bilateral dependent atelectatic versus fibrotic change.  There is no pleural or pericardial fluid.     The stomach is unremarkable.  There is a large duodenal diverticulum.  The spleen is unremarkable.  There is fatty atrophy of the pancreas.  The liver, gallbladder, and adrenal glands are unremarkable.   There is abdominal aortic plaque which extends into its branches.  There is severe narrowing of the left common iliac artery.  The right kidney is unremarkable.  There is a large left renal cyst.  The bladder is unremarkable.  There is a left inguinal hernia containing a small short segment of bowel.  The bowel is significant for diverticulosis coli.  The appendix is not visualized.  The osseous structures demonstrate degenerative change.  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  There is grade 1 anterolisthesis of L4 in relation to L5.     Impression:     No evidence of metastatic disease.    CT thigh 1/8/24:  Impression:     No acute findings with no detrimental interval change compared to previous performed 09/28/2023.       CT C/A/P 1/8/24:  Impression:     1. Mediastinal and bilateral hilar enlarged lymph node, not significantly changed.  2. 0.9-cm ground-glass nodule left lower lobe with apparent slight interval increased in density which may be artifactual.  A few additional pulmonary nodules throughout the bilateral lung fields are not significantly changed.  3. Three hepatic parenchymal punctate hypodensities are too small to accurately characterize, not significantly changed.  4. Punctate indeterminate hypodensity in the lateral limb of the left adrenal gland is too small to characterize but not significantly changed.     Assessment and Plan:     1. Primary myxofibrosarcoma    2. 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 Essential hypertension    7. Cancer related pain    8. Lung nodules      1-5  - Mr Plunkett is a 77 yo man with recurrent myxofibrosarcoma of the right thigh, has had 3 resections and IMRT  - he was seen by Dr. Mao and Dr. Unger, they did not feel that he would be a good surgical candidate and recommended palliative RT  - completed palliative RT 2/13/2023  - Previously  discussed systemic treatment options, including chemotherapy and immunotherapy. He was not interested in chemotherapy including TKI. Pembrolizumab has efficacy in phase II studies and is recommended on NCCN Guidelines for patients with myxofibrosarcoma. PD-L1 is 25%. However, his TMB is not high. Pembrolizumab was denied by insurance and then denied at hearing. He got patient assistance through [employer]  - started palliative keytruda on 3/24/23, s/p 14 cycles. He has had a complete response  - doing well. Cycle 14 keytruda tomorrow  - return in 3 weeks for cycle 15     6.  - BP controlled  - c/w current medication    7.  - stable  - c/w oxycodone prn    8.  - monitor    Follow-up:     In 3 weeks    Ed Martines MD  Hematology and Medical Oncology  [facility]      Route Chart for Scheduling    Med Onc Chart Routing      Follow up with physician . Keep scheduled appts. labs and CT thigh, CT C/A/P at [place] on 4/23, see me on 4/24 then treatment   Follow up with NICOLLE    Infusion scheduling note   keytruda every 3 weeks   Injection scheduling note    Labs CBC, CMP and TSH   Scheduling:  Preferred lab:  Lab interval: every 3 weeks     Imaging CT chest abdomen pelvis and other      Pharmacy appointment    Other referrals          Treatment Plan Information   OP PEMBROLIZUMAB 200MG Q3W   Ed Martines MD   Upcoming Treatment Dates - OP PEMBROLIZUMAB 200MG Q3W    2/1/2024       Chemotherapy       pembrolizumab (KEYTRUDA) 200 mg in sodium chloride 0.9% SolP 108 mL infusion  2/22/2024       Chemotherapy       pembrolizumab (KEYTRUDA) 200 mg in sodium chloride 0.9% SolP 108 mL infusion  3/14/2024       Chemotherapy       pembrolizumab (KEYTRUDA) 200 mg in sodium chloride 0.9% SolP 108 mL infusion  4/4/2024       Chemotherapy       pembrolizumab (KEYTRUDA) 200 mg in sodium chloride 0.9% SolP 108 mL infusion

## 2024-02-22 ENCOUNTER — INFUSION (OUTPATIENT)
Dept: INFUSION THERAPY | Facility: HOSPITAL | Age: 79
End: 2024-02-22
Attending: INTERNAL MEDICINE
Payer: MEDICARE

## 2024-02-22 VITALS
WEIGHT: 168.19 LBS | DIASTOLIC BLOOD PRESSURE: 62 MMHG | BODY MASS INDEX: 23.55 KG/M2 | SYSTOLIC BLOOD PRESSURE: 137 MMHG | HEART RATE: 58 BPM | TEMPERATURE: 98 F | HEIGHT: 71 IN | RESPIRATION RATE: 18 BRPM

## 2024-02-22 DIAGNOSIS — C49.9 PRIMARY MYXOFIBROSARCOMA: Primary | ICD-10-CM

## 2024-02-22 PROCEDURE — 96413 CHEMO IV INFUSION 1 HR: CPT | Mod: HCNC

## 2024-02-22 PROCEDURE — 25000003 PHARM REV CODE 250: Mod: HCNC | Performed by: INTERNAL MEDICINE

## 2024-02-22 PROCEDURE — 63600175 PHARM REV CODE 636 W HCPCS: Mod: JZ,JG,HCNC | Performed by: INTERNAL MEDICINE

## 2024-02-22 RX ORDER — DIPHENHYDRAMINE HYDROCHLORIDE 50 MG/ML
50 INJECTION INTRAMUSCULAR; INTRAVENOUS ONCE AS NEEDED
Status: DISCONTINUED | OUTPATIENT
Start: 2024-02-22 | End: 2024-02-22 | Stop reason: HOSPADM

## 2024-02-22 RX ORDER — HEPARIN 100 UNIT/ML
500 SYRINGE INTRAVENOUS
Status: DISCONTINUED | OUTPATIENT
Start: 2024-02-22 | End: 2024-02-22 | Stop reason: HOSPADM

## 2024-02-22 RX ORDER — EPINEPHRINE 0.3 MG/.3ML
0.3 INJECTION SUBCUTANEOUS ONCE AS NEEDED
Status: DISCONTINUED | OUTPATIENT
Start: 2024-02-22 | End: 2024-02-22 | Stop reason: HOSPADM

## 2024-02-22 RX ORDER — SODIUM CHLORIDE 0.9 % (FLUSH) 0.9 %
10 SYRINGE (ML) INJECTION
Status: DISCONTINUED | OUTPATIENT
Start: 2024-02-22 | End: 2024-02-22 | Stop reason: HOSPADM

## 2024-02-22 RX ADMIN — SODIUM CHLORIDE 200 MG: 9 INJECTION, SOLUTION INTRAVENOUS at 03:02

## 2024-03-12 DIAGNOSIS — C49.9 PRIMARY MYXOFIBROSARCOMA: ICD-10-CM

## 2024-03-12 RX ORDER — PEMBROLIZUMAB 25 MG/ML
INJECTION, SOLUTION INTRAVENOUS
Qty: 8 ML | Refills: 11 | Status: SHIPPED | OUTPATIENT
Start: 2024-03-12

## 2024-03-13 ENCOUNTER — OFFICE VISIT (OUTPATIENT)
Dept: HEMATOLOGY/ONCOLOGY | Facility: CLINIC | Age: 79
End: 2024-03-13
Payer: MEDICARE

## 2024-03-13 ENCOUNTER — INFUSION (OUTPATIENT)
Dept: INFUSION THERAPY | Facility: HOSPITAL | Age: 79
End: 2024-03-13
Attending: INTERNAL MEDICINE
Payer: MEDICARE

## 2024-03-13 VITALS
HEART RATE: 55 BPM | DIASTOLIC BLOOD PRESSURE: 67 MMHG | SYSTOLIC BLOOD PRESSURE: 168 MMHG | OXYGEN SATURATION: 100 % | BODY MASS INDEX: 21.45 KG/M2 | WEIGHT: 153.25 LBS | HEIGHT: 71 IN | RESPIRATION RATE: 18 BRPM

## 2024-03-13 DIAGNOSIS — D84.821 IMMUNODEFICIENCY DUE TO DRUGS: ICD-10-CM

## 2024-03-13 DIAGNOSIS — I10 ESSENTIAL HYPERTENSION: ICD-10-CM

## 2024-03-13 DIAGNOSIS — G89.3 CANCER RELATED PAIN: ICD-10-CM

## 2024-03-13 DIAGNOSIS — C77.4 SECONDARY MALIGNANCY OF INGUINAL LYMPH NODES: ICD-10-CM

## 2024-03-13 DIAGNOSIS — C49.9 PRIMARY MYXOFIBROSARCOMA: Primary | ICD-10-CM

## 2024-03-13 DIAGNOSIS — C49.9 SOFT TISSUE SARCOMA: ICD-10-CM

## 2024-03-13 DIAGNOSIS — D49.9 IMMUNODEFICIENCY SECONDARY TO NEOPLASM: ICD-10-CM

## 2024-03-13 DIAGNOSIS — Z79.899 IMMUNODEFICIENCY DUE TO DRUGS: ICD-10-CM

## 2024-03-13 DIAGNOSIS — D84.81 IMMUNODEFICIENCY SECONDARY TO NEOPLASM: ICD-10-CM

## 2024-03-13 PROCEDURE — G2211 COMPLEX E/M VISIT ADD ON: HCPCS | Mod: HCNC,S$GLB,, | Performed by: INTERNAL MEDICINE

## 2024-03-13 PROCEDURE — 3078F DIAST BP <80 MM HG: CPT | Mod: HCNC,CPTII,S$GLB, | Performed by: INTERNAL MEDICINE

## 2024-03-13 PROCEDURE — 63600175 PHARM REV CODE 636 W HCPCS: Mod: JZ,JG,HCNC | Performed by: INTERNAL MEDICINE

## 2024-03-13 PROCEDURE — 99999 PR PBB SHADOW E&M-EST. PATIENT-LVL III: CPT | Mod: PBBFAC,HCNC,, | Performed by: INTERNAL MEDICINE

## 2024-03-13 PROCEDURE — 1159F MED LIST DOCD IN RCRD: CPT | Mod: HCNC,CPTII,S$GLB, | Performed by: INTERNAL MEDICINE

## 2024-03-13 PROCEDURE — 96413 CHEMO IV INFUSION 1 HR: CPT | Mod: HCNC

## 2024-03-13 PROCEDURE — 3288F FALL RISK ASSESSMENT DOCD: CPT | Mod: HCNC,CPTII,S$GLB, | Performed by: INTERNAL MEDICINE

## 2024-03-13 PROCEDURE — 1160F RVW MEDS BY RX/DR IN RCRD: CPT | Mod: HCNC,CPTII,S$GLB, | Performed by: INTERNAL MEDICINE

## 2024-03-13 PROCEDURE — 25000003 PHARM REV CODE 250: Mod: HCNC | Performed by: INTERNAL MEDICINE

## 2024-03-13 PROCEDURE — 1101F PT FALLS ASSESS-DOCD LE1/YR: CPT | Mod: HCNC,CPTII,S$GLB, | Performed by: INTERNAL MEDICINE

## 2024-03-13 PROCEDURE — 3077F SYST BP >= 140 MM HG: CPT | Mod: HCNC,CPTII,S$GLB, | Performed by: INTERNAL MEDICINE

## 2024-03-13 PROCEDURE — 99215 OFFICE O/P EST HI 40 MIN: CPT | Mod: HCNC,S$GLB,, | Performed by: INTERNAL MEDICINE

## 2024-03-13 RX ORDER — SODIUM CHLORIDE 0.9 % (FLUSH) 0.9 %
10 SYRINGE (ML) INJECTION
Status: CANCELLED | OUTPATIENT
Start: 2024-03-13

## 2024-03-13 RX ORDER — DIPHENHYDRAMINE HYDROCHLORIDE 50 MG/ML
50 INJECTION INTRAMUSCULAR; INTRAVENOUS ONCE AS NEEDED
Status: CANCELLED | OUTPATIENT
Start: 2024-03-13

## 2024-03-13 RX ORDER — EPINEPHRINE 0.3 MG/.3ML
0.3 INJECTION SUBCUTANEOUS ONCE AS NEEDED
Status: CANCELLED | OUTPATIENT
Start: 2024-03-13

## 2024-03-13 RX ORDER — SODIUM CHLORIDE 0.9 % (FLUSH) 0.9 %
10 SYRINGE (ML) INJECTION
Status: DISCONTINUED | OUTPATIENT
Start: 2024-03-13 | End: 2024-03-13 | Stop reason: HOSPADM

## 2024-03-13 RX ORDER — HEPARIN 100 UNIT/ML
500 SYRINGE INTRAVENOUS
Status: CANCELLED | OUTPATIENT
Start: 2024-03-13

## 2024-03-13 RX ORDER — DIPHENHYDRAMINE HYDROCHLORIDE 50 MG/ML
50 INJECTION INTRAMUSCULAR; INTRAVENOUS ONCE AS NEEDED
Status: DISCONTINUED | OUTPATIENT
Start: 2024-03-13 | End: 2024-03-13 | Stop reason: HOSPADM

## 2024-03-13 RX ORDER — EPINEPHRINE 0.3 MG/.3ML
0.3 INJECTION SUBCUTANEOUS ONCE AS NEEDED
Status: DISCONTINUED | OUTPATIENT
Start: 2024-03-13 | End: 2024-03-13 | Stop reason: HOSPADM

## 2024-03-13 RX ORDER — HEPARIN 100 UNIT/ML
500 SYRINGE INTRAVENOUS
Status: DISCONTINUED | OUTPATIENT
Start: 2024-03-13 | End: 2024-03-13 | Stop reason: HOSPADM

## 2024-03-13 RX ADMIN — SODIUM CHLORIDE: 9 INJECTION, SOLUTION INTRAVENOUS at 02:03

## 2024-03-13 RX ADMIN — SODIUM CHLORIDE 200 MG: 9 INJECTION, SOLUTION INTRAVENOUS at 03:03

## 2024-03-13 NOTE — PROGRESS NOTES
"                                                         PROGRESS NOTE    Subjective:       Patient ID: Zac Plunkett is a 78 y.o. male.    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 JD. TMB 6.3    Oncologic History:  Prior patient of Dr Lucia's  2021  May              5/27- US right thigh: showed 2 hypoechoic masses in the subcutaneous tissues of the right anterior thigh. Largest measured 1.1 x 0.9 x 1 cm. Read at the time was concern for lymphadenopathy.   June 6/4 - CXR: "Bilateral reticular opacities are present in a perihilar distribution which appears slightly more pronounced compared to prior exams."               6/11 - underwent excisional biopsy of the right thigh; pathology came back positive for myxofibrosarcoma, high grade. Path a 1.8 x 1.1 x 0.9 cm mass with an adjacent 0.4 x 0.4 x 0.3 cm mass.   July/Aug              - IMRT with Dr. Mcadams  2022 January 1/12 - PET: locoregional recurrence and concerning 0.9 cm RUL lesion              1/20 - Path: excision on 2 recurrent lesions:  Myxofibrosarcoma, 1.9 cm at longest length, PD-L1 25%              1/31 - CT thigh: successful removal w/o evidence of recurrent disease  April 4/28 - recurrence, locally with metastatic deposit in LN. S/p excision on 4/28/22. Path: right thigh superior mass: Myxofibrosarcoma, high grade with focal tumor necrosis, incompletely excised. A portion of the lesion is suggestive of an involved lymph node exhibiting metastatic sarcoma with extranodal extension.  Sarcoma is focally present within inked but otherwise unspecified peripheral margins.   July 7/19 - CT thigh showed local recurrence and a 0.7 cm R inguinal lymph node    2023  [facility]   1/3 - CT thigh/pelvis showed "Heterogeneous enhancing soft tissue lesions " "and nodular foci at the anterior thigh and right groin highly concerning for worsening local recurrent disease with talon metastasis"    - stable pulmonary nodules on CT chest  Fe- completed palliative radiation to right thigh  March  3/24- started keytruda, s/p 15 cycles    Interval History:   Mr Plunkett returns for follow up. Feeling well. Has been having erythematous papules over the upper right thigh over the past 8 months. He feels it may be getting a little redder.     ECO    ROS:   A ten-point system review is obtained and negative except for what was stated in the Interval History.     Physical Examination:   Vital signs reviewed.   General: well hydrated, well developed, in no acute distress  HEENT: normocephalic, EOMI, anicteric sclerae  Neck: supple, no JVD, thyromegaly, cervical or supraclavicular lymphadenopathy  Lungs: clear breath sounds bilaterally, no wheezing, rales, or rhonchi  Heart: RRR, no M/R/G  Abdomen: soft, no tenderness, non-distended, no hepatosplenomegaly, mass, or hernia. BS present  Extremities: no edema  Skin: erythematous papules over the right upper thigh  Neuro: alert and oriented x 4, no focal neuro deficit  Psych: pleasant and appropriate mood and affect    Objective:     Laboratory Data:  Labs reviewed    Imaging Data:  CT C/A/P 3/29/23:  Impression:     1. 0.8 x 2.8-cm (previously 3.0 x 3.9-cm) right inguinal mass (series 3, image 171). 2.9 x 5.6-cm (previously 2.4 x 5.0-cm) exophytic right inguinal mass.  2. Multiple pulmonary nodules throughout the bilateral lung fields that are predominantly all stable.  There are approximately 2 pulmonary nodules that were not definitively seen on prior exam which may be related to technique.  3. Single enlarged precarinal space lymph node and prominent bilateral hilar lymph nodes that are not enlarged by radiologic size criteria, not significantly changed.  4. Approximately 3 punctate hepatic parenchymal hypodensities " that are too small to accurately characterize.    CT right thigh 3/29/23:  Interval increase in size of subcutaneous soft tissue mass within the right superior thigh.     Centrilobular emphysema.     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     FINDINGS:  The visualized intra-abdominal content is unremarkable.  There is a small sebaceous cyst involving the right inferior buttocks.  There is significant scattered arterial plaque.  There is fatty stranding believed to represent postoperative change involving the anterior superior right thigh.  This is within the area of previously described exophytic mass.  There is no evidence of residual mass.  There is significant scattered arterial plaque.  There is a knee joint effusion.     Impression:     No evidence of recurrent or residual mass.     CT C/A/P 9/28/23:  FINDINGS:  The visualized portion of the base of the lungs is significant for a subcentimeter hypodensity within the left lobe of the thyroid too small to characterize.  The visualized portion of the mediastinum including the heart and great vessels is significant for calcification of the aorta, aortic annulus, and coronary arteries.  The trachea is patent and free of any intraluminal filling defects.  The lungs are well expanded.  There is bilateral apical pleural thickening and/or scar.  There is centrilobular emphysema.  Scattered pulmonary nodules are unchanged compared to previous performed 06/15/2023.  There is bilateral dependent atelectatic versus fibrotic change.  There is no pleural or pericardial fluid.     The stomach is unremarkable.  There is a large duodenal diverticulum.  The spleen is unremarkable.  There is fatty atrophy of the pancreas.  The liver, gallbladder, and adrenal glands are unremarkable.  There is  abdominal aortic plaque which extends into its branches.  There is severe narrowing of the left common iliac artery.  The right kidney is unremarkable.  There is a large left renal cyst.  The bladder is unremarkable.  There is a left inguinal hernia containing a small short segment of bowel.  The bowel is significant for diverticulosis coli.  The appendix is not visualized.  The osseous structures demonstrate degenerative change.  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  There is grade 1 anterolisthesis of L4 in relation to L5.     Impression:     No evidence of metastatic disease.    CT thigh 1/8/24:  Impression:     No acute findings with no detrimental interval change compared to previous performed 09/28/2023.       CT C/A/P 1/8/24:  Impression:     1. Mediastinal and bilateral hilar enlarged lymph node, not significantly changed.  2. 0.9-cm ground-glass nodule left lower lobe with apparent slight interval increased in density which may be artifactual.  A few additional pulmonary nodules throughout the bilateral lung fields are not significantly changed.  3. Three hepatic parenchymal punctate hypodensities are too small to accurately characterize, not significantly changed.  4. Punctate indeterminate hypodensity in the lateral limb of the left adrenal gland is too small to characterize but not significantly changed.     Assessment and Plan:     1. Primary myxofibrosarcoma    2. 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 Essential hypertension    7. Cancer related pain      1-5  - Mr Plunkett is a 77 yo man with recurrent myxofibrosarcoma of the right thigh, has had 3 resections and IMRT  - he was seen by Dr. Mao and Dr. Unger, they did not feel that he would be a good surgical candidate and recommended palliative RT  - completed palliative RT 2/13/2023  - Previously discussed systemic treatment  options, including chemotherapy and immunotherapy. He was not interested in chemotherapy including TKI. Pembrolizumab has efficacy in phase II studies and is recommended on NCCN Guidelines for patients with myxofibrosarcoma. PD-L1 is 25%. However, his TMB is not high. Pembrolizumab was denied by insurance and then denied at hearing. He got patient assistance through [employer]  - started palliative keytruda on 3/24/23, s/p 15 cycles. He has had a complete response on CT. On physical exam, he has erythematous nodules over the right upper thigh  - doing well. Cycle 16 keytruda today  - return in 3 weeks for cycle 17 with restaging scan    6.  - monitor BP    7.  - stable  - c/w oxycodone prn    Follow-up:     In 3 weeks    Ed Martines MD  Hematology and Medical Oncology  [facility]      Route Chart for Scheduling    Med Onc Chart Routing      Follow up with physician . Keep scheduled appts. see NICOLLE on 5/15 with labs then keytruda   Follow up with NICOLLE    Infusion scheduling note   keytruda every 3 weeks   Injection scheduling note    Labs CBC, CMP and TSH   Scheduling:  Preferred lab:  Lab interval: every 3 weeks     Imaging CT chest abdomen pelvis   scheduled   Pharmacy appointment    Other referrals          Treatment Plan Information   OP PEMBROLIZUMAB 200MG Q3W   Ed Martines MD   Upcoming Treatment Dates - OP PEMBROLIZUMAB 200MG Q3W    2/23/2024       Chemotherapy       pembrolizumab (KEYTRUDA) 200 mg in sodium chloride 0.9% SolP 108 mL infusion  3/15/2024       Chemotherapy       pembrolizumab (KEYTRUDA) 200 mg in sodium chloride 0.9% SolP 108 mL infusion  4/5/2024       Chemotherapy       pembrolizumab (KEYTRUDA) 200 mg in sodium chloride 0.9% SolP 108 mL infusion  4/26/2024       Chemotherapy       pembrolizumab (KEYTRUDA) 200 mg in sodium chloride 0.9% SolP 108 mL infusion

## 2024-03-17 DIAGNOSIS — G89.3 NEOPLASM RELATED PAIN: ICD-10-CM

## 2024-03-18 RX ORDER — OXYCODONE HYDROCHLORIDE 10 MG/1
10 TABLET ORAL EVERY 6 HOURS PRN
Qty: 120 TABLET | Refills: 0 | Status: SHIPPED | OUTPATIENT
Start: 2024-03-18 | End: 2024-04-17 | Stop reason: SDUPTHER

## 2024-04-03 ENCOUNTER — OFFICE VISIT (OUTPATIENT)
Dept: HEMATOLOGY/ONCOLOGY | Facility: CLINIC | Age: 79
End: 2024-04-03
Payer: MEDICARE

## 2024-04-03 ENCOUNTER — INFUSION (OUTPATIENT)
Dept: INFUSION THERAPY | Facility: HOSPITAL | Age: 79
End: 2024-04-03
Attending: INTERNAL MEDICINE
Payer: MEDICARE

## 2024-04-03 VITALS
TEMPERATURE: 98 F | DIASTOLIC BLOOD PRESSURE: 68 MMHG | RESPIRATION RATE: 16 BRPM | HEART RATE: 53 BPM | SYSTOLIC BLOOD PRESSURE: 159 MMHG | OXYGEN SATURATION: 100 %

## 2024-04-03 VITALS
TEMPERATURE: 98 F | SYSTOLIC BLOOD PRESSURE: 161 MMHG | HEIGHT: 71 IN | HEART RATE: 58 BPM | WEIGHT: 169.06 LBS | BODY MASS INDEX: 23.67 KG/M2 | DIASTOLIC BLOOD PRESSURE: 68 MMHG | RESPIRATION RATE: 18 BRPM | OXYGEN SATURATION: 100 %

## 2024-04-03 DIAGNOSIS — G89.3 CANCER RELATED PAIN: ICD-10-CM

## 2024-04-03 DIAGNOSIS — D84.821 IMMUNODEFICIENCY DUE TO DRUGS: ICD-10-CM

## 2024-04-03 DIAGNOSIS — C49.9 SOFT TISSUE SARCOMA: ICD-10-CM

## 2024-04-03 DIAGNOSIS — I10 ESSENTIAL HYPERTENSION: ICD-10-CM

## 2024-04-03 DIAGNOSIS — D49.9 IMMUNODEFICIENCY SECONDARY TO NEOPLASM: ICD-10-CM

## 2024-04-03 DIAGNOSIS — C77.4 SECONDARY MALIGNANCY OF INGUINAL LYMPH NODES: ICD-10-CM

## 2024-04-03 DIAGNOSIS — Z79.899 IMMUNODEFICIENCY DUE TO DRUGS: ICD-10-CM

## 2024-04-03 DIAGNOSIS — R53.83 FATIGUE, UNSPECIFIED TYPE: ICD-10-CM

## 2024-04-03 DIAGNOSIS — D84.81 IMMUNODEFICIENCY SECONDARY TO NEOPLASM: ICD-10-CM

## 2024-04-03 DIAGNOSIS — R21 RASH AND NONSPECIFIC SKIN ERUPTION: ICD-10-CM

## 2024-04-03 DIAGNOSIS — C49.9 PRIMARY MYXOFIBROSARCOMA: Primary | ICD-10-CM

## 2024-04-03 PROCEDURE — 1125F AMNT PAIN NOTED PAIN PRSNT: CPT | Mod: HCNC,CPTII,S$GLB, | Performed by: PHYSICIAN ASSISTANT

## 2024-04-03 PROCEDURE — 1100F PTFALLS ASSESS-DOCD GE2>/YR: CPT | Mod: HCNC,CPTII,S$GLB, | Performed by: PHYSICIAN ASSISTANT

## 2024-04-03 PROCEDURE — 3078F DIAST BP <80 MM HG: CPT | Mod: HCNC,CPTII,S$GLB, | Performed by: PHYSICIAN ASSISTANT

## 2024-04-03 PROCEDURE — 96413 CHEMO IV INFUSION 1 HR: CPT | Mod: HCNC

## 2024-04-03 PROCEDURE — 63600175 PHARM REV CODE 636 W HCPCS: Mod: JZ,JG,HCNC | Performed by: PHYSICIAN ASSISTANT

## 2024-04-03 PROCEDURE — 3077F SYST BP >= 140 MM HG: CPT | Mod: HCNC,CPTII,S$GLB, | Performed by: PHYSICIAN ASSISTANT

## 2024-04-03 PROCEDURE — 99999 PR PBB SHADOW E&M-EST. PATIENT-LVL IV: CPT | Mod: PBBFAC,HCNC,, | Performed by: PHYSICIAN ASSISTANT

## 2024-04-03 PROCEDURE — 3288F FALL RISK ASSESSMENT DOCD: CPT | Mod: HCNC,CPTII,S$GLB, | Performed by: PHYSICIAN ASSISTANT

## 2024-04-03 PROCEDURE — 1159F MED LIST DOCD IN RCRD: CPT | Mod: HCNC,CPTII,S$GLB, | Performed by: PHYSICIAN ASSISTANT

## 2024-04-03 PROCEDURE — 99215 OFFICE O/P EST HI 40 MIN: CPT | Mod: HCNC,S$GLB,, | Performed by: PHYSICIAN ASSISTANT

## 2024-04-03 PROCEDURE — 25000003 PHARM REV CODE 250: Mod: HCNC | Performed by: PHYSICIAN ASSISTANT

## 2024-04-03 RX ORDER — EPINEPHRINE 0.3 MG/.3ML
0.3 INJECTION SUBCUTANEOUS ONCE AS NEEDED
Status: DISCONTINUED | OUTPATIENT
Start: 2024-04-03 | End: 2024-04-03 | Stop reason: HOSPADM

## 2024-04-03 RX ORDER — DIPHENHYDRAMINE HYDROCHLORIDE 50 MG/ML
50 INJECTION INTRAMUSCULAR; INTRAVENOUS ONCE AS NEEDED
Status: CANCELLED | OUTPATIENT
Start: 2024-04-03

## 2024-04-03 RX ORDER — HEPARIN 100 UNIT/ML
500 SYRINGE INTRAVENOUS
Status: DISCONTINUED | OUTPATIENT
Start: 2024-04-03 | End: 2024-04-03 | Stop reason: HOSPADM

## 2024-04-03 RX ORDER — SODIUM CHLORIDE 0.9 % (FLUSH) 0.9 %
10 SYRINGE (ML) INJECTION
Status: CANCELLED | OUTPATIENT
Start: 2024-04-03

## 2024-04-03 RX ORDER — HEPARIN 100 UNIT/ML
500 SYRINGE INTRAVENOUS
Status: CANCELLED | OUTPATIENT
Start: 2024-04-03

## 2024-04-03 RX ORDER — DIPHENHYDRAMINE HYDROCHLORIDE 50 MG/ML
50 INJECTION INTRAMUSCULAR; INTRAVENOUS ONCE AS NEEDED
Status: DISCONTINUED | OUTPATIENT
Start: 2024-04-03 | End: 2024-04-03 | Stop reason: HOSPADM

## 2024-04-03 RX ORDER — SODIUM CHLORIDE 0.9 % (FLUSH) 0.9 %
10 SYRINGE (ML) INJECTION
Status: DISCONTINUED | OUTPATIENT
Start: 2024-04-03 | End: 2024-04-03 | Stop reason: HOSPADM

## 2024-04-03 RX ORDER — EPINEPHRINE 0.3 MG/.3ML
0.3 INJECTION SUBCUTANEOUS ONCE AS NEEDED
Status: CANCELLED | OUTPATIENT
Start: 2024-04-03

## 2024-04-03 RX ADMIN — SODIUM CHLORIDE: 9 INJECTION, SOLUTION INTRAVENOUS at 12:04

## 2024-04-03 RX ADMIN — SODIUM CHLORIDE 200 MG: 9 INJECTION, SOLUTION INTRAVENOUS at 01:04

## 2024-04-03 NOTE — PROGRESS NOTES
"                                                         PROGRESS NOTE    Subjective:       Patient ID: Zac Plunkett is a 78 y.o. male.    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 JD. TMB 6.3    Oncologic History copied from medical chart:  Prior patient of Dr Lucia's  2021  May              5/27- US right thigh: showed 2 hypoechoic masses in the subcutaneous tissues of the right anterior thigh. Largest measured 1.1 x 0.9 x 1 cm. Read at the time was concern for lymphadenopathy.   June 6/4 - CXR: "Bilateral reticular opacities are present in a perihilar distribution which appears slightly more pronounced compared to prior exams."               6/11 - underwent excisional biopsy of the right thigh; pathology came back positive for myxofibrosarcoma, high grade. Path a 1.8 x 1.1 x 0.9 cm mass with an adjacent 0.4 x 0.4 x 0.3 cm mass.   July/Aug              - IMRT with Dr. Mcadams  2022 January 1/12 - PET: locoregional recurrence and concerning 0.9 cm RUL lesion              1/20 - Path: excision on 2 recurrent lesions:  Myxofibrosarcoma, 1.9 cm at longest length, PD-L1 25%              1/31 - CT thigh: successful removal w/o evidence of recurrent disease  April 4/28 - recurrence, locally with metastatic deposit in LN. S/p excision on 4/28/22. Path: right thigh superior mass: Myxofibrosarcoma, high grade with focal tumor necrosis, incompletely excised. A portion of the lesion is suggestive of an involved lymph node exhibiting metastatic sarcoma with extranodal extension.  Sarcoma is focally present within inked but otherwise unspecified peripheral margins.   July 7/19 - CT thigh showed local recurrence and a 0.7 cm R inguinal lymph node    2023  [facility]   1/3 - CT thigh/pelvis showed "Heterogeneous " "enhancing soft tissue lesions and nodular foci at the anterior thigh and right groin highly concerning for worsening local recurrent disease with talon metastasis"    - stable pulmonary nodules on CT chest  - completed palliative radiation to right thigh  March  3/24- started keytruda, s/p 15 cycles    Interval History:   Mr Plunkett returns for follow up. Feeling well. He has fallen a couple of times, as his feels that his legs just give out on him. He did not hit his head. He was assisted to his feet without complication. He is walking better and still uses his walking cane. Has been having erythematous papules over the upper right thigh over the past 8 months. He felt it may be getting a little redder and it has not changed.     ECO. Presents with his family today.     ROS:   A ten-point system review is obtained and negative except for what was stated in the Interval History.     Physical Examination:   Vital signs reviewed.   General: well hydrated, well developed, in no acute distress  HEENT: normocephalic, EOMI, anicteric sclerae  Neck: supple, no JVD, thyromegaly  Lungs: clear breath sounds bilaterally, no wheezing, rales, or rhonchi  Heart: RRR, no M/R/G  Abdomen: soft, no tenderness, non-distended. BS present  Extremities: no edema  Skin: erythematous papules over the right upper thigh  Neuro: alert and oriented x 4, no focal neuro deficit  Psych: pleasant and appropriate mood and affect    Objective:     Laboratory Data:  Labs reviewed    Imaging Data:  CT C/A/P 3/29/23:  Impression:     1. 0.8 x 2.8-cm (previously 3.0 x 3.9-cm) right inguinal mass (series 3, image 171). 2.9 x 5.6-cm (previously 2.4 x 5.0-cm) exophytic right inguinal mass.  2. Multiple pulmonary nodules throughout the bilateral lung fields that are predominantly all stable.  There are approximately 2 pulmonary nodules that were not definitively seen on prior exam which may be related to technique.  3. Single enlarged " precarinal space lymph node and prominent bilateral hilar lymph nodes that are not enlarged by radiologic size criteria, not significantly changed.  4. Approximately 3 punctate hepatic parenchymal hypodensities that are too small to accurately characterize.    CT right thigh 3/29/23:  Interval increase in size of subcutaneous soft tissue mass within the right superior thigh.     Centrilobular emphysema.     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     FINDINGS:  The visualized intra-abdominal content is unremarkable.  There is a small sebaceous cyst involving the right inferior buttocks.  There is significant scattered arterial plaque.  There is fatty stranding believed to represent postoperative change involving the anterior superior right thigh.  This is within the area of previously described exophytic mass.  There is no evidence of residual mass.  There is significant scattered arterial plaque.  There is a knee joint effusion.     Impression:     No evidence of recurrent or residual mass.     CT C/A/P 9/28/23:  FINDINGS:  The visualized portion of the base of the lungs is significant for a subcentimeter hypodensity within the left lobe of the thyroid too small to characterize.  The visualized portion of the mediastinum including the heart and great vessels is significant for calcification of the aorta, aortic annulus, and coronary arteries.  The trachea is patent and free of any intraluminal filling defects.  The lungs are well expanded.  There is bilateral apical pleural thickening and/or scar.  There is centrilobular emphysema.  Scattered pulmonary nodules are unchanged compared to previous performed 06/15/2023.  There is bilateral dependent atelectatic versus fibrotic change.  There is no pleural or pericardial fluid.     The  stomach is unremarkable.  There is a large duodenal diverticulum.  The spleen is unremarkable.  There is fatty atrophy of the pancreas.  The liver, gallbladder, and adrenal glands are unremarkable.  There is abdominal aortic plaque which extends into its branches.  There is severe narrowing of the left common iliac artery.  The right kidney is unremarkable.  There is a large left renal cyst.  The bladder is unremarkable.  There is a left inguinal hernia containing a small short segment of bowel.  The bowel is significant for diverticulosis coli.  The appendix is not visualized.  The osseous structures demonstrate degenerative change.  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  There is grade 1 anterolisthesis of L4 in relation to L5.     Impression:     No evidence of metastatic disease.    CT thigh 1/8/24:  Impression:     No acute findings with no detrimental interval change compared to previous performed 09/28/2023.       CT C/A/P 1/8/24:  Impression:     1. Mediastinal and bilateral hilar enlarged lymph node, not significantly changed.  2. 0.9-cm ground-glass nodule left lower lobe with apparent slight interval increased in density which may be artifactual.  A few additional pulmonary nodules throughout the bilateral lung fields are not significantly changed.  3. Three hepatic parenchymal punctate hypodensities are too small to accurately characterize, not significantly changed.  4. Punctate indeterminate hypodensity in the lateral limb of the left adrenal gland is too small to characterize but not significantly changed.     Assessment and Plan:     1. Primary myxofibrosarcoma    2. 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 Essential hypertension    7. Cancer related pain    8. Fatigue, unspecified type    9. Rash and nonspecific skin eruption        1-5  - Mr Plunkett is a 77 yo man with recurrent  myxofibrosarcoma of the right thigh, has had 3 resections and IMRT  - he was seen by Dr. Mao and Dr. Unger, they did not feel that he would be a good surgical candidate and recommended palliative RT  - completed palliative RT 2/13/2023  - Previously discussed systemic treatment options, including chemotherapy and immunotherapy. He was not interested in chemotherapy including TKI. Pembrolizumab has efficacy in phase II studies and is recommended on NCCN Guidelines for patients with myxofibrosarcoma. PD-L1 is 25%. However, his TMB is not high. Pembrolizumab was denied by insurance and then denied at hearing. He got patient assistance through [employer]  - started palliative keytruda on 3/24/23, s/p 15 cycles. He has had a complete response on CT. On physical exam, he has erythematous nodules over the right upper thigh    - doing well. Did have a couple of falls. He recovered fine. Eating well and tolerating treatment well.   - Cycle 17 keytruda today  - Lab work reviewed and adequate    - return in 3 weeks for cycle 18 with restaging scan    6.  - monitor BP  - Slightly elevated     7.  - stable  - c/w oxycodone prn    9.   - Stable. No signs of infection.     Follow-up:     In 3 weeks    Route Chart for Scheduling    Med Onc Chart Routing  Urgent    Follow up with physician 3 weeks and 6 weeks. See Dr Martines as scheduled in 3 weeks with scans. See Dr Martines in 6 weeks with lab work and Pembro   Follow up with NICOLLE . See NICOLLE in 9 weeks with lab work and Pembro   Infusion scheduling note    Injection scheduling note    Labs CBC, CMP and TSH   Scheduling:  Preferred lab:  Lab interval: every 3 weeks     Imaging    Pharmacy appointment    Other referrals              Treatment Plan Information   OP PEMBROLIZUMAB 200MG Q3W   Ed Martines MD   Upcoming Treatment Dates - OP PEMBROLIZUMAB 200MG Q3W    3/15/2024       Chemotherapy       pembrolizumab (KEYTRUDA) 200 mg in sodium chloride 0.9% SolP 108 mL infusion  4/5/2024        Chemotherapy       pembrolizumab (KEYTRUDA) 200 mg in sodium chloride 0.9% SolP 108 mL infusion  4/26/2024       Chemotherapy       pembrolizumab (KEYTRUDA) 200 mg in sodium chloride 0.9% SolP 108 mL infusion  5/17/2024       Chemotherapy       pembrolizumab (KEYTRUDA) 200 mg in sodium chloride 0.9% SolP 108 mL infusion

## 2024-04-03 NOTE — PLAN OF CARE
1350-Pt tolerated keytruda infusion well, no complications or side effects, POC and meds discussed with pt, pt aware of upcoming appts, pt knows to call MD with any questions or concerns. Pt ambulated off unit, no distress noted.

## 2024-04-17 DIAGNOSIS — G89.3 NEOPLASM RELATED PAIN: ICD-10-CM

## 2024-04-17 RX ORDER — OXYCODONE HYDROCHLORIDE 10 MG/1
10 TABLET ORAL EVERY 6 HOURS PRN
Qty: 120 TABLET | Refills: 0 | Status: SHIPPED | OUTPATIENT
Start: 2024-04-17 | End: 2024-05-19 | Stop reason: SDUPTHER

## 2024-04-24 ENCOUNTER — PATIENT MESSAGE (OUTPATIENT)
Dept: HEMATOLOGY/ONCOLOGY | Facility: CLINIC | Age: 79
End: 2024-04-24
Payer: MEDICARE

## 2024-04-24 ENCOUNTER — TELEPHONE (OUTPATIENT)
Dept: HEMATOLOGY/ONCOLOGY | Facility: CLINIC | Age: 79
End: 2024-04-24
Payer: MEDICARE

## 2024-04-24 NOTE — TELEPHONE ENCOUNTER
"Called patient as he did not show for his appointment with Dr. Martines or for his infusion.  Patient reports that he called the [facility] and spoke with someone to cancel his appointments as he was stuck in traffic and could not make it on time.  Patient unsure of who he spoke to or what number he dialed.  Patient also states that he's been "battling diarrhea" the last couple of days.  Asked patient to give more information but patient declined and asked if we could discuss tomorrow as he was still driving and wasn't feeling well.  Made note to follow up with patient tomorrow.  Will send portal message with clinic number for future.  "

## 2024-04-25 ENCOUNTER — TELEPHONE (OUTPATIENT)
Dept: HEMATOLOGY/ONCOLOGY | Facility: CLINIC | Age: 79
End: 2024-04-25
Payer: MEDICARE

## 2024-04-25 NOTE — TELEPHONE ENCOUNTER
Called patient to inform him of opening at 2:30 pm tomorrow for keytruda.  Appointment with provider would be at 11 am.  Patient unable to have provider visit at that time.  Declined infusion for tomorrow but would like to reschedule for next week.  Informed CAMILO Jones RN in infusion that patient declined appointment and would like to be placed on waitlist for next week.

## 2024-05-01 NOTE — TELEPHONE ENCOUNTER
In 5 to 7 days  Please follow up with your primary care doctor:  Miryam Miller MD   [address] [place] [address] / [facility] [address]   [phone]     Home, doxycycline 100 mg twice daily for 7 days, no intercourse until cleared in follow-up, if your culture is positive your partner should be treated before you have intercourse with him again.  Follow with your primary physician or go to the emergency department for problems including new or worsening symptoms    Patient Education     Cervicitis (Chlamydia or Gonorrhea), Treated    You have cervicitis. This is when the opening to the uterus (the cervix) becomes inflamed and irritated. It is often caused by an infection, and it needs to be treated.   Causes  Acute cervicitis is usually caused by an infection.   · The infection is often a sexually transmitted infection (STI). These are spread by high-risk sex or sex with multiple partners.  · Chlamydia and gonorrhea are 2 common STIs that cause cervicitis. These STIs are very contagious. They can be spread during sex from an infected person until that person gets treatment.  · Other bacteria that can cause cervicitis include Mycoplasma genitalium, trichomonas, and herpes.  Sometimes the cause of cervicitis can't be identified.  Symptoms  At first, cervicitis may cause no symptoms or only mild symptoms. When symptoms occur, they may come on quickly. The most common symptoms are:   · Vaginal discharge  · Vaginal bleeding  · Pelvic pain  · Pain when urinating  · Pain with intercourse  Often there is no fever. If you have a fever, it may be a sign that the disease has spread to the fallopian tubes and caused pelvic inflammatory disease (PID).   After many days to a few weeks, these symptoms go away and you may have no symptoms at all. But the infection is still there and you can still spread it to others.   Treatment  Remember that cervicitis is often the result of a STI. You can give it or get it without knowing if you have no  Returned call to patient.  Scheduled labs for 2 pm, appointment with Jade Salazar at 2:30 pm, and infusion at 3 pm.  Patient verbalized confirmation of appointment times.   symptoms.   Whether or not you have symptoms, it is important to treat cervicitis. This is to prevent it from damaging the uterus, spreading to the fallopian tubes, and becoming PID. If not treated, chlamydia or gonorrhea can scar the uterus and fallopian tubes. Scarring can cause infertility (being unable to have children). PID also increases the risk of having a pregnancy outside the uterus (ectopic pregnancy) in the future.   This infection can be treated and cured. The infection is treated with antibiotic medicine.   To figure out the type of infection you have and decide on the best treatment, your healthcare provider may do some tests, such as:   · Nucleic acid (DNA) testing for signs of the germs  · Analysis of samples under a microscope  · Cultures, which are samples of cells grown and observed in a lab  DNA tests and cultures may take a few days to show results.   Home care  · Once your condition is diagnosed, your sexual partner should also be treated at the same time, even if there are no symptoms. Your partner should call his or her healthcare provider. Or he or she can go to an urgent care clinic or the public health department to be examined and treated. In some cases, your healthcare provider may give your partner a prescription for antibiotics. This is called expedited partner therapy.  · Don't have sex until both you and your partner have completed all antibiotic medicine and your healthcare provider has told you that you are no longer contagious.  · Take all medicines until they are gone. If not, the illness may recur.  · Learn about \"safer sex\" practices and use these in the future. The safest sex is with a partner who has tested negative and only has sex with you. Condoms can protect you from some STIs, including gonorrhea, chlamydia, and HIV. But they are not a guarantee. Taking medicines to prevent HIV may also be a good idea for you. Ask your healthcare provider if you should start taking PEP  (HIV post-exposure prophylaxis) or PrEP (HIV pre-exposure prophylaxis).    Follow-up care  Follow up with your healthcare provider, or as advised.  · If a DNA test or culture was done, you will be told if the treatment needs to be changed. You can call as directed for the results.  · Follow up with your healthcare provider or the public health department for complete STI screening, including HIV testing.  Call 911  Call 911 if any of these occur:   · Trouble breathing  · Chills or fever  · Abdominal pain  · Uncontrolled vaginal bleeding  · Extreme confusion  · Extreme drowsiness or trouble awakening  · Fainting or loss of consciousness  · Rapid heart rate  When to seek medical advice  Call your healthcare provider right away if any of these occur:  · No improvement after 3 days of treatment  · New or increasing lower abdominal pain or back pain  · Vaginal bleeding between menstrual periods  · Weakness or dizziness  · Repeated vomiting  · Inability to urinate due to pain  · Rash or joint pain  · Painful open sores around the outer vagina  · Enlarged, painful lymph nodes (lumps) in the groin  · Fever of 100.4ºF (38ºC) or higher, or as directed by your healthcare provider  Gabe last reviewed this educational content on 7/1/2020 © 2000-2021 [employer]. All rights reserved. This information is not intended as a substitute for professional medical care. Always follow your healthcare professional's instructions.

## 2024-05-01 NOTE — TELEPHONE ENCOUNTER
"Message  Received: Today   Appointment Access, Same Day Access Requested  [facility] Staff  Caller: Unspecified (Today,  2:15 PM)  Consult/Advisory    Name Of Caller: Self    Contact Preference?: [phone]    Provider Name: Conrad    Does patient feel the need to be seen today? No    What is the nature of the call?: Returning call to office about an opening for being seen tomorrow    Additional Notes:  "Thank you for all that you do for our patients"  "

## 2024-05-02 ENCOUNTER — INFUSION (OUTPATIENT)
Dept: INFUSION THERAPY | Facility: HOSPITAL | Age: 79
End: 2024-05-02
Attending: INTERNAL MEDICINE
Payer: MEDICARE

## 2024-05-02 ENCOUNTER — OFFICE VISIT (OUTPATIENT)
Dept: HEMATOLOGY/ONCOLOGY | Facility: CLINIC | Age: 79
End: 2024-05-02
Payer: MEDICARE

## 2024-05-02 VITALS
RESPIRATION RATE: 18 BRPM | HEART RATE: 58 BPM | BODY MASS INDEX: 23.29 KG/M2 | WEIGHT: 171.94 LBS | SYSTOLIC BLOOD PRESSURE: 142 MMHG | DIASTOLIC BLOOD PRESSURE: 68 MMHG | HEIGHT: 72 IN

## 2024-05-02 DIAGNOSIS — R53.83 FATIGUE, UNSPECIFIED TYPE: ICD-10-CM

## 2024-05-02 DIAGNOSIS — C77.4 SECONDARY MALIGNANCY OF INGUINAL LYMPH NODES: ICD-10-CM

## 2024-05-02 DIAGNOSIS — I10 ESSENTIAL HYPERTENSION: ICD-10-CM

## 2024-05-02 DIAGNOSIS — D49.9 IMMUNODEFICIENCY SECONDARY TO NEOPLASM: ICD-10-CM

## 2024-05-02 DIAGNOSIS — Z79.899 IMMUNODEFICIENCY DUE TO DRUGS: ICD-10-CM

## 2024-05-02 DIAGNOSIS — C49.9 PRIMARY MYXOFIBROSARCOMA: Primary | ICD-10-CM

## 2024-05-02 DIAGNOSIS — R21 RASH AND NONSPECIFIC SKIN ERUPTION: ICD-10-CM

## 2024-05-02 DIAGNOSIS — C49.9 SOFT TISSUE SARCOMA: ICD-10-CM

## 2024-05-02 DIAGNOSIS — G89.3 CANCER RELATED PAIN: ICD-10-CM

## 2024-05-02 DIAGNOSIS — D84.821 IMMUNODEFICIENCY DUE TO DRUGS: ICD-10-CM

## 2024-05-02 DIAGNOSIS — D84.81 IMMUNODEFICIENCY SECONDARY TO NEOPLASM: ICD-10-CM

## 2024-05-02 PROCEDURE — 3078F DIAST BP <80 MM HG: CPT | Mod: HCNC,CPTII,S$GLB, | Performed by: PHYSICIAN ASSISTANT

## 2024-05-02 PROCEDURE — 3077F SYST BP >= 140 MM HG: CPT | Mod: HCNC,CPTII,S$GLB, | Performed by: PHYSICIAN ASSISTANT

## 2024-05-02 PROCEDURE — 3288F FALL RISK ASSESSMENT DOCD: CPT | Mod: HCNC,CPTII,S$GLB, | Performed by: PHYSICIAN ASSISTANT

## 2024-05-02 PROCEDURE — 99215 OFFICE O/P EST HI 40 MIN: CPT | Mod: HCNC,S$GLB,, | Performed by: PHYSICIAN ASSISTANT

## 2024-05-02 PROCEDURE — 25000003 PHARM REV CODE 250: Mod: HCNC | Performed by: PHYSICIAN ASSISTANT

## 2024-05-02 PROCEDURE — 99999 PR PBB SHADOW E&M-EST. PATIENT-LVL III: CPT | Mod: PBBFAC,HCNC,, | Performed by: PHYSICIAN ASSISTANT

## 2024-05-02 PROCEDURE — 96413 CHEMO IV INFUSION 1 HR: CPT | Mod: HCNC

## 2024-05-02 PROCEDURE — 1125F AMNT PAIN NOTED PAIN PRSNT: CPT | Mod: HCNC,CPTII,S$GLB, | Performed by: PHYSICIAN ASSISTANT

## 2024-05-02 PROCEDURE — G2211 COMPLEX E/M VISIT ADD ON: HCPCS | Mod: HCNC,S$GLB,, | Performed by: PHYSICIAN ASSISTANT

## 2024-05-02 PROCEDURE — 1160F RVW MEDS BY RX/DR IN RCRD: CPT | Mod: HCNC,CPTII,S$GLB, | Performed by: PHYSICIAN ASSISTANT

## 2024-05-02 PROCEDURE — 1101F PT FALLS ASSESS-DOCD LE1/YR: CPT | Mod: HCNC,CPTII,S$GLB, | Performed by: PHYSICIAN ASSISTANT

## 2024-05-02 PROCEDURE — 63600175 PHARM REV CODE 636 W HCPCS: Mod: JG,HCNC | Performed by: PHYSICIAN ASSISTANT

## 2024-05-02 PROCEDURE — 1159F MED LIST DOCD IN RCRD: CPT | Mod: HCNC,CPTII,S$GLB, | Performed by: PHYSICIAN ASSISTANT

## 2024-05-02 RX ORDER — DIPHENHYDRAMINE HYDROCHLORIDE 50 MG/ML
50 INJECTION INTRAMUSCULAR; INTRAVENOUS ONCE AS NEEDED
Status: DISCONTINUED | OUTPATIENT
Start: 2024-05-02 | End: 2024-05-02 | Stop reason: HOSPADM

## 2024-05-02 RX ORDER — SODIUM CHLORIDE 0.9 % (FLUSH) 0.9 %
10 SYRINGE (ML) INJECTION
Status: CANCELLED | OUTPATIENT
Start: 2024-05-02

## 2024-05-02 RX ORDER — SODIUM CHLORIDE 0.9 % (FLUSH) 0.9 %
10 SYRINGE (ML) INJECTION
Status: DISCONTINUED | OUTPATIENT
Start: 2024-05-02 | End: 2024-05-02 | Stop reason: HOSPADM

## 2024-05-02 RX ORDER — HEPARIN 100 UNIT/ML
500 SYRINGE INTRAVENOUS
Status: CANCELLED | OUTPATIENT
Start: 2024-05-02

## 2024-05-02 RX ORDER — EPINEPHRINE 0.3 MG/.3ML
0.3 INJECTION SUBCUTANEOUS ONCE AS NEEDED
Status: DISCONTINUED | OUTPATIENT
Start: 2024-05-02 | End: 2024-05-02 | Stop reason: HOSPADM

## 2024-05-02 RX ORDER — EPINEPHRINE 0.3 MG/.3ML
0.3 INJECTION SUBCUTANEOUS ONCE AS NEEDED
Status: CANCELLED | OUTPATIENT
Start: 2024-05-02

## 2024-05-02 RX ORDER — DIPHENHYDRAMINE HYDROCHLORIDE 50 MG/ML
50 INJECTION INTRAMUSCULAR; INTRAVENOUS ONCE AS NEEDED
Status: CANCELLED | OUTPATIENT
Start: 2024-05-02

## 2024-05-02 RX ORDER — HEPARIN 100 UNIT/ML
500 SYRINGE INTRAVENOUS
Status: DISCONTINUED | OUTPATIENT
Start: 2024-05-02 | End: 2024-05-02 | Stop reason: HOSPADM

## 2024-05-02 RX ADMIN — SODIUM CHLORIDE 200 MG: 9 INJECTION, SOLUTION INTRAVENOUS at 03:05

## 2024-05-02 NOTE — PROGRESS NOTES
"                                                         PROGRESS NOTE    Subjective:       Patient ID: Zac Plunkett is a 78 y.o. male.    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 JD. TMB 6.3    Oncologic History copied from medical chart:  Prior patient of Dr Lucia's  2021  May              5/27- US right thigh: showed 2 hypoechoic masses in the subcutaneous tissues of the right anterior thigh. Largest measured 1.1 x 0.9 x 1 cm. Read at the time was concern for lymphadenopathy.   June 6/4 - CXR: "Bilateral reticular opacities are present in a perihilar distribution which appears slightly more pronounced compared to prior exams."               6/11 - underwent excisional biopsy of the right thigh; pathology came back positive for myxofibrosarcoma, high grade. Path a 1.8 x 1.1 x 0.9 cm mass with an adjacent 0.4 x 0.4 x 0.3 cm mass.   July/Aug              - IMRT with Dr. Mcadams  2022 January 1/12 - PET: locoregional recurrence and concerning 0.9 cm RUL lesion              1/20 - Path: excision on 2 recurrent lesions:  Myxofibrosarcoma, 1.9 cm at longest length, PD-L1 25%              1/31 - CT thigh: successful removal w/o evidence of recurrent disease  April 4/28 - recurrence, locally with metastatic deposit in LN. S/p excision on 4/28/22. Path: right thigh superior mass: Myxofibrosarcoma, high grade with focal tumor necrosis, incompletely excised. A portion of the lesion is suggestive of an involved lymph node exhibiting metastatic sarcoma with extranodal extension.  Sarcoma is focally present within inked but otherwise unspecified peripheral margins.   July 7/19 - CT thigh showed local recurrence and a 0.7 cm R inguinal lymph node    2023  [facility]   1/3 - CT thigh/pelvis showed "Heterogeneous " "enhancing soft tissue lesions and nodular foci at the anterior thigh and right groin highly concerning for worsening local recurrent disease with talon metastasis"    - stable pulmonary nodules on CT chest  - completed palliative radiation to right thigh  March  3/24- started keytruda, s/p 15 cycles    Interval History:   Mr Plunkett returns for follow up. Feeling well. He has fallen a couple of times, as his feels that his legs just give out on him. He did not hit his head. He was assisted to his feet without complication. He is walking better and still uses his walking cane. Has been having erythematous papules over the upper right thigh over the past 8 months. He felt it may be getting a little redder and it has not changed.     ECO. Presents with his family today. Had repeat CT scans performed on 2024    ROS:   A ten-point system review is obtained and negative except for what was stated in the Interval History.     Physical Examination:   Vital signs reviewed.   General: well hydrated, well developed, in no acute distress  HEENT: normocephalic, EOMI, anicteric sclerae  Neck: supple, no JVD, thyromegaly  Lungs: clear breath sounds bilaterally, no wheezing, rales, or rhonchi  Heart: RRR, no M/R/G  Abdomen: soft, no tenderness, non-distended. BS present  Extremities: no edema  Skin: erythematous papules over the right upper thigh  Neuro: alert and oriented x 4, no focal neuro deficit  Psych: pleasant and appropriate mood and affect    Objective:     Laboratory Data:  Labs reviewed    Imaging Data:  CT C/A/P 3/29/23:  Impression:     1. 0.8 x 2.8-cm (previously 3.0 x 3.9-cm) right inguinal mass (series 3, image 171). 2.9 x 5.6-cm (previously 2.4 x 5.0-cm) exophytic right inguinal mass.  2. Multiple pulmonary nodules throughout the bilateral lung fields that are predominantly all stable.  There are approximately 2 pulmonary nodules that were not definitively seen on prior exam which may be " related to technique.  3. Single enlarged precarinal space lymph node and prominent bilateral hilar lymph nodes that are not enlarged by radiologic size criteria, not significantly changed.  4. Approximately 3 punctate hepatic parenchymal hypodensities that are too small to accurately characterize.    CT right thigh 3/29/23:  Interval increase in size of subcutaneous soft tissue mass within the right superior thigh.     Centrilobular emphysema.     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     FINDINGS:  The visualized intra-abdominal content is unremarkable.  There is a small sebaceous cyst involving the right inferior buttocks.  There is significant scattered arterial plaque.  There is fatty stranding believed to represent postoperative change involving the anterior superior right thigh.  This is within the area of previously described exophytic mass.  There is no evidence of residual mass.  There is significant scattered arterial plaque.  There is a knee joint effusion.     Impression:     No evidence of recurrent or residual mass.     CT C/A/P 9/28/23:  FINDINGS:  The visualized portion of the base of the lungs is significant for a subcentimeter hypodensity within the left lobe of the thyroid too small to characterize.  The visualized portion of the mediastinum including the heart and great vessels is significant for calcification of the aorta, aortic annulus, and coronary arteries.  The trachea is patent and free of any intraluminal filling defects.  The lungs are well expanded.  There is bilateral apical pleural thickening and/or scar.  There is centrilobular emphysema.  Scattered pulmonary nodules are unchanged compared to previous performed 06/15/2023.  There is bilateral dependent atelectatic versus fibrotic change.  There is  no pleural or pericardial fluid.     The stomach is unremarkable.  There is a large duodenal diverticulum.  The spleen is unremarkable.  There is fatty atrophy of the pancreas.  The liver, gallbladder, and adrenal glands are unremarkable.  There is abdominal aortic plaque which extends into its branches.  There is severe narrowing of the left common iliac artery.  The right kidney is unremarkable.  There is a large left renal cyst.  The bladder is unremarkable.  There is a left inguinal hernia containing a small short segment of bowel.  The bowel is significant for diverticulosis coli.  The appendix is not visualized.  The osseous structures demonstrate degenerative change.  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  There is grade 1 anterolisthesis of L4 in relation to L5.     Impression:     No evidence of metastatic disease.    CT thigh 1/8/24:  Impression:     No acute findings with no detrimental interval change compared to previous performed 09/28/2023.       CT C/A/P 1/8/24:  Impression:     1. Mediastinal and bilateral hilar enlarged lymph node, not significantly changed.  2. 0.9-cm ground-glass nodule left lower lobe with apparent slight interval increased in density which may be artifactual.  A few additional pulmonary nodules throughout the bilateral lung fields are not significantly changed.  3. Three hepatic parenchymal punctate hypodensities are too small to accurately characterize, not significantly changed.  4. Punctate indeterminate hypodensity in the lateral limb of the left adrenal gland is too small to characterize but not significantly changed.    CT CAP: 4/23/2024  Impression:     Mediastinal and hilar adenopathy which is similar or minimally increased when compared to the prior examination.     Stable pulmonary nodules.  No new pulmonary nodules are identified.     Prominent atherosclerotic calcification within the coronary arteries in a multi-vessel distribution.     Emphysematous changes. Mild  bronchiectasis.     Grade 1 anterolisthesis of L4 on L5. Stable subcentimeter hepatic hypodensities. Subcentimeter left adrenal hypodensity, stable.     Left renal cyst. Colonic diverticulosis. Mild prostatomegaly. Fat containing left inguinal hernia.    CT thigh: 4/23/2024  Impression:     No evidence for local recurrence or metastatic disease.     Surgical changes.     Degenerative changes of the right hip and knee with calcified loose bodies caudal to the right patella.     There are no measurable lesions per RECIST criteria.     Assessment and Plan:     1. Primary myxofibrosarcoma    2. 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 Essential hypertension    7. Cancer related pain    8. Fatigue, unspecified type    9. Rash and nonspecific skin eruption          1-5  - Mr Plunkett is a 77 yo man with recurrent myxofibrosarcoma of the right thigh, has had 3 resections and IMRT  - he was seen by Dr. Mao and Dr. Unger, they did not feel that he would be a good surgical candidate and recommended palliative RT  - completed palliative RT 2/13/2023  - Previously discussed systemic treatment options, including chemotherapy and immunotherapy. He was not interested in chemotherapy including TKI. Pembrolizumab has efficacy in phase II studies and is recommended on NCCN Guidelines for patients with myxofibrosarcoma. PD-L1 is 25%. However, his TMB is not high. Pembrolizumab was denied by insurance and then denied at hearing. He got patient assistance through [employer]  - started palliative keytruda on 3/24/23, s/p 17 cycles. He has had a complete response on CT. On physical exam, he has erythematous nodules over the right upper thigh  -CT CAP and thigh were reviewed and display a favorable response with no evidence for worsening disease. Non-specific lung nodules have not changed. Overall, scan result is good. Recc to  continue with current treatment.    - doing well. Did have ongoing diarrhea but has improved with taking Imodium. He recovered fine. Eating well and tolerating treatment well.   - Having a good response on imaging studies. Will proceed with treatment today. Discussed with Dr Martines  - Cycle 18 keytruda today  - Lab work reviewed and adequate    - return in 3 weeks for cycle 19    6.  - monitor BP  - Slightly elevated     7.  - stable  - c/w oxycodone prn    9.   - Stable. No signs of infection.     Follow-up:     In 3 weeks    Route Chart for Scheduling    Med Onc Chart Routing      Follow up with physician . See Dr Martines in 9 weeks and 12 weeks with lab work and Keytruda   Follow up with NICOLLE 3 weeks. See NICOLLE in 3 weeks.   Infusion scheduling note    Injection scheduling note    Labs CBC, CMP and TSH   Scheduling:  Preferred lab:  Lab interval: every 3 weeks     Imaging    Pharmacy appointment    Other referrals              Treatment Plan Information   OP PEMBROLIZUMAB 200MG Q3W   Ed Martines MD   Upcoming Treatment Dates - OP PEMBROLIZUMAB 200MG Q3W    5/23/2024       Chemotherapy       pembrolizumab (KEYTRUDA) 200 mg in sodium chloride 0.9% SolP 108 mL infusion  6/13/2024       Chemotherapy       pembrolizumab (KEYTRUDA) 200 mg in sodium chloride 0.9% SolP 108 mL infusion  7/4/2024       Chemotherapy       pembrolizumab (KEYTRUDA) 200 mg in sodium chloride 0.9% SolP 108 mL infusion  7/25/2024       Chemotherapy       pembrolizumab (KEYTRUDA) 200 mg in sodium chloride 0.9% SolP 108 mL infusion

## 2024-05-13 ENCOUNTER — PATIENT MESSAGE (OUTPATIENT)
Dept: HEMATOLOGY/ONCOLOGY | Facility: CLINIC | Age: 79
End: 2024-05-13
Payer: MEDICARE

## 2024-05-13 ENCOUNTER — TELEPHONE (OUTPATIENT)
Dept: HEMATOLOGY/ONCOLOGY | Facility: CLINIC | Age: 79
End: 2024-05-13
Payer: MEDICARE

## 2024-05-19 DIAGNOSIS — G89.3 NEOPLASM RELATED PAIN: ICD-10-CM

## 2024-05-20 RX ORDER — OXYCODONE HYDROCHLORIDE 10 MG/1
10 TABLET ORAL EVERY 6 HOURS PRN
Qty: 120 TABLET | Refills: 0 | Status: SHIPPED | OUTPATIENT
Start: 2024-05-20

## 2024-05-23 ENCOUNTER — INFUSION (OUTPATIENT)
Dept: INFUSION THERAPY | Facility: HOSPITAL | Age: 79
End: 2024-05-23
Attending: INTERNAL MEDICINE
Payer: MEDICARE

## 2024-05-23 ENCOUNTER — OFFICE VISIT (OUTPATIENT)
Dept: HEMATOLOGY/ONCOLOGY | Facility: CLINIC | Age: 79
End: 2024-05-23
Payer: MEDICARE

## 2024-05-23 VITALS
OXYGEN SATURATION: 97 % | DIASTOLIC BLOOD PRESSURE: 60 MMHG | WEIGHT: 174.63 LBS | HEART RATE: 63 BPM | RESPIRATION RATE: 16 BRPM | HEIGHT: 72 IN | BODY MASS INDEX: 23.65 KG/M2 | SYSTOLIC BLOOD PRESSURE: 128 MMHG | TEMPERATURE: 98 F

## 2024-05-23 VITALS
HEIGHT: 72 IN | TEMPERATURE: 98 F | BODY MASS INDEX: 23.65 KG/M2 | OXYGEN SATURATION: 97 % | WEIGHT: 174.63 LBS | HEART RATE: 56 BPM | RESPIRATION RATE: 18 BRPM | DIASTOLIC BLOOD PRESSURE: 66 MMHG | SYSTOLIC BLOOD PRESSURE: 150 MMHG

## 2024-05-23 DIAGNOSIS — D84.81 IMMUNODEFICIENCY SECONDARY TO NEOPLASM: ICD-10-CM

## 2024-05-23 DIAGNOSIS — C77.4 SECONDARY MALIGNANCY OF INGUINAL LYMPH NODES: ICD-10-CM

## 2024-05-23 DIAGNOSIS — D49.9 IMMUNODEFICIENCY SECONDARY TO NEOPLASM: ICD-10-CM

## 2024-05-23 DIAGNOSIS — D84.821 IMMUNODEFICIENCY DUE TO DRUGS: ICD-10-CM

## 2024-05-23 DIAGNOSIS — R21 RASH AND NONSPECIFIC SKIN ERUPTION: ICD-10-CM

## 2024-05-23 DIAGNOSIS — I10 ESSENTIAL HYPERTENSION: ICD-10-CM

## 2024-05-23 DIAGNOSIS — C49.9 SOFT TISSUE SARCOMA: ICD-10-CM

## 2024-05-23 DIAGNOSIS — C49.9 PRIMARY MYXOFIBROSARCOMA: Primary | ICD-10-CM

## 2024-05-23 DIAGNOSIS — R53.83 FATIGUE, UNSPECIFIED TYPE: ICD-10-CM

## 2024-05-23 DIAGNOSIS — Z79.899 IMMUNODEFICIENCY DUE TO DRUGS: ICD-10-CM

## 2024-05-23 DIAGNOSIS — G89.3 CANCER RELATED PAIN: ICD-10-CM

## 2024-05-23 DIAGNOSIS — M79.659 PAIN OF THIGH, UNSPECIFIED LATERALITY: ICD-10-CM

## 2024-05-23 PROCEDURE — 1159F MED LIST DOCD IN RCRD: CPT | Mod: HCNC,CPTII,S$GLB, | Performed by: PHYSICIAN ASSISTANT

## 2024-05-23 PROCEDURE — 25000003 PHARM REV CODE 250: Mod: HCNC | Performed by: PHYSICIAN ASSISTANT

## 2024-05-23 PROCEDURE — 99999 PR PBB SHADOW E&M-EST. PATIENT-LVL IV: CPT | Mod: PBBFAC,HCNC,, | Performed by: PHYSICIAN ASSISTANT

## 2024-05-23 PROCEDURE — 3078F DIAST BP <80 MM HG: CPT | Mod: HCNC,CPTII,S$GLB, | Performed by: PHYSICIAN ASSISTANT

## 2024-05-23 PROCEDURE — 3288F FALL RISK ASSESSMENT DOCD: CPT | Mod: HCNC,CPTII,S$GLB, | Performed by: PHYSICIAN ASSISTANT

## 2024-05-23 PROCEDURE — 99215 OFFICE O/P EST HI 40 MIN: CPT | Mod: HCNC,S$GLB,, | Performed by: PHYSICIAN ASSISTANT

## 2024-05-23 PROCEDURE — 96413 CHEMO IV INFUSION 1 HR: CPT | Mod: HCNC

## 2024-05-23 PROCEDURE — 1160F RVW MEDS BY RX/DR IN RCRD: CPT | Mod: HCNC,CPTII,S$GLB, | Performed by: PHYSICIAN ASSISTANT

## 2024-05-23 PROCEDURE — 63600175 PHARM REV CODE 636 W HCPCS: Mod: JG,HCNC | Performed by: PHYSICIAN ASSISTANT

## 2024-05-23 PROCEDURE — 3074F SYST BP LT 130 MM HG: CPT | Mod: HCNC,CPTII,S$GLB, | Performed by: PHYSICIAN ASSISTANT

## 2024-05-23 PROCEDURE — G2211 COMPLEX E/M VISIT ADD ON: HCPCS | Mod: HCNC,S$GLB,, | Performed by: PHYSICIAN ASSISTANT

## 2024-05-23 PROCEDURE — 1126F AMNT PAIN NOTED NONE PRSNT: CPT | Mod: HCNC,CPTII,S$GLB, | Performed by: PHYSICIAN ASSISTANT

## 2024-05-23 PROCEDURE — 1101F PT FALLS ASSESS-DOCD LE1/YR: CPT | Mod: HCNC,CPTII,S$GLB, | Performed by: PHYSICIAN ASSISTANT

## 2024-05-23 RX ORDER — EPINEPHRINE 0.3 MG/.3ML
0.3 INJECTION SUBCUTANEOUS ONCE AS NEEDED
Status: DISCONTINUED | OUTPATIENT
Start: 2024-05-23 | End: 2024-05-23 | Stop reason: HOSPADM

## 2024-05-23 RX ORDER — HEPARIN 100 UNIT/ML
500 SYRINGE INTRAVENOUS
Status: CANCELLED | OUTPATIENT
Start: 2024-05-23

## 2024-05-23 RX ORDER — DIPHENHYDRAMINE HYDROCHLORIDE 50 MG/ML
50 INJECTION INTRAMUSCULAR; INTRAVENOUS ONCE AS NEEDED
Status: CANCELLED | OUTPATIENT
Start: 2024-05-23

## 2024-05-23 RX ORDER — EPINEPHRINE 0.3 MG/.3ML
0.3 INJECTION SUBCUTANEOUS ONCE AS NEEDED
Status: CANCELLED | OUTPATIENT
Start: 2024-05-23

## 2024-05-23 RX ORDER — SODIUM CHLORIDE 0.9 % (FLUSH) 0.9 %
10 SYRINGE (ML) INJECTION
Status: CANCELLED | OUTPATIENT
Start: 2024-05-23

## 2024-05-23 RX ORDER — DIPHENHYDRAMINE HYDROCHLORIDE 50 MG/ML
50 INJECTION INTRAMUSCULAR; INTRAVENOUS ONCE AS NEEDED
Status: DISCONTINUED | OUTPATIENT
Start: 2024-05-23 | End: 2024-05-23 | Stop reason: HOSPADM

## 2024-05-23 RX ADMIN — SODIUM CHLORIDE 200 MG: 9 INJECTION, SOLUTION INTRAVENOUS at 11:05

## 2024-05-23 NOTE — PROGRESS NOTES
"                                                         PROGRESS NOTE    Subjective:       Patient ID: Zac Plunkett is a 78 y.o. male.    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 JD. TMB 6.3    Oncologic History copied from medical chart:  Prior patient of Dr Lucia's  2021  May              5/27- US right thigh: showed 2 hypoechoic masses in the subcutaneous tissues of the right anterior thigh. Largest measured 1.1 x 0.9 x 1 cm. Read at the time was concern for lymphadenopathy.   June 6/4 - CXR: "Bilateral reticular opacities are present in a perihilar distribution which appears slightly more pronounced compared to prior exams."               6/11 - underwent excisional biopsy of the right thigh; pathology came back positive for myxofibrosarcoma, high grade. Path a 1.8 x 1.1 x 0.9 cm mass with an adjacent 0.4 x 0.4 x 0.3 cm mass.   July/Aug              - IMRT with Dr. Mcadams  2022 January 1/12 - PET: locoregional recurrence and concerning 0.9 cm RUL lesion              1/20 - Path: excision on 2 recurrent lesions:  Myxofibrosarcoma, 1.9 cm at longest length, PD-L1 25%              1/31 - CT thigh: successful removal w/o evidence of recurrent disease  April 4/28 - recurrence, locally with metastatic deposit in LN. S/p excision on 4/28/22. Path: right thigh superior mass: Myxofibrosarcoma, high grade with focal tumor necrosis, incompletely excised. A portion of the lesion is suggestive of an involved lymph node exhibiting metastatic sarcoma with extranodal extension.  Sarcoma is focally present within inked but otherwise unspecified peripheral margins.   July 7/19 - CT thigh showed local recurrence and a 0.7 cm R inguinal lymph node    2023  [place]   1/3 - CT thigh/pelvis showed "Heterogeneous " "enhancing soft tissue lesions and nodular foci at the anterior thigh and right groin highly concerning for worsening local recurrent disease with talon metastasis"    - stable pulmonary nodules on CT chest  - completed palliative radiation to right thigh  March  3/24- started keytruda, s/p 15 cycles    Interval History:   Mr Plunkett returns for follow up. Feeling well. He has fallen a couple of times, as his feels that his legs just give out on him. He did not hit his head. He was assisted to his feet without complication. He is walking better and still uses his walking cane. Has been having erythematous papules over the upper right thigh over the past 8 months. He felt it may be getting a little redder and it has not changed.     ECO. Presents with his family today. Had repeat CT scans performed on 2024    ROS:   A ten-point system review is obtained and negative except for what was stated in the Interval History.     Physical Examination:   Vital signs reviewed.   General: well hydrated, well developed, in no acute distress  HEENT: normocephalic, EOMI, anicteric sclerae  Neck: supple, no JVD, thyromegaly  Lungs: clear breath sounds bilaterally, no wheezing, rales, or rhonchi  Heart: RRR, no M/R/G  Abdomen: soft, no tenderness, non-distended. BS present  Extremities: no edema  Skin: erythematous papules over the right upper thigh  Neuro: alert and oriented x 4, no focal neuro deficit  Psych: pleasant and appropriate mood and affect    Objective:     Laboratory Data:  Labs reviewed    Imaging Data:  CT C/A/P 3/29/23:  Impression:     1. 0.8 x 2.8-cm (previously 3.0 x 3.9-cm) right inguinal mass (series 3, image 171). 2.9 x 5.6-cm (previously 2.4 x 5.0-cm) exophytic right inguinal mass.  2. Multiple pulmonary nodules throughout the bilateral lung fields that are predominantly all stable.  There are approximately 2 pulmonary nodules that were not definitively seen on prior exam which may be " related to technique.  3. Single enlarged precarinal space lymph node and prominent bilateral hilar lymph nodes that are not enlarged by radiologic size criteria, not significantly changed.  4. Approximately 3 punctate hepatic parenchymal hypodensities that are too small to accurately characterize.    CT right thigh 3/29/23:  Interval increase in size of subcutaneous soft tissue mass within the right superior thigh.     Centrilobular emphysema.     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     FINDINGS:  The visualized intra-abdominal content is unremarkable.  There is a small sebaceous cyst involving the right inferior buttocks.  There is significant scattered arterial plaque.  There is fatty stranding believed to represent postoperative change involving the anterior superior right thigh.  This is within the area of previously described exophytic mass.  There is no evidence of residual mass.  There is significant scattered arterial plaque.  There is a knee joint effusion.     Impression:     No evidence of recurrent or residual mass.     CT C/A/P 9/28/23:  FINDINGS:  The visualized portion of the base of the lungs is significant for a subcentimeter hypodensity within the left lobe of the thyroid too small to characterize.  The visualized portion of the mediastinum including the heart and great vessels is significant for calcification of the aorta, aortic annulus, and coronary arteries.  The trachea is patent and free of any intraluminal filling defects.  The lungs are well expanded.  There is bilateral apical pleural thickening and/or scar.  There is centrilobular emphysema.  Scattered pulmonary nodules are unchanged compared to previous performed 06/15/2023.  There is bilateral dependent atelectatic versus fibrotic change.  There is  no pleural or pericardial fluid.     The stomach is unremarkable.  There is a large duodenal diverticulum.  The spleen is unremarkable.  There is fatty atrophy of the pancreas.  The liver, gallbladder, and adrenal glands are unremarkable.  There is abdominal aortic plaque which extends into its branches.  There is severe narrowing of the left common iliac artery.  The right kidney is unremarkable.  There is a large left renal cyst.  The bladder is unremarkable.  There is a left inguinal hernia containing a small short segment of bowel.  The bowel is significant for diverticulosis coli.  The appendix is not visualized.  The osseous structures demonstrate degenerative change.  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  There is grade 1 anterolisthesis of L4 in relation to L5.     Impression:     No evidence of metastatic disease.    CT thigh 1/8/24:  Impression:     No acute findings with no detrimental interval change compared to previous performed 09/28/2023.       CT C/A/P 1/8/24:  Impression:     1. Mediastinal and bilateral hilar enlarged lymph node, not significantly changed.  2. 0.9-cm ground-glass nodule left lower lobe with apparent slight interval increased in density which may be artifactual.  A few additional pulmonary nodules throughout the bilateral lung fields are not significantly changed.  3. Three hepatic parenchymal punctate hypodensities are too small to accurately characterize, not significantly changed.  4. Punctate indeterminate hypodensity in the lateral limb of the left adrenal gland is too small to characterize but not significantly changed.    CT CAP: 4/23/2024  Impression:     Mediastinal and hilar adenopathy which is similar or minimally increased when compared to the prior examination.     Stable pulmonary nodules.  No new pulmonary nodules are identified.     Prominent atherosclerotic calcification within the coronary arteries in a multi-vessel distribution.     Emphysematous changes. Mild  bronchiectasis.     Grade 1 anterolisthesis of L4 on L5. Stable subcentimeter hepatic hypodensities. Subcentimeter left adrenal hypodensity, stable.     Left renal cyst. Colonic diverticulosis. Mild prostatomegaly. Fat containing left inguinal hernia.    CT thigh: 4/23/2024  Impression:     No evidence for local recurrence or metastatic disease.     Surgical changes.     Degenerative changes of the right hip and knee with calcified loose bodies caudal to the right patella.     There are no measurable lesions per RECIST criteria.     Assessment and Plan:     1. Primary myxofibrosarcoma    2. 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 Essential hypertension    7. Cancer related pain    8. Fatigue, unspecified type    9. Pain of thigh, unspecified laterality    10. Rash and nonspecific skin eruption            1-5  - Mr Plunkett is a 79 yo man with recurrent myxofibrosarcoma of the right thigh, has had 3 resections and IMRT  - he was seen by Dr. Mao and Dr. Unger, they did not feel that he would be a good surgical candidate and recommended palliative RT  - completed palliative RT 2/13/2023  - Previously discussed systemic treatment options, including chemotherapy and immunotherapy. He was not interested in chemotherapy including TKI. Pembrolizumab has efficacy in phase II studies and is recommended on NCCN Guidelines for patients with myxofibrosarcoma. PD-L1 is 25%. However, his TMB is not high. Pembrolizumab was denied by insurance and then denied at hearing. He got patient assistance through [employer]  - started palliative keytruda on 3/24/23, s/p 18 cycles. He has had a complete response on CT. On physical exam, he has erythematous nodules over the right upper thigh  -CT CAP and thigh were reviewed and display a favorable response with no evidence for worsening disease. Non-specific lung nodules have not  changed. Overall, scan result is good. Recc to continue with current treatment.    - doing well. Did have ongoing diarrhea but has improved with taking Imodium. He recovered fine. Eating well and tolerating treatment well. Has chronic thigh pain, due to degenerative disease.   - Having a good response on imaging studies. Will proceed with treatment today. Discussed with Dr Martines  - Cycle 19 keytruda today  - Lab work reviewed and adequate    - return in 3 weeks for cycle 20    6.  - monitor BP  - Slightly elevated     7.  - stable  - c/w oxycodone prn    8.   - Monitor TSH with immunotherapy    9.   - Chronic. CT displays degenerative disease to the thigh.   - will continue to monitor.     10.   - Stable. No signs of infection.     Follow-up:     In 3 weeks    Route Chart for Scheduling    Med Onc Chart Routing      Follow up with physician . Keep appointments as scheduled   Follow up with [facility]    Infusion scheduling note    Injection scheduling note    Labs CBC, CMP, TSH and free T4   Scheduling:  Preferred lab:  Lab interval: every 3 weeks     Imaging    Pharmacy appointment    Other referrals              Treatment Plan Information   OP PEMBROLIZUMAB 200MG Q3W   Ed Martines MD   Upcoming Treatment Dates - OP PEMBROLIZUMAB 200MG Q3W    5/23/2024       Chemotherapy       pembrolizumab (KEYTRUDA) 200 mg in sodium chloride 0.9% SolP 108 mL infusion  6/13/2024       Chemotherapy       pembrolizumab (KEYTRUDA) 200 mg in sodium chloride 0.9% SolP 108 mL infusion  7/4/2024       Chemotherapy       pembrolizumab (KEYTRUDA) 200 mg in sodium chloride 0.9% SolP 108 mL infusion  7/25/2024       Chemotherapy       pembrolizumab (KEYTRUDA) 200 mg in sodium chloride 0.9% SolP 108 mL infusion

## 2024-05-23 NOTE — PLAN OF CARE
Problem: Fatigue  Goal: Improved Activity Tolerance  Outcome: Progressing  Intervention: Promote Improved Energy  Flowsheets (Taken 5/23/2024 1104)  Fatigue Management:   activity schedule adjusted   activity assistance provided   fatigue-related activity identified   frequent rest breaks encouraged   paced activity encouraged  Sleep/Rest Enhancement:   awakenings minimized   consistent schedule promoted   natural light exposure provided   noise level reduced   reading promoted   regular sleep/rest pattern promoted   relaxation techniques promoted  Activity Management:   Ambulated -L4   Up in chair - L3  Environmental Support:   calm environment promoted   caregiver consistency promoted   comfort object encouraged   distractions minimized   environmental consistency promoted   personal routine supported   rest periods encouraged.vs  /60 (Patient Position: Sitting)   Pulse 63   Temp 97.7 °F (36.5 °C)   Resp 16   Ht 6' (1.829 m)   Wt 79.2 kg (174 lb 9.7 oz)   SpO2 97%   BMI 23.68 kg/m² Pleasant, alert and oriented patient to Chemo Infusion per self for C19 Keytruda - VSS and PIV started x1 attempt with immediate flashback observed, flushed with NS, site secured and patient tolerated procedure well - Patient tolerated treatment with no AVE's, PIV discontinued, pressure dressing applied and patient discharged to home with no concerns - RTC on 6/13/24

## 2024-06-13 ENCOUNTER — OFFICE VISIT (OUTPATIENT)
Dept: HEMATOLOGY/ONCOLOGY | Facility: CLINIC | Age: 79
End: 2024-06-13
Payer: MEDICARE

## 2024-06-13 ENCOUNTER — LAB VISIT (OUTPATIENT)
Dept: LAB | Facility: HOSPITAL | Age: 79
End: 2024-06-13
Attending: INTERNAL MEDICINE
Payer: MEDICARE

## 2024-06-13 ENCOUNTER — INFUSION (OUTPATIENT)
Dept: INFUSION THERAPY | Facility: HOSPITAL | Age: 79
End: 2024-06-13
Attending: INTERNAL MEDICINE
Payer: MEDICARE

## 2024-06-13 VITALS
TEMPERATURE: 98 F | HEART RATE: 64 BPM | SYSTOLIC BLOOD PRESSURE: 138 MMHG | DIASTOLIC BLOOD PRESSURE: 68 MMHG | HEIGHT: 71 IN | RESPIRATION RATE: 18 BRPM | BODY MASS INDEX: 24.39 KG/M2 | WEIGHT: 174.19 LBS

## 2024-06-13 VITALS
WEIGHT: 174.19 LBS | HEART RATE: 60 BPM | BODY MASS INDEX: 24.39 KG/M2 | RESPIRATION RATE: 18 BRPM | SYSTOLIC BLOOD PRESSURE: 144 MMHG | OXYGEN SATURATION: 98 % | DIASTOLIC BLOOD PRESSURE: 70 MMHG | HEIGHT: 71 IN

## 2024-06-13 DIAGNOSIS — C49.9 PRIMARY MYXOFIBROSARCOMA: ICD-10-CM

## 2024-06-13 DIAGNOSIS — Z79.899 IMMUNODEFICIENCY DUE TO DRUGS: ICD-10-CM

## 2024-06-13 DIAGNOSIS — G89.3 CANCER RELATED PAIN: ICD-10-CM

## 2024-06-13 DIAGNOSIS — C49.9 PRIMARY MYXOFIBROSARCOMA: Primary | ICD-10-CM

## 2024-06-13 DIAGNOSIS — C49.9 SOFT TISSUE SARCOMA: ICD-10-CM

## 2024-06-13 DIAGNOSIS — D49.9 IMMUNODEFICIENCY SECONDARY TO NEOPLASM: ICD-10-CM

## 2024-06-13 DIAGNOSIS — M79.659 PAIN OF THIGH, UNSPECIFIED LATERALITY: ICD-10-CM

## 2024-06-13 DIAGNOSIS — C77.4 SECONDARY MALIGNANCY OF INGUINAL LYMPH NODES: ICD-10-CM

## 2024-06-13 DIAGNOSIS — D84.81 IMMUNODEFICIENCY SECONDARY TO NEOPLASM: ICD-10-CM

## 2024-06-13 DIAGNOSIS — R21 RASH AND NONSPECIFIC SKIN ERUPTION: ICD-10-CM

## 2024-06-13 DIAGNOSIS — I10 ESSENTIAL HYPERTENSION: ICD-10-CM

## 2024-06-13 DIAGNOSIS — R53.83 FATIGUE, UNSPECIFIED TYPE: ICD-10-CM

## 2024-06-13 DIAGNOSIS — D84.821 IMMUNODEFICIENCY DUE TO DRUGS: ICD-10-CM

## 2024-06-13 LAB
ALBUMIN SERPL BCP-MCNC: 3.7 G/DL (ref 3.5–5.2)
ALP SERPL-CCNC: 128 U/L (ref 55–135)
ALT SERPL W/O P-5'-P-CCNC: 12 U/L (ref 10–44)
ANION GAP SERPL CALC-SCNC: 8 MMOL/L (ref 8–16)
AST SERPL-CCNC: 12 U/L (ref 10–40)
BASOPHILS # BLD AUTO: 0.11 K/UL (ref 0–0.2)
BASOPHILS NFR BLD: 1.5 % (ref 0–1.9)
BILIRUB SERPL-MCNC: 0.5 MG/DL (ref 0.1–1)
BUN SERPL-MCNC: 12 MG/DL (ref 8–23)
CALCIUM SERPL-MCNC: 9.8 MG/DL (ref 8.7–10.5)
CHLORIDE SERPL-SCNC: 101 MMOL/L (ref 95–110)
CO2 SERPL-SCNC: 25 MMOL/L (ref 23–29)
CREAT SERPL-MCNC: 0.9 MG/DL (ref 0.5–1.4)
DIFFERENTIAL METHOD BLD: ABNORMAL
EOSINOPHIL # BLD AUTO: 0.2 K/UL (ref 0–0.5)
EOSINOPHIL NFR BLD: 2.7 % (ref 0–8)
ERYTHROCYTE [DISTWIDTH] IN BLOOD BY AUTOMATED COUNT: 14.8 % (ref 11.5–14.5)
EST. GFR  (NO RACE VARIABLE): >60 ML/MIN/1.73 M^2
GLUCOSE SERPL-MCNC: 101 MG/DL (ref 70–110)
HCT VFR BLD AUTO: 40.2 % (ref 40–54)
HGB BLD-MCNC: 13.8 G/DL (ref 14–18)
IMM GRANULOCYTES # BLD AUTO: 0.17 K/UL (ref 0–0.04)
IMM GRANULOCYTES NFR BLD AUTO: 2.3 % (ref 0–0.5)
LYMPHOCYTES # BLD AUTO: 1 K/UL (ref 1–4.8)
LYMPHOCYTES NFR BLD: 13.8 % (ref 18–48)
MCH RBC QN AUTO: 30.3 PG (ref 27–31)
MCHC RBC AUTO-ENTMCNC: 34.3 G/DL (ref 32–36)
MCV RBC AUTO: 88 FL (ref 82–98)
MONOCYTES # BLD AUTO: 0.5 K/UL (ref 0.3–1)
MONOCYTES NFR BLD: 6.4 % (ref 4–15)
NEUTROPHILS # BLD AUTO: 5.5 K/UL (ref 1.8–7.7)
NEUTROPHILS NFR BLD: 73.3 % (ref 38–73)
NRBC BLD-RTO: 0 /100 WBC
PLATELET # BLD AUTO: 200 K/UL (ref 150–450)
PMV BLD AUTO: 9 FL (ref 9.2–12.9)
POTASSIUM SERPL-SCNC: 4.1 MMOL/L (ref 3.5–5.1)
PROT SERPL-MCNC: 7.1 G/DL (ref 6–8.4)
RBC # BLD AUTO: 4.55 M/UL (ref 4.6–6.2)
SODIUM SERPL-SCNC: 134 MMOL/L (ref 136–145)
TSH SERPL DL<=0.005 MIU/L-ACNC: 1.04 UIU/ML (ref 0.4–4)
WBC # BLD AUTO: 7.48 K/UL (ref 3.9–12.7)

## 2024-06-13 PROCEDURE — 99215 OFFICE O/P EST HI 40 MIN: CPT | Mod: HCNC,S$GLB,, | Performed by: PHYSICIAN ASSISTANT

## 2024-06-13 PROCEDURE — 1101F PT FALLS ASSESS-DOCD LE1/YR: CPT | Mod: HCNC,CPTII,S$GLB, | Performed by: PHYSICIAN ASSISTANT

## 2024-06-13 PROCEDURE — 84443 ASSAY THYROID STIM HORMONE: CPT | Mod: HCNC | Performed by: INTERNAL MEDICINE

## 2024-06-13 PROCEDURE — 25000003 PHARM REV CODE 250: Mod: HCNC | Performed by: PHYSICIAN ASSISTANT

## 2024-06-13 PROCEDURE — 1159F MED LIST DOCD IN RCRD: CPT | Mod: HCNC,CPTII,S$GLB, | Performed by: PHYSICIAN ASSISTANT

## 2024-06-13 PROCEDURE — 3077F SYST BP >= 140 MM HG: CPT | Mod: HCNC,CPTII,S$GLB, | Performed by: PHYSICIAN ASSISTANT

## 2024-06-13 PROCEDURE — 99999 PR PBB SHADOW E&M-EST. PATIENT-LVL IV: CPT | Mod: PBBFAC,HCNC,, | Performed by: PHYSICIAN ASSISTANT

## 2024-06-13 PROCEDURE — 1125F AMNT PAIN NOTED PAIN PRSNT: CPT | Mod: HCNC,CPTII,S$GLB, | Performed by: PHYSICIAN ASSISTANT

## 2024-06-13 PROCEDURE — 3078F DIAST BP <80 MM HG: CPT | Mod: HCNC,CPTII,S$GLB, | Performed by: PHYSICIAN ASSISTANT

## 2024-06-13 PROCEDURE — 80053 COMPREHEN METABOLIC PANEL: CPT | Mod: HCNC | Performed by: INTERNAL MEDICINE

## 2024-06-13 PROCEDURE — G2211 COMPLEX E/M VISIT ADD ON: HCPCS | Mod: HCNC,S$GLB,, | Performed by: PHYSICIAN ASSISTANT

## 2024-06-13 PROCEDURE — 96413 CHEMO IV INFUSION 1 HR: CPT | Mod: HCNC

## 2024-06-13 PROCEDURE — 85025 COMPLETE CBC W/AUTO DIFF WBC: CPT | Mod: HCNC | Performed by: INTERNAL MEDICINE

## 2024-06-13 PROCEDURE — 3288F FALL RISK ASSESSMENT DOCD: CPT | Mod: HCNC,CPTII,S$GLB, | Performed by: PHYSICIAN ASSISTANT

## 2024-06-13 RX ORDER — SODIUM CHLORIDE 0.9 % (FLUSH) 0.9 %
10 SYRINGE (ML) INJECTION
Status: CANCELLED | OUTPATIENT
Start: 2024-06-13

## 2024-06-13 RX ORDER — DIPHENHYDRAMINE HYDROCHLORIDE 50 MG/ML
50 INJECTION INTRAMUSCULAR; INTRAVENOUS ONCE AS NEEDED
Status: CANCELLED | OUTPATIENT
Start: 2024-06-13

## 2024-06-13 RX ORDER — DIPHENHYDRAMINE HYDROCHLORIDE 50 MG/ML
50 INJECTION INTRAMUSCULAR; INTRAVENOUS ONCE AS NEEDED
Status: DISCONTINUED | OUTPATIENT
Start: 2024-06-13 | End: 2024-06-13 | Stop reason: HOSPADM

## 2024-06-13 RX ORDER — EPINEPHRINE 0.3 MG/.3ML
0.3 INJECTION SUBCUTANEOUS ONCE AS NEEDED
Status: CANCELLED | OUTPATIENT
Start: 2024-06-13

## 2024-06-13 RX ORDER — SODIUM CHLORIDE 0.9 % (FLUSH) 0.9 %
10 SYRINGE (ML) INJECTION
Status: DISCONTINUED | OUTPATIENT
Start: 2024-06-13 | End: 2024-06-13 | Stop reason: HOSPADM

## 2024-06-13 RX ORDER — HEPARIN 100 UNIT/ML
500 SYRINGE INTRAVENOUS
Status: CANCELLED | OUTPATIENT
Start: 2024-06-13

## 2024-06-13 RX ORDER — OXYCODONE HYDROCHLORIDE 10 MG/1
10 TABLET ORAL EVERY 4 HOURS PRN
Qty: 180 TABLET | Refills: 0 | Status: SHIPPED | OUTPATIENT
Start: 2024-06-13

## 2024-06-13 RX ORDER — HEPARIN 100 UNIT/ML
500 SYRINGE INTRAVENOUS
Status: DISCONTINUED | OUTPATIENT
Start: 2024-06-13 | End: 2024-06-13 | Stop reason: HOSPADM

## 2024-06-13 RX ORDER — EPINEPHRINE 0.3 MG/.3ML
0.3 INJECTION SUBCUTANEOUS ONCE AS NEEDED
Status: DISCONTINUED | OUTPATIENT
Start: 2024-06-13 | End: 2024-06-13 | Stop reason: HOSPADM

## 2024-06-13 RX ADMIN — SODIUM CHLORIDE 200 MG: 9 INJECTION, SOLUTION INTRAVENOUS at 11:06

## 2024-06-13 RX ADMIN — SODIUM CHLORIDE: 9 INJECTION, SOLUTION INTRAVENOUS at 11:06

## 2024-06-13 NOTE — PLAN OF CARE
1223 pt tolerated Keytruda infusion without issue, pt to rtc 7/3/24, no distress noted upon d/c to home

## 2024-06-13 NOTE — PROGRESS NOTES
"                                                         PROGRESS NOTE    Subjective:       Patient ID: Zac Plunkett is a 78 y.o. male.    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 JD. TMB 6.3    Oncologic History copied from medical chart:  Prior patient of Dr Lucia's  2021  May              5/27- US right thigh: showed 2 hypoechoic masses in the subcutaneous tissues of the right anterior thigh. Largest measured 1.1 x 0.9 x 1 cm. Read at the time was concern for lymphadenopathy.   June 6/4 - CXR: "Bilateral reticular opacities are present in a perihilar distribution which appears slightly more pronounced compared to prior exams."               6/11 - underwent excisional biopsy of the right thigh; pathology came back positive for myxofibrosarcoma, high grade. Path a 1.8 x 1.1 x 0.9 cm mass with an adjacent 0.4 x 0.4 x 0.3 cm mass.   July/Aug              - IMRT with Dr. Mcadams  2022 January 1/12 - PET: locoregional recurrence and concerning 0.9 cm RUL lesion              1/20 - Path: excision on 2 recurrent lesions:  Myxofibrosarcoma, 1.9 cm at longest length, PD-L1 25%              1/31 - CT thigh: successful removal w/o evidence of recurrent disease  April 4/28 - recurrence, locally with metastatic deposit in LN. S/p excision on 4/28/22. Path: right thigh superior mass: Myxofibrosarcoma, high grade with focal tumor necrosis, incompletely excised. A portion of the lesion is suggestive of an involved lymph node exhibiting metastatic sarcoma with extranodal extension.  Sarcoma is focally present within inked but otherwise unspecified peripheral margins.   July 7/19 - CT thigh showed local recurrence and a 0.7 cm R inguinal lymph node    2023  [facility]   1/3 - CT thigh/pelvis showed "Heterogeneous " "enhancing soft tissue lesions and nodular foci at the anterior thigh and right groin highly concerning for worsening local recurrent disease with talon metastasis"    - stable pulmonary nodules on CT chest  - completed palliative radiation to right thigh  March  3/24- started keytruda, s/p 15 cycles    Interval History:   Mr Plunkett returns for follow up. Feeling ok. Reports having worsening R leg pain, Oxycodone 10 mg q6 hours is not really controlling it . He had fallen a couple of times, as his feels that his legs just give out on him. He has not fallen since then. Still uses his walking cane. He is eating and has a fair appetite. No report of nausea, vomiting, abdominal pain or fever. Overall, feeling ok. No other concerns or complaints today.     ECO. Presents with his family today. Had repeat CT scans performed on 2024    ROS:   A ten-point system review is obtained and negative except for what was stated in the Interval History.     Physical Examination:   Vital signs reviewed.   General: well hydrated, well developed, in no acute distress  HEENT: normocephalic, EOMI, anicteric sclerae  Neck: supple, no JVD, thyromegaly  Lungs: clear breath sounds bilaterally, no wheezing, rales, or rhonchi  Heart: RRR, no M/R/G  Abdomen: soft, no tenderness, non-distended. BS present  Extremities: no edema  Skin: erythematous papules over the right upper thigh  Neuro: alert and oriented x 4, no focal neuro deficit  Psych: pleasant and appropriate mood and affect    Objective:     Laboratory Data:  Labs reviewed    Imaging Data:  CT C/A/P 3/29/23:  Impression:     1. 0.8 x 2.8-cm (previously 3.0 x 3.9-cm) right inguinal mass (series 3, image 171). 2.9 x 5.6-cm (previously 2.4 x 5.0-cm) exophytic right inguinal mass.  2. Multiple pulmonary nodules throughout the bilateral lung fields that are predominantly all stable.  There are approximately 2 pulmonary nodules that were not definitively seen on prior " exam which may be related to technique.  3. Single enlarged precarinal space lymph node and prominent bilateral hilar lymph nodes that are not enlarged by radiologic size criteria, not significantly changed.  4. Approximately 3 punctate hepatic parenchymal hypodensities that are too small to accurately characterize.    CT right thigh 3/29/23:  Interval increase in size of subcutaneous soft tissue mass within the right superior thigh.     Centrilobular emphysema.     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     FINDINGS:  The visualized intra-abdominal content is unremarkable.  There is a small sebaceous cyst involving the right inferior buttocks.  There is significant scattered arterial plaque.  There is fatty stranding believed to represent postoperative change involving the anterior superior right thigh.  This is within the area of previously described exophytic mass.  There is no evidence of residual mass.  There is significant scattered arterial plaque.  There is a knee joint effusion.     Impression:     No evidence of recurrent or residual mass.     CT C/A/P 9/28/23:  FINDINGS:  The visualized portion of the base of the lungs is significant for a subcentimeter hypodensity within the left lobe of the thyroid too small to characterize.  The visualized portion of the mediastinum including the heart and great vessels is significant for calcification of the aorta, aortic annulus, and coronary arteries.  The trachea is patent and free of any intraluminal filling defects.  The lungs are well expanded.  There is bilateral apical pleural thickening and/or scar.  There is centrilobular emphysema.  Scattered pulmonary nodules are unchanged compared to previous performed 06/15/2023.  There is bilateral dependent atelectatic versus fibrotic  change.  There is no pleural or pericardial fluid.     The stomach is unremarkable.  There is a large duodenal diverticulum.  The spleen is unremarkable.  There is fatty atrophy of the pancreas.  The liver, gallbladder, and adrenal glands are unremarkable.  There is abdominal aortic plaque which extends into its branches.  There is severe narrowing of the left common iliac artery.  The right kidney is unremarkable.  There is a large left renal cyst.  The bladder is unremarkable.  There is a left inguinal hernia containing a small short segment of bowel.  The bowel is significant for diverticulosis coli.  The appendix is not visualized.  The osseous structures demonstrate degenerative change.  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  There is grade 1 anterolisthesis of L4 in relation to L5.     Impression:     No evidence of metastatic disease.    CT thigh 1/8/24:  Impression:     No acute findings with no detrimental interval change compared to previous performed 09/28/2023.       CT C/A/P 1/8/24:  Impression:     1. Mediastinal and bilateral hilar enlarged lymph node, not significantly changed.  2. 0.9-cm ground-glass nodule left lower lobe with apparent slight interval increased in density which may be artifactual.  A few additional pulmonary nodules throughout the bilateral lung fields are not significantly changed.  3. Three hepatic parenchymal punctate hypodensities are too small to accurately characterize, not significantly changed.  4. Punctate indeterminate hypodensity in the lateral limb of the left adrenal gland is too small to characterize but not significantly changed.    CT CAP: 4/23/2024  Impression:     Mediastinal and hilar adenopathy which is similar or minimally increased when compared to the prior examination.     Stable pulmonary nodules.  No new pulmonary nodules are identified.     Prominent atherosclerotic calcification within the coronary arteries in a multi-vessel distribution.      Emphysematous changes. Mild bronchiectasis.     Grade 1 anterolisthesis of L4 on L5. Stable subcentimeter hepatic hypodensities. Subcentimeter left adrenal hypodensity, stable.     Left renal cyst. Colonic diverticulosis. Mild prostatomegaly. Fat containing left inguinal hernia.    CT thigh: 4/23/2024  Impression:     No evidence for local recurrence or metastatic disease.     Surgical changes.     Degenerative changes of the right hip and knee with calcified loose bodies caudal to the right patella.     There are no measurable lesions per RECIST criteria.     Assessment and Plan:     1. Primary myxofibrosarcoma    2. 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 Essential hypertension    7. Cancer related pain    8. Fatigue, unspecified type    9. Pain of thigh, unspecified laterality    10. Rash and nonspecific skin eruption              1-5  - Mr Plunkett is a 79 yo man with recurrent myxofibrosarcoma of the right thigh, has had 3 resections and IMRT  - he was seen by Dr. Mao and Dr. Unger, they did not feel that he would be a good surgical candidate and recommended palliative RT  - completed palliative RT 2/13/2023  - Previously discussed systemic treatment options, including chemotherapy and immunotherapy. He was not interested in chemotherapy including TKI. Pembrolizumab has efficacy in phase II studies and is recommended on NCCN Guidelines for patients with myxofibrosarcoma. PD-L1 is 25%. However, his TMB is not high. Pembrolizumab was denied by insurance and then denied at hearing. He got patient assistance through [employer]  - started palliative keytruda on 3/24/23, s/p 18 cycles. He has had a complete response on CT. On physical exam, he has erythematous nodules over the right upper thigh  -CT CAP and thigh were reviewed and display a favorable response with no evidence for worsening disease.  Non-specific lung nodules have not changed. Overall, scan result is good. Recc to continue with current treatment.    - doing fairly. Did have ongoing diarrhea but has improved with taking Imodium. He recovered fine. Eating well and tolerating treatment well. Has chronic thigh pain, due to degenerative disease but this is worsening.   - Having a good response on imaging studies. Will proceed with treatment today. Discussed with Dr Martines  - Cycle 20 keytruda today  - Lab work reviewed and adequate    - return in 3 weeks for cycle 21. Will repeat imaging studies in 9 weeks. However, given that he is having worsening R leg pain, we are going to perform the CT thigh sooner.     6.  - monitor BP  - Stable.     7.  - worsening. Will repeat CT thigh   - Will try 10 mg Oxycodone q4 hours. Does not want to take the Morphine    8.   - Monitor TSH with immunotherapy    9.   - Chronic. CT displays degenerative disease to the thigh.   - will continue to monitor.     10.   - Stable. No signs of infection.     Follow-up:     In 3 weeks    Route Chart for Scheduling    Med Onc Chart Routing  Urgent    Follow up with physician . Keep as scheduled. See Dr Martines in 9 weeks for Pembro   Follow up with NICOLLE    Infusion scheduling note    Injection scheduling note    Labs CBC, CMP and TSH   Scheduling:  Preferred lab:  Lab interval: every 3 weeks     Imaging CT chest abdomen pelvis   Will schedule CT thigh ASAP to evaluate worsening leg pain. Please schedule in 9 weeks prior to clinic visit with Dr. Martines.   Pharmacy appointment    Other referrals              Treatment Plan Information   OP PEMBROLIZUMAB 200MG Q3W   Ed Martines MD   Upcoming Treatment Dates - OP PEMBROLIZUMAB 200MG Q3W    6/13/2024       Chemotherapy       pembrolizumab (KEYTRUDA) 200 mg in sodium chloride 0.9% SolP 108 mL infusion  7/4/2024       Chemotherapy       pembrolizumab (KEYTRUDA) 200 mg in sodium chloride 0.9% SolP 108 mL infusion  7/25/2024       Chemotherapy        pembrolizumab (KEYTRUDA) 200 mg in sodium chloride 0.9% SolP 108 mL infusion  8/15/2024       Chemotherapy       pembrolizumab (KEYTRUDA) 200 mg in sodium chloride 0.9% SolP 108 mL infusion

## 2024-06-13 NOTE — PLAN OF CARE
1125 pt here for Keytruda C20 infusion, labs, hx, meds, allergies reviewed, pt with no new complaints, reclined in chair, continue to monitor

## 2024-06-20 ENCOUNTER — PATIENT MESSAGE (OUTPATIENT)
Dept: HEMATOLOGY/ONCOLOGY | Facility: CLINIC | Age: 79
End: 2024-06-20
Payer: MEDICARE

## 2024-06-27 ENCOUNTER — TELEPHONE (OUTPATIENT)
Dept: HEMATOLOGY/ONCOLOGY | Facility: CLINIC | Age: 79
End: 2024-06-27
Payer: MEDICARE

## 2024-07-02 ENCOUNTER — PATIENT MESSAGE (OUTPATIENT)
Dept: HEMATOLOGY/ONCOLOGY | Facility: CLINIC | Age: 79
End: 2024-07-02
Payer: MEDICARE

## 2024-07-09 ENCOUNTER — PATIENT MESSAGE (OUTPATIENT)
Dept: HEMATOLOGY/ONCOLOGY | Facility: CLINIC | Age: 79
End: 2024-07-09
Payer: MEDICARE

## 2024-07-20 DIAGNOSIS — C49.9 PRIMARY MYXOFIBROSARCOMA: ICD-10-CM

## 2024-07-20 DIAGNOSIS — C49.9 SOFT TISSUE SARCOMA: ICD-10-CM

## 2024-07-20 DIAGNOSIS — G89.3 CANCER RELATED PAIN: ICD-10-CM

## 2024-07-22 RX ORDER — OXYCODONE HYDROCHLORIDE 10 MG/1
10 TABLET ORAL EVERY 4 HOURS PRN
Qty: 180 TABLET | Refills: 0 | Status: SHIPPED | OUTPATIENT
Start: 2024-07-22

## 2024-07-24 ENCOUNTER — OFFICE VISIT (OUTPATIENT)
Dept: HEMATOLOGY/ONCOLOGY | Facility: CLINIC | Age: 79
End: 2024-07-24
Payer: MEDICARE

## 2024-07-24 ENCOUNTER — INFUSION (OUTPATIENT)
Dept: INFUSION THERAPY | Facility: HOSPITAL | Age: 79
End: 2024-07-24
Attending: INTERNAL MEDICINE
Payer: MEDICARE

## 2024-07-24 VITALS
SYSTOLIC BLOOD PRESSURE: 142 MMHG | WEIGHT: 179.44 LBS | HEART RATE: 52 BPM | HEIGHT: 71 IN | RESPIRATION RATE: 18 BRPM | OXYGEN SATURATION: 100 % | TEMPERATURE: 98 F | BODY MASS INDEX: 25.12 KG/M2 | DIASTOLIC BLOOD PRESSURE: 64 MMHG

## 2024-07-24 VITALS
HEART RATE: 52 BPM | RESPIRATION RATE: 18 BRPM | SYSTOLIC BLOOD PRESSURE: 142 MMHG | BODY MASS INDEX: 25.12 KG/M2 | TEMPERATURE: 98 F | HEIGHT: 71 IN | WEIGHT: 179.44 LBS | DIASTOLIC BLOOD PRESSURE: 64 MMHG

## 2024-07-24 DIAGNOSIS — C49.9 SOFT TISSUE SARCOMA: ICD-10-CM

## 2024-07-24 DIAGNOSIS — D84.821 IMMUNODEFICIENCY DUE TO DRUGS: ICD-10-CM

## 2024-07-24 DIAGNOSIS — G89.3 CANCER RELATED PAIN: ICD-10-CM

## 2024-07-24 DIAGNOSIS — Z79.899 IMMUNODEFICIENCY DUE TO DRUGS: ICD-10-CM

## 2024-07-24 DIAGNOSIS — D49.9 IMMUNODEFICIENCY SECONDARY TO NEOPLASM: ICD-10-CM

## 2024-07-24 DIAGNOSIS — C77.4 SECONDARY MALIGNANCY OF INGUINAL LYMPH NODES: ICD-10-CM

## 2024-07-24 DIAGNOSIS — I10 ESSENTIAL HYPERTENSION: ICD-10-CM

## 2024-07-24 DIAGNOSIS — R53.83 FATIGUE, UNSPECIFIED TYPE: ICD-10-CM

## 2024-07-24 DIAGNOSIS — C49.9 PRIMARY MYXOFIBROSARCOMA: Primary | ICD-10-CM

## 2024-07-24 DIAGNOSIS — D84.81 IMMUNODEFICIENCY SECONDARY TO NEOPLASM: ICD-10-CM

## 2024-07-24 PROCEDURE — 96413 CHEMO IV INFUSION 1 HR: CPT | Mod: HCNC

## 2024-07-24 PROCEDURE — 1101F PT FALLS ASSESS-DOCD LE1/YR: CPT | Mod: HCNC,CPTII,S$GLB, | Performed by: INTERNAL MEDICINE

## 2024-07-24 PROCEDURE — 25000003 PHARM REV CODE 250: Mod: HCNC | Performed by: INTERNAL MEDICINE

## 2024-07-24 PROCEDURE — G2211 COMPLEX E/M VISIT ADD ON: HCPCS | Mod: HCNC,S$GLB,, | Performed by: INTERNAL MEDICINE

## 2024-07-24 PROCEDURE — 99999 PR PBB SHADOW E&M-EST. PATIENT-LVL IV: CPT | Mod: PBBFAC,HCNC,, | Performed by: INTERNAL MEDICINE

## 2024-07-24 PROCEDURE — 99215 OFFICE O/P EST HI 40 MIN: CPT | Mod: HCNC,S$GLB,, | Performed by: INTERNAL MEDICINE

## 2024-07-24 PROCEDURE — 3078F DIAST BP <80 MM HG: CPT | Mod: HCNC,CPTII,S$GLB, | Performed by: INTERNAL MEDICINE

## 2024-07-24 PROCEDURE — 3077F SYST BP >= 140 MM HG: CPT | Mod: HCNC,CPTII,S$GLB, | Performed by: INTERNAL MEDICINE

## 2024-07-24 PROCEDURE — 3288F FALL RISK ASSESSMENT DOCD: CPT | Mod: HCNC,CPTII,S$GLB, | Performed by: INTERNAL MEDICINE

## 2024-07-24 PROCEDURE — 1160F RVW MEDS BY RX/DR IN RCRD: CPT | Mod: HCNC,CPTII,S$GLB, | Performed by: INTERNAL MEDICINE

## 2024-07-24 PROCEDURE — 63600175 PHARM REV CODE 636 W HCPCS: Mod: JG,HCNC | Performed by: INTERNAL MEDICINE

## 2024-07-24 PROCEDURE — 1126F AMNT PAIN NOTED NONE PRSNT: CPT | Mod: HCNC,CPTII,S$GLB, | Performed by: INTERNAL MEDICINE

## 2024-07-24 PROCEDURE — 1159F MED LIST DOCD IN RCRD: CPT | Mod: HCNC,CPTII,S$GLB, | Performed by: INTERNAL MEDICINE

## 2024-07-24 RX ORDER — EPINEPHRINE 0.3 MG/.3ML
0.3 INJECTION SUBCUTANEOUS ONCE AS NEEDED
Status: CANCELLED | OUTPATIENT
Start: 2024-07-24

## 2024-07-24 RX ORDER — DIPHENHYDRAMINE HYDROCHLORIDE 50 MG/ML
50 INJECTION INTRAMUSCULAR; INTRAVENOUS ONCE AS NEEDED
Status: DISCONTINUED | OUTPATIENT
Start: 2024-07-24 | End: 2024-07-24 | Stop reason: HOSPADM

## 2024-07-24 RX ORDER — SODIUM CHLORIDE 0.9 % (FLUSH) 0.9 %
10 SYRINGE (ML) INJECTION
Status: DISCONTINUED | OUTPATIENT
Start: 2024-07-24 | End: 2024-07-24 | Stop reason: HOSPADM

## 2024-07-24 RX ORDER — HEPARIN 100 UNIT/ML
500 SYRINGE INTRAVENOUS
Status: CANCELLED | OUTPATIENT
Start: 2024-07-24

## 2024-07-24 RX ORDER — HEPARIN 100 UNIT/ML
500 SYRINGE INTRAVENOUS
Status: DISCONTINUED | OUTPATIENT
Start: 2024-07-24 | End: 2024-07-24 | Stop reason: HOSPADM

## 2024-07-24 RX ORDER — DIPHENHYDRAMINE HYDROCHLORIDE 50 MG/ML
50 INJECTION INTRAMUSCULAR; INTRAVENOUS ONCE AS NEEDED
Status: CANCELLED | OUTPATIENT
Start: 2024-07-24

## 2024-07-24 RX ORDER — SODIUM CHLORIDE 0.9 % (FLUSH) 0.9 %
10 SYRINGE (ML) INJECTION
Status: CANCELLED | OUTPATIENT
Start: 2024-07-24

## 2024-07-24 RX ORDER — EPINEPHRINE 0.3 MG/.3ML
0.3 INJECTION SUBCUTANEOUS ONCE AS NEEDED
Status: DISCONTINUED | OUTPATIENT
Start: 2024-07-24 | End: 2024-07-24 | Stop reason: HOSPADM

## 2024-07-24 RX ADMIN — SODIUM CHLORIDE: 9 INJECTION, SOLUTION INTRAVENOUS at 11:07

## 2024-07-24 RX ADMIN — SODIUM CHLORIDE 200 MG: 9 INJECTION, SOLUTION INTRAVENOUS at 11:07

## 2024-07-24 NOTE — PLAN OF CARE
1120 pt here for Keytruda infusion C21,labs, hx, meds, allergies reviewed, pt with no new complaints at this time, reclined in chair, continue to monitor

## 2024-07-24 NOTE — PLAN OF CARE
1230 pt tolerated keytruda infusion without issue, pt to rtc 8/13/24, no distress noted upon d/c to home

## 2024-07-24 NOTE — PROGRESS NOTES
"                                                         PROGRESS NOTE    Subjective:       Patient ID: Zac Plunkett is a 79 y.o. male.    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 JD. TMB 6.3    Oncologic History copied from medical chart:  Prior patient of Dr Lucia's  2021  May              5/27- US right thigh: showed 2 hypoechoic masses in the subcutaneous tissues of the right anterior thigh. Largest measured 1.1 x 0.9 x 1 cm. Read at the time was concern for lymphadenopathy.   June 6/4 - CXR: "Bilateral reticular opacities are present in a perihilar distribution which appears slightly more pronounced compared to prior exams."               6/11 - underwent excisional biopsy of the right thigh; pathology came back positive for myxofibrosarcoma, high grade. Path a 1.8 x 1.1 x 0.9 cm mass with an adjacent 0.4 x 0.4 x 0.3 cm mass.   July/Aug              - IMRT with Dr. Mcadams  2022 January 1/12 - PET: locoregional recurrence and concerning 0.9 cm RUL lesion              1/20 - Path: excision on 2 recurrent lesions:  Myxofibrosarcoma, 1.9 cm at longest length, PD-L1 25%              1/31 - CT thigh: successful removal w/o evidence of recurrent disease  April 4/28 - recurrence, locally with metastatic deposit in LN. S/p excision on 4/28/22. Path: right thigh superior mass: Myxofibrosarcoma, high grade with focal tumor necrosis, incompletely excised. A portion of the lesion is suggestive of an involved lymph node exhibiting metastatic sarcoma with extranodal extension.  Sarcoma is focally present within inked but otherwise unspecified peripheral margins.   July 7/19 - CT thigh showed local recurrence and a 0.7 cm R inguinal lymph node    2023  [facility]   1/3 - CT thigh/pelvis showed "Heterogeneous " "enhancing soft tissue lesions and nodular foci at the anterior thigh and right groin highly concerning for worsening local recurrent disease with talon metastasis"    - stable pulmonary nodules on CT chest  - completed palliative radiation to right thigh  March  3/24- started keytruda, s/p 20 cycles    Interval History:   Mr Plunkett returns for follow up. Feeling ok. Does not have frequent diarrhea.  Pain stable.     ECO.     ROS:   A ten-point system review is obtained and negative except for what was stated in the Interval History.     Physical Examination:   Vital signs reviewed.   General: well hydrated, well developed, in no acute distress  HEENT: normocephalic, EOMI, anicteric sclerae  Neck: supple, no JVD, thyromegaly  Lungs: clear breath sounds bilaterally, no wheezing, rales, or rhonchi  Heart: RRR, no M/R/G  Abdomen: soft, no tenderness, non-distended. BS present  Extremities: no edema  Neuro: alert and oriented x 4, no focal neuro deficit  Psych: pleasant and appropriate mood and affect    Objective:     Laboratory Data:  Labs reviewed. Adequate for treatment    Imaging Data:    CT thigh 24:  Impression:     No acute findings with no detrimental interval change compared to previous performed 2023.       CT C/A/P 24:  Impression:     1. Mediastinal and bilateral hilar enlarged lymph node, not significantly changed.  2. 0.9-cm ground-glass nodule left lower lobe with apparent slight interval increased in density which may be artifactual.  A few additional pulmonary nodules throughout the bilateral lung fields are not significantly changed.  3. Three hepatic parenchymal punctate hypodensities are too small to accurately characterize, not significantly changed.  4. Punctate indeterminate hypodensity in the lateral limb of the left adrenal gland is too small to characterize but not significantly changed.    CT CAP: 2024  Impression:     Mediastinal and hilar adenopathy " which is similar or minimally increased when compared to the prior examination.     Stable pulmonary nodules.  No new pulmonary nodules are identified.     Prominent atherosclerotic calcification within the coronary arteries in a multi-vessel distribution.     Emphysematous changes. Mild bronchiectasis.     Grade 1 anterolisthesis of L4 on L5. Stable subcentimeter hepatic hypodensities. Subcentimeter left adrenal hypodensity, stable.     Left renal cyst. Colonic diverticulosis. Mild prostatomegaly. Fat containing left inguinal hernia.    CT thigh: 4/23/2024  Impression:     No evidence for local recurrence or metastatic disease.     Surgical changes.     Degenerative changes of the right hip and knee with calcified loose bodies caudal to the right patella.     There are no measurable lesions per RECIST criteria.     CT C/A/P 6/21/24:  Impression:     Stable hilar and mediastinal adenopathy.     Stable pulmonary nodules with no new pulmonary nodules evident.     Coronary artery atherosclerosis in a multi vessel distribution.     Emphysematous changes.     Mild bronchiectasis with mild mucous plugging within the lower lobes.     Grade 1 anterolisthesis of L4 on L5.  There appears to be a unilateral spondylolytic defect at the L4 level on the right.     Left renal cysts.     Colonic diverticulosis.     Fat and bowel containing left inguinal hernia without evidence for obstruction.  Fat containing right inguinal hernia.     Mild prostatomegaly.    CT right thigh 6/21/24:  Impression:     Postoperative and degenerative changes.  No mass lesion identified on this noncontrast exam.        Assessment and Plan:     1. Primary myxofibrosarcoma    2. 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 Cancer related pain    7. Essential hypertension    8. Fatigue, unspecified type      1-5  - Mr Plunkett is a 79 yo man with  recurrent myxofibrosarcoma of the right thigh, has had 3 resections and IMRT  - he was seen by Dr. Mao and Dr. Unger, they did not feel that he would be a good surgical candidate and recommended palliative RT  - completed palliative RT 2/13/2023  - Previously discussed systemic treatment options, including chemotherapy and immunotherapy. He was not interested in chemotherapy including TKI. Pembrolizumab has efficacy in phase II studies and is recommended on NCCN Guidelines for patients with myxofibrosarcoma. PD-L1 is 25%. However, his TMB is not high. Pembrolizumab was denied by insurance and then denied at hearing. He got patient assistance through [facility]  - started palliative keytruda on 3/24/23  - doing well. Reviewed CT scan results from June 2024. No progression of disease. Will continue with pembro  - RTC in 3 weeks    6.  - stable. C/w oxycodone 10 mg prn    7.  - BP controlled  - c/w current medication    8.   - Monitor TSH with immunotherapy      Follow-up:     In 3 weeks    Route Chart for Scheduling    Med Onc Chart Routing      Follow up with physician . See NICOLLE in 3 weeks with labs then keytruda. see me in 6 weeks with labs then keytruda   Follow up with NICOLLE    Infusion scheduling note   keytruda every 3 weeks   Injection scheduling note    Labs CBC, CMP and TSH   Scheduling:  Preferred lab:  Lab interval: every 3 weeks     Imaging    Pharmacy appointment    Other referrals          Treatment Plan Information   OP PEMBROLIZUMAB 200MG Q3W   Ed Martines MD   Upcoming Treatment Dates - OP PEMBROLIZUMAB 200MG Q3W    7/4/2024       Chemotherapy       pembrolizumab (KEYTRUDA) 200 mg in sodium chloride 0.9% SolP 108 mL infusion  7/25/2024       Chemotherapy       pembrolizumab (KEYTRUDA) 200 mg in sodium chloride 0.9% SolP 108 mL infusion  8/15/2024       Chemotherapy       pembrolizumab (KEYTRUDA) 200 mg in sodium chloride 0.9% SolP 108 mL infusion  9/5/2024       Chemotherapy       pembrolizumab (KEYTRUDA)  200 mg in sodium chloride 0.9% SolP 108 mL infusion

## 2024-08-13 ENCOUNTER — OFFICE VISIT (OUTPATIENT)
Dept: HEMATOLOGY/ONCOLOGY | Facility: CLINIC | Age: 79
End: 2024-08-13
Payer: MEDICARE

## 2024-08-13 ENCOUNTER — LAB VISIT (OUTPATIENT)
Dept: LAB | Facility: HOSPITAL | Age: 79
End: 2024-08-13
Payer: MEDICARE

## 2024-08-13 ENCOUNTER — INFUSION (OUTPATIENT)
Dept: INFUSION THERAPY | Facility: HOSPITAL | Age: 79
End: 2024-08-13
Attending: INTERNAL MEDICINE
Payer: MEDICARE

## 2024-08-13 VITALS
HEIGHT: 71 IN | TEMPERATURE: 98 F | WEIGHT: 175.81 LBS | RESPIRATION RATE: 15 BRPM | SYSTOLIC BLOOD PRESSURE: 126 MMHG | HEART RATE: 57 BPM | DIASTOLIC BLOOD PRESSURE: 59 MMHG | BODY MASS INDEX: 24.61 KG/M2 | OXYGEN SATURATION: 100 %

## 2024-08-13 DIAGNOSIS — I10 ESSENTIAL HYPERTENSION: ICD-10-CM

## 2024-08-13 DIAGNOSIS — R53.83 FATIGUE, UNSPECIFIED TYPE: ICD-10-CM

## 2024-08-13 DIAGNOSIS — D84.821 IMMUNODEFICIENCY DUE TO DRUGS: ICD-10-CM

## 2024-08-13 DIAGNOSIS — C49.9 PRIMARY MYXOFIBROSARCOMA: Primary | ICD-10-CM

## 2024-08-13 DIAGNOSIS — D84.81 IMMUNODEFICIENCY SECONDARY TO NEOPLASM: ICD-10-CM

## 2024-08-13 DIAGNOSIS — C49.9 SOFT TISSUE SARCOMA: ICD-10-CM

## 2024-08-13 DIAGNOSIS — M79.659 PAIN OF THIGH, UNSPECIFIED LATERALITY: ICD-10-CM

## 2024-08-13 DIAGNOSIS — G89.3 CANCER RELATED PAIN: ICD-10-CM

## 2024-08-13 DIAGNOSIS — D49.9 IMMUNODEFICIENCY SECONDARY TO NEOPLASM: ICD-10-CM

## 2024-08-13 DIAGNOSIS — C49.9 PRIMARY MYXOFIBROSARCOMA: ICD-10-CM

## 2024-08-13 DIAGNOSIS — Z79.899 IMMUNODEFICIENCY DUE TO DRUGS: ICD-10-CM

## 2024-08-13 DIAGNOSIS — C77.4 SECONDARY MALIGNANCY OF INGUINAL LYMPH NODES: ICD-10-CM

## 2024-08-13 LAB
ALBUMIN SERPL BCP-MCNC: 3.6 G/DL (ref 3.5–5.2)
ALP SERPL-CCNC: 124 U/L (ref 55–135)
ALT SERPL W/O P-5'-P-CCNC: 11 U/L (ref 10–44)
ANION GAP SERPL CALC-SCNC: 9 MMOL/L (ref 8–16)
ANISOCYTOSIS BLD QL SMEAR: SLIGHT
AST SERPL-CCNC: 12 U/L (ref 10–40)
BASOPHILS # BLD AUTO: 0.05 K/UL (ref 0–0.2)
BASOPHILS NFR BLD: 0.7 % (ref 0–1.9)
BILIRUB SERPL-MCNC: 0.5 MG/DL (ref 0.1–1)
BUN SERPL-MCNC: 11 MG/DL (ref 8–23)
CALCIUM SERPL-MCNC: 9.9 MG/DL (ref 8.7–10.5)
CHLORIDE SERPL-SCNC: 101 MMOL/L (ref 95–110)
CO2 SERPL-SCNC: 24 MMOL/L (ref 23–29)
CREAT SERPL-MCNC: 0.9 MG/DL (ref 0.5–1.4)
DIFFERENTIAL METHOD BLD: ABNORMAL
DOHLE BOD BLD QL SMEAR: PRESENT
EOSINOPHIL # BLD AUTO: 0.2 K/UL (ref 0–0.5)
EOSINOPHIL NFR BLD: 2.4 % (ref 0–8)
ERYTHROCYTE [DISTWIDTH] IN BLOOD BY AUTOMATED COUNT: 14.7 % (ref 11.5–14.5)
EST. GFR  (NO RACE VARIABLE): >60 ML/MIN/1.73 M^2
GLUCOSE SERPL-MCNC: 111 MG/DL (ref 70–110)
HCT VFR BLD AUTO: 43.7 % (ref 40–54)
HGB BLD-MCNC: 14.1 G/DL (ref 14–18)
HYPOCHROMIA BLD QL SMEAR: ABNORMAL
IMM GRANULOCYTES # BLD AUTO: 0.18 K/UL (ref 0–0.04)
IMM GRANULOCYTES NFR BLD AUTO: 2.4 % (ref 0–0.5)
LYMPHOCYTES # BLD AUTO: 1 K/UL (ref 1–4.8)
LYMPHOCYTES NFR BLD: 13.6 % (ref 18–48)
MCH RBC QN AUTO: 29.4 PG (ref 27–31)
MCHC RBC AUTO-ENTMCNC: 32.3 G/DL (ref 32–36)
MCV RBC AUTO: 91 FL (ref 82–98)
MONOCYTES # BLD AUTO: 0.6 K/UL (ref 0.3–1)
MONOCYTES NFR BLD: 7.7 % (ref 4–15)
NEUTROPHILS # BLD AUTO: 5.4 K/UL (ref 1.8–7.7)
NEUTROPHILS NFR BLD: 73.2 % (ref 38–73)
NRBC BLD-RTO: 0 /100 WBC
PLATELET # BLD AUTO: 211 K/UL (ref 150–450)
PLATELET BLD QL SMEAR: ABNORMAL
PMV BLD AUTO: 9.5 FL (ref 9.2–12.9)
POTASSIUM SERPL-SCNC: 4.7 MMOL/L (ref 3.5–5.1)
PROT SERPL-MCNC: 6.9 G/DL (ref 6–8.4)
RBC # BLD AUTO: 4.8 M/UL (ref 4.6–6.2)
SODIUM SERPL-SCNC: 134 MMOL/L (ref 136–145)
TOXIC GRANULES BLD QL SMEAR: PRESENT
TSH SERPL DL<=0.005 MIU/L-ACNC: 1.06 UIU/ML (ref 0.4–4)
WBC # BLD AUTO: 7.38 K/UL (ref 3.9–12.7)
WBC TOXIC VACUOLES BLD QL SMEAR: PRESENT

## 2024-08-13 PROCEDURE — 1101F PT FALLS ASSESS-DOCD LE1/YR: CPT | Mod: HCNC,CPTII,S$GLB, | Performed by: PHYSICIAN ASSISTANT

## 2024-08-13 PROCEDURE — 1159F MED LIST DOCD IN RCRD: CPT | Mod: HCNC,CPTII,S$GLB, | Performed by: PHYSICIAN ASSISTANT

## 2024-08-13 PROCEDURE — 85025 COMPLETE CBC W/AUTO DIFF WBC: CPT | Mod: HCNC | Performed by: PHYSICIAN ASSISTANT

## 2024-08-13 PROCEDURE — 3074F SYST BP LT 130 MM HG: CPT | Mod: HCNC,CPTII,S$GLB, | Performed by: PHYSICIAN ASSISTANT

## 2024-08-13 PROCEDURE — 3288F FALL RISK ASSESSMENT DOCD: CPT | Mod: HCNC,CPTII,S$GLB, | Performed by: PHYSICIAN ASSISTANT

## 2024-08-13 PROCEDURE — G2211 COMPLEX E/M VISIT ADD ON: HCPCS | Mod: HCNC,S$GLB,, | Performed by: PHYSICIAN ASSISTANT

## 2024-08-13 PROCEDURE — 96413 CHEMO IV INFUSION 1 HR: CPT | Mod: HCNC

## 2024-08-13 PROCEDURE — 99999 PR PBB SHADOW E&M-EST. PATIENT-LVL IV: CPT | Mod: PBBFAC,HCNC,, | Performed by: PHYSICIAN ASSISTANT

## 2024-08-13 PROCEDURE — 84443 ASSAY THYROID STIM HORMONE: CPT | Mod: HCNC | Performed by: PHYSICIAN ASSISTANT

## 2024-08-13 PROCEDURE — 80053 COMPREHEN METABOLIC PANEL: CPT | Mod: HCNC | Performed by: PHYSICIAN ASSISTANT

## 2024-08-13 PROCEDURE — 99215 OFFICE O/P EST HI 40 MIN: CPT | Mod: HCNC,S$GLB,, | Performed by: PHYSICIAN ASSISTANT

## 2024-08-13 PROCEDURE — 3078F DIAST BP <80 MM HG: CPT | Mod: HCNC,CPTII,S$GLB, | Performed by: PHYSICIAN ASSISTANT

## 2024-08-13 PROCEDURE — 63600175 PHARM REV CODE 636 W HCPCS: Mod: JG,HCNC | Performed by: PHYSICIAN ASSISTANT

## 2024-08-13 PROCEDURE — 25000003 PHARM REV CODE 250: Mod: HCNC | Performed by: PHYSICIAN ASSISTANT

## 2024-08-13 PROCEDURE — 1126F AMNT PAIN NOTED NONE PRSNT: CPT | Mod: HCNC,CPTII,S$GLB, | Performed by: PHYSICIAN ASSISTANT

## 2024-08-13 PROCEDURE — 36415 COLL VENOUS BLD VENIPUNCTURE: CPT | Mod: HCNC | Performed by: PHYSICIAN ASSISTANT

## 2024-08-13 RX ORDER — DIPHENHYDRAMINE HYDROCHLORIDE 50 MG/ML
50 INJECTION INTRAMUSCULAR; INTRAVENOUS ONCE AS NEEDED
Status: DISCONTINUED | OUTPATIENT
Start: 2024-08-13 | End: 2024-08-13 | Stop reason: HOSPADM

## 2024-08-13 RX ORDER — HEPARIN 100 UNIT/ML
500 SYRINGE INTRAVENOUS
Status: CANCELLED | OUTPATIENT
Start: 2024-08-13

## 2024-08-13 RX ORDER — SODIUM CHLORIDE 0.9 % (FLUSH) 0.9 %
10 SYRINGE (ML) INJECTION
Status: CANCELLED | OUTPATIENT
Start: 2024-08-13

## 2024-08-13 RX ORDER — DIPHENHYDRAMINE HYDROCHLORIDE 50 MG/ML
50 INJECTION INTRAMUSCULAR; INTRAVENOUS ONCE AS NEEDED
Status: CANCELLED | OUTPATIENT
Start: 2024-08-13

## 2024-08-13 RX ORDER — EPINEPHRINE 0.3 MG/.3ML
0.3 INJECTION SUBCUTANEOUS ONCE AS NEEDED
Status: DISCONTINUED | OUTPATIENT
Start: 2024-08-13 | End: 2024-08-13 | Stop reason: HOSPADM

## 2024-08-13 RX ORDER — HEPARIN 100 UNIT/ML
500 SYRINGE INTRAVENOUS
Status: DISCONTINUED | OUTPATIENT
Start: 2024-08-13 | End: 2024-08-13 | Stop reason: HOSPADM

## 2024-08-13 RX ORDER — EPINEPHRINE 0.3 MG/.3ML
0.3 INJECTION SUBCUTANEOUS ONCE AS NEEDED
Status: CANCELLED | OUTPATIENT
Start: 2024-08-13

## 2024-08-13 RX ORDER — SODIUM CHLORIDE 0.9 % (FLUSH) 0.9 %
10 SYRINGE (ML) INJECTION
Status: DISCONTINUED | OUTPATIENT
Start: 2024-08-13 | End: 2024-08-13 | Stop reason: HOSPADM

## 2024-08-13 RX ADMIN — SODIUM CHLORIDE 200 MG: 9 INJECTION, SOLUTION INTRAVENOUS at 11:08

## 2024-08-13 RX ADMIN — SODIUM CHLORIDE: 9 INJECTION, SOLUTION INTRAVENOUS at 11:08

## 2024-08-13 NOTE — PROGRESS NOTES
"                                                         PROGRESS NOTE    Subjective:       Patient ID: Zac Plunkett is a 79 y.o. male.    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 JD. TMB 6.3    Oncologic History copied from medical chart:  Prior patient of Dr Lucia's  2021  May              5/27- US right thigh: showed 2 hypoechoic masses in the subcutaneous tissues of the right anterior thigh. Largest measured 1.1 x 0.9 x 1 cm. Read at the time was concern for lymphadenopathy.   June 6/4 - CXR: "Bilateral reticular opacities are present in a perihilar distribution which appears slightly more pronounced compared to prior exams."               6/11 - underwent excisional biopsy of the right thigh; pathology came back positive for myxofibrosarcoma, high grade. Path a 1.8 x 1.1 x 0.9 cm mass with an adjacent 0.4 x 0.4 x 0.3 cm mass.   July/Aug              - IMRT with Dr. Mcadams  2022 January 1/12 - PET: locoregional recurrence and concerning 0.9 cm RUL lesion              1/20 - Path: excision on 2 recurrent lesions:  Myxofibrosarcoma, 1.9 cm at longest length, PD-L1 25%              1/31 - CT thigh: successful removal w/o evidence of recurrent disease  April 4/28 - recurrence, locally with metastatic deposit in LN. S/p excision on 4/28/22. Path: right thigh superior mass: Myxofibrosarcoma, high grade with focal tumor necrosis, incompletely excised. A portion of the lesion is suggestive of an involved lymph node exhibiting metastatic sarcoma with extranodal extension.  Sarcoma is focally present within inked but otherwise unspecified peripheral margins.   July 7/19 - CT thigh showed local recurrence and a 0.7 cm R inguinal lymph node    2023  [facility]   1/3 - CT thigh/pelvis showed "Heterogeneous " "enhancing soft tissue lesions and nodular foci at the anterior thigh and right groin highly concerning for worsening local recurrent disease with talon metastasis"    - stable pulmonary nodules on CT chest  - completed palliative radiation to right thigh  March  3/24- started keytruda, s/p 21 cycles    Interval History:   Mr Plunkett returns for follow up. Feeling ok. Does not have frequent diarrhea.  Pain stable and controlled with medication. Eating well.     ECO. Presents with his friend today    ROS:   A ten-point system review is obtained and negative except for what was stated in the Interval History.     Physical Examination:   Vital signs reviewed.   General: well hydrated, well developed, in no acute distress  HEENT: normocephalic, EOMI, anicteric sclerae  Neck: supple, no JVD, thyromegaly  Lungs: clear breath sounds bilaterally, no wheezing, rales, or rhonchi  Heart: RRR, no M/R/G  Abdomen: soft, no tenderness, non-distended. BS present  Extremities: no edema  Neuro: alert and oriented x 4, no focal neuro deficit  Psych: pleasant and appropriate mood and affect    Objective:     Laboratory Data:  Labs reviewed. Adequate for treatment    Imaging Data:    CT thigh 24:  Impression:     No acute findings with no detrimental interval change compared to previous performed 2023.       CT C/A/P 24:  Impression:     1. Mediastinal and bilateral hilar enlarged lymph node, not significantly changed.  2. 0.9-cm ground-glass nodule left lower lobe with apparent slight interval increased in density which may be artifactual.  A few additional pulmonary nodules throughout the bilateral lung fields are not significantly changed.  3. Three hepatic parenchymal punctate hypodensities are too small to accurately characterize, not significantly changed.  4. Punctate indeterminate hypodensity in the lateral limb of the left adrenal gland is too small to characterize but not significantly " changed.    CT CAP: 4/23/2024  Impression:     Mediastinal and hilar adenopathy which is similar or minimally increased when compared to the prior examination.     Stable pulmonary nodules.  No new pulmonary nodules are identified.     Prominent atherosclerotic calcification within the coronary arteries in a multi-vessel distribution.     Emphysematous changes. Mild bronchiectasis.     Grade 1 anterolisthesis of L4 on L5. Stable subcentimeter hepatic hypodensities. Subcentimeter left adrenal hypodensity, stable.     Left renal cyst. Colonic diverticulosis. Mild prostatomegaly. Fat containing left inguinal hernia.    CT thigh: 4/23/2024  Impression:     No evidence for local recurrence or metastatic disease.     Surgical changes.     Degenerative changes of the right hip and knee with calcified loose bodies caudal to the right patella.     There are no measurable lesions per RECIST criteria.     CT C/A/P 6/21/24:  Impression:     Stable hilar and mediastinal adenopathy.     Stable pulmonary nodules with no new pulmonary nodules evident.     Coronary artery atherosclerosis in a multi vessel distribution.     Emphysematous changes.     Mild bronchiectasis with mild mucous plugging within the lower lobes.     Grade 1 anterolisthesis of L4 on L5.  There appears to be a unilateral spondylolytic defect at the L4 level on the right.     Left renal cysts.     Colonic diverticulosis.     Fat and bowel containing left inguinal hernia without evidence for obstruction.  Fat containing right inguinal hernia.     Mild prostatomegaly.    CT right thigh 6/21/24:  Impression:     Postoperative and degenerative changes.  No mass lesion identified on this noncontrast exam.        Assessment and Plan:     1. Primary myxofibrosarcoma    2. 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 Cancer related pain    7. Essential  hypertension    8. Fatigue, unspecified type    9. Pain of thigh, unspecified laterality        1-5  - Mr Plunkett is a 79 yo man with recurrent myxofibrosarcoma of the right thigh, has had 3 resections and IMRT  - he was seen by Dr. Mao and Dr. Unger, they did not feel that he would be a good surgical candidate and recommended palliative RT  - completed palliative RT 2/13/2023  - Previously discussed systemic treatment options, including chemotherapy and immunotherapy. He was not interested in chemotherapy including TKI. Pembrolizumab has efficacy in phase II studies and is recommended on NCCN Guidelines for patients with myxofibrosarcoma. PD-L1 is 25%. However, his TMB is not high. Pembrolizumab was denied by insurance and then denied at hearing. He got patient assistance through [employer]  - started palliative keytruda on 3/24/23    - doing well.   - Reviewed CT scan results from June 2024. No progression of disease. Will continue with pembro  - Lab work reviewed  - Proceed with cycle 22 of Keytruda    - RTC in 3 weeks. Will repeat imaging studies in 6 weeks    6.  - stable. C/w oxycodone 10 mg prn    7.  - BP well controlled  - c/w current medication    8.   - Monitor TSH with immunotherapy      Follow-up:     In 3 weeks    Route Chart for Scheduling    Med Onc Chart Routing      Follow up with physician 3 weeks and 6 weeks. Keytruda. See Dr Martines in 6 weeks for lab work, CT CAP, CT thigh and treatment discussion with Pembro   Follow up with NICOLLE . 9 weeks for Keytruda   Infusion scheduling note    Injection scheduling note    Labs CBC, CMP and TSH   Scheduling:  Preferred lab:  Lab interval: every 3 weeks     Imaging CT chest abdomen pelvis   Please schedule in 6 weeks prior to clinic visit with Dr Martines   Pharmacy appointment    Other referrals              Treatment Plan Information   OP PEMBROLIZUMAB 200MG Q3W   Ed Martines MD   Upcoming Treatment Dates - OP PEMBROLIZUMAB 200MG Q3W    7/25/2024       Chemotherapy        pembrolizumab (KEYTRUDA) 200 mg in 0.9% NaCl SolP 108 mL infusion  8/15/2024       Chemotherapy       pembrolizumab (KEYTRUDA) 200 mg in 0.9% NaCl SolP 108 mL infusion  9/5/2024       Chemotherapy       pembrolizumab (KEYTRUDA) 200 mg in 0.9% NaCl SolP 108 mL infusion  9/26/2024       Chemotherapy       pembrolizumab (KEYTRUDA) 200 mg in 0.9% NaCl SolP 108 mL infusion

## 2024-08-19 ENCOUNTER — PATIENT MESSAGE (OUTPATIENT)
Dept: HEMATOLOGY/ONCOLOGY | Facility: CLINIC | Age: 79
End: 2024-08-19
Payer: MEDICARE

## 2024-08-20 DIAGNOSIS — C49.9 PRIMARY MYXOFIBROSARCOMA: ICD-10-CM

## 2024-08-20 DIAGNOSIS — G89.3 CANCER RELATED PAIN: ICD-10-CM

## 2024-08-20 DIAGNOSIS — C49.9 SOFT TISSUE SARCOMA: ICD-10-CM

## 2024-08-20 RX ORDER — OXYCODONE HYDROCHLORIDE 10 MG/1
10 TABLET ORAL EVERY 4 HOURS PRN
Qty: 180 TABLET | Refills: 0 | Status: SHIPPED | OUTPATIENT
Start: 2024-08-20

## 2024-08-22 DIAGNOSIS — C49.9 PRIMARY MYXOFIBROSARCOMA: Primary | ICD-10-CM

## 2024-08-22 DIAGNOSIS — R53.83 FATIGUE, UNSPECIFIED TYPE: ICD-10-CM

## 2024-09-03 ENCOUNTER — PATIENT MESSAGE (OUTPATIENT)
Dept: HEMATOLOGY/ONCOLOGY | Facility: CLINIC | Age: 79
End: 2024-09-03
Payer: MEDICARE

## 2024-09-18 ENCOUNTER — INFUSION (OUTPATIENT)
Dept: INFUSION THERAPY | Facility: HOSPITAL | Age: 79
End: 2024-09-18
Attending: INTERNAL MEDICINE
Payer: MEDICARE

## 2024-09-18 ENCOUNTER — OFFICE VISIT (OUTPATIENT)
Dept: HEMATOLOGY/ONCOLOGY | Facility: CLINIC | Age: 79
End: 2024-09-18
Payer: MEDICARE

## 2024-09-18 VITALS
HEART RATE: 61 BPM | DIASTOLIC BLOOD PRESSURE: 69 MMHG | OXYGEN SATURATION: 98 % | BODY MASS INDEX: 23.43 KG/M2 | WEIGHT: 176.81 LBS | SYSTOLIC BLOOD PRESSURE: 158 MMHG | HEIGHT: 73 IN | RESPIRATION RATE: 16 BRPM | TEMPERATURE: 98 F

## 2024-09-18 VITALS
SYSTOLIC BLOOD PRESSURE: 152 MMHG | RESPIRATION RATE: 18 BRPM | WEIGHT: 176.81 LBS | DIASTOLIC BLOOD PRESSURE: 64 MMHG | BODY MASS INDEX: 23.43 KG/M2 | HEIGHT: 73 IN | TEMPERATURE: 98 F | HEART RATE: 66 BPM

## 2024-09-18 DIAGNOSIS — R53.83 FATIGUE, UNSPECIFIED TYPE: ICD-10-CM

## 2024-09-18 DIAGNOSIS — I10 ESSENTIAL HYPERTENSION: ICD-10-CM

## 2024-09-18 DIAGNOSIS — C49.9 PRIMARY MYXOFIBROSARCOMA: Primary | ICD-10-CM

## 2024-09-18 DIAGNOSIS — D84.81 IMMUNODEFICIENCY SECONDARY TO NEOPLASM: ICD-10-CM

## 2024-09-18 DIAGNOSIS — R19.7 DIARRHEA, UNSPECIFIED TYPE: ICD-10-CM

## 2024-09-18 DIAGNOSIS — C77.4 SECONDARY MALIGNANCY OF INGUINAL LYMPH NODES: ICD-10-CM

## 2024-09-18 DIAGNOSIS — Z79.899 IMMUNODEFICIENCY DUE TO DRUGS: ICD-10-CM

## 2024-09-18 DIAGNOSIS — D49.9 IMMUNODEFICIENCY SECONDARY TO NEOPLASM: ICD-10-CM

## 2024-09-18 DIAGNOSIS — D84.821 IMMUNODEFICIENCY DUE TO DRUGS: ICD-10-CM

## 2024-09-18 DIAGNOSIS — C49.9 SOFT TISSUE SARCOMA: ICD-10-CM

## 2024-09-18 DIAGNOSIS — G89.3 CANCER RELATED PAIN: ICD-10-CM

## 2024-09-18 PROCEDURE — 99999 PR PBB SHADOW E&M-EST. PATIENT-LVL IV: CPT | Mod: PBBFAC,HCNC,, | Performed by: INTERNAL MEDICINE

## 2024-09-18 PROCEDURE — 3077F SYST BP >= 140 MM HG: CPT | Mod: HCNC,CPTII,S$GLB, | Performed by: INTERNAL MEDICINE

## 2024-09-18 PROCEDURE — 3288F FALL RISK ASSESSMENT DOCD: CPT | Mod: HCNC,CPTII,S$GLB, | Performed by: INTERNAL MEDICINE

## 2024-09-18 PROCEDURE — 99215 OFFICE O/P EST HI 40 MIN: CPT | Mod: HCNC,S$GLB,, | Performed by: INTERNAL MEDICINE

## 2024-09-18 PROCEDURE — 1159F MED LIST DOCD IN RCRD: CPT | Mod: HCNC,CPTII,S$GLB, | Performed by: INTERNAL MEDICINE

## 2024-09-18 PROCEDURE — 63600175 PHARM REV CODE 636 W HCPCS: Mod: JG,HCNC | Performed by: INTERNAL MEDICINE

## 2024-09-18 PROCEDURE — 1126F AMNT PAIN NOTED NONE PRSNT: CPT | Mod: HCNC,CPTII,S$GLB, | Performed by: INTERNAL MEDICINE

## 2024-09-18 PROCEDURE — 1160F RVW MEDS BY RX/DR IN RCRD: CPT | Mod: HCNC,CPTII,S$GLB, | Performed by: INTERNAL MEDICINE

## 2024-09-18 PROCEDURE — 1101F PT FALLS ASSESS-DOCD LE1/YR: CPT | Mod: HCNC,CPTII,S$GLB, | Performed by: INTERNAL MEDICINE

## 2024-09-18 PROCEDURE — 25000003 PHARM REV CODE 250: Mod: HCNC | Performed by: INTERNAL MEDICINE

## 2024-09-18 PROCEDURE — G2211 COMPLEX E/M VISIT ADD ON: HCPCS | Mod: HCNC,S$GLB,, | Performed by: INTERNAL MEDICINE

## 2024-09-18 PROCEDURE — 3078F DIAST BP <80 MM HG: CPT | Mod: HCNC,CPTII,S$GLB, | Performed by: INTERNAL MEDICINE

## 2024-09-18 PROCEDURE — 96413 CHEMO IV INFUSION 1 HR: CPT | Mod: HCNC

## 2024-09-18 RX ORDER — EPINEPHRINE 0.3 MG/.3ML
0.3 INJECTION SUBCUTANEOUS ONCE AS NEEDED
Status: CANCELLED | OUTPATIENT
Start: 2024-09-18

## 2024-09-18 RX ORDER — DIPHENHYDRAMINE HYDROCHLORIDE 50 MG/ML
50 INJECTION INTRAMUSCULAR; INTRAVENOUS ONCE AS NEEDED
Status: DISCONTINUED | OUTPATIENT
Start: 2024-09-18 | End: 2024-09-18 | Stop reason: HOSPADM

## 2024-09-18 RX ORDER — SODIUM CHLORIDE 0.9 % (FLUSH) 0.9 %
10 SYRINGE (ML) INJECTION
Status: CANCELLED | OUTPATIENT
Start: 2024-09-18

## 2024-09-18 RX ORDER — HEPARIN 100 UNIT/ML
500 SYRINGE INTRAVENOUS
Status: DISCONTINUED | OUTPATIENT
Start: 2024-09-18 | End: 2024-09-18 | Stop reason: HOSPADM

## 2024-09-18 RX ORDER — DIPHENHYDRAMINE HYDROCHLORIDE 50 MG/ML
50 INJECTION INTRAMUSCULAR; INTRAVENOUS ONCE AS NEEDED
Status: CANCELLED | OUTPATIENT
Start: 2024-09-18

## 2024-09-18 RX ORDER — HEPARIN 100 UNIT/ML
500 SYRINGE INTRAVENOUS
Status: CANCELLED | OUTPATIENT
Start: 2024-09-18

## 2024-09-18 RX ORDER — SODIUM CHLORIDE 0.9 % (FLUSH) 0.9 %
10 SYRINGE (ML) INJECTION
Status: DISCONTINUED | OUTPATIENT
Start: 2024-09-18 | End: 2024-09-18 | Stop reason: HOSPADM

## 2024-09-18 RX ORDER — EPINEPHRINE 0.3 MG/.3ML
0.3 INJECTION SUBCUTANEOUS ONCE AS NEEDED
Status: DISCONTINUED | OUTPATIENT
Start: 2024-09-18 | End: 2024-09-18 | Stop reason: HOSPADM

## 2024-09-18 RX ADMIN — SODIUM CHLORIDE: 9 INJECTION, SOLUTION INTRAVENOUS at 09:09

## 2024-09-18 RX ADMIN — SODIUM CHLORIDE 200 MG: 9 INJECTION, SOLUTION INTRAVENOUS at 09:09

## 2024-09-18 NOTE — PLAN OF CARE
0915 pt here for Keytruda infusion C23, labs, hx, meds, allergies reviewed, pt with no new complaints at this time, reclined in chair, continue to monitor

## 2024-09-18 NOTE — PLAN OF CARE
1030 pt tolerated Keytruda infusion without issue, pt to rtc 10/9/24, no distress noted upon d/c to home

## 2024-09-18 NOTE — PROGRESS NOTES
"                                                         PROGRESS NOTE    Subjective:       Patient ID: Zac Plunkett is a 79 y.o. male.    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 JD. TMB 6.3    Oncologic History copied from medical chart:  Prior patient of Dr Lucia's  2021  May              5/27- US right thigh: showed 2 hypoechoic masses in the subcutaneous tissues of the right anterior thigh. Largest measured 1.1 x 0.9 x 1 cm. Read at the time was concern for lymphadenopathy.   June 6/4 - CXR: "Bilateral reticular opacities are present in a perihilar distribution which appears slightly more pronounced compared to prior exams."               6/11 - underwent excisional biopsy of the right thigh; pathology came back positive for myxofibrosarcoma, high grade. Path a 1.8 x 1.1 x 0.9 cm mass with an adjacent 0.4 x 0.4 x 0.3 cm mass.   July/Aug              - IMRT with Dr. Mcadams  2022 January 1/12 - PET: locoregional recurrence and concerning 0.9 cm RUL lesion              1/20 - Path: excision on 2 recurrent lesions:  Myxofibrosarcoma, 1.9 cm at longest length, PD-L1 25%              1/31 - CT thigh: successful removal w/o evidence of recurrent disease  April 4/28 - recurrence, locally with metastatic deposit in LN. S/p excision on 4/28/22. Path: right thigh superior mass: Myxofibrosarcoma, high grade with focal tumor necrosis, incompletely excised. A portion of the lesion is suggestive of an involved lymph node exhibiting metastatic sarcoma with extranodal extension.  Sarcoma is focally present within inked but otherwise unspecified peripheral margins.   July 7/19 - CT thigh showed local recurrence and a 0.7 cm R inguinal lymph node    2023  [facility]   1/3 - CT thigh/pelvis showed "Heterogeneous " "enhancing soft tissue lesions and nodular foci at the anterior thigh and right groin highly concerning for worsening local recurrent disease with talon metastasis"    - stable pulmonary nodules on CT chest  - completed palliative radiation to right thigh  March  3/24- started keytruda, s/p 22 cycles    Interval History:   Mr Plunkett returns for follow up. Feeling ok. Still tired. Every 2-3 months and sometimes 6 months he will have 2 days of diarrhea that resolves on its own. Imodium helps.     ECO.     ROS:   A ten-point system review is obtained and negative except for what was stated in the Interval History.     Physical Examination:   Vital signs reviewed.   General: well hydrated, well developed, in no acute distress  HEENT: normocephalic, EOMI, anicteric sclerae  Neck: supple, no JVD, thyromegaly  Lungs: clear breath sounds bilaterally, no wheezing, rales, or rhonchi  Heart: RRR, no M/R/G  Abdomen: soft, no tenderness, non-distended. BS present  Extremities: no edema  Neuro: alert and oriented x 4, no focal neuro deficit  Psych: pleasant and appropriate mood and affect    Objective:     Laboratory Data:  Labs reviewed. Adequate for treatment. TSH normal    Imaging Data:    CT thigh 24:  Impression:     No evidence of residual disease or recurrence.       CT C/A/P 24 results not back at the time of visit. I personally reviewed the images.   Assessment and Plan:     1. Primary myxofibrosarcoma    2. 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 Fatigue, unspecified type    7. Cancer related pain    8. Essential hypertension    9. Diarrhea, unspecified type        1-5  - Mr Plunkett is a 79 yo man with recurrent myxofibrosarcoma of the right thigh, has had 3 resections and IMRT  - he was seen by Dr. Mao and Dr. Unger, they did not feel that he would be a good surgical " candidate and recommended palliative RT  - completed palliative RT 2/13/2023  - Previously discussed systemic treatment options, including chemotherapy and immunotherapy. He was not interested in chemotherapy including TKI. Pembrolizumab has efficacy in phase II studies and is recommended on NCCN Guidelines for patients with myxofibrosarcoma. PD-L1 is 25%. However, his TMB is not high. Pembrolizumab was denied by insurance and then denied at hearing. He got patient assistance through [employer]  - started palliative keytruda on 3/24/23  - doing well. Reviewed test results. AMADO on CT thigh. CT C/A/P results not back yet. I personally reviewed the images. Small lung nodules but he always have small lung nodules. Will wait for final report.   - c/w keytruda. Cycle 23 today  - RTC in 3 weeks    6.  - monitor TSH    7.  - stable. C/w oxycodone 10 mg prn    8.  - BP controlled  - c/w current medication    9.  - infrequent. Symptoms are not consistent with immune-related colitis  - monitor. C/w imodium prn    Follow-up:     In 3 weeks    Route Chart for Scheduling    Med Onc Chart Routing      Follow up with physician 6 weeks. see NICOLLE in 3 and 9 weeks with CBC, CMP, TSH then keytruda. see me in 6 weeks with CBC, CMP, TSH then keytruda   Follow up with NICOLLE 3 weeks.   Infusion scheduling note   keytruda every 3 weeks   Injection scheduling note    Labs CBC, CMP and TSH   Scheduling:  Preferred lab:  Lab interval: every 3 weeks     Imaging    Pharmacy appointment    Other referrals          Treatment Plan Information   OP PEMBROLIZUMAB 200MG Q3W Ed Martines MD   Associated diagnosis: Primary myxofibrosarcoma   noted on 8/18/2022   Line of treatment: First Line  Treatment Goal: Palliative     Upcoming Treatment Dates - OP PEMBROLIZUMAB 200MG Q3W    8/15/2024       Chemotherapy       pembrolizumab (KEYTRUDA) 200 mg in 0.9% NaCl SolP 108 mL infusion  9/5/2024       Chemotherapy       pembrolizumab (KEYTRUDA) 200 mg in 0.9% NaCl SolP  108 mL infusion  9/26/2024       Chemotherapy       pembrolizumab (KEYTRUDA) 200 mg in 0.9% NaCl SolP 108 mL infusion  10/17/2024       Chemotherapy       pembrolizumab (KEYTRUDA) 200 mg in 0.9% NaCl SolP 108 mL infusion

## 2024-09-19 DIAGNOSIS — G89.3 CANCER RELATED PAIN: ICD-10-CM

## 2024-09-19 DIAGNOSIS — C49.9 PRIMARY MYXOFIBROSARCOMA: ICD-10-CM

## 2024-09-19 DIAGNOSIS — C49.9 SOFT TISSUE SARCOMA: ICD-10-CM

## 2024-09-19 RX ORDER — OXYCODONE HYDROCHLORIDE 10 MG/1
10 TABLET ORAL EVERY 4 HOURS PRN
Qty: 180 TABLET | Refills: 0 | Status: SHIPPED | OUTPATIENT
Start: 2024-09-19

## 2024-09-23 ENCOUNTER — PATIENT MESSAGE (OUTPATIENT)
Dept: HEMATOLOGY/ONCOLOGY | Facility: CLINIC | Age: 79
End: 2024-09-23
Payer: MEDICARE

## 2024-09-23 DIAGNOSIS — G89.3 CANCER RELATED PAIN: ICD-10-CM

## 2024-09-23 DIAGNOSIS — C49.9 SOFT TISSUE SARCOMA: ICD-10-CM

## 2024-09-23 DIAGNOSIS — C49.9 PRIMARY MYXOFIBROSARCOMA: Primary | ICD-10-CM

## 2024-09-24 NOTE — TELEPHONE ENCOUNTER
I think we can refer him to Palliative medicine. I will place the referral. We don't have privileges to prescribe medical marijuana.     For now, I think he needs to continue with Oxycodone 10 mg q4 hours to get control. I will place the referral however.

## 2024-10-08 NOTE — PROGRESS NOTES
"                                                         PROGRESS NOTE    Subjective:       Patient ID: Zac Plunkett is a 79 y.o. male.    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 JD. TMB 6.3    Oncologic History copied from medical chart:  Prior patient of Dr Lucia's  2021  May              5/27- US right thigh: showed 2 hypoechoic masses in the subcutaneous tissues of the right anterior thigh. Largest measured 1.1 x 0.9 x 1 cm. Read at the time was concern for lymphadenopathy.   June 6/4 - CXR: "Bilateral reticular opacities are present in a perihilar distribution which appears slightly more pronounced compared to prior exams."               6/11 - underwent excisional biopsy of the right thigh; pathology came back positive for myxofibrosarcoma, high grade. Path a 1.8 x 1.1 x 0.9 cm mass with an adjacent 0.4 x 0.4 x 0.3 cm mass.   July/Aug              - IMRT with Dr. Mcadams  2022 January 1/12 - PET: locoregional recurrence and concerning 0.9 cm RUL lesion              1/20 - Path: excision on 2 recurrent lesions:  Myxofibrosarcoma, 1.9 cm at longest length, PD-L1 25%              1/31 - CT thigh: successful removal w/o evidence of recurrent disease  April 4/28 - recurrence, locally with metastatic deposit in LN. S/p excision on 4/28/22. Path: right thigh superior mass: Myxofibrosarcoma, high grade with focal tumor necrosis, incompletely excised. A portion of the lesion is suggestive of an involved lymph node exhibiting metastatic sarcoma with extranodal extension.  Sarcoma is focally present within inked but otherwise unspecified peripheral margins.   July 7/19 - CT thigh showed local recurrence and a 0.7 cm R inguinal lymph node    2023  [facility]   1/3 - CT thigh/pelvis showed "Heterogeneous " "enhancing soft tissue lesions and nodular foci at the anterior thigh and right groin highly concerning for worsening local recurrent disease with talon metastasis"    - stable pulmonary nodules on CT chest  - completed palliative radiation to right thigh  March  3/24- started keytruda, s/p 23 cycles    Interval History:   Mr Plunkett returns for follow up. Feeling pretty good today. Still tired. Every 2-3 months and sometimes 6 months he will have 2 days of diarrhea that resolves on its own. Imodium helps. Wants to try medical marijuana to help with appetite and intermittent nausea. Has pain medication that works. Continues to function well, has not fallen. No other concerns or complaints today. Tolerating the Pembro well.     ECO. Presents with his wife and brother    ROS:   A ten-point system review is obtained and negative except for what was stated in the Interval History.     Physical Examination:   Vital signs reviewed.   General: well hydrated, well developed, in no acute distress  HEENT: normocephalic, EOMI, anicteric sclerae  Neck: supple, no JVD, thyromegaly  Lungs: clear breath sounds bilaterally, no wheezing, rales, or rhonchi  Heart: RRR, no M/R/G  Abdomen: soft, no tenderness, non-distended. BS present  Extremities: no edema  Neuro: alert and oriented x 4, no focal neuro deficit  Psych: pleasant and appropriate mood and affect    Objective:     Laboratory Data:  Labs reviewed. Adequate for treatment. TSH normal    Imaging Data:    CT thigh 24:  Impression:     No evidence of residual disease or recurrence.       CT C/A/P 24 results not back at the time of visit. I personally reviewed the images.   Assessment and Plan:     1. Primary myxofibrosarcoma    2. 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 Fatigue, unspecified type    7. Cancer related pain    8. " Essential hypertension    9. Diarrhea, unspecified type          1-5  - Mr Plunkett is a 77 yo man with recurrent myxofibrosarcoma of the right thigh, has had 3 resections and IMRT  - he was seen by Dr. Mao and Dr. Unger, they did not feel that he would be a good surgical candidate and recommended palliative RT  - completed palliative RT 2/13/2023  - Previously discussed systemic treatment options, including chemotherapy and immunotherapy. He was not interested in chemotherapy including TKI. Pembrolizumab has efficacy in phase II studies and is recommended on NCCN Guidelines for patients with myxofibrosarcoma. PD-L1 is 25%. However, his TMB is not high. Pembrolizumab was denied by insurance and then denied at hearing. He got patient assistance through [employer]  - started palliative keytruda on 3/24/23  - doing well. Reviewed test results. AMADO on CT thigh. CT C/A/P results reviewed as well by Dr Martines. Small lung nodules stable. Recc to continue with treatment.   - c/w keytruda. Cycle 24 today  - I have reviewed the CBC and CMP, adequate for treatment     - RTC in 3 weeks for next cycle. Will repeat imaging studies q3 mos    6.  - monitor TSH    7.  - stable. C/w oxycodone 10 mg prn    8.  - BP controlled  - c/w current medication    9.  - infrequent. Symptoms are not consistent with immune-related colitis  - monitor. C/w imodium prn    Follow-up:     In 3 weeks    Pte and family members displayed understanding of the above encounter and treatment plan. All thoughtful questions were answered to their satisfaction. Pte was advised to notify the care team or proceed to the ER if signs and symptoms worsen.     Visit today included increased complexity associated with the care of the episodic problem chemotherapy  addressed and managing the longitudinal care of the patient due to the serious and/or complex managed problem(s) GI malignancies/cancer      ADEEL Hurley, PA-C [facility]  Dept of  Hematology/Oncology  PA-C to GI Oncology team         Route Chart for Scheduling    Med Onc Chart Routing      Follow up with physician 3 weeks. Pembrolizumab   Follow up with NICOLLE 6 weeks. Pembrolizumab   Infusion scheduling note    Injection scheduling note    Labs CBC, CMP and TSH   Scheduling:  Preferred lab:  Lab interval: every 3 weeks     Imaging    Pharmacy appointment    Other referrals              Treatment Plan Information   OP PEMBROLIZUMAB 200MG Q3W Ed Martines MD   Associated diagnosis: Primary myxofibrosarcoma   noted on 8/18/2022   Line of treatment: First Line  Treatment Goal: Palliative     Upcoming Treatment Dates - OP PEMBROLIZUMAB 200MG Q3W    9/19/2024       Chemotherapy       pembrolizumab (KEYTRUDA) 200 mg in 0.9% NaCl SolP 108 mL infusion  10/10/2024       Chemotherapy       pembrolizumab (KEYTRUDA) 200 mg in 0.9% NaCl SolP 108 mL infusion  10/31/2024       Chemotherapy       pembrolizumab (KEYTRUDA) 200 mg in 0.9% NaCl SolP 108 mL infusion  11/21/2024       Chemotherapy       pembrolizumab (KEYTRUDA) 200 mg in 0.9% NaCl SolP 108 mL infusion

## 2024-10-09 ENCOUNTER — INFUSION (OUTPATIENT)
Dept: INFUSION THERAPY | Facility: HOSPITAL | Age: 79
End: 2024-10-09
Attending: INTERNAL MEDICINE
Payer: MEDICARE

## 2024-10-09 ENCOUNTER — LAB VISIT (OUTPATIENT)
Dept: LAB | Facility: HOSPITAL | Age: 79
End: 2024-10-09
Payer: MEDICARE

## 2024-10-09 ENCOUNTER — OFFICE VISIT (OUTPATIENT)
Dept: HEMATOLOGY/ONCOLOGY | Facility: CLINIC | Age: 79
End: 2024-10-09
Payer: MEDICARE

## 2024-10-09 VITALS
TEMPERATURE: 98 F | OXYGEN SATURATION: 99 % | BODY MASS INDEX: 23.62 KG/M2 | SYSTOLIC BLOOD PRESSURE: 156 MMHG | HEART RATE: 49 BPM | DIASTOLIC BLOOD PRESSURE: 73 MMHG | WEIGHT: 178.25 LBS | HEIGHT: 73 IN

## 2024-10-09 VITALS
HEART RATE: 59 BPM | SYSTOLIC BLOOD PRESSURE: 155 MMHG | OXYGEN SATURATION: 100 % | RESPIRATION RATE: 17 BRPM | DIASTOLIC BLOOD PRESSURE: 74 MMHG

## 2024-10-09 DIAGNOSIS — I10 ESSENTIAL HYPERTENSION: ICD-10-CM

## 2024-10-09 DIAGNOSIS — C49.9 PRIMARY MYXOFIBROSARCOMA: Primary | ICD-10-CM

## 2024-10-09 DIAGNOSIS — R53.83 FATIGUE, UNSPECIFIED TYPE: ICD-10-CM

## 2024-10-09 DIAGNOSIS — C49.9 SOFT TISSUE SARCOMA: ICD-10-CM

## 2024-10-09 DIAGNOSIS — R19.7 DIARRHEA, UNSPECIFIED TYPE: ICD-10-CM

## 2024-10-09 DIAGNOSIS — C77.4 SECONDARY MALIGNANCY OF INGUINAL LYMPH NODES: ICD-10-CM

## 2024-10-09 DIAGNOSIS — G89.3 CANCER RELATED PAIN: ICD-10-CM

## 2024-10-09 DIAGNOSIS — D49.9 IMMUNODEFICIENCY SECONDARY TO NEOPLASM: ICD-10-CM

## 2024-10-09 DIAGNOSIS — D84.821 IMMUNODEFICIENCY DUE TO DRUGS: ICD-10-CM

## 2024-10-09 DIAGNOSIS — C49.9 PRIMARY MYXOFIBROSARCOMA: ICD-10-CM

## 2024-10-09 DIAGNOSIS — Z79.899 IMMUNODEFICIENCY DUE TO DRUGS: ICD-10-CM

## 2024-10-09 DIAGNOSIS — D84.81 IMMUNODEFICIENCY SECONDARY TO NEOPLASM: ICD-10-CM

## 2024-10-09 LAB
ALBUMIN SERPL BCP-MCNC: 3.7 G/DL (ref 3.5–5.2)
ALP SERPL-CCNC: 135 U/L (ref 55–135)
ALT SERPL W/O P-5'-P-CCNC: 10 U/L (ref 10–44)
ANION GAP SERPL CALC-SCNC: 7 MMOL/L (ref 8–16)
AST SERPL-CCNC: 13 U/L (ref 10–40)
BILIRUB SERPL-MCNC: 0.6 MG/DL (ref 0.1–1)
BUN SERPL-MCNC: 11 MG/DL (ref 8–23)
CALCIUM SERPL-MCNC: 10 MG/DL (ref 8.7–10.5)
CHLORIDE SERPL-SCNC: 102 MMOL/L (ref 95–110)
CO2 SERPL-SCNC: 27 MMOL/L (ref 23–29)
CREAT SERPL-MCNC: 0.9 MG/DL (ref 0.5–1.4)
ERYTHROCYTE [DISTWIDTH] IN BLOOD BY AUTOMATED COUNT: 15 % (ref 11.5–14.5)
EST. GFR  (NO RACE VARIABLE): >60 ML/MIN/1.73 M^2
GLUCOSE SERPL-MCNC: 109 MG/DL (ref 70–110)
HCT VFR BLD AUTO: 41 % (ref 40–54)
HGB BLD-MCNC: 13.7 G/DL (ref 14–18)
IMM GRANULOCYTES # BLD AUTO: 0.13 K/UL (ref 0–0.04)
MCH RBC QN AUTO: 30 PG (ref 27–31)
MCHC RBC AUTO-ENTMCNC: 33.4 G/DL (ref 32–36)
MCV RBC AUTO: 90 FL (ref 82–98)
NEUTROPHILS # BLD AUTO: 5.2 K/UL (ref 1.8–7.7)
PLATELET # BLD AUTO: 192 K/UL (ref 150–450)
PMV BLD AUTO: 9.3 FL (ref 9.2–12.9)
POTASSIUM SERPL-SCNC: 5.2 MMOL/L (ref 3.5–5.1)
PROT SERPL-MCNC: 6.8 G/DL (ref 6–8.4)
RBC # BLD AUTO: 4.57 M/UL (ref 4.6–6.2)
SODIUM SERPL-SCNC: 136 MMOL/L (ref 136–145)
TSH SERPL DL<=0.005 MIU/L-ACNC: 1.43 UIU/ML (ref 0.4–4)
WBC # BLD AUTO: 7.08 K/UL (ref 3.9–12.7)

## 2024-10-09 PROCEDURE — 80053 COMPREHEN METABOLIC PANEL: CPT | Mod: HCNC | Performed by: REGISTERED NURSE

## 2024-10-09 PROCEDURE — 99215 OFFICE O/P EST HI 40 MIN: CPT | Mod: HCNC,S$GLB,, | Performed by: PHYSICIAN ASSISTANT

## 2024-10-09 PROCEDURE — 63600175 PHARM REV CODE 636 W HCPCS: Mod: JZ,JG,HCNC | Performed by: PHYSICIAN ASSISTANT

## 2024-10-09 PROCEDURE — G2211 COMPLEX E/M VISIT ADD ON: HCPCS | Mod: HCNC,S$GLB,, | Performed by: PHYSICIAN ASSISTANT

## 2024-10-09 PROCEDURE — 3077F SYST BP >= 140 MM HG: CPT | Mod: HCNC,CPTII,S$GLB, | Performed by: PHYSICIAN ASSISTANT

## 2024-10-09 PROCEDURE — 85027 COMPLETE CBC AUTOMATED: CPT | Mod: HCNC | Performed by: REGISTERED NURSE

## 2024-10-09 PROCEDURE — 25000003 PHARM REV CODE 250: Mod: HCNC | Performed by: PHYSICIAN ASSISTANT

## 2024-10-09 PROCEDURE — 1126F AMNT PAIN NOTED NONE PRSNT: CPT | Mod: HCNC,CPTII,S$GLB, | Performed by: PHYSICIAN ASSISTANT

## 2024-10-09 PROCEDURE — 84443 ASSAY THYROID STIM HORMONE: CPT | Mod: HCNC | Performed by: REGISTERED NURSE

## 2024-10-09 PROCEDURE — 96413 CHEMO IV INFUSION 1 HR: CPT | Mod: HCNC

## 2024-10-09 PROCEDURE — 3288F FALL RISK ASSESSMENT DOCD: CPT | Mod: HCNC,CPTII,S$GLB, | Performed by: PHYSICIAN ASSISTANT

## 2024-10-09 PROCEDURE — 1101F PT FALLS ASSESS-DOCD LE1/YR: CPT | Mod: HCNC,CPTII,S$GLB, | Performed by: PHYSICIAN ASSISTANT

## 2024-10-09 PROCEDURE — 99999 PR PBB SHADOW E&M-EST. PATIENT-LVL III: CPT | Mod: PBBFAC,HCNC,, | Performed by: PHYSICIAN ASSISTANT

## 2024-10-09 PROCEDURE — 36415 COLL VENOUS BLD VENIPUNCTURE: CPT | Mod: HCNC | Performed by: REGISTERED NURSE

## 2024-10-09 PROCEDURE — 1160F RVW MEDS BY RX/DR IN RCRD: CPT | Mod: HCNC,CPTII,S$GLB, | Performed by: PHYSICIAN ASSISTANT

## 2024-10-09 PROCEDURE — 3078F DIAST BP <80 MM HG: CPT | Mod: HCNC,CPTII,S$GLB, | Performed by: PHYSICIAN ASSISTANT

## 2024-10-09 PROCEDURE — 1159F MED LIST DOCD IN RCRD: CPT | Mod: HCNC,CPTII,S$GLB, | Performed by: PHYSICIAN ASSISTANT

## 2024-10-09 RX ORDER — EPINEPHRINE 0.3 MG/.3ML
0.3 INJECTION SUBCUTANEOUS ONCE AS NEEDED
Status: CANCELLED | OUTPATIENT
Start: 2024-10-09

## 2024-10-09 RX ORDER — HEPARIN 100 UNIT/ML
500 SYRINGE INTRAVENOUS
Status: DISCONTINUED | OUTPATIENT
Start: 2024-10-09 | End: 2024-10-09 | Stop reason: HOSPADM

## 2024-10-09 RX ORDER — SODIUM CHLORIDE 0.9 % (FLUSH) 0.9 %
10 SYRINGE (ML) INJECTION
Status: DISCONTINUED | OUTPATIENT
Start: 2024-10-09 | End: 2024-10-09 | Stop reason: HOSPADM

## 2024-10-09 RX ORDER — EPINEPHRINE 0.3 MG/.3ML
0.3 INJECTION SUBCUTANEOUS ONCE AS NEEDED
Status: DISCONTINUED | OUTPATIENT
Start: 2024-10-09 | End: 2024-10-09 | Stop reason: HOSPADM

## 2024-10-09 RX ORDER — DIPHENHYDRAMINE HYDROCHLORIDE 50 MG/ML
50 INJECTION INTRAMUSCULAR; INTRAVENOUS ONCE AS NEEDED
Status: CANCELLED | OUTPATIENT
Start: 2024-10-09

## 2024-10-09 RX ORDER — DIPHENHYDRAMINE HYDROCHLORIDE 50 MG/ML
50 INJECTION INTRAMUSCULAR; INTRAVENOUS ONCE AS NEEDED
Status: DISCONTINUED | OUTPATIENT
Start: 2024-10-09 | End: 2024-10-09 | Stop reason: HOSPADM

## 2024-10-09 RX ORDER — SODIUM CHLORIDE 0.9 % (FLUSH) 0.9 %
10 SYRINGE (ML) INJECTION
Status: CANCELLED | OUTPATIENT
Start: 2024-10-09

## 2024-10-09 RX ORDER — HEPARIN 100 UNIT/ML
500 SYRINGE INTRAVENOUS
Status: CANCELLED | OUTPATIENT
Start: 2024-10-09

## 2024-10-09 RX ADMIN — SODIUM CHLORIDE: 9 INJECTION, SOLUTION INTRAVENOUS at 11:10

## 2024-10-09 RX ADMIN — SODIUM CHLORIDE 200 MG: 9 INJECTION, SOLUTION INTRAVENOUS at 11:10

## 2024-10-09 NOTE — PLAN OF CARE
Pt ambulatory to clinic alone for Keytruda infusion. Denies any sig complaints. PIV started without difficulty. Tolerated treatment well. PIV removed with catheter intact. Ambulatory from clinic in NAD.

## 2024-10-20 DIAGNOSIS — C49.9 PRIMARY MYXOFIBROSARCOMA: ICD-10-CM

## 2024-10-20 DIAGNOSIS — C49.9 SOFT TISSUE SARCOMA: ICD-10-CM

## 2024-10-20 DIAGNOSIS — G89.3 CANCER RELATED PAIN: ICD-10-CM

## 2024-10-22 RX ORDER — OXYCODONE HYDROCHLORIDE 10 MG/1
10 TABLET ORAL EVERY 4 HOURS PRN
Qty: 180 TABLET | Refills: 0 | Status: SHIPPED | OUTPATIENT
Start: 2024-10-22

## 2024-10-30 ENCOUNTER — INFUSION (OUTPATIENT)
Dept: INFUSION THERAPY | Facility: HOSPITAL | Age: 79
End: 2024-10-30
Attending: INTERNAL MEDICINE
Payer: MEDICARE

## 2024-10-30 ENCOUNTER — OFFICE VISIT (OUTPATIENT)
Dept: HEMATOLOGY/ONCOLOGY | Facility: CLINIC | Age: 79
End: 2024-10-30
Payer: MEDICARE

## 2024-10-30 VITALS
DIASTOLIC BLOOD PRESSURE: 60 MMHG | HEART RATE: 56 BPM | HEIGHT: 73 IN | SYSTOLIC BLOOD PRESSURE: 143 MMHG | TEMPERATURE: 98 F | BODY MASS INDEX: 23.01 KG/M2 | RESPIRATION RATE: 17 BRPM | WEIGHT: 173.63 LBS

## 2024-10-30 VITALS
HEIGHT: 73 IN | BODY MASS INDEX: 23.01 KG/M2 | TEMPERATURE: 98 F | HEART RATE: 56 BPM | WEIGHT: 173.63 LBS | DIASTOLIC BLOOD PRESSURE: 60 MMHG | OXYGEN SATURATION: 100 % | RESPIRATION RATE: 17 BRPM | SYSTOLIC BLOOD PRESSURE: 143 MMHG

## 2024-10-30 DIAGNOSIS — G89.3 CANCER RELATED PAIN: ICD-10-CM

## 2024-10-30 DIAGNOSIS — D49.9 IMMUNODEFICIENCY SECONDARY TO NEOPLASM: ICD-10-CM

## 2024-10-30 DIAGNOSIS — Z79.899 IMMUNODEFICIENCY DUE TO DRUGS: ICD-10-CM

## 2024-10-30 DIAGNOSIS — C49.9 PRIMARY MYXOFIBROSARCOMA: Primary | ICD-10-CM

## 2024-10-30 DIAGNOSIS — D84.81 IMMUNODEFICIENCY SECONDARY TO NEOPLASM: ICD-10-CM

## 2024-10-30 DIAGNOSIS — R53.83 FATIGUE, UNSPECIFIED TYPE: ICD-10-CM

## 2024-10-30 DIAGNOSIS — I10 ESSENTIAL HYPERTENSION: ICD-10-CM

## 2024-10-30 DIAGNOSIS — D84.821 IMMUNODEFICIENCY DUE TO DRUGS: ICD-10-CM

## 2024-10-30 DIAGNOSIS — C49.9 SOFT TISSUE SARCOMA: ICD-10-CM

## 2024-10-30 DIAGNOSIS — C77.4 SECONDARY MALIGNANCY OF INGUINAL LYMPH NODES: ICD-10-CM

## 2024-10-30 PROCEDURE — 1101F PT FALLS ASSESS-DOCD LE1/YR: CPT | Mod: HCNC,CPTII,S$GLB, | Performed by: INTERNAL MEDICINE

## 2024-10-30 PROCEDURE — 96413 CHEMO IV INFUSION 1 HR: CPT | Mod: HCNC

## 2024-10-30 PROCEDURE — 3078F DIAST BP <80 MM HG: CPT | Mod: HCNC,CPTII,S$GLB, | Performed by: INTERNAL MEDICINE

## 2024-10-30 PROCEDURE — 63600175 PHARM REV CODE 636 W HCPCS: Mod: JZ,JG,HCNC | Performed by: INTERNAL MEDICINE

## 2024-10-30 PROCEDURE — 1159F MED LIST DOCD IN RCRD: CPT | Mod: HCNC,CPTII,S$GLB, | Performed by: INTERNAL MEDICINE

## 2024-10-30 PROCEDURE — 3288F FALL RISK ASSESSMENT DOCD: CPT | Mod: HCNC,CPTII,S$GLB, | Performed by: INTERNAL MEDICINE

## 2024-10-30 PROCEDURE — 3077F SYST BP >= 140 MM HG: CPT | Mod: HCNC,CPTII,S$GLB, | Performed by: INTERNAL MEDICINE

## 2024-10-30 PROCEDURE — 99215 OFFICE O/P EST HI 40 MIN: CPT | Mod: HCNC,S$GLB,, | Performed by: INTERNAL MEDICINE

## 2024-10-30 PROCEDURE — 99999 PR PBB SHADOW E&M-EST. PATIENT-LVL IV: CPT | Mod: PBBFAC,HCNC,, | Performed by: INTERNAL MEDICINE

## 2024-10-30 PROCEDURE — G2211 COMPLEX E/M VISIT ADD ON: HCPCS | Mod: HCNC,S$GLB,, | Performed by: INTERNAL MEDICINE

## 2024-10-30 PROCEDURE — 25000003 PHARM REV CODE 250: Mod: HCNC | Performed by: INTERNAL MEDICINE

## 2024-10-30 PROCEDURE — 1126F AMNT PAIN NOTED NONE PRSNT: CPT | Mod: HCNC,CPTII,S$GLB, | Performed by: INTERNAL MEDICINE

## 2024-10-30 PROCEDURE — 1160F RVW MEDS BY RX/DR IN RCRD: CPT | Mod: HCNC,CPTII,S$GLB, | Performed by: INTERNAL MEDICINE

## 2024-10-30 RX ORDER — SODIUM CHLORIDE 0.9 % (FLUSH) 0.9 %
10 SYRINGE (ML) INJECTION
Status: DISCONTINUED | OUTPATIENT
Start: 2024-10-30 | End: 2024-10-30 | Stop reason: HOSPADM

## 2024-10-30 RX ORDER — HEPARIN 100 UNIT/ML
500 SYRINGE INTRAVENOUS
Status: DISCONTINUED | OUTPATIENT
Start: 2024-10-30 | End: 2024-10-30 | Stop reason: HOSPADM

## 2024-10-30 RX ORDER — EPINEPHRINE 0.3 MG/.3ML
0.3 INJECTION SUBCUTANEOUS ONCE AS NEEDED
Status: CANCELLED | OUTPATIENT
Start: 2024-10-30

## 2024-10-30 RX ORDER — DIPHENHYDRAMINE HYDROCHLORIDE 50 MG/ML
50 INJECTION INTRAMUSCULAR; INTRAVENOUS ONCE AS NEEDED
Status: CANCELLED | OUTPATIENT
Start: 2024-10-30

## 2024-10-30 RX ORDER — HEPARIN 100 UNIT/ML
500 SYRINGE INTRAVENOUS
Status: CANCELLED | OUTPATIENT
Start: 2024-10-30

## 2024-10-30 RX ORDER — SODIUM CHLORIDE 0.9 % (FLUSH) 0.9 %
10 SYRINGE (ML) INJECTION
Status: CANCELLED | OUTPATIENT
Start: 2024-10-30

## 2024-10-30 RX ORDER — EPINEPHRINE 0.3 MG/.3ML
0.3 INJECTION SUBCUTANEOUS ONCE AS NEEDED
Status: DISCONTINUED | OUTPATIENT
Start: 2024-10-30 | End: 2024-10-30 | Stop reason: HOSPADM

## 2024-10-30 RX ORDER — DIPHENHYDRAMINE HYDROCHLORIDE 50 MG/ML
50 INJECTION INTRAMUSCULAR; INTRAVENOUS ONCE AS NEEDED
Status: DISCONTINUED | OUTPATIENT
Start: 2024-10-30 | End: 2024-10-30 | Stop reason: HOSPADM

## 2024-10-30 RX ADMIN — SODIUM CHLORIDE: 9 INJECTION, SOLUTION INTRAVENOUS at 02:10

## 2024-10-30 RX ADMIN — SODIUM CHLORIDE 200 MG: 9 INJECTION, SOLUTION INTRAVENOUS at 02:10

## 2024-11-08 ENCOUNTER — TELEPHONE (OUTPATIENT)
Dept: HEMATOLOGY/ONCOLOGY | Facility: CLINIC | Age: 79
End: 2024-11-08
Payer: MEDICARE

## 2024-11-08 NOTE — TELEPHONE ENCOUNTER
Patient agreed to start re-enrollment for [facility] [employer] Keytruda program for 2025.  Will let him know when we need data from him

## 2024-11-12 ENCOUNTER — PATIENT MESSAGE (OUTPATIENT)
Dept: HEMATOLOGY/ONCOLOGY | Facility: CLINIC | Age: 79
End: 2024-11-12
Payer: MEDICARE

## 2024-11-20 ENCOUNTER — INFUSION (OUTPATIENT)
Dept: INFUSION THERAPY | Facility: HOSPITAL | Age: 79
End: 2024-11-20
Attending: INTERNAL MEDICINE
Payer: MEDICARE

## 2024-11-20 ENCOUNTER — OFFICE VISIT (OUTPATIENT)
Dept: HEMATOLOGY/ONCOLOGY | Facility: CLINIC | Age: 79
End: 2024-11-20
Payer: MEDICARE

## 2024-11-20 VITALS
OXYGEN SATURATION: 98 % | BODY MASS INDEX: 24.09 KG/M2 | DIASTOLIC BLOOD PRESSURE: 72 MMHG | HEART RATE: 46 BPM | RESPIRATION RATE: 16 BRPM | SYSTOLIC BLOOD PRESSURE: 161 MMHG | WEIGHT: 181.75 LBS | HEIGHT: 73 IN

## 2024-11-20 VITALS
HEIGHT: 73 IN | WEIGHT: 181.75 LBS | RESPIRATION RATE: 16 BRPM | HEART RATE: 47 BPM | TEMPERATURE: 98 F | SYSTOLIC BLOOD PRESSURE: 180 MMHG | DIASTOLIC BLOOD PRESSURE: 72 MMHG | OXYGEN SATURATION: 98 % | BODY MASS INDEX: 24.09 KG/M2

## 2024-11-20 DIAGNOSIS — C49.9 SOFT TISSUE SARCOMA: ICD-10-CM

## 2024-11-20 DIAGNOSIS — C77.4 SECONDARY MALIGNANCY OF INGUINAL LYMPH NODES: ICD-10-CM

## 2024-11-20 DIAGNOSIS — C49.9 PRIMARY MYXOFIBROSARCOMA: Primary | ICD-10-CM

## 2024-11-20 DIAGNOSIS — G89.3 CANCER RELATED PAIN: ICD-10-CM

## 2024-11-20 DIAGNOSIS — Z79.899 IMMUNODEFICIENCY DUE TO DRUGS: ICD-10-CM

## 2024-11-20 DIAGNOSIS — D84.821 IMMUNODEFICIENCY DUE TO DRUGS: ICD-10-CM

## 2024-11-20 DIAGNOSIS — D84.81 IMMUNODEFICIENCY SECONDARY TO NEOPLASM: ICD-10-CM

## 2024-11-20 DIAGNOSIS — I10 ESSENTIAL HYPERTENSION: ICD-10-CM

## 2024-11-20 DIAGNOSIS — C49.9 PRIMARY MYXOFIBROSARCOMA: ICD-10-CM

## 2024-11-20 DIAGNOSIS — D49.9 IMMUNODEFICIENCY SECONDARY TO NEOPLASM: ICD-10-CM

## 2024-11-20 DIAGNOSIS — R53.83 FATIGUE, UNSPECIFIED TYPE: ICD-10-CM

## 2024-11-20 PROCEDURE — 25000003 PHARM REV CODE 250: Mod: HCNC | Performed by: PHYSICIAN ASSISTANT

## 2024-11-20 PROCEDURE — 3078F DIAST BP <80 MM HG: CPT | Mod: HCNC,CPTII,S$GLB, | Performed by: PHYSICIAN ASSISTANT

## 2024-11-20 PROCEDURE — 99215 OFFICE O/P EST HI 40 MIN: CPT | Mod: HCNC,S$GLB,, | Performed by: PHYSICIAN ASSISTANT

## 2024-11-20 PROCEDURE — 3288F FALL RISK ASSESSMENT DOCD: CPT | Mod: HCNC,CPTII,S$GLB, | Performed by: PHYSICIAN ASSISTANT

## 2024-11-20 PROCEDURE — 3077F SYST BP >= 140 MM HG: CPT | Mod: HCNC,CPTII,S$GLB, | Performed by: PHYSICIAN ASSISTANT

## 2024-11-20 PROCEDURE — G2211 COMPLEX E/M VISIT ADD ON: HCPCS | Mod: HCNC,S$GLB,, | Performed by: PHYSICIAN ASSISTANT

## 2024-11-20 PROCEDURE — 1101F PT FALLS ASSESS-DOCD LE1/YR: CPT | Mod: HCNC,CPTII,S$GLB, | Performed by: PHYSICIAN ASSISTANT

## 2024-11-20 PROCEDURE — 99999 PR PBB SHADOW E&M-EST. PATIENT-LVL IV: CPT | Mod: PBBFAC,HCNC,, | Performed by: PHYSICIAN ASSISTANT

## 2024-11-20 PROCEDURE — 1159F MED LIST DOCD IN RCRD: CPT | Mod: HCNC,CPTII,S$GLB, | Performed by: PHYSICIAN ASSISTANT

## 2024-11-20 PROCEDURE — 1126F AMNT PAIN NOTED NONE PRSNT: CPT | Mod: HCNC,CPTII,S$GLB, | Performed by: PHYSICIAN ASSISTANT

## 2024-11-20 PROCEDURE — 96413 CHEMO IV INFUSION 1 HR: CPT | Mod: HCNC

## 2024-11-20 PROCEDURE — 63600175 PHARM REV CODE 636 W HCPCS: Mod: JZ,JG,HCNC | Performed by: PHYSICIAN ASSISTANT

## 2024-11-20 RX ORDER — DIPHENHYDRAMINE HYDROCHLORIDE 50 MG/ML
50 INJECTION INTRAMUSCULAR; INTRAVENOUS ONCE AS NEEDED
Status: DISCONTINUED | OUTPATIENT
Start: 2024-11-20 | End: 2024-11-20 | Stop reason: HOSPADM

## 2024-11-20 RX ORDER — HEPARIN 100 UNIT/ML
500 SYRINGE INTRAVENOUS
Status: CANCELLED | OUTPATIENT
Start: 2024-11-20

## 2024-11-20 RX ORDER — EPINEPHRINE 0.3 MG/.3ML
0.3 INJECTION SUBCUTANEOUS ONCE AS NEEDED
Status: CANCELLED | OUTPATIENT
Start: 2024-11-20

## 2024-11-20 RX ORDER — HEPARIN 100 UNIT/ML
500 SYRINGE INTRAVENOUS
Status: DISCONTINUED | OUTPATIENT
Start: 2024-11-20 | End: 2024-11-20 | Stop reason: HOSPADM

## 2024-11-20 RX ORDER — OXYCODONE HYDROCHLORIDE 10 MG/1
10 TABLET ORAL EVERY 4 HOURS PRN
Qty: 180 TABLET | Refills: 0 | Status: SHIPPED | OUTPATIENT
Start: 2024-11-20

## 2024-11-20 RX ORDER — SODIUM CHLORIDE 0.9 % (FLUSH) 0.9 %
10 SYRINGE (ML) INJECTION
Status: CANCELLED | OUTPATIENT
Start: 2024-11-20

## 2024-11-20 RX ORDER — DIPHENHYDRAMINE HYDROCHLORIDE 50 MG/ML
50 INJECTION INTRAMUSCULAR; INTRAVENOUS ONCE AS NEEDED
Status: CANCELLED | OUTPATIENT
Start: 2024-11-20

## 2024-11-20 RX ORDER — EPINEPHRINE 0.3 MG/.3ML
0.3 INJECTION SUBCUTANEOUS ONCE AS NEEDED
Status: DISCONTINUED | OUTPATIENT
Start: 2024-11-20 | End: 2024-11-20 | Stop reason: HOSPADM

## 2024-11-20 RX ORDER — TAMSULOSIN HYDROCHLORIDE 0.4 MG/1
1 CAPSULE ORAL
COMMUNITY
Start: 2024-07-29

## 2024-11-20 RX ORDER — SODIUM CHLORIDE 0.9 % (FLUSH) 0.9 %
10 SYRINGE (ML) INJECTION
Status: DISCONTINUED | OUTPATIENT
Start: 2024-11-20 | End: 2024-11-20 | Stop reason: HOSPADM

## 2024-11-20 RX ADMIN — SODIUM CHLORIDE 200 MG: 9 INJECTION, SOLUTION INTRAVENOUS at 11:11

## 2024-11-20 RX ADMIN — SODIUM CHLORIDE: 9 INJECTION, SOLUTION INTRAVENOUS at 11:11

## 2024-11-20 NOTE — PLAN OF CARE
Patient completed Keytruda infusion, tolerated well.  PIV discontinued without issue.  Patient ambulated off floor with cane assist to discharge home.

## 2024-11-20 NOTE — PROGRESS NOTES
"                                                         PROGRESS NOTE    Subjective:       Patient ID: Zac Plunkett is a 79 y.o. male.    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 JD. TMB 6.3    Oncologic History copied from medical chart:  Prior patient of Dr Lucia's  2021  May              5/27- US right thigh: showed 2 hypoechoic masses in the subcutaneous tissues of the right anterior thigh. Largest measured 1.1 x 0.9 x 1 cm. Read at the time was concern for lymphadenopathy.   June 6/4 - CXR: "Bilateral reticular opacities are present in a perihilar distribution which appears slightly more pronounced compared to prior exams."               6/11 - underwent excisional biopsy of the right thigh; pathology came back positive for myxofibrosarcoma, high grade. Path a 1.8 x 1.1 x 0.9 cm mass with an adjacent 0.4 x 0.4 x 0.3 cm mass.   July/Aug              - IMRT with Dr. Mcadams  2022 January 1/12 - PET: locoregional recurrence and concerning 0.9 cm RUL lesion              1/20 - Path: excision on 2 recurrent lesions:  Myxofibrosarcoma, 1.9 cm at longest length, PD-L1 25%              1/31 - CT thigh: successful removal w/o evidence of recurrent disease  April 4/28 - recurrence, locally with metastatic deposit in LN. S/p excision on 4/28/22. Path: right thigh superior mass: Myxofibrosarcoma, high grade with focal tumor necrosis, incompletely excised. A portion of the lesion is suggestive of an involved lymph node exhibiting metastatic sarcoma with extranodal extension.  Sarcoma is focally present within inked but otherwise unspecified peripheral margins.   July 7/19 - CT thigh showed local recurrence and a 0.7 cm R inguinal lymph node    2023  [facility]   1/3 - CT thigh/pelvis showed "Heterogeneous " "enhancing soft tissue lesions and nodular foci at the anterior thigh and right groin highly concerning for worsening local recurrent disease with talon metastasis"    - stable pulmonary nodules on CT chest  - completed palliative radiation to right thigh  March  3/24- started keytruda, s/p 24 cycles    Interval History:   Mr Plunkett returns for follow up. Feeling well. Chronic rash after radiation. Itches sometimes. Uses cream. Has re-fill on pain medication. Eating well and has a good appetite. No other concerns or complaints.     ECO. Presents with caregiver    ROS:   A ten-point system review is obtained and negative except for what was stated in the Interval History.     Physical Examination:   Vital signs reviewed.   General: well hydrated, well developed, in no acute distress  HEENT: normocephalic, EOMI, anicteric sclerae  Neck: supple, no JVD, thyromegaly  Lungs: clear breath sounds bilaterally, no wheezing, rales, or rhonchi  Heart: RRR, no M/R/G  Abdomen: soft, no tenderness, non-distended. BS present  Extremities: no edema  Skin: post radiation changes over R upper thigh. No wound.   Neuro: alert and oriented x 4, no focal neuro deficit  Psych: pleasant and appropriate mood and affect    Objective:     Laboratory Data:  Labs reviewed. Adequate for treatment. TSH normal    Imaging Data:    CT thigh 24:  Impression:     No evidence of residual disease or recurrence.       CT C/A/P 24   Impression:     Stable mediastinal and hilar adenopathy.     Stable pulmonary micronodule.     Emphysema.     Diverticulosis coli.     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  Assessment and Plan:     1. Primary myxofibrosarcoma    2. 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 Cancer related pain    7. Fatigue, unspecified type    8. Essential " hypertension      1-5  - Mr Plunkett is a 79 yo man with recurrent myxofibrosarcoma of the right thigh, has had 3 resections and IMRT  - he was seen by Dr. Mao and Dr. Unger, they did not feel that he would be a good surgical candidate and recommended palliative RT  - completed palliative RT 2/13/2023  - Previously discussed systemic treatment options, including chemotherapy and immunotherapy. He was not interested in chemotherapy including TKI. Pembrolizumab has efficacy in phase II studies and is recommended on NCCN Guidelines for patients with myxofibrosarcoma. PD-L1 is 25%. However, his TMB is not high. Pembrolizumab was denied by insurance and then denied at hearing. He got patient assistance through [employer]  - started palliative keytruda on 3/24/23    - doing well. Eating well.   - I have reviewed the CBC and CMP, adequate for treatment   - c/w keytruda. Cycle 26 today  - RTC in 3 weeks with lab work, scans and treatment discussion with Dr Martines    6.  - stable. C/w oxycodone 10 mg prn  - Re-fill requested.     7.  - monitor TSH    8.  - BP elevated in clinic but he feels well. Typically well controlled  - Will continue to monitor  - c/w current medication      Follow-up:     In 3 weeks    Pte and family members displayed understanding of the above encounter and treatment plan. All thoughtful questions were answered to their satisfaction. Pte was advised to notify the care team or proceed to the ER if signs and symptoms worsen.     Visit today included increased complexity associated with the care of the episodic problem chemotherapy  addressed and managing the longitudinal care of the patient due to the serious and/or complex managed problem(s) GI malignancies/cancer      ADEEL Hurley, PA-C [facility]  Dept of Hematology/Oncology  PASENDY to GI Oncology team         Route Chart for Scheduling    Med Onc Chart Routing      Follow up with physician 3 weeks and 6 weeks. 3 weeks with lab work, imaging  studies, clinic visit with Dr Martines and Mayito. Keytruda alone in 6 weeks   Follow up with NICOLLE . 9 weeks for Keytruda   Infusion scheduling note    Injection scheduling note    Labs CBC, CMP and TSH   Scheduling:  Preferred lab:  Lab interval: every 3 weeks     Imaging CT chest abdomen pelvis   Scheduled in 3 weeks. Thank you   Pharmacy appointment    Other referrals              Treatment Plan Information   OP PEMBROLIZUMAB 200MG Q3W Ed Martines MD   Associated diagnosis: Primary myxofibrosarcoma   noted on 8/18/2022   Line of treatment: First Line  Treatment Goal: Palliative     Upcoming Treatment Dates - OP PEMBROLIZUMAB 200MG Q3W    10/31/2024       Chemotherapy       pembrolizumab (KEYTRUDA) 200 mg in 0.9% NaCl SolP 108 mL infusion  11/21/2024       Chemotherapy       pembrolizumab (KEYTRUDA) 200 mg in 0.9% NaCl SolP 108 mL infusion  12/12/2024       Chemotherapy       pembrolizumab (KEYTRUDA) 200 mg in 0.9% NaCl SolP 108 mL infusion  1/2/2025       Chemotherapy       pembrolizumab (KEYTRUDA) 200 mg in 0.9% NaCl SolP 108 mL infusion

## 2024-11-20 NOTE — PLAN OF CARE
Patient seated in chair, VSS, assessment done.  PIV inserted, flushed, blood return noted, started NS @ 25 cc/hr KVO while waiting for Keytruda from pharmacy.  [facility] for safety

## 2024-11-21 ENCOUNTER — TELEPHONE (OUTPATIENT)
Dept: HEMATOLOGY/ONCOLOGY | Facility: CLINIC | Age: 79
End: 2024-11-21
Payer: MEDICARE

## 2024-11-21 NOTE — TELEPHONE ENCOUNTER
----- Message from [place] sent at 11/21/2024  9:43 AM CST -----  Regarding: Patient advice  Contact: Gisele  [phone]        Name of Caller:  Gisele with [employer]      Contact Preference:  [phone]   Fax [phone]     Nature of Call: Requesting a call back form Sebastian in regards to  states pg 5 of enrollment form the signature needs to be dated and a dx code and clinic intake work sheet fill out and returned

## 2024-11-21 NOTE — TELEPHONE ENCOUNTER
Returned call to [facility].  Clinic intake worksheet to be faxed and will be completed once received.  Other errors resolved.  Will send once completed.

## 2024-11-26 ENCOUNTER — PATIENT MESSAGE (OUTPATIENT)
Dept: HEMATOLOGY/ONCOLOGY | Facility: CLINIC | Age: 79
End: 2024-11-26
Payer: MEDICARE

## 2024-12-05 ENCOUNTER — PATIENT MESSAGE (OUTPATIENT)
Dept: HEMATOLOGY/ONCOLOGY | Facility: CLINIC | Age: 79
End: 2024-12-05
Payer: MEDICARE

## 2024-12-05 DIAGNOSIS — C49.9 PRIMARY MYXOFIBROSARCOMA: ICD-10-CM

## 2024-12-06 RX ORDER — PEMBROLIZUMAB 25 MG/ML
INJECTION, SOLUTION INTRAVENOUS
Qty: 8 ML | Refills: 10 | Status: SHIPPED | OUTPATIENT
Start: 2024-12-06

## 2024-12-10 ENCOUNTER — TELEPHONE (OUTPATIENT)
Dept: HEMATOLOGY/ONCOLOGY | Facility: CLINIC | Age: 79
End: 2024-12-10
Payer: MEDICARE

## 2024-12-10 ENCOUNTER — OFFICE VISIT (OUTPATIENT)
Dept: HEMATOLOGY/ONCOLOGY | Facility: CLINIC | Age: 79
End: 2024-12-10
Payer: MEDICARE

## 2024-12-10 DIAGNOSIS — G89.3 CANCER RELATED PAIN: ICD-10-CM

## 2024-12-10 DIAGNOSIS — C77.4 SECONDARY MALIGNANCY OF INGUINAL LYMPH NODES: ICD-10-CM

## 2024-12-10 DIAGNOSIS — D84.81 IMMUNODEFICIENCY SECONDARY TO NEOPLASM: ICD-10-CM

## 2024-12-10 DIAGNOSIS — I10 ESSENTIAL HYPERTENSION: ICD-10-CM

## 2024-12-10 DIAGNOSIS — D49.9 IMMUNODEFICIENCY SECONDARY TO NEOPLASM: ICD-10-CM

## 2024-12-10 DIAGNOSIS — Z79.899 IMMUNODEFICIENCY DUE TO DRUGS: ICD-10-CM

## 2024-12-10 DIAGNOSIS — R53.83 FATIGUE, UNSPECIFIED TYPE: ICD-10-CM

## 2024-12-10 DIAGNOSIS — C49.9 SOFT TISSUE SARCOMA: ICD-10-CM

## 2024-12-10 DIAGNOSIS — D84.821 IMMUNODEFICIENCY DUE TO DRUGS: ICD-10-CM

## 2024-12-10 DIAGNOSIS — C49.9 PRIMARY MYXOFIBROSARCOMA: Primary | ICD-10-CM

## 2024-12-10 PROCEDURE — 1159F MED LIST DOCD IN RCRD: CPT | Mod: HCNC,CPTII,95, | Performed by: INTERNAL MEDICINE

## 2024-12-10 PROCEDURE — G2211 COMPLEX E/M VISIT ADD ON: HCPCS | Mod: HCNC,95,, | Performed by: INTERNAL MEDICINE

## 2024-12-10 PROCEDURE — 1160F RVW MEDS BY RX/DR IN RCRD: CPT | Mod: HCNC,CPTII,95, | Performed by: INTERNAL MEDICINE

## 2024-12-10 PROCEDURE — 99215 OFFICE O/P EST HI 40 MIN: CPT | Mod: HCNC,95,, | Performed by: INTERNAL MEDICINE

## 2024-12-10 RX ORDER — SODIUM CHLORIDE 0.9 % (FLUSH) 0.9 %
10 SYRINGE (ML) INJECTION
Status: CANCELLED | OUTPATIENT
Start: 2024-12-10

## 2024-12-10 RX ORDER — EPINEPHRINE 0.3 MG/.3ML
0.3 INJECTION SUBCUTANEOUS ONCE AS NEEDED
Status: CANCELLED | OUTPATIENT
Start: 2024-12-10

## 2024-12-10 RX ORDER — HEPARIN 100 UNIT/ML
500 SYRINGE INTRAVENOUS
Status: CANCELLED | OUTPATIENT
Start: 2024-12-10

## 2024-12-10 RX ORDER — DIPHENHYDRAMINE HYDROCHLORIDE 50 MG/ML
50 INJECTION INTRAMUSCULAR; INTRAVENOUS ONCE AS NEEDED
Status: CANCELLED | OUTPATIENT
Start: 2024-12-10

## 2024-12-10 NOTE — PROGRESS NOTES
"                         The patient location is: home  The chief complaint leading to consultation is: sarcoma    Visit type: audiovisual    Face to Face time with patient: 20 min  40 minutes of total time spent on the encounter, which includes face to face time and non-face to face time preparing to see the patient (eg, review of tests), Obtaining and/or reviewing separately obtained history, Documenting clinical information in the electronic or other health record, Independently interpreting results (not separately reported) and communicating results to the patient/family/caregiver, or Care coordination (not separately reported).         Each patient to whom he or she provides medical services by telemedicine is:  (1) informed of the relationship between the physician and patient and the respective role of any other health care provider with respect to management of the patient; and (2) notified that he or she may decline to receive medical services by telemedicine and may withdraw from such care at any time.    Notes:                                     PROGRESS NOTE    Subjective:       Patient ID: Zac Plunkett is a 79 y.o. male.    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 JD. TMB 6.3    Oncologic History copied from medical chart:  Prior patient of Dr Lucia's  2021  May              5/27- US right thigh: showed 2 hypoechoic masses in the subcutaneous tissues of the right anterior thigh. Largest measured 1.1 x 0.9 x 1 cm. Read at the time was concern for lymphadenopathy.   June 6/4 - CXR: "Bilateral reticular opacities are present in a perihilar distribution which appears slightly more pronounced compared to prior exams."               6/11 - underwent excisional biopsy of the right thigh; pathology came back positive for " "myxofibrosarcoma, high grade. Path a 1.8 x 1.1 x 0.9 cm mass with an adjacent 0.4 x 0.4 x 0.3 cm mass.   July/Aug              - IMRT with Dr. Mcadams   - PET: locoregional recurrence and concerning 0.9 cm RUL lesion               - Path: excision on 2 recurrent lesions:  Myxofibrosarcoma, 1.9 cm at longest length, PD-L1 25%               - CT thigh: successful removal w/o evidence of recurrent disease   - recurrence, locally with metastatic deposit in LN. S/p excision on 22. Path: right thigh superior mass: Myxofibrosarcoma, high grade with focal tumor necrosis, incompletely excised. A portion of the lesion is suggestive of an involved lymph node exhibiting metastatic sarcoma with extranodal extension.  Sarcoma is focally present within inked but otherwise unspecified peripheral margins.    - CT thigh showed local recurrence and a 0.7 cm R inguinal lymph node    2023  [facility]   1/3 - CT thigh/pelvis showed "Heterogeneous enhancing soft tissue lesions and nodular foci at the anterior thigh and right groin highly concerning for worsening local recurrent disease with talon metastasis"    - stable pulmonary nodules on CT chest  - completed palliative radiation to right thigh  March  3/24- started keytruda, s/p 24 cycles    Interval History:   Mr Plunkett returns for follow up. Feeling well. Chronic rash after radiation. Itches sometimes. Uses cream.     ECO.     ROS:   A ten-point system review is obtained and negative except for what was stated in the Interval History.     Physical Examination:   General: well hydrated, well developed, in no acute distress  HEENT: normocephalic, EOMI, anicteric sclerae  Neuro: alert and oriented x 4  Psych: pleasant and appropriate mood and affect    Objective:     Laboratory Data:  Labs reviewed. Adequate for treatment. TSH normal    Imaging Data:    CT thigh " 9/16/24:  Impression:     No evidence of residual disease or recurrence.       CT C/A/P 9/16/24   Impression:     Stable mediastinal and hilar adenopathy.     Stable pulmonary micronodule.     Emphysema.     Diverticulosis coli.     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  Assessment and Plan:     1. Primary myxofibrosarcoma    2. 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 Cancer related pain    7. Fatigue, unspecified type    8. Essential hypertension          1-5  - Mr Plunkett is a 80 yo man with recurrent myxofibrosarcoma of the right thigh, has had 3 resections and IMRT  - he was seen by Dr. Mao and Dr. Unger, they did not feel that he would be a good surgical candidate and recommended palliative RT  - completed palliative RT 2/13/2023  - Previously discussed systemic treatment options, including chemotherapy and immunotherapy. He was not interested in chemotherapy including TKI. Pembrolizumab has efficacy in phase II studies and is recommended on NCCN Guidelines for patients with myxofibrosarcoma. PD-L1 is 25%. However, his TMB is not high. Pembrolizumab was denied by insurance and then denied at hearing. He got patient assistance through [employer]  - started palliative keytruda on 3/24/23  - doing well.  - at the time of visit, the CT results are not back. I have personally reviewed CT images myself and compared to the prior CT from September. CT looks stable to me.   - discussed with patient. He will see CT results. If no progression, c/w keytruda  - test results adequate for treatment  - c/w keytruda. Cycle 27 today  - RTC in 3 weeks    6.  - stable. C/w oxycodone 10 mg prn    7.  - monitor TSH    8.  - monitor BP      Follow-up:     In 3 weeks    Route Chart for Scheduling    Med Onc Chart Routing      Follow up with physician . Keep scheduled appts. see NICOLLE in 6  weeks with labs then keytruda. see me in 9 weeks with labs then keytruda   Follow up with NICOLLE    Infusion scheduling note   keytruda every 3 weeks   Injection scheduling note    Labs CBC, CMP and TSH   Scheduling:  Preferred lab:  Lab interval:     Imaging    Pharmacy appointment    Other referrals              Treatment Plan Information   OP PEMBROLIZUMAB 200MG Q3W Ed Martines MD   Associated diagnosis: Primary myxofibrosarcoma   noted on 8/18/2022   Line of treatment: First Line  Treatment Goal: Palliative     Upcoming Treatment Dates - OP PEMBROLIZUMAB 200MG Q3W    11/21/2024       Chemotherapy       pembrolizumab (KEYTRUDA) 200 mg in 0.9% NaCl SolP 108 mL infusion  12/12/2024       Chemotherapy       pembrolizumab (KEYTRUDA) 200 mg in 0.9% NaCl SolP 108 mL infusion  1/2/2025       Chemotherapy       pembrolizumab (KEYTRUDA) 200 mg in 0.9% NaCl SolP 108 mL infusion  1/23/2025       Chemotherapy       pembrolizumab (KEYTRUDA) 200 mg in 0.9% NaCl SolP 108 mL infusion

## 2024-12-12 ENCOUNTER — INFUSION (OUTPATIENT)
Dept: INFUSION THERAPY | Facility: HOSPITAL | Age: 79
End: 2024-12-12
Attending: INTERNAL MEDICINE
Payer: MEDICARE

## 2024-12-12 VITALS
HEIGHT: 73 IN | HEART RATE: 52 BPM | OXYGEN SATURATION: 99 % | SYSTOLIC BLOOD PRESSURE: 134 MMHG | DIASTOLIC BLOOD PRESSURE: 63 MMHG | TEMPERATURE: 98 F | RESPIRATION RATE: 18 BRPM | WEIGHT: 179.44 LBS | BODY MASS INDEX: 23.78 KG/M2

## 2024-12-12 DIAGNOSIS — C49.9 PRIMARY MYXOFIBROSARCOMA: Primary | ICD-10-CM

## 2024-12-12 PROCEDURE — 96413 CHEMO IV INFUSION 1 HR: CPT | Mod: HCNC

## 2024-12-12 PROCEDURE — A4216 STERILE WATER/SALINE, 10 ML: HCPCS | Mod: HCNC | Performed by: INTERNAL MEDICINE

## 2024-12-12 PROCEDURE — 25000003 PHARM REV CODE 250: Mod: HCNC | Performed by: INTERNAL MEDICINE

## 2024-12-12 PROCEDURE — 63600175 PHARM REV CODE 636 W HCPCS: Mod: JZ,JG,HCNC | Performed by: INTERNAL MEDICINE

## 2024-12-12 RX ORDER — SODIUM CHLORIDE 0.9 % (FLUSH) 0.9 %
10 SYRINGE (ML) INJECTION
Status: DISCONTINUED | OUTPATIENT
Start: 2024-12-12 | End: 2024-12-12 | Stop reason: HOSPADM

## 2024-12-12 RX ORDER — HEPARIN 100 UNIT/ML
500 SYRINGE INTRAVENOUS
Status: DISCONTINUED | OUTPATIENT
Start: 2024-12-12 | End: 2024-12-12 | Stop reason: HOSPADM

## 2024-12-12 RX ORDER — EPINEPHRINE 0.3 MG/.3ML
0.3 INJECTION SUBCUTANEOUS ONCE AS NEEDED
Status: DISCONTINUED | OUTPATIENT
Start: 2024-12-12 | End: 2024-12-12 | Stop reason: HOSPADM

## 2024-12-12 RX ORDER — DIPHENHYDRAMINE HYDROCHLORIDE 50 MG/ML
50 INJECTION INTRAMUSCULAR; INTRAVENOUS ONCE AS NEEDED
Status: DISCONTINUED | OUTPATIENT
Start: 2024-12-12 | End: 2024-12-12 | Stop reason: HOSPADM

## 2024-12-12 RX ADMIN — Medication 10 ML: at 10:12

## 2024-12-12 RX ADMIN — SODIUM CHLORIDE: 9 INJECTION, SOLUTION INTRAVENOUS at 10:12

## 2024-12-12 RX ADMIN — SODIUM CHLORIDE 200 MG: 9 INJECTION, SOLUTION INTRAVENOUS at 10:12

## 2024-12-12 NOTE — PLAN OF CARE
Pt tolerated Keytruda well. No adverse reaction noted. Pt education reinforced on chemo regimen, side effects, what to expect, and when to call Dr. Martines. Pt verbalized understanding. I reviewed pt calendar w/ pt and understanding verbalized.

## 2024-12-18 DIAGNOSIS — C49.9 PRIMARY MYXOFIBROSARCOMA: ICD-10-CM

## 2024-12-18 DIAGNOSIS — G89.3 CANCER RELATED PAIN: ICD-10-CM

## 2024-12-18 DIAGNOSIS — C49.9 SOFT TISSUE SARCOMA: ICD-10-CM

## 2024-12-18 RX ORDER — OXYCODONE HYDROCHLORIDE 10 MG/1
10 TABLET ORAL EVERY 4 HOURS PRN
Qty: 180 TABLET | Refills: 0 | Status: SHIPPED | OUTPATIENT
Start: 2024-12-18

## 2024-12-24 ENCOUNTER — PATIENT MESSAGE (OUTPATIENT)
Dept: HEMATOLOGY/ONCOLOGY | Facility: CLINIC | Age: 79
End: 2024-12-24
Payer: MEDICARE

## 2025-01-02 ENCOUNTER — TELEPHONE (OUTPATIENT)
Dept: HEMATOLOGY/ONCOLOGY | Facility: CLINIC | Age: 80
End: 2025-01-02
Payer: MEDICARE

## 2025-01-02 NOTE — TELEPHONE ENCOUNTER
Received secure chat that patient is scheduled for keytruda for tomorrow at 2 pm.  Per provider, no provider visit needed - just labs.  Scheduled labs and called patient to inform him of appointments.  Patient verbalized understanding and will make lab and infusion appointment.

## 2025-01-03 ENCOUNTER — INFUSION (OUTPATIENT)
Dept: INFUSION THERAPY | Facility: HOSPITAL | Age: 80
End: 2025-01-03
Attending: INTERNAL MEDICINE
Payer: MEDICARE

## 2025-01-03 ENCOUNTER — LAB VISIT (OUTPATIENT)
Dept: LAB | Facility: HOSPITAL | Age: 80
End: 2025-01-03
Payer: MEDICARE

## 2025-01-03 VITALS
WEIGHT: 178 LBS | SYSTOLIC BLOOD PRESSURE: 121 MMHG | HEART RATE: 51 BPM | DIASTOLIC BLOOD PRESSURE: 70 MMHG | HEIGHT: 73 IN | BODY MASS INDEX: 23.59 KG/M2 | RESPIRATION RATE: 18 BRPM | TEMPERATURE: 98 F

## 2025-01-03 DIAGNOSIS — R53.83 FATIGUE, UNSPECIFIED TYPE: ICD-10-CM

## 2025-01-03 DIAGNOSIS — C49.9 PRIMARY MYXOFIBROSARCOMA: Primary | ICD-10-CM

## 2025-01-03 DIAGNOSIS — C49.9 PRIMARY MYXOFIBROSARCOMA: ICD-10-CM

## 2025-01-03 LAB
ALBUMIN SERPL BCP-MCNC: 3.7 G/DL (ref 3.5–5.2)
ALP SERPL-CCNC: 121 U/L (ref 40–150)
ALT SERPL W/O P-5'-P-CCNC: 10 U/L (ref 10–44)
ANION GAP SERPL CALC-SCNC: 7 MMOL/L (ref 8–16)
AST SERPL-CCNC: 12 U/L (ref 10–40)
BASOPHILS # BLD AUTO: 0.12 K/UL (ref 0–0.2)
BASOPHILS NFR BLD: 1.8 % (ref 0–1.9)
BILIRUB SERPL-MCNC: 0.5 MG/DL (ref 0.1–1)
BUN SERPL-MCNC: 17 MG/DL (ref 8–23)
CALCIUM SERPL-MCNC: 9.6 MG/DL (ref 8.7–10.5)
CHLORIDE SERPL-SCNC: 103 MMOL/L (ref 95–110)
CO2 SERPL-SCNC: 25 MMOL/L (ref 23–29)
CREAT SERPL-MCNC: 0.9 MG/DL (ref 0.5–1.4)
DIFFERENTIAL METHOD BLD: ABNORMAL
EOSINOPHIL # BLD AUTO: 0.2 K/UL (ref 0–0.5)
EOSINOPHIL NFR BLD: 2.4 % (ref 0–8)
ERYTHROCYTE [DISTWIDTH] IN BLOOD BY AUTOMATED COUNT: 14.8 % (ref 11.5–14.5)
EST. GFR  (NO RACE VARIABLE): >60 ML/MIN/1.73 M^2
GLUCOSE SERPL-MCNC: 103 MG/DL (ref 70–110)
HCT VFR BLD AUTO: 41.5 % (ref 40–54)
HGB BLD-MCNC: 13.7 G/DL (ref 14–18)
IMM GRANULOCYTES # BLD AUTO: 0.18 K/UL (ref 0–0.04)
IMM GRANULOCYTES NFR BLD AUTO: 2.7 % (ref 0–0.5)
LYMPHOCYTES # BLD AUTO: 1 K/UL (ref 1–4.8)
LYMPHOCYTES NFR BLD: 14.7 % (ref 18–48)
MCH RBC QN AUTO: 29.5 PG (ref 27–31)
MCHC RBC AUTO-ENTMCNC: 33 G/DL (ref 32–36)
MCV RBC AUTO: 89 FL (ref 82–98)
MONOCYTES # BLD AUTO: 0.4 K/UL (ref 0.3–1)
MONOCYTES NFR BLD: 6.7 % (ref 4–15)
NEUTROPHILS # BLD AUTO: 4.7 K/UL (ref 1.8–7.7)
NEUTROPHILS NFR BLD: 71.7 % (ref 38–73)
NRBC BLD-RTO: 0 /100 WBC
PLATELET # BLD AUTO: 191 K/UL (ref 150–450)
PMV BLD AUTO: 9.4 FL (ref 9.2–12.9)
POTASSIUM SERPL-SCNC: 4.9 MMOL/L (ref 3.5–5.1)
PROT SERPL-MCNC: 7.3 G/DL (ref 6–8.4)
RBC # BLD AUTO: 4.64 M/UL (ref 4.6–6.2)
SODIUM SERPL-SCNC: 135 MMOL/L (ref 136–145)
TSH SERPL DL<=0.005 MIU/L-ACNC: 1.08 UIU/ML (ref 0.4–4)
WBC # BLD AUTO: 6.55 K/UL (ref 3.9–12.7)

## 2025-01-03 PROCEDURE — 36415 COLL VENOUS BLD VENIPUNCTURE: CPT | Performed by: PHYSICIAN ASSISTANT

## 2025-01-03 PROCEDURE — 84443 ASSAY THYROID STIM HORMONE: CPT | Performed by: PHYSICIAN ASSISTANT

## 2025-01-03 PROCEDURE — 63600175 PHARM REV CODE 636 W HCPCS: Mod: JZ,TB | Performed by: INTERNAL MEDICINE

## 2025-01-03 PROCEDURE — 80053 COMPREHEN METABOLIC PANEL: CPT | Performed by: PHYSICIAN ASSISTANT

## 2025-01-03 PROCEDURE — 25000003 PHARM REV CODE 250: Performed by: INTERNAL MEDICINE

## 2025-01-03 PROCEDURE — 85025 COMPLETE CBC W/AUTO DIFF WBC: CPT | Performed by: PHYSICIAN ASSISTANT

## 2025-01-03 PROCEDURE — 96413 CHEMO IV INFUSION 1 HR: CPT

## 2025-01-03 RX ORDER — DIPHENHYDRAMINE HYDROCHLORIDE 50 MG/ML
50 INJECTION INTRAMUSCULAR; INTRAVENOUS ONCE AS NEEDED
Status: DISCONTINUED | OUTPATIENT
Start: 2025-01-03 | End: 2025-01-03 | Stop reason: HOSPADM

## 2025-01-03 RX ORDER — EPINEPHRINE 0.3 MG/.3ML
0.3 INJECTION SUBCUTANEOUS ONCE AS NEEDED
Status: CANCELLED | OUTPATIENT
Start: 2025-01-03

## 2025-01-03 RX ORDER — DIPHENHYDRAMINE HYDROCHLORIDE 50 MG/ML
50 INJECTION INTRAMUSCULAR; INTRAVENOUS ONCE AS NEEDED
Status: CANCELLED | OUTPATIENT
Start: 2025-01-03

## 2025-01-03 RX ORDER — SODIUM CHLORIDE 0.9 % (FLUSH) 0.9 %
10 SYRINGE (ML) INJECTION
Status: DISCONTINUED | OUTPATIENT
Start: 2025-01-03 | End: 2025-01-03 | Stop reason: HOSPADM

## 2025-01-03 RX ORDER — HEPARIN 100 UNIT/ML
500 SYRINGE INTRAVENOUS
Status: CANCELLED | OUTPATIENT
Start: 2025-01-03

## 2025-01-03 RX ORDER — SODIUM CHLORIDE 0.9 % (FLUSH) 0.9 %
10 SYRINGE (ML) INJECTION
Status: CANCELLED | OUTPATIENT
Start: 2025-01-03

## 2025-01-03 RX ORDER — EPINEPHRINE 0.3 MG/.3ML
0.3 INJECTION SUBCUTANEOUS ONCE AS NEEDED
Status: DISCONTINUED | OUTPATIENT
Start: 2025-01-03 | End: 2025-01-03 | Stop reason: HOSPADM

## 2025-01-03 RX ORDER — HEPARIN 100 UNIT/ML
500 SYRINGE INTRAVENOUS
Status: DISCONTINUED | OUTPATIENT
Start: 2025-01-03 | End: 2025-01-03 | Stop reason: HOSPADM

## 2025-01-03 RX ADMIN — SODIUM CHLORIDE 200 MG: 9 INJECTION, SOLUTION INTRAVENOUS at 03:01

## 2025-01-03 NOTE — PLAN OF CARE
1607-Pt tolerated Keytruda well today, no complaints or complications. VSS. Pt aware to call provider with any questions or concerns and is aware of upcoming appts. Pt ambulatory from clinic with cane, no distress noted.

## 2025-01-18 DIAGNOSIS — C49.9 PRIMARY MYXOFIBROSARCOMA: ICD-10-CM

## 2025-01-18 DIAGNOSIS — G89.3 CANCER RELATED PAIN: ICD-10-CM

## 2025-01-18 DIAGNOSIS — C49.9 SOFT TISSUE SARCOMA: ICD-10-CM

## 2025-01-21 RX ORDER — OXYCODONE HYDROCHLORIDE 10 MG/1
10 TABLET ORAL EVERY 4 HOURS PRN
Qty: 180 TABLET | Refills: 0 | Status: SHIPPED | OUTPATIENT
Start: 2025-01-21

## 2025-01-24 ENCOUNTER — TELEPHONE (OUTPATIENT)
Dept: HEMATOLOGY/ONCOLOGY | Facility: CLINIC | Age: 80
End: 2025-01-24
Payer: MEDICARE

## 2025-01-30 DIAGNOSIS — Z00.00 ENCOUNTER FOR MEDICARE ANNUAL WELLNESS EXAM: ICD-10-CM

## 2025-02-04 ENCOUNTER — INFUSION (OUTPATIENT)
Dept: INFUSION THERAPY | Facility: HOSPITAL | Age: 80
End: 2025-02-04
Attending: INTERNAL MEDICINE
Payer: MEDICARE

## 2025-02-04 ENCOUNTER — LAB VISIT (OUTPATIENT)
Dept: LAB | Facility: HOSPITAL | Age: 80
End: 2025-02-04
Payer: MEDICARE

## 2025-02-04 ENCOUNTER — OFFICE VISIT (OUTPATIENT)
Dept: HEMATOLOGY/ONCOLOGY | Facility: CLINIC | Age: 80
End: 2025-02-04
Payer: MEDICARE

## 2025-02-04 VITALS
DIASTOLIC BLOOD PRESSURE: 71 MMHG | TEMPERATURE: 98 F | HEIGHT: 73 IN | SYSTOLIC BLOOD PRESSURE: 158 MMHG | OXYGEN SATURATION: 98 % | RESPIRATION RATE: 18 BRPM | WEIGHT: 183.19 LBS | BODY MASS INDEX: 24.28 KG/M2 | HEART RATE: 51 BPM

## 2025-02-04 VITALS
BODY MASS INDEX: 24.28 KG/M2 | HEIGHT: 73 IN | TEMPERATURE: 98 F | SYSTOLIC BLOOD PRESSURE: 152 MMHG | DIASTOLIC BLOOD PRESSURE: 73 MMHG | HEART RATE: 52 BPM | OXYGEN SATURATION: 98 % | WEIGHT: 183.19 LBS | RESPIRATION RATE: 16 BRPM

## 2025-02-04 DIAGNOSIS — R53.83 FATIGUE, UNSPECIFIED TYPE: ICD-10-CM

## 2025-02-04 DIAGNOSIS — C49.9 PRIMARY MYXOFIBROSARCOMA: Primary | ICD-10-CM

## 2025-02-04 DIAGNOSIS — D84.821 IMMUNODEFICIENCY DUE TO DRUGS: ICD-10-CM

## 2025-02-04 DIAGNOSIS — C77.4 SECONDARY MALIGNANCY OF INGUINAL LYMPH NODES: ICD-10-CM

## 2025-02-04 DIAGNOSIS — C49.9 PRIMARY MYXOFIBROSARCOMA: ICD-10-CM

## 2025-02-04 DIAGNOSIS — C49.9 SOFT TISSUE SARCOMA: ICD-10-CM

## 2025-02-04 DIAGNOSIS — D49.9 IMMUNODEFICIENCY SECONDARY TO NEOPLASM: ICD-10-CM

## 2025-02-04 DIAGNOSIS — D84.81 IMMUNODEFICIENCY SECONDARY TO NEOPLASM: ICD-10-CM

## 2025-02-04 DIAGNOSIS — I10 ESSENTIAL HYPERTENSION: ICD-10-CM

## 2025-02-04 DIAGNOSIS — M25.551 RIGHT HIP PAIN: ICD-10-CM

## 2025-02-04 DIAGNOSIS — Z79.899 IMMUNODEFICIENCY DUE TO DRUGS: ICD-10-CM

## 2025-02-04 DIAGNOSIS — G89.3 CANCER RELATED PAIN: ICD-10-CM

## 2025-02-04 LAB
ALBUMIN SERPL BCP-MCNC: 3.7 G/DL (ref 3.5–5.2)
ALP SERPL-CCNC: 118 U/L (ref 40–150)
ALT SERPL W/O P-5'-P-CCNC: 9 U/L (ref 10–44)
ANION GAP SERPL CALC-SCNC: 9 MMOL/L (ref 8–16)
AST SERPL-CCNC: 10 U/L (ref 10–40)
BILIRUB SERPL-MCNC: 0.5 MG/DL (ref 0.1–1)
BUN SERPL-MCNC: 16 MG/DL (ref 8–23)
CALCIUM SERPL-MCNC: 9.3 MG/DL (ref 8.7–10.5)
CHLORIDE SERPL-SCNC: 100 MMOL/L (ref 95–110)
CO2 SERPL-SCNC: 26 MMOL/L (ref 23–29)
CREAT SERPL-MCNC: 1 MG/DL (ref 0.5–1.4)
ERYTHROCYTE [DISTWIDTH] IN BLOOD BY AUTOMATED COUNT: 14.7 % (ref 11.5–14.5)
EST. GFR  (NO RACE VARIABLE): >60 ML/MIN/1.73 M^2
GLUCOSE SERPL-MCNC: 156 MG/DL (ref 70–110)
HCT VFR BLD AUTO: 39.3 % (ref 40–54)
HGB BLD-MCNC: 12.6 G/DL (ref 14–18)
IMM GRANULOCYTES # BLD AUTO: 0.18 K/UL (ref 0–0.04)
MCH RBC QN AUTO: 29.2 PG (ref 27–31)
MCHC RBC AUTO-ENTMCNC: 32.1 G/DL (ref 32–36)
MCV RBC AUTO: 91 FL (ref 82–98)
NEUTROPHILS # BLD AUTO: 5.4 K/UL (ref 1.8–7.7)
PLATELET # BLD AUTO: 188 K/UL (ref 150–450)
PMV BLD AUTO: 9 FL (ref 9.2–12.9)
POTASSIUM SERPL-SCNC: 4.5 MMOL/L (ref 3.5–5.1)
PROT SERPL-MCNC: 7 G/DL (ref 6–8.4)
RBC # BLD AUTO: 4.32 M/UL (ref 4.6–6.2)
SODIUM SERPL-SCNC: 135 MMOL/L (ref 136–145)
TSH SERPL DL<=0.005 MIU/L-ACNC: 1.49 UIU/ML (ref 0.4–4)
WBC # BLD AUTO: 7.28 K/UL (ref 3.9–12.7)

## 2025-02-04 PROCEDURE — 80053 COMPREHEN METABOLIC PANEL: CPT | Mod: HCNC | Performed by: REGISTERED NURSE

## 2025-02-04 PROCEDURE — 96365 THER/PROPH/DIAG IV INF INIT: CPT | Mod: HCNC

## 2025-02-04 PROCEDURE — 1160F RVW MEDS BY RX/DR IN RCRD: CPT | Mod: HCNC,CPTII,S$GLB, | Performed by: INTERNAL MEDICINE

## 2025-02-04 PROCEDURE — 99215 OFFICE O/P EST HI 40 MIN: CPT | Mod: HCNC,S$GLB,, | Performed by: INTERNAL MEDICINE

## 2025-02-04 PROCEDURE — 99999 PR PBB SHADOW E&M-EST. PATIENT-LVL IV: CPT | Mod: PBBFAC,HCNC,, | Performed by: INTERNAL MEDICINE

## 2025-02-04 PROCEDURE — 1125F AMNT PAIN NOTED PAIN PRSNT: CPT | Mod: HCNC,CPTII,S$GLB, | Performed by: INTERNAL MEDICINE

## 2025-02-04 PROCEDURE — 1101F PT FALLS ASSESS-DOCD LE1/YR: CPT | Mod: HCNC,CPTII,S$GLB, | Performed by: INTERNAL MEDICINE

## 2025-02-04 PROCEDURE — 63600175 PHARM REV CODE 636 W HCPCS: Mod: JZ,TB,HCNC | Performed by: INTERNAL MEDICINE

## 2025-02-04 PROCEDURE — 36415 COLL VENOUS BLD VENIPUNCTURE: CPT | Mod: HCNC | Performed by: REGISTERED NURSE

## 2025-02-04 PROCEDURE — 25000003 PHARM REV CODE 250: Mod: HCNC | Performed by: INTERNAL MEDICINE

## 2025-02-04 PROCEDURE — 84443 ASSAY THYROID STIM HORMONE: CPT | Mod: HCNC | Performed by: REGISTERED NURSE

## 2025-02-04 PROCEDURE — 3077F SYST BP >= 140 MM HG: CPT | Mod: HCNC,CPTII,S$GLB, | Performed by: INTERNAL MEDICINE

## 2025-02-04 PROCEDURE — 3078F DIAST BP <80 MM HG: CPT | Mod: HCNC,CPTII,S$GLB, | Performed by: INTERNAL MEDICINE

## 2025-02-04 PROCEDURE — 85027 COMPLETE CBC AUTOMATED: CPT | Mod: HCNC | Performed by: REGISTERED NURSE

## 2025-02-04 PROCEDURE — G2211 COMPLEX E/M VISIT ADD ON: HCPCS | Mod: HCNC,S$GLB,, | Performed by: INTERNAL MEDICINE

## 2025-02-04 PROCEDURE — 3288F FALL RISK ASSESSMENT DOCD: CPT | Mod: HCNC,CPTII,S$GLB, | Performed by: INTERNAL MEDICINE

## 2025-02-04 PROCEDURE — 1159F MED LIST DOCD IN RCRD: CPT | Mod: HCNC,CPTII,S$GLB, | Performed by: INTERNAL MEDICINE

## 2025-02-04 RX ORDER — EPINEPHRINE 0.3 MG/.3ML
0.3 INJECTION SUBCUTANEOUS ONCE AS NEEDED
Status: CANCELLED | OUTPATIENT
Start: 2025-02-04

## 2025-02-04 RX ORDER — SODIUM CHLORIDE 0.9 % (FLUSH) 0.9 %
10 SYRINGE (ML) INJECTION
Status: DISCONTINUED | OUTPATIENT
Start: 2025-02-04 | End: 2025-02-04 | Stop reason: HOSPADM

## 2025-02-04 RX ORDER — HEPARIN 100 UNIT/ML
500 SYRINGE INTRAVENOUS
Status: CANCELLED | OUTPATIENT
Start: 2025-02-04

## 2025-02-04 RX ORDER — DIPHENHYDRAMINE HYDROCHLORIDE 50 MG/ML
50 INJECTION INTRAMUSCULAR; INTRAVENOUS ONCE AS NEEDED
Status: CANCELLED | OUTPATIENT
Start: 2025-02-04

## 2025-02-04 RX ORDER — SODIUM CHLORIDE 0.9 % (FLUSH) 0.9 %
10 SYRINGE (ML) INJECTION
Status: CANCELLED | OUTPATIENT
Start: 2025-02-04

## 2025-02-04 RX ORDER — DIPHENHYDRAMINE HYDROCHLORIDE 50 MG/ML
50 INJECTION INTRAMUSCULAR; INTRAVENOUS ONCE AS NEEDED
Status: DISCONTINUED | OUTPATIENT
Start: 2025-02-04 | End: 2025-02-04 | Stop reason: HOSPADM

## 2025-02-04 RX ORDER — EPINEPHRINE 0.3 MG/.3ML
0.3 INJECTION SUBCUTANEOUS ONCE AS NEEDED
Status: DISCONTINUED | OUTPATIENT
Start: 2025-02-04 | End: 2025-02-04 | Stop reason: HOSPADM

## 2025-02-04 RX ORDER — HEPARIN 100 UNIT/ML
500 SYRINGE INTRAVENOUS
Status: DISCONTINUED | OUTPATIENT
Start: 2025-02-04 | End: 2025-02-04 | Stop reason: HOSPADM

## 2025-02-04 RX ADMIN — SODIUM CHLORIDE: 9 INJECTION, SOLUTION INTRAVENOUS at 08:02

## 2025-02-04 RX ADMIN — SODIUM CHLORIDE 200 MG: 9 INJECTION, SOLUTION INTRAVENOUS at 09:02

## 2025-02-04 NOTE — PROGRESS NOTES
"                                    PROGRESS NOTE    Subjective:       Patient ID: Zac Plunkett is a 79 y.o. male.    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 JD. TMB 6.3    Oncologic History copied from medical chart:  Prior patient of Dr Lucia's  2021  May              5/27- US right thigh: showed 2 hypoechoic masses in the subcutaneous tissues of the right anterior thigh. Largest measured 1.1 x 0.9 x 1 cm. Read at the time was concern for lymphadenopathy.   June 6/4 - CXR: "Bilateral reticular opacities are present in a perihilar distribution which appears slightly more pronounced compared to prior exams."               6/11 - underwent excisional biopsy of the right thigh; pathology came back positive for myxofibrosarcoma, high grade. Path a 1.8 x 1.1 x 0.9 cm mass with an adjacent 0.4 x 0.4 x 0.3 cm mass.   July/Aug              - IMRT with Dr. Mcadams  2022 January 1/12 - PET: locoregional recurrence and concerning 0.9 cm RUL lesion              1/20 - Path: excision on 2 recurrent lesions:  Myxofibrosarcoma, 1.9 cm at longest length, PD-L1 25%              1/31 - CT thigh: successful removal w/o evidence of recurrent disease  April 4/28 - recurrence, locally with metastatic deposit in LN. S/p excision on 4/28/22. Path: right thigh superior mass: Myxofibrosarcoma, high grade with focal tumor necrosis, incompletely excised. A portion of the lesion is suggestive of an involved lymph node exhibiting metastatic sarcoma with extranodal extension.  Sarcoma is focally present within inked but otherwise unspecified peripheral margins.   July 7/19 - CT thigh showed local recurrence and a 0.7 cm R inguinal lymph node    2023  [facility]   1/3 - CT thigh/pelvis showed "Heterogeneous enhancing soft tissue " "lesions and nodular foci at the anterior thigh and right groin highly concerning for worsening local recurrent disease with talon metastasis"    - stable pulmonary nodules on CT chest  - completed palliative radiation to right thigh  March  3/24- started keytruda, s/p 28 cycles    Interval History:   Mr Plunkett returns for follow up. Over the past week, he notices more pain involving the right leg. He is scheduled to see his orthopedics doctor this Thursday. He remembers being told when he got the second round of radiation that further radiation could damage his bone.     ECO.     ROS:   A ten-point system review is obtained and negative except for what was stated in the Interval History.     Physical Examination:   Vital signs reviewed  General: well hydrated, well developed, in no acute distress  HEENT: normocephalic, EOMI, anicteric sclerae  Neck: supple, no JVD, thyromegaly, no cervical or supraclavicular LAD  Lungs: CTAB, no wheezing/rales/rhonchi  Heart: RRR, no M/R/G  Abdomen: soft, NT, ND, no hepatosplenomegaly, no hernia or mass, BS present  Ext: no clubbing, cyanosis, edema  Neuro: alert and oriented x 4  Psych: pleasant and appropriate mood and affect    Objective:     Laboratory Data:  Labs reviewed. CBC adequate for treatment.     Imaging Data:    CT thigh 24:  FINDINGS:  The visualized intra-abdominal content is significant for arterial calcification.  Note is made of diverticulosis coli.  The prostate is enlarged measuring 4.9 cm.  There is postoperative change involving the anterior right thigh.     Impression:     No acute findings with no detrimental interval change compared to previous performed 2024.       CT C/A/P 24:  FINDINGS:  The visualized portion of the base of the lungs is significant for mild bilateral basilar atelectatic versus fibrotic change.  The visualized portion of the heart is significant for calcification of the right coronary artery.  The " stomach is unremarkable.  There is a duodenal diverticulum.  The spleen is unremarkable.  There is fatty atrophy of the pancreas.  The liver and gallbladder are unremarkable.  The adrenal glands are unremarkable.  There is calcification of the aorta which extends into its branches.  The right kidney is unremarkable.  There is a left sided renal cyst.  The bladder is unremarkable.  There is a fat containing left inguinal hernia.  The bowel is unremarkable.  The osseous structures demonstrate degenerative change.  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     Impression:     No acute findings.  Assessment and Plan:     1. Primary myxofibrosarcoma    2. 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 Cancer related pain    7. Essential hypertension    8. Right hip pain          1-5  - Mr Plunkett is a 80 yo man with recurrent myxofibrosarcoma of the right thigh, has had 3 resections and IMRT  - he was seen by Dr. Mao and Dr. Unger, they did not feel that he would be a good surgical candidate and recommended palliative RT  - completed palliative RT 2/13/2023  - Previously discussed systemic treatment options, including chemotherapy and immunotherapy. He was not interested in chemotherapy including TKI. Pembrolizumab has efficacy in phase II studies and is recommended on NCCN Guidelines for patients with myxofibrosarcoma. PD-L1 is 25%. However, his TMB is not high. Pembrolizumab was denied by insurance and then denied at hearing. He got patient assistance through [employer]  - started palliative keytruda on 3/24/23  - he has new right leg pain over the past 2 weeks. Will get restaging scan to make sure it is not cancer regrowth. It could be osteonecrosis of the femoral head. He is seeing orthopedics this Thursday  - cycle 29 keytruda today  - return in 3 weeks for cycle 30 keytruda.     6.  - C/w oxycodone 10 mg  prn    7.  - BP okay. Monitor    8.  - see above. Seeing orthopedics this week    Follow-up:     In 3 weeks    Route Chart for Scheduling    Med Onc Chart Routing  Urgent    Follow up with physician 3 weeks. see me in 3 weeks with CBC, CMP, TSH, CT right thigh, CT C/A/P at [place] 2 days prior to seeing me, see me then get keytruda. see NICOLLE in 6 weeks with labs then keytruda   Follow up with NICOLLE    Infusion scheduling note   keytruda every 3 weeks   Injection scheduling note    Labs CBC, CMP and TSH   Scheduling:  Preferred lab:  Lab interval:     Imaging CT chest abdomen pelvis and other   CT thigh, CT C/A/P at [place] in 3 weeks   Pharmacy appointment    Other referrals          Treatment Plan Information   OP PEMBROLIZUMAB 200MG Q3W Ed Martines MD   Associated diagnosis: Primary myxofibrosarcoma   noted on 8/18/2022   Line of treatment: First Line  Treatment Goal: Palliative     Upcoming Treatment Dates - OP PEMBROLIZUMAB 200MG Q3W    1/4/2025       Chemotherapy       pembrolizumab (KEYTRUDA) 200 mg in 0.9% NaCl SolP 108 mL infusion  1/25/2025       Chemotherapy       pembrolizumab (KEYTRUDA) 200 mg in 0.9% NaCl SolP 108 mL infusion  2/15/2025       Chemotherapy       pembrolizumab (KEYTRUDA) 200 mg in 0.9% NaCl SolP 108 mL infusion  3/8/2025       Chemotherapy       pembrolizumab (KEYTRUDA) 200 mg in 0.9% NaCl SolP 108 mL infusion

## 2025-02-04 NOTE — PLAN OF CARE
Pt seated. NAD. VS charted in Flowsheets. Assessment done. PIV to R wrist, blood return noted, flushed, and infusing NS@25ml/hr while awaiting Keytruda from pharmacy. [facility] for safety.

## 2025-02-04 NOTE — PLAN OF CARE
Treatment completed. Pt tolerated Keytruda  well. NAD. VS charted in Flowsheets. PIV discontinued. Ambulated off unit independently.

## 2025-02-20 DIAGNOSIS — G89.3 CANCER RELATED PAIN: ICD-10-CM

## 2025-02-20 DIAGNOSIS — C49.9 PRIMARY MYXOFIBROSARCOMA: ICD-10-CM

## 2025-02-20 DIAGNOSIS — C49.9 SOFT TISSUE SARCOMA: ICD-10-CM

## 2025-02-21 RX ORDER — OXYCODONE HYDROCHLORIDE 10 MG/1
10 TABLET ORAL EVERY 4 HOURS PRN
Qty: 180 TABLET | Refills: 0 | Status: SHIPPED | OUTPATIENT
Start: 2025-02-21

## 2025-02-25 ENCOUNTER — OFFICE VISIT (OUTPATIENT)
Dept: HEMATOLOGY/ONCOLOGY | Facility: CLINIC | Age: 80
End: 2025-02-25
Payer: MEDICARE

## 2025-02-25 ENCOUNTER — INFUSION (OUTPATIENT)
Dept: INFUSION THERAPY | Facility: HOSPITAL | Age: 80
End: 2025-02-25
Attending: INTERNAL MEDICINE
Payer: MEDICARE

## 2025-02-25 ENCOUNTER — LAB VISIT (OUTPATIENT)
Dept: LAB | Facility: HOSPITAL | Age: 80
End: 2025-02-25
Payer: MEDICARE

## 2025-02-25 VITALS
DIASTOLIC BLOOD PRESSURE: 68 MMHG | SYSTOLIC BLOOD PRESSURE: 160 MMHG | BODY MASS INDEX: 24.11 KG/M2 | HEART RATE: 47 BPM | HEIGHT: 73 IN | RESPIRATION RATE: 14 BRPM | OXYGEN SATURATION: 100 % | WEIGHT: 181.88 LBS | TEMPERATURE: 98 F

## 2025-02-25 DIAGNOSIS — Z79.899 IMMUNODEFICIENCY DUE TO DRUGS: ICD-10-CM

## 2025-02-25 DIAGNOSIS — D84.81 IMMUNODEFICIENCY SECONDARY TO NEOPLASM: ICD-10-CM

## 2025-02-25 DIAGNOSIS — C77.4 SECONDARY MALIGNANCY OF INGUINAL LYMPH NODES: ICD-10-CM

## 2025-02-25 DIAGNOSIS — R53.83 FATIGUE, UNSPECIFIED TYPE: ICD-10-CM

## 2025-02-25 DIAGNOSIS — C49.9 SOFT TISSUE SARCOMA: ICD-10-CM

## 2025-02-25 DIAGNOSIS — I10 ESSENTIAL HYPERTENSION: ICD-10-CM

## 2025-02-25 DIAGNOSIS — G89.3 CANCER RELATED PAIN: ICD-10-CM

## 2025-02-25 DIAGNOSIS — C49.9 PRIMARY MYXOFIBROSARCOMA: Primary | ICD-10-CM

## 2025-02-25 DIAGNOSIS — D49.9 IMMUNODEFICIENCY SECONDARY TO NEOPLASM: ICD-10-CM

## 2025-02-25 DIAGNOSIS — C49.9 PRIMARY MYXOFIBROSARCOMA: ICD-10-CM

## 2025-02-25 DIAGNOSIS — D84.821 IMMUNODEFICIENCY DUE TO DRUGS: ICD-10-CM

## 2025-02-25 LAB
ALBUMIN SERPL BCP-MCNC: 3.6 G/DL (ref 3.5–5.2)
ALP SERPL-CCNC: 131 U/L (ref 40–150)
ALT SERPL W/O P-5'-P-CCNC: 10 U/L (ref 10–44)
ANION GAP SERPL CALC-SCNC: 8 MMOL/L (ref 8–16)
AST SERPL-CCNC: 15 U/L (ref 10–40)
BILIRUB SERPL-MCNC: 0.5 MG/DL (ref 0.1–1)
BUN SERPL-MCNC: 14 MG/DL (ref 8–23)
CALCIUM SERPL-MCNC: 9.2 MG/DL (ref 8.7–10.5)
CHLORIDE SERPL-SCNC: 101 MMOL/L (ref 95–110)
CO2 SERPL-SCNC: 27 MMOL/L (ref 23–29)
CREAT SERPL-MCNC: 0.9 MG/DL (ref 0.5–1.4)
ERYTHROCYTE [DISTWIDTH] IN BLOOD BY AUTOMATED COUNT: 14.6 % (ref 11.5–14.5)
EST. GFR  (NO RACE VARIABLE): >60 ML/MIN/1.73 M^2
GLUCOSE SERPL-MCNC: 111 MG/DL (ref 70–110)
HCT VFR BLD AUTO: 40.9 % (ref 40–54)
HGB BLD-MCNC: 13.7 G/DL (ref 14–18)
IMM GRANULOCYTES # BLD AUTO: 0.19 K/UL (ref 0–0.04)
MCH RBC QN AUTO: 29.8 PG (ref 27–31)
MCHC RBC AUTO-ENTMCNC: 33.5 G/DL (ref 32–36)
MCV RBC AUTO: 89 FL (ref 82–98)
NEUTROPHILS # BLD AUTO: 5.1 K/UL (ref 1.8–7.7)
PLATELET # BLD AUTO: 178 K/UL (ref 150–450)
PMV BLD AUTO: 9.4 FL (ref 9.2–12.9)
POTASSIUM SERPL-SCNC: 4.1 MMOL/L (ref 3.5–5.1)
PROT SERPL-MCNC: 7 G/DL (ref 6–8.4)
RBC # BLD AUTO: 4.59 M/UL (ref 4.6–6.2)
SODIUM SERPL-SCNC: 136 MMOL/L (ref 136–145)
TSH SERPL DL<=0.005 MIU/L-ACNC: 1.92 UIU/ML (ref 0.4–4)
WBC # BLD AUTO: 7.02 K/UL (ref 3.9–12.7)

## 2025-02-25 PROCEDURE — 36415 COLL VENOUS BLD VENIPUNCTURE: CPT | Mod: HCNC | Performed by: REGISTERED NURSE

## 2025-02-25 PROCEDURE — 96413 CHEMO IV INFUSION 1 HR: CPT | Mod: HCNC

## 2025-02-25 PROCEDURE — 25000003 PHARM REV CODE 250: Mod: HCNC | Performed by: INTERNAL MEDICINE

## 2025-02-25 PROCEDURE — 85027 COMPLETE CBC AUTOMATED: CPT | Mod: HCNC | Performed by: REGISTERED NURSE

## 2025-02-25 PROCEDURE — 63600175 PHARM REV CODE 636 W HCPCS: Mod: JZ,TB,HCNC | Performed by: INTERNAL MEDICINE

## 2025-02-25 PROCEDURE — 99999 PR PBB SHADOW E&M-EST. PATIENT-LVL IV: CPT | Mod: PBBFAC,HCNC,, | Performed by: INTERNAL MEDICINE

## 2025-02-25 PROCEDURE — 84443 ASSAY THYROID STIM HORMONE: CPT | Mod: HCNC | Performed by: REGISTERED NURSE

## 2025-02-25 PROCEDURE — 80053 COMPREHEN METABOLIC PANEL: CPT | Mod: HCNC | Performed by: REGISTERED NURSE

## 2025-02-25 RX ORDER — HEPARIN 100 UNIT/ML
500 SYRINGE INTRAVENOUS
Status: DISCONTINUED | OUTPATIENT
Start: 2025-02-25 | End: 2025-02-25 | Stop reason: HOSPADM

## 2025-02-25 RX ORDER — EPINEPHRINE 0.3 MG/.3ML
0.3 INJECTION SUBCUTANEOUS ONCE AS NEEDED
Status: CANCELLED | OUTPATIENT
Start: 2025-02-25

## 2025-02-25 RX ORDER — SODIUM CHLORIDE 0.9 % (FLUSH) 0.9 %
10 SYRINGE (ML) INJECTION
Status: CANCELLED | OUTPATIENT
Start: 2025-02-25

## 2025-02-25 RX ORDER — DIPHENHYDRAMINE HYDROCHLORIDE 50 MG/ML
50 INJECTION INTRAMUSCULAR; INTRAVENOUS ONCE AS NEEDED
Status: CANCELLED | OUTPATIENT
Start: 2025-02-25

## 2025-02-25 RX ORDER — HEPARIN 100 UNIT/ML
500 SYRINGE INTRAVENOUS
Status: CANCELLED | OUTPATIENT
Start: 2025-02-25

## 2025-02-25 RX ORDER — EPINEPHRINE 0.3 MG/.3ML
0.3 INJECTION SUBCUTANEOUS ONCE AS NEEDED
Status: DISCONTINUED | OUTPATIENT
Start: 2025-02-25 | End: 2025-02-25 | Stop reason: HOSPADM

## 2025-02-25 RX ORDER — GABAPENTIN 300 MG/1
300 CAPSULE ORAL 2 TIMES DAILY PRN
COMMUNITY
Start: 2025-02-06

## 2025-02-25 RX ORDER — DIPHENHYDRAMINE HYDROCHLORIDE 50 MG/ML
50 INJECTION INTRAMUSCULAR; INTRAVENOUS ONCE AS NEEDED
Status: DISCONTINUED | OUTPATIENT
Start: 2025-02-25 | End: 2025-02-25 | Stop reason: HOSPADM

## 2025-02-25 RX ORDER — SODIUM CHLORIDE 0.9 % (FLUSH) 0.9 %
10 SYRINGE (ML) INJECTION
Status: DISCONTINUED | OUTPATIENT
Start: 2025-02-25 | End: 2025-02-25 | Stop reason: HOSPADM

## 2025-02-25 RX ADMIN — SODIUM CHLORIDE: 9 INJECTION, SOLUTION INTRAVENOUS at 01:02

## 2025-02-25 RX ADMIN — SODIUM CHLORIDE 200 MG: 9 INJECTION, SOLUTION INTRAVENOUS at 01:02

## 2025-02-25 NOTE — PLAN OF CARE
Pt here for C30 Keytruda. Labs and clinic visit complete. PIV started.    14:20- Pt tolerated treatment. RTC 3/18, D/C in stable condition, ambulated independently.

## 2025-02-25 NOTE — PROGRESS NOTES
"                                    PROGRESS NOTE    Subjective:       Patient ID: Zac Plunkett is a 79 y.o. male.    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 JD. TMB 6.3    Oncologic History copied from medical chart:  Prior patient of Dr Lucia's  2021  May              5/27- US right thigh: showed 2 hypoechoic masses in the subcutaneous tissues of the right anterior thigh. Largest measured 1.1 x 0.9 x 1 cm. Read at the time was concern for lymphadenopathy.   June 6/4 - CXR: "Bilateral reticular opacities are present in a perihilar distribution which appears slightly more pronounced compared to prior exams."               6/11 - underwent excisional biopsy of the right thigh; pathology came back positive for myxofibrosarcoma, high grade. Path a 1.8 x 1.1 x 0.9 cm mass with an adjacent 0.4 x 0.4 x 0.3 cm mass.   July/Aug              - IMRT with Dr. Mcadams  2022 January 1/12 - PET: locoregional recurrence and concerning 0.9 cm RUL lesion              1/20 - Path: excision on 2 recurrent lesions:  Myxofibrosarcoma, 1.9 cm at longest length, PD-L1 25%              1/31 - CT thigh: successful removal w/o evidence of recurrent disease  April 4/28 - recurrence, locally with metastatic deposit in LN. S/p excision on 4/28/22. Path: right thigh superior mass: Myxofibrosarcoma, high grade with focal tumor necrosis, incompletely excised. A portion of the lesion is suggestive of an involved lymph node exhibiting metastatic sarcoma with extranodal extension.  Sarcoma is focally present within inked but otherwise unspecified peripheral margins.   July 7/19 - CT thigh showed local recurrence and a 0.7 cm R inguinal lymph node    2023  [facility]   1/3 - CT thigh/pelvis showed "Heterogeneous enhancing soft tissue " "lesions and nodular foci at the anterior thigh and right groin highly concerning for worsening local recurrent disease with talon metastasis"    - stable pulmonary nodules on CT chest  Feb      - completed palliative radiation to right thigh  March  3/24- started keytruda, s/p 29 cycles    Interval History:   Mr Plunkett returns for follow up. He went to see ortho and had xray done. Was told it could be nerve damage. Started on gabapentin 1 week ago.     ECO.     ROS:   A ten-point system review is obtained and negative except for what was stated in the Interval History.     Physical Examination:   Vital signs reviewed  General: well hydrated, well developed, in no acute distress  HEENT: normocephalic, EOMI, anicteric sclerae  Neck: supple, no JVD, thyromegaly, no cervical or supraclavicular LAD  Lungs: CTAB, no wheezing/rales/rhonchi  Heart: RRR, no M/R/G  Abdomen: soft, NT, ND, no hepatosplenomegaly, no hernia or mass, BS present  Ext: no clubbing, cyanosis, edema  Neuro: alert and oriented x 4  Psych: pleasant and appropriate mood and affect    Objective:     Laboratory Data:  Labs reviewed. CBC, CMP adequate for treatment. TSH normal     Imaging Data:    CT CAP 25:  CLINICAL HISTORY:  Metastatic disease evaluation; Malignant neoplasm of connective and soft tissue, unspecified     TECHNIQUE:  Low dose axial images, sagittal and coronal reformations were obtained from the thoracic inlet to the pubic symphysis following the IV administration of 150 mL of Omnipaque 350     FINDINGS:  The soft tissues and vascular structures at the base the neck are unremarkable.  The mediastinum including the heart and great vessels is significant for calcification of the aorta, aortic annulus, and coronary arteries.  There is a 1.2 cm pretracheal lymph node unchanged compared to previous performed 2024.  There is a 1.3 cm subcarinal lymph node unchanged compared to previous.  There is a prominent left perihilar " lymph node unchanged compared to prior.  The trachea is patent and free of any intraluminal filling defects.  The lungs are significant for centrilobular emphysema.  There is dependent atelectatic versus fibrotic change.  There is no pleural or pericardial fluid.     The stomach is unremarkable.  There is a large duodenal diverticulum.  The spleen is unremarkable.  There is fatty atrophy of the pancreas.  The liver and gallbladder are unremarkable.  There is mild nonspecific thickening of the adrenal glands bilaterally.  There is abdominal aortic plaque.  The right kidney is unremarkable.  There is a 6 cm left-sided renal hypodensity consistent with a cyst.  The bladder is decompressed and poorly evaluated.  There is a left inguinal hernia containing a short segment of bowel.  The bowel is otherwise unremarkable.  The osseous structures demonstrate degenerative change.  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  There is grade 1 anterolisthesis of L4 in relation to L5.     Impression:     No acute findings.    CT thigh 2/21/25:  FINDINGS:  The visualized intra-abdominal content and soft tissues and vascular structures of the visualized upper right thigh are significant for arterial calcification.  There is loss of right hip joint space.  There is a cores calcification within the right knee joint space.     Impression:     No acute findings.  Assessment and Plan:     1. Primary myxofibrosarcoma    2. 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 Cancer related pain    7. Essential hypertension        1-5  - Mr Plunkett is a 80 yo man with recurrent myxofibrosarcoma of the right thigh, has had 3 resections and IMRT  - he was seen by Dr. Mao and Dr. Unger, they did not feel that he would be a good surgical candidate and recommended palliative RT  - completed palliative RT 2/13/2023  - Previously discussed  systemic treatment options, including chemotherapy and immunotherapy. He was not interested in chemotherapy including TKI. Pembrolizumab has efficacy in phase II studies and is recommended on NCCN Guidelines for patients with myxofibrosarcoma. PD-L1 is 25%. However, his TMB is not high. Pembrolizumab was denied by insurance and then denied at hearing. He got patient assistance through [employer]  - started palliative keytruda on 3/24/23  - reviewed CT results with patient. Stable disease.   - c/w keytruda every 3 weeks  - RTC in 3 weeks    6.  - C/w oxycodone 10 mg prn    7.  - BP elevated  - f/u with PCP    Follow-up:     In 3 weeks    Route Chart for Scheduling    Med Onc Chart Routing      Follow up with physician . See NICOLLE in 3 and 6 weeks with labs then keytruda. see me in 9 weeks with labs then keytruda   Follow up with NICOLLE    Infusion scheduling note   keytruda every 3 weeks   Injection scheduling note    Labs CBC, CMP and TSH   Scheduling:  Preferred lab:  Lab interval: every 3 weeks     Imaging    Pharmacy appointment    Other referrals          Treatment Plan Information   OP PEMBROLIZUMAB 200MG Q3W Ed Martines MD   Associated diagnosis: Primary myxofibrosarcoma   noted on 8/18/2022   Line of treatment: First Line  Treatment Goal: Palliative     Upcoming Treatment Dates - OP PEMBROLIZUMAB 200MG Q3W    2/25/2025       Chemotherapy       pembrolizumab (KEYTRUDA) 200 mg in 0.9% NaCl SolP 108 mL infusion  3/18/2025       Chemotherapy       pembrolizumab (KEYTRUDA) 200 mg in 0.9% NaCl SolP 108 mL infusion  4/8/2025       Chemotherapy       pembrolizumab (KEYTRUDA) 200 mg in 0.9% NaCl SolP 108 mL infusion  4/29/2025       Chemotherapy       pembrolizumab (KEYTRUDA) 200 mg in 0.9% NaCl SolP 108 mL infusion

## 2025-03-18 ENCOUNTER — INFUSION (OUTPATIENT)
Dept: INFUSION THERAPY | Facility: HOSPITAL | Age: 80
End: 2025-03-18
Attending: INTERNAL MEDICINE
Payer: MEDICARE

## 2025-03-18 ENCOUNTER — OFFICE VISIT (OUTPATIENT)
Dept: HEMATOLOGY/ONCOLOGY | Facility: CLINIC | Age: 80
End: 2025-03-18
Payer: MEDICARE

## 2025-03-18 VITALS
HEART RATE: 52 BPM | SYSTOLIC BLOOD PRESSURE: 157 MMHG | HEIGHT: 73 IN | RESPIRATION RATE: 18 BRPM | DIASTOLIC BLOOD PRESSURE: 72 MMHG | WEIGHT: 180.31 LBS | TEMPERATURE: 98 F | BODY MASS INDEX: 23.9 KG/M2 | OXYGEN SATURATION: 99 %

## 2025-03-18 VITALS
RESPIRATION RATE: 14 BRPM | HEIGHT: 73 IN | OXYGEN SATURATION: 99 % | HEART RATE: 50 BPM | BODY MASS INDEX: 23.9 KG/M2 | DIASTOLIC BLOOD PRESSURE: 70 MMHG | WEIGHT: 180.31 LBS | SYSTOLIC BLOOD PRESSURE: 170 MMHG

## 2025-03-18 DIAGNOSIS — C77.4 SECONDARY MALIGNANCY OF INGUINAL LYMPH NODES: ICD-10-CM

## 2025-03-18 DIAGNOSIS — D49.9 IMMUNODEFICIENCY SECONDARY TO NEOPLASM: ICD-10-CM

## 2025-03-18 DIAGNOSIS — C49.9 PRIMARY MYXOFIBROSARCOMA: Primary | ICD-10-CM

## 2025-03-18 DIAGNOSIS — D84.81 IMMUNODEFICIENCY SECONDARY TO NEOPLASM: ICD-10-CM

## 2025-03-18 DIAGNOSIS — D84.821 IMMUNODEFICIENCY DUE TO DRUGS: ICD-10-CM

## 2025-03-18 DIAGNOSIS — I10 ESSENTIAL HYPERTENSION: ICD-10-CM

## 2025-03-18 DIAGNOSIS — C49.9 SOFT TISSUE SARCOMA: ICD-10-CM

## 2025-03-18 DIAGNOSIS — G89.3 CANCER RELATED PAIN: ICD-10-CM

## 2025-03-18 DIAGNOSIS — C49.9 PRIMARY MYXOFIBROSARCOMA: ICD-10-CM

## 2025-03-18 DIAGNOSIS — Z79.899 IMMUNODEFICIENCY DUE TO DRUGS: ICD-10-CM

## 2025-03-18 DIAGNOSIS — R53.83 FATIGUE, UNSPECIFIED TYPE: ICD-10-CM

## 2025-03-18 PROCEDURE — 99999 PR PBB SHADOW E&M-EST. PATIENT-LVL IV: CPT | Mod: PBBFAC,HCNC,, | Performed by: PHYSICIAN ASSISTANT

## 2025-03-18 PROCEDURE — 25000003 PHARM REV CODE 250: Mod: HCNC | Performed by: PHYSICIAN ASSISTANT

## 2025-03-18 PROCEDURE — 96413 CHEMO IV INFUSION 1 HR: CPT | Mod: HCNC

## 2025-03-18 PROCEDURE — 63600175 PHARM REV CODE 636 W HCPCS: Mod: JZ,TB,HCNC | Performed by: PHYSICIAN ASSISTANT

## 2025-03-18 RX ORDER — OXYCODONE HYDROCHLORIDE 10 MG/1
10 TABLET ORAL EVERY 4 HOURS PRN
Qty: 180 TABLET | Refills: 0 | Status: SHIPPED | OUTPATIENT
Start: 2025-03-18

## 2025-03-18 RX ORDER — HEPARIN 100 UNIT/ML
500 SYRINGE INTRAVENOUS
Status: CANCELLED | OUTPATIENT
Start: 2025-03-18

## 2025-03-18 RX ORDER — DIPHENHYDRAMINE HYDROCHLORIDE 50 MG/ML
50 INJECTION, SOLUTION INTRAMUSCULAR; INTRAVENOUS ONCE AS NEEDED
Status: DISCONTINUED | OUTPATIENT
Start: 2025-03-18 | End: 2025-03-18 | Stop reason: HOSPADM

## 2025-03-18 RX ORDER — EPINEPHRINE 0.3 MG/.3ML
0.3 INJECTION SUBCUTANEOUS ONCE AS NEEDED
Status: DISCONTINUED | OUTPATIENT
Start: 2025-03-18 | End: 2025-03-18 | Stop reason: HOSPADM

## 2025-03-18 RX ORDER — EPINEPHRINE 0.3 MG/.3ML
0.3 INJECTION SUBCUTANEOUS ONCE AS NEEDED
Status: CANCELLED | OUTPATIENT
Start: 2025-03-18

## 2025-03-18 RX ORDER — DIPHENHYDRAMINE HYDROCHLORIDE 50 MG/ML
50 INJECTION, SOLUTION INTRAMUSCULAR; INTRAVENOUS ONCE AS NEEDED
Status: CANCELLED | OUTPATIENT
Start: 2025-03-18

## 2025-03-18 RX ORDER — SODIUM CHLORIDE 0.9 % (FLUSH) 0.9 %
10 SYRINGE (ML) INJECTION
Status: CANCELLED | OUTPATIENT
Start: 2025-03-18

## 2025-03-18 RX ADMIN — SODIUM CHLORIDE 200 MG: 9 INJECTION, SOLUTION INTRAVENOUS at 01:03

## 2025-03-18 NOTE — PLAN OF CARE
"BP (!) 164/75 (Patient Position: Sitting)   Pulse (!) 50   Temp 97.9 °F (36.6 °C)   Resp 18   Ht 6' 1" (1.854 m)   Wt 81.8 kg (180 lb 5.4 oz)   SpO2 99%   BMI 23.79 kg/m²  Pleasant, alert and oriented patient to Chemo Infusion per self for C31 Keytruda - VSS and PIV started x1 attempt with immediate flashback observed, flushed with NS, site secured and patient tolerated procedure well - Patient tolerated treatment with no AVE's, PIV discontinued, pressure dressing applied and patient discharged to home with no concerns - RTC on 4/8/25    "

## 2025-03-18 NOTE — PROGRESS NOTES
"                                    PROGRESS NOTE    Subjective:       Patient ID: Zac Plunkett is a 79 y.o. male.    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 JD. TMB 6.3    Oncologic History copied from medical chart:  Prior patient of Dr Lucia's  2021  May              5/27- US right thigh: showed 2 hypoechoic masses in the subcutaneous tissues of the right anterior thigh. Largest measured 1.1 x 0.9 x 1 cm. Read at the time was concern for lymphadenopathy.   June 6/4 - CXR: "Bilateral reticular opacities are present in a perihilar distribution which appears slightly more pronounced compared to prior exams."               6/11 - underwent excisional biopsy of the right thigh; pathology came back positive for myxofibrosarcoma, high grade. Path a 1.8 x 1.1 x 0.9 cm mass with an adjacent 0.4 x 0.4 x 0.3 cm mass.   July/Aug              - IMRT with Dr. Mcadams  2022 January 1/12 - PET: locoregional recurrence and concerning 0.9 cm RUL lesion              1/20 - Path: excision on 2 recurrent lesions:  Myxofibrosarcoma, 1.9 cm at longest length, PD-L1 25%              1/31 - CT thigh: successful removal w/o evidence of recurrent disease  April 4/28 - recurrence, locally with metastatic deposit in LN. S/p excision on 4/28/22. Path: right thigh superior mass: Myxofibrosarcoma, high grade with focal tumor necrosis, incompletely excised. A portion of the lesion is suggestive of an involved lymph node exhibiting metastatic sarcoma with extranodal extension.  Sarcoma is focally present within inked but otherwise unspecified peripheral margins.   July 7/19 - CT thigh showed local recurrence and a 0.7 cm R inguinal lymph node    2023  [facility]   1/3 - CT thigh/pelvis showed "Heterogeneous enhancing soft tissue " "lesions and nodular foci at the anterior thigh and right groin highly concerning for worsening local recurrent disease with talon metastasis"    - stable pulmonary nodules on CT chest  Feb      - completed palliative radiation to right thigh  March  3/24- started keytruda, s/p 30 cycles    Interval History:   Mr Plunkett returns for follow up. He went to see ortho and had xray done. Was told it could be nerve damage. Started on gabapentin 1 week ago and he is doing well with it thus far. He is ambulating and remaining active. He is eating and has a good appetite. No other concerns or complaints today    ECO. Presents with his brother today    ROS:   A ten-point system review is obtained and negative except for what was stated in the Interval History.     Physical Examination:   Vital signs reviewed  General: well hydrated, well developed, in no acute distress  HEENT: normocephalic, EOMI, anicteric sclerae  Neck: supple, no JVD  Lungs: CTAB, no wheezing/rales/rhonchi  Heart: RRR, no M/R/G  Abdomen: soft, NT, ND. BS present  Ext: no clubbing, cyanosis, edema  Neuro: alert and oriented x 4  Psych: pleasant and appropriate mood and affect    Objective:     Laboratory Data:  Labs reviewed. CBC, CMP adequate for treatment. TSH normal     Imaging Data:    CT CAP 25:  CLINICAL HISTORY:  Metastatic disease evaluation; Malignant neoplasm of connective and soft tissue, unspecified     TECHNIQUE:  Low dose axial images, sagittal and coronal reformations were obtained from the thoracic inlet to the pubic symphysis following the IV administration of 150 mL of Omnipaque 350     FINDINGS:  The soft tissues and vascular structures at the base the neck are unremarkable.  The mediastinum including the heart and great vessels is significant for calcification of the aorta, aortic annulus, and coronary arteries.  There is a 1.2 cm pretracheal lymph node unchanged compared to previous performed 2024.  There is a 1.3 " cm subcarinal lymph node unchanged compared to previous.  There is a prominent left perihilar lymph node unchanged compared to prior.  The trachea is patent and free of any intraluminal filling defects.  The lungs are significant for centrilobular emphysema.  There is dependent atelectatic versus fibrotic change.  There is no pleural or pericardial fluid.     The stomach is unremarkable.  There is a large duodenal diverticulum.  The spleen is unremarkable.  There is fatty atrophy of the pancreas.  The liver and gallbladder are unremarkable.  There is mild nonspecific thickening of the adrenal glands bilaterally.  There is abdominal aortic plaque.  The right kidney is unremarkable.  There is a 6 cm left-sided renal hypodensity consistent with a cyst.  The bladder is decompressed and poorly evaluated.  There is a left inguinal hernia containing a short segment of bowel.  The bowel is otherwise unremarkable.  The osseous structures demonstrate degenerative change.  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  There is grade 1 anterolisthesis of L4 in relation to L5.     Impression:     No acute findings.    CT thigh 2/21/25:  FINDINGS:  The visualized intra-abdominal content and soft tissues and vascular structures of the visualized upper right thigh are significant for arterial calcification.  There is loss of right hip joint space.  There is a cores calcification within the right knee joint space.     Impression:     No acute findings.  Assessment and Plan:     1. Primary myxofibrosarcoma    2. 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 Cancer related pain    7. Essential hypertension    8. Fatigue, unspecified type          1-5  - Mr Plunkett is a 80 yo man with recurrent myxofibrosarcoma of the right thigh, has had 3 resections and IMRT  - he was seen by Dr. Mao and Dr. Unger, they did not feel that he  would be a good surgical candidate and recommended palliative RT  - completed palliative RT 2/13/2023  - Previously discussed systemic treatment options, including chemotherapy and immunotherapy. He was not interested in chemotherapy including TKI. Pembrolizumab has efficacy in phase II studies and is recommended on NCCN Guidelines for patients with myxofibrosarcoma. PD-L1 is 25%. However, his TMB is not high. Pembrolizumab was denied by insurance and then denied at hearing. He got patient assistance through [employer]  - started palliative keytruda on 3/24/23  - reviewed CT results with patient. Stable disease. Recc to continue with Keytruda    - doing well.   - I have personally reviewed the patient's lab work today, including CBC and CMP. ANC is adequate for treatment   - c/w keytruda every 3 weeks  - RTC in 3 weeks    6.  - C/w oxycodone 10 mg prn    7.  - BP elevated but he feels well. No report of chest pain or shortness of breath. Will continue to monitor.   - f/u with PCP    Follow-up:     In 3 weeks    Patient and family members displayed understanding of the above encounter and treatment plan. All thoughtful questions were answered to their satisfaction. Patient was advised to notify the care team or proceed to the ER if signs and symptoms worsen.     30 minutes were spent today on this encounter including face to face time with the patient, data gathering/interpretation and documentation. Greater than 50% of this time involved counseling or coordination of care. I have provided the patient with an opportunity to ask questions and have all questions answered to patient's satisfaction.     Visit today included increased complexity associated with the care of the episodic problem chemotherapy  addressed and managing the longitudinal care of the patient due to the serious and/or complex managed problem(s) GI malignancies/cancer. In addition, the above encounter addresses an illness that poses a significant threat  to life, bodily function and overall performance status.      code applied: patient requires or will require a continuous, longitudinal, and active collaborative plan of care related to this patient's health condition, GI malignancies/cancer --the management of which requires the direction of a practitioner with specialized clinical knowledge, skill, and expertise       ADEEL Hurley, PA-C [facility]  Dept of Hematology/Oncology  MARILEE to GI Oncology team         Route Chart for Scheduling    Med Onc Chart Routing      Follow up with physician 6 weeks. Pembro q3 weeks.   Follow up with NICOLLE 3 weeks. Pembro   Infusion scheduling note    Injection scheduling note    Labs CBC, CMP and TSH   Scheduling:  Preferred lab:  Lab interval: every 3 weeks     Imaging    Pharmacy appointment    Other referrals              Treatment Plan Information   OP PEMBROLIZUMAB 200MG Q3W Ed Martines MD   Associated diagnosis: Primary myxofibrosarcoma   noted on 8/18/2022   Line of treatment: First Line  Treatment Goal: Palliative     Upcoming Treatment Dates - OP PEMBROLIZUMAB 200MG Q3W    3/18/2025       Chemotherapy       pembrolizumab (KEYTRUDA) 200 mg in 0.9% NaCl SolP 108 mL infusion  4/8/2025       Chemotherapy       pembrolizumab (KEYTRUDA) 200 mg in 0.9% NaCl SolP 108 mL infusion  4/29/2025       Chemotherapy       pembrolizumab (KEYTRUDA) 200 mg in 0.9% NaCl SolP 108 mL infusion  5/20/2025       Chemotherapy       pembrolizumab (KEYTRUDA) 200 mg in 0.9% NaCl SolP 108 mL infusion

## 2025-04-08 ENCOUNTER — INFUSION (OUTPATIENT)
Dept: INFUSION THERAPY | Facility: HOSPITAL | Age: 80
End: 2025-04-08
Attending: INTERNAL MEDICINE
Payer: MEDICARE

## 2025-04-08 ENCOUNTER — OFFICE VISIT (OUTPATIENT)
Dept: HEMATOLOGY/ONCOLOGY | Facility: CLINIC | Age: 80
End: 2025-04-08
Payer: MEDICARE

## 2025-04-08 VITALS
SYSTOLIC BLOOD PRESSURE: 148 MMHG | HEIGHT: 73 IN | OXYGEN SATURATION: 99 % | TEMPERATURE: 96 F | RESPIRATION RATE: 18 BRPM | HEART RATE: 50 BPM | BODY MASS INDEX: 23.76 KG/M2 | DIASTOLIC BLOOD PRESSURE: 65 MMHG | WEIGHT: 179.25 LBS

## 2025-04-08 VITALS
HEART RATE: 60 BPM | SYSTOLIC BLOOD PRESSURE: 135 MMHG | BODY MASS INDEX: 23.76 KG/M2 | OXYGEN SATURATION: 99 % | WEIGHT: 179.25 LBS | RESPIRATION RATE: 20 BRPM | HEIGHT: 73 IN | TEMPERATURE: 98 F | DIASTOLIC BLOOD PRESSURE: 64 MMHG

## 2025-04-08 DIAGNOSIS — I10 ESSENTIAL HYPERTENSION: ICD-10-CM

## 2025-04-08 DIAGNOSIS — D84.821 IMMUNODEFICIENCY DUE TO DRUGS: ICD-10-CM

## 2025-04-08 DIAGNOSIS — C49.9 PRIMARY MYXOFIBROSARCOMA: Primary | ICD-10-CM

## 2025-04-08 DIAGNOSIS — C77.4 SECONDARY MALIGNANCY OF INGUINAL LYMPH NODES: ICD-10-CM

## 2025-04-08 DIAGNOSIS — C49.9 SOFT TISSUE SARCOMA: ICD-10-CM

## 2025-04-08 DIAGNOSIS — D84.81 IMMUNODEFICIENCY SECONDARY TO NEOPLASM: ICD-10-CM

## 2025-04-08 DIAGNOSIS — Z79.899 IMMUNODEFICIENCY DUE TO DRUGS: ICD-10-CM

## 2025-04-08 DIAGNOSIS — R53.83 FATIGUE, UNSPECIFIED TYPE: ICD-10-CM

## 2025-04-08 DIAGNOSIS — D49.9 IMMUNODEFICIENCY SECONDARY TO NEOPLASM: ICD-10-CM

## 2025-04-08 DIAGNOSIS — G89.3 CANCER RELATED PAIN: ICD-10-CM

## 2025-04-08 PROCEDURE — 25000003 PHARM REV CODE 250: Mod: HCNC | Performed by: PHYSICIAN ASSISTANT

## 2025-04-08 PROCEDURE — 99999 PR PBB SHADOW E&M-EST. PATIENT-LVL V: CPT | Mod: PBBFAC,HCNC,, | Performed by: PHYSICIAN ASSISTANT

## 2025-04-08 PROCEDURE — 63600175 PHARM REV CODE 636 W HCPCS: Mod: JZ,TB,HCNC | Performed by: PHYSICIAN ASSISTANT

## 2025-04-08 RX ORDER — SODIUM CHLORIDE 0.9 % (FLUSH) 0.9 %
10 SYRINGE (ML) INJECTION
Status: DISCONTINUED | OUTPATIENT
Start: 2025-04-08 | End: 2025-04-08 | Stop reason: HOSPADM

## 2025-04-08 RX ORDER — DIPHENHYDRAMINE HYDROCHLORIDE 50 MG/ML
50 INJECTION, SOLUTION INTRAMUSCULAR; INTRAVENOUS ONCE AS NEEDED
Status: CANCELLED | OUTPATIENT
Start: 2025-04-08

## 2025-04-08 RX ORDER — EPINEPHRINE 0.3 MG/.3ML
0.3 INJECTION SUBCUTANEOUS ONCE AS NEEDED
Status: CANCELLED | OUTPATIENT
Start: 2025-04-08

## 2025-04-08 RX ORDER — DIPHENHYDRAMINE HYDROCHLORIDE 50 MG/ML
50 INJECTION, SOLUTION INTRAMUSCULAR; INTRAVENOUS ONCE AS NEEDED
Status: DISCONTINUED | OUTPATIENT
Start: 2025-04-08 | End: 2025-04-08 | Stop reason: HOSPADM

## 2025-04-08 RX ORDER — SODIUM CHLORIDE 0.9 % (FLUSH) 0.9 %
10 SYRINGE (ML) INJECTION
Status: CANCELLED | OUTPATIENT
Start: 2025-04-08

## 2025-04-08 RX ORDER — EPINEPHRINE 0.3 MG/.3ML
0.3 INJECTION SUBCUTANEOUS ONCE AS NEEDED
Status: DISCONTINUED | OUTPATIENT
Start: 2025-04-08 | End: 2025-04-08 | Stop reason: HOSPADM

## 2025-04-08 RX ORDER — HEPARIN 100 UNIT/ML
500 SYRINGE INTRAVENOUS
Status: DISCONTINUED | OUTPATIENT
Start: 2025-04-08 | End: 2025-04-08 | Stop reason: HOSPADM

## 2025-04-08 RX ORDER — HEPARIN 100 UNIT/ML
500 SYRINGE INTRAVENOUS
Status: CANCELLED | OUTPATIENT
Start: 2025-04-08

## 2025-04-08 RX ADMIN — SODIUM CHLORIDE 200 MG: 9 INJECTION, SOLUTION INTRAVENOUS at 11:04

## 2025-04-08 RX ADMIN — SODIUM CHLORIDE: 9 INJECTION, SOLUTION INTRAVENOUS at 10:04

## 2025-04-08 NOTE — PLAN OF CARE
Treatment completed. Pt tolerated well. VS charted. PIV discontinued. Ambulated off unit independently with cane.

## 2025-04-08 NOTE — PROGRESS NOTES
"                                    PROGRESS NOTE    Subjective:       Patient ID: Zac Plunkett is a 79 y.o. male.    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 JD. TMB 6.3    Oncologic History copied from medical chart:  Prior patient of Dr Lucia's  2021  May              5/27- US right thigh: showed 2 hypoechoic masses in the subcutaneous tissues of the right anterior thigh. Largest measured 1.1 x 0.9 x 1 cm. Read at the time was concern for lymphadenopathy.   June 6/4 - CXR: "Bilateral reticular opacities are present in a perihilar distribution which appears slightly more pronounced compared to prior exams."               6/11 - underwent excisional biopsy of the right thigh; pathology came back positive for myxofibrosarcoma, high grade. Path a 1.8 x 1.1 x 0.9 cm mass with an adjacent 0.4 x 0.4 x 0.3 cm mass.   July/Aug              - IMRT with Dr. Mcadams  2022 January 1/12 - PET: locoregional recurrence and concerning 0.9 cm RUL lesion              1/20 - Path: excision on 2 recurrent lesions:  Myxofibrosarcoma, 1.9 cm at longest length, PD-L1 25%              1/31 - CT thigh: successful removal w/o evidence of recurrent disease  April 4/28 - recurrence, locally with metastatic deposit in LN. S/p excision on 4/28/22. Path: right thigh superior mass: Myxofibrosarcoma, high grade with focal tumor necrosis, incompletely excised. A portion of the lesion is suggestive of an involved lymph node exhibiting metastatic sarcoma with extranodal extension.  Sarcoma is focally present within inked but otherwise unspecified peripheral margins.   July 7/19 - CT thigh showed local recurrence and a 0.7 cm R inguinal lymph node    2023  [place]   1/3 - CT thigh/pelvis showed "Heterogeneous enhancing soft tissue " "lesions and nodular foci at the anterior thigh and right groin highly concerning for worsening local recurrent disease with talon metastasis"    - stable pulmonary nodules on CT chest  Fe- completed palliative radiation to right thigh  March  3/24- started keytruda, s/p 30 cycles    Interval History:   Mr Plunkett returns for follow up. He is eating and has a good appetite. His leg pain has slightly worsened since his previous visit. He is taking his pain medication as needed but wants to hold off taking Morphine. No other concerns or complaints today. He does wonder if the treatment is working.     ECO. Presents with his brother today    ROS:   A ten-point system review is obtained and negative except for what was stated in the Interval History.     Physical Examination:   Vital signs reviewed  General: well hydrated, well developed, in no acute distress  HEENT: normocephalic, EOMI, anicteric sclerae  Neck: supple, no JVD  Lungs: CTAB, no wheezing/rales/rhonchi  Heart: RRR, no M/R/G  Abdomen: soft, NT, ND. BS present  Ext: no clubbing, cyanosis, edema  Neuro: alert and oriented x 4  Psych: pleasant and appropriate mood and affect    Objective:     Laboratory Data:  Labs reviewed. CBC, CMP adequate for treatment. TSH normal     Imaging Data:    CT CAP 25:  CLINICAL HISTORY:  Metastatic disease evaluation; Malignant neoplasm of connective and soft tissue, unspecified     TECHNIQUE:  Low dose axial images, sagittal and coronal reformations were obtained from the thoracic inlet to the pubic symphysis following the IV administration of 150 mL of Omnipaque 350     FINDINGS:  The soft tissues and vascular structures at the base the neck are unremarkable.  The mediastinum including the heart and great vessels is significant for calcification of the aorta, aortic annulus, and coronary arteries.  There is a 1.2 cm pretracheal lymph node unchanged compared to previous performed 2024.  There is a " 1.3 cm subcarinal lymph node unchanged compared to previous.  There is a prominent left perihilar lymph node unchanged compared to prior.  The trachea is patent and free of any intraluminal filling defects.  The lungs are significant for centrilobular emphysema.  There is dependent atelectatic versus fibrotic change.  There is no pleural or pericardial fluid.     The stomach is unremarkable.  There is a large duodenal diverticulum.  The spleen is unremarkable.  There is fatty atrophy of the pancreas.  The liver and gallbladder are unremarkable.  There is mild nonspecific thickening of the adrenal glands bilaterally.  There is abdominal aortic plaque.  The right kidney is unremarkable.  There is a 6 cm left-sided renal hypodensity consistent with a cyst.  The bladder is decompressed and poorly evaluated.  There is a left inguinal hernia containing a short segment of bowel.  The bowel is otherwise unremarkable.  The osseous structures demonstrate degenerative change.  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  There is grade 1 anterolisthesis of L4 in relation to L5.     Impression:     No acute findings.    CT thigh 2/21/25:  FINDINGS:  The visualized intra-abdominal content and soft tissues and vascular structures of the visualized upper right thigh are significant for arterial calcification.  There is loss of right hip joint space.  There is a cores calcification within the right knee joint space.     Impression:     No acute findings.  Assessment and Plan:     1. Primary myxofibrosarcoma    2. 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 Cancer related pain    7. Essential hypertension    8. Fatigue, unspecified type      1-5  - Mr Plunkett is a 78 yo man with recurrent myxofibrosarcoma of the right thigh, has had 3 resections and IMRT  - he was seen by Dr. Mao and Dr. Unger, they did not feel that he  would be a good surgical candidate and recommended palliative RT  - completed palliative RT 2/13/2023  - Previously discussed systemic treatment options, including chemotherapy and immunotherapy. He was not interested in chemotherapy including TKI. Pembrolizumab has efficacy in phase II studies and is recommended on NCCN Guidelines for patients with myxofibrosarcoma. PD-L1 is 25%. However, his TMB is not high. Pembrolizumab was denied by insurance and then denied at hearing. He got patient assistance through [employer]  - started palliative keytruda on 3/24/23  - reviewed CT results with patient. Stable disease. Recc to continue with Keytruda    - doing fairly well. He is still tolerating treatment well. Eating well and has a good appetite. Energy level is stable as well. Pain is controlled with medication but he does feel that it   - I have personally reviewed the patient's lab work today, including CBC and CMP. ANC is adequate for treatment   - c/w keytruda every 3 weeks  - RTC in 3 weeks    6.  - Slightly worsening but he does not want to take Morphine just yet. C/w oxycodone 10 mg prn. It is tolerable. He is using his cane and has not fallen.   - Will continue to monitor.     7.  - BP well controlled. No report of chest pain or shortness of breath. Will continue to monitor.   - f/u with PCP    Follow-up:     In 3 weeks    Patient and family members displayed understanding of the above encounter and treatment plan. All thoughtful questions were answered to their satisfaction. Patient was advised to notify the care team or proceed to the ER if signs and symptoms worsen.     30 minutes were spent today on this encounter including face to face time with the patient, data gathering/interpretation and documentation. Greater than 50% of this time involved counseling or coordination of care. I have provided the patient with an opportunity to ask questions and have all questions answered to patient's satisfaction.     Visit  today included increased complexity associated with the care of the episodic problem chemotherapy  addressed and managing the longitudinal care of the patient due to the serious and/or complex managed problem(s) GI malignancies/cancer. In addition, the above encounter addresses an illness that poses a significant threat to life, bodily function and overall performance status.      code applied: patient requires or will require a continuous, longitudinal, and active collaborative plan of care related to this patient's health condition, GI malignancies/cancer --the management of which requires the direction of a practitioner with specialized clinical knowledge, skill, and expertise       ADEEL Hurley, PA-C  [facility]  Dept of Hematology/Oncology  PASENDY to GI Oncology team         Route Chart for Scheduling    Med Onc Chart Routing      Follow up with physician 3 weeks. See Dr Martines in 3 weeks with lab work, scans and treatment discussion with Pemkylah   Follow up with NICOLLE 6 weeks. Pembrolizumab   Infusion scheduling note    Injection scheduling note    Labs CBC, CMP and TSH   Scheduling:  Preferred lab:  Lab interval: every 3 weeks     Imaging CT chest abdomen pelvis   Schedule in 3 weeks prior to clinic visit with Dr Martines   Pharmacy appointment    Other referrals              Treatment Plan Information   OP PEMBROLIZUMAB 200MG Q3W Ed Martines MD   Associated diagnosis: Primary myxofibrosarcoma   noted on 8/18/2022   Line of treatment: First Line  Treatment Goal: Palliative     Upcoming Treatment Dates - OP PEMBROLIZUMAB 200MG Q3W    4/8/2025       Chemotherapy       pembrolizumab (KEYTRUDA) 200 mg in 0.9% NaCl SolP 108 mL infusion  4/29/2025       Chemotherapy       pembrolizumab (KEYTRUDA) 200 mg in 0.9% NaCl SolP 108 mL infusion  5/20/2025       Chemotherapy       pembrolizumab (KEYTRUDA) 200 mg in 0.9% NaCl SolP 108 mL infusion  6/10/2025       Chemotherapy       pembrolizumab (KEYTRUDA) 200 mg in  0.9% NaCl SolP 108 mL infusion

## 2025-04-08 NOTE — PLAN OF CARE
Pt seated. VS charted. Assessment done. PIV to R wrist, flushed, blood return noted. Infusing NS@25ml/hr while awaiting Keytruda from pharmacy. Will monitor throughout treatment.

## 2025-04-17 ENCOUNTER — RESULTS FOLLOW-UP (OUTPATIENT)
Dept: HEMATOLOGY/ONCOLOGY | Facility: CLINIC | Age: 80
End: 2025-04-17

## 2025-04-18 DIAGNOSIS — G89.3 CANCER RELATED PAIN: ICD-10-CM

## 2025-04-18 DIAGNOSIS — C49.9 SOFT TISSUE SARCOMA: ICD-10-CM

## 2025-04-18 DIAGNOSIS — C49.9 PRIMARY MYXOFIBROSARCOMA: ICD-10-CM

## 2025-04-21 RX ORDER — OXYCODONE HYDROCHLORIDE 10 MG/1
10 TABLET ORAL EVERY 4 HOURS PRN
Qty: 180 TABLET | Refills: 0 | Status: SHIPPED | OUTPATIENT
Start: 2025-04-21

## 2025-05-02 NOTE — PROGRESS NOTES
02/27/2023    Radiation Oncology Follow-Up Visit    Prior Radiation History:  Treatment Summary  Course: C1 R leg 2021    Treatment Site Ref. ID Energy Dose/Fx (Gy) #Fx Dose Correction (Gy) Total Dose (Gy) Start Date End Date Elapsed Days   IM LEG_R 50Gy PTV 50 cyndi 6X 2 25 / 25 0 50 7/21/2021 8/25/2021 35   IM LEG_R 60Gy PTV 60jb 6X 2 1 / 5 0 2 8/26/2021 8/26/2021 0     HPI: As per previous notes, Mr. 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 He eventually underwent an ultrasound of the R thigh that demonstrated 2 hypoechoic masses in the subcutaneous tissues of the R anterior thigh, largest 1.1 cm.      He underwent excisional biopsy of the R thigh masses on 6/11/21 with Dr. Mao, with pathology demonstrating high grade myxofibrosarcoma. PET/CT demonstrated no evidence of recurrent or metastatic disease and he completed 52 Gy in 26 fractions (out of planned 60 Gy in 30 fractions) via IMRT, with treatment ending sooner than expected due to [place] and subsequent treatment delays.    He unfortunately developed recurrent disease in the R anterior thigh in 1/22 undergoing additional excision of his suspicious palpable lesions with Dr. 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 He has also followed with Dr. White regarding a RUL opacity 0.9 cm in size, with recommendations for continued surveillance imaging.    He underwent CT thigh and chest imaging on 4/8/22 and 4/13/22, which demonstrated no new disease in the chest but worsening adenopathy in the R thigh. He underwent additional resection with Dr. Mao on 4/28/22, with final pathology demonstrating recurrent high grade myxofibrosarcoma, with positive margins. He met with Dr. Mcadams who did not recommend any  Good Morning/Afternoon,     Pt is requesting for a refill of: cyclobenzaprine 10 mg  Last filed:   Last encounter: 3/7/2025  Up coming apt: 5/27/2025  Pharmacy: [id number] - [place], [place] - [address] AT [facility] [address]   Is this something you can do?      Mya Gutierrez  Medical Assistant      additional local therapy at the time.    He had further progression of his locoregional disease. He underwent resection of the symptomatic groin recurrence on 9/26/22. He developed a regional recurrence in November 2022, asymptomatic at the time. The recurrence progressed to needing frequent wound care. He has followed with surgery and heme onc, and was referred to Dr. Unger for consideration of re-resection, which was not recommended. Med onc is doing another appeal for [facility]. He underwent a palliative course of radiotherapy to the symptomatic right groin mass, receiving 30 Gy in 5 fractions. He is following with wound care.     At today's visit,   Pain remains stable and controlled with short acting oxycodone q6hr. Has not needed MS contin. He continues to have bleeding but less frequent per wife. To see  heme onc this week. Was to see wound care but will have to reschedule. No other complaints.         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    Kidney stone     Lumbar spondylosis     Lumbar spondylosis     PONV (postoperative nausea and vomiting)     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    Tobacco dependence        Past Surgical History:   Procedure Laterality Date    APPENDECTOMY      BACK SURGERY      HIP REPLACEMENT ARTHROPLASTY Left 8/6/2018    Procedure: ARTHROPLASTY, HIP REPLACEMENT;  Surgeon: Chapincito Sagastume MD;  Location: [facility] OR;  Service: Orthopedics;  Laterality: Left;    JOINT REPLACEMENT Left     hip    laminectomy l4-l5      SURGICAL REMOVAL OF LYMPH NODE Right 1/20/2022    Procedure: EXCISION, LYMPH NODE;  Surgeon: Arun Mao MD;  Location: [facility] OR;  Service: General;  Laterality: Right;  right thigh and right groin  RN PREOP 1/18/2022      PT CHOSE TO DO HOME TEST FOR COVID ON AM OF SURGERY       Social History     Tobacco Use     Smoking status: Every Day     Types: Cigars    Smokeless tobacco: Never    Tobacco comments:     smokes cigars 2-3 a day, smoke cigarette for 15 years   Substance Use Topics    Alcohol use: No    Drug use: No       Cancer-related family history is negative for Melanoma and Liver cancer.    Current Outpatient Medications on File Prior to Visit   Medication Sig Dispense Refill    atorvastatin (LIPITOR) 80 MG tablet Take 1 tablet (80 mg total) by mouth once daily. 90 tablet 3    finasteride (PROSCAR) 5 mg tablet TAKE 1 TABLET BY MOUTH EVERY DAY 90 tablet 0    hydroCHLOROthiazide (HYDRODIURIL) 25 MG tablet TAKE 1 TABLET BY MOUTH DAILY 90 tablet 0    morphine (MS CONTIN) 15 MG 12 hr tablet Take 1 tablet (15 mg total) by mouth 2 (two) times daily. 60 tablet 0    oxyCODONE (ROXICODONE) 10 mg Tab immediate release tablet Take 1 tablet (10 mg total) by mouth every 6 (six) hours as needed for Pain. 120 tablet 0    TURMERIC ORAL Take by mouth.      naloxone (NARCAN) 4 mg/actuation Spry 4mg by nasal route as needed for opioid overdose; may repeat every 2-3 minutes in alternating nostrils until medical help arrives. Call 911 (Patient not taking: Reported on 2/27/2023) 1 each 11    SELENIUM ORAL Take by mouth.      VITAMIN E ACETATE ORAL Take by mouth.       No current facility-administered medications on file prior to visit.       Review of patient's allergies indicates:  No Known Allergies    ROS     Vital Signs:   Vitals:    02/27/23 1314   BP: 126/63   Pulse: 73   Resp: 18   Temp: 97.9 °F (36.6 °C)         ECOG Performance Status: 1 - Ambulates, capable of light work    Physical exam:  Constitutional:  Well-developed, well-nourished and in no acute distress.   Pulmonary: Work of breathing appropriate.  Cardiovascular: No appreciated edema.  GI: Non-distended.    Musculoskeletal: Range of motion appears normal in all 4 extremities.  R groin, was just bandaged. He asked that we not unbandage as he has a long ride back and is  getting serial exams with wound care        Data Review  Information obtained via chart review and discussion with Mr. Plunkett.       Assessment:  This is a 77 y.o. male with recurrent high grade extremity sarcoma status post surgery, adjuvant XRT and multiple additional surgeries for recurrent disease. He has a symptomatic, unresectable groin recurrence, s/p palliative radiotherapy to 30 Gy in 5 fractions completed 2/13/23. No significant change, at this point, possible decreased bleeding.    Plan:  Palliative RT may have maximal benefit in the next few weeks. Agree with continuing with wound care.   Will plan to see him back PRN but he has our contact information and will reach out to the clinic with any further issues.    Shun Morales MD  Radiation Oncology

## 2025-05-06 ENCOUNTER — INFUSION (OUTPATIENT)
Dept: INFUSION THERAPY | Facility: HOSPITAL | Age: 80
End: 2025-05-06
Attending: INTERNAL MEDICINE
Payer: MEDICARE

## 2025-05-06 ENCOUNTER — OFFICE VISIT (OUTPATIENT)
Dept: HEMATOLOGY/ONCOLOGY | Facility: CLINIC | Age: 80
End: 2025-05-06
Payer: MEDICARE

## 2025-05-06 VITALS
OXYGEN SATURATION: 98 % | RESPIRATION RATE: 18 BRPM | SYSTOLIC BLOOD PRESSURE: 144 MMHG | DIASTOLIC BLOOD PRESSURE: 66 MMHG | HEART RATE: 51 BPM

## 2025-05-06 VITALS
HEIGHT: 73 IN | OXYGEN SATURATION: 97 % | SYSTOLIC BLOOD PRESSURE: 148 MMHG | RESPIRATION RATE: 17 BRPM | DIASTOLIC BLOOD PRESSURE: 74 MMHG | HEART RATE: 53 BPM | WEIGHT: 180.75 LBS | BODY MASS INDEX: 23.96 KG/M2 | TEMPERATURE: 97 F

## 2025-05-06 DIAGNOSIS — G89.3 CANCER RELATED PAIN: ICD-10-CM

## 2025-05-06 DIAGNOSIS — I10 ESSENTIAL HYPERTENSION: ICD-10-CM

## 2025-05-06 DIAGNOSIS — C49.9 PRIMARY MYXOFIBROSARCOMA: Primary | ICD-10-CM

## 2025-05-06 DIAGNOSIS — C49.9 SOFT TISSUE SARCOMA: ICD-10-CM

## 2025-05-06 DIAGNOSIS — C77.4 SECONDARY MALIGNANCY OF INGUINAL LYMPH NODES: ICD-10-CM

## 2025-05-06 DIAGNOSIS — M25.551 RIGHT HIP PAIN: ICD-10-CM

## 2025-05-06 PROCEDURE — 99215 OFFICE O/P EST HI 40 MIN: CPT | Mod: HCNC,S$GLB,, | Performed by: INTERNAL MEDICINE

## 2025-05-06 PROCEDURE — G2211 COMPLEX E/M VISIT ADD ON: HCPCS | Mod: HCNC,S$GLB,, | Performed by: INTERNAL MEDICINE

## 2025-05-06 PROCEDURE — 63600175 PHARM REV CODE 636 W HCPCS: Mod: JZ,TB,HCNC | Performed by: INTERNAL MEDICINE

## 2025-05-06 PROCEDURE — 96413 CHEMO IV INFUSION 1 HR: CPT | Mod: HCNC

## 2025-05-06 PROCEDURE — 1101F PT FALLS ASSESS-DOCD LE1/YR: CPT | Mod: CPTII,HCNC,S$GLB, | Performed by: INTERNAL MEDICINE

## 2025-05-06 PROCEDURE — 99999 PR PBB SHADOW E&M-EST. PATIENT-LVL IV: CPT | Mod: PBBFAC,HCNC,, | Performed by: INTERNAL MEDICINE

## 2025-05-06 PROCEDURE — 1160F RVW MEDS BY RX/DR IN RCRD: CPT | Mod: CPTII,HCNC,S$GLB, | Performed by: INTERNAL MEDICINE

## 2025-05-06 PROCEDURE — 3077F SYST BP >= 140 MM HG: CPT | Mod: CPTII,HCNC,S$GLB, | Performed by: INTERNAL MEDICINE

## 2025-05-06 PROCEDURE — 25000003 PHARM REV CODE 250: Mod: HCNC | Performed by: INTERNAL MEDICINE

## 2025-05-06 PROCEDURE — 1125F AMNT PAIN NOTED PAIN PRSNT: CPT | Mod: CPTII,HCNC,S$GLB, | Performed by: INTERNAL MEDICINE

## 2025-05-06 PROCEDURE — 3288F FALL RISK ASSESSMENT DOCD: CPT | Mod: CPTII,HCNC,S$GLB, | Performed by: INTERNAL MEDICINE

## 2025-05-06 PROCEDURE — 1159F MED LIST DOCD IN RCRD: CPT | Mod: CPTII,HCNC,S$GLB, | Performed by: INTERNAL MEDICINE

## 2025-05-06 PROCEDURE — 3078F DIAST BP <80 MM HG: CPT | Mod: CPTII,HCNC,S$GLB, | Performed by: INTERNAL MEDICINE

## 2025-05-06 RX ORDER — DIPHENHYDRAMINE HYDROCHLORIDE 50 MG/ML
50 INJECTION, SOLUTION INTRAMUSCULAR; INTRAVENOUS ONCE AS NEEDED
Status: DISCONTINUED | OUTPATIENT
Start: 2025-05-06 | End: 2025-05-06 | Stop reason: HOSPADM

## 2025-05-06 RX ORDER — EPINEPHRINE 0.3 MG/.3ML
0.3 INJECTION SUBCUTANEOUS ONCE AS NEEDED
Status: DISCONTINUED | OUTPATIENT
Start: 2025-05-06 | End: 2025-05-06 | Stop reason: HOSPADM

## 2025-05-06 RX ORDER — HEPARIN 100 UNIT/ML
500 SYRINGE INTRAVENOUS
Status: CANCELLED | OUTPATIENT
Start: 2025-05-06

## 2025-05-06 RX ORDER — SODIUM CHLORIDE 0.9 % (FLUSH) 0.9 %
10 SYRINGE (ML) INJECTION
Status: CANCELLED | OUTPATIENT
Start: 2025-05-06

## 2025-05-06 RX ORDER — DIPHENHYDRAMINE HYDROCHLORIDE 50 MG/ML
50 INJECTION, SOLUTION INTRAMUSCULAR; INTRAVENOUS ONCE AS NEEDED
Status: CANCELLED | OUTPATIENT
Start: 2025-05-06

## 2025-05-06 RX ORDER — SODIUM CHLORIDE 0.9 % (FLUSH) 0.9 %
10 SYRINGE (ML) INJECTION
Status: DISCONTINUED | OUTPATIENT
Start: 2025-05-06 | End: 2025-05-06 | Stop reason: HOSPADM

## 2025-05-06 RX ORDER — EPINEPHRINE 0.3 MG/.3ML
0.3 INJECTION SUBCUTANEOUS ONCE AS NEEDED
Status: CANCELLED | OUTPATIENT
Start: 2025-05-06

## 2025-05-06 RX ORDER — HEPARIN 100 UNIT/ML
500 SYRINGE INTRAVENOUS
Status: DISCONTINUED | OUTPATIENT
Start: 2025-05-06 | End: 2025-05-06 | Stop reason: HOSPADM

## 2025-05-06 RX ADMIN — SODIUM CHLORIDE 200 MG: 9 INJECTION, SOLUTION INTRAVENOUS at 11:05

## 2025-05-06 RX ADMIN — SODIUM CHLORIDE: 9 INJECTION, SOLUTION INTRAVENOUS at 10:05

## 2025-05-06 NOTE — PROGRESS NOTES
"                                    PROGRESS NOTE    Subjective:       Patient ID: Zac Plunkett is a 79 y.o. male.    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 JD. TMB 6.3    Oncologic History copied from medical chart:  Prior patient of Dr Lucia's  2021  May              5/27- US right thigh: showed 2 hypoechoic masses in the subcutaneous tissues of the right anterior thigh. Largest measured 1.1 x 0.9 x 1 cm. Read at the time was concern for lymphadenopathy.   June 6/4 - CXR: "Bilateral reticular opacities are present in a perihilar distribution which appears slightly more pronounced compared to prior exams."               6/11 - underwent excisional biopsy of the right thigh; pathology came back positive for myxofibrosarcoma, high grade. Path a 1.8 x 1.1 x 0.9 cm mass with an adjacent 0.4 x 0.4 x 0.3 cm mass.   July/Aug              - IMRT with Dr. Mcadams  2022 January 1/12 - PET: locoregional recurrence and concerning 0.9 cm RUL lesion              1/20 - Path: excision on 2 recurrent lesions:  Myxofibrosarcoma, 1.9 cm at longest length, PD-L1 25%              1/31 - CT thigh: successful removal w/o evidence of recurrent disease  April 4/28 - recurrence, locally with metastatic deposit in LN. S/p excision on 4/28/22. Path: right thigh superior mass: Myxofibrosarcoma, high grade with focal tumor necrosis, incompletely excised. A portion of the lesion is suggestive of an involved lymph node exhibiting metastatic sarcoma with extranodal extension.  Sarcoma is focally present within inked but otherwise unspecified peripheral margins.   July 7/19 - CT thigh showed local recurrence and a 0.7 cm R inguinal lymph node    2023  Jac[facility]   1/3 - CT thigh/pelvis showed "Heterogeneous enhancing soft tissue " "lesions and nodular foci at the anterior thigh and right groin highly concerning for worsening local recurrent disease with talon metastasis"    - stable pulmonary nodules on CT chest  Fe- completed palliative radiation to right thigh  March  3/24- started keytruda, s/p 32 cycles    Interval History:   Mr Plunkett returns for follow up. He does report worsened pain over the past several months in his right thigh but does get intermittent relief from his current regimen. We discussed potential need to re-attempt long-acting regimen if this pain continues to worsen and was agreeable. Otherwise, is doing well and in good spirits.       ECO. Presents with his brother today    ROS:   A ten-point system review is obtained and negative except for what was stated in the Interval History.     Physical Examination:   Vital signs reviewed  General: well hydrated, well developed, in no acute distress  HEENT: normocephalic, EOMI, anicteric sclerae  Neck: supple, no JVD  Lungs: CTAB, no wheezing/rales/rhonchi  Heart: RRR, no M/R/G  Abdomen: soft, NT, ND. BS present  Ext: no clubbing, cyanosis, edema  Neuro: alert and oriented x 4  Psych: pleasant and appropriate mood and affect    Objective:     Laboratory Data:  Labs reviewed. CBC, CMP adequate for treatment. TSH normal     Imaging Data:    CT C/A/P 2025    Impression:     Stable hilar and mediastinal adenopathy.     Stable pulmonary nodules.  No new pulmonary nodules are identified.     Prominent atherosclerosis including coronary artery atherosclerosis in a multi vessel distribution and prominent atherosclerotic calcification involving the common iliac arteries bilaterally.     Chronic interstitial changes within the lungs with subpleural reticulation and mild traction bronchiolectasis.     Bilateral inguinal hernias.  Inguinal hernia on the left contains a nonobstructed loop of bowel.  Inguinal hernia on the right contains fat.    CT Thigh  " 4/17/2025    Impression:     No evidence for local recurrence in this patient with history of sarcoma.     Prominent degenerative changes of the right hip and right knee with calcified loose bodies within the knee joint.  Calcified loose body is also noted within a right Baker's cyst.      Assessment and Plan:     1. Primary myxofibrosarcoma    2. Soft tissue sarcoma    3. 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 Essential hypertension    6. Right hip pain      1-5  - Mr Plunkett is a 80 yo man with recurrent myxofibrosarcoma of the right thigh, has had 3 resections and IMRT  - he was seen by Dr. Mao and Dr. Unger, they did not feel that he would be a good surgical candidate and recommended palliative RT  - completed palliative RT 2/13/2023  - Previously discussed systemic treatment options, including chemotherapy and immunotherapy. He was not interested in chemotherapy including TKI. Pembrolizumab has efficacy in phase II studies and is recommended on NCCN Guidelines for patients with myxofibrosarcoma. PD-L1 is 25%. However, his TMB is not high. Pembrolizumab was denied by insurance and then denied at hearing. He got patient assistance through [employer]  - started palliative keytruda on 3/24/23  - reviewed CT results with patient. Stable disease. Recc to continue with Keytruda    - doing fairly well. He is still tolerating treatment well. Eating well and has a good appetite. Energy level is stable as well. Pain is controlled with medication but he does feel that it   - I have personally reviewed the patient's lab work today, including CBC and CMP. ANC is adequate for treatment   - c/w keytruda every 3 weeks  - RTC in 3 weeks    6.  - Slightly worsening but he does not want to take Morphine just yet. C/w oxycodone 10 mg prn. It is tolerable. He is using his cane and has not fallen. Discussed likely need to change to long-acting if this trend continues  - Will continue to monitor.      7.  - BP well controlled. No report of chest pain or shortness of breath. Will continue to monitor.   - f/u with PCP    Follow-up:     In 3 weeks    Ashvin Long D.O.  Hematology/Oncology Fellow, PGY-VI    ATTESTATION:    I have personally seen, examined and talked to the patient. I have reviewed Dr Long's note and agreed with the findings, assessment and plan.   Doing well. Reviewed CT results. Continued complete response. C/w keytruda.   Asked patient to f/u with ortho doctor. May need an MRI of the right hip. CT showed degenerative disease.     Ed Martines MD  Hematology and Medical Oncology  [facility]           Route Chart for Scheduling    Med Onc Chart Routing      Follow up with physician . See NICOLLE in 3 weeks with labs then keytruda. see me in 6 weeks with labs then keytruda   Follow up with NICOLLE    Infusion scheduling note   keytruda every 3 weeks   Injection scheduling note    Labs CBC, CMP and TSH   Scheduling:  Preferred lab:  Lab interval: every 3 weeks     Imaging    Pharmacy appointment    Other referrals          Treatment Plan Information   OP PEMBROLIZUMAB 200MG Q3W Ed Martines MD   Associated diagnosis: Primary myxofibrosarcoma   noted on 8/18/2022   Line of treatment: First Line  Treatment Goal: Palliative     Upcoming Treatment Dates - OP PEMBROLIZUMAB 200MG Q3W    4/29/2025       Chemotherapy       pembrolizumab (KEYTRUDA) 200 mg in 0.9% NaCl SolP 108 mL infusion  5/20/2025       Chemotherapy       pembrolizumab (KEYTRUDA) 200 mg in 0.9% NaCl SolP 108 mL infusion  6/10/2025       Chemotherapy       pembrolizumab (KEYTRUDA) 200 mg in 0.9% NaCl SolP 108 mL infusion

## 2025-05-06 NOTE — PLAN OF CARE
Patient tolerated C33 Keytruda without incident. Seen by Dr. Martines prior to treatment. Labs reviewed. Vitals stable. PIV inserted & flushed with blood return present, saline locked & catheter removed at d/c. Aware of next appointment on 5/24. Stable and ambulatory off of unit at d/c.

## 2025-05-20 DIAGNOSIS — G89.3 CANCER RELATED PAIN: ICD-10-CM

## 2025-05-20 DIAGNOSIS — C49.9 SOFT TISSUE SARCOMA: ICD-10-CM

## 2025-05-20 DIAGNOSIS — C49.9 PRIMARY MYXOFIBROSARCOMA: ICD-10-CM

## 2025-05-20 RX ORDER — OXYCODONE HYDROCHLORIDE 10 MG/1
10 TABLET ORAL EVERY 4 HOURS PRN
Qty: 180 TABLET | Refills: 0 | Status: SHIPPED | OUTPATIENT
Start: 2025-05-20

## 2025-05-27 ENCOUNTER — LAB VISIT (OUTPATIENT)
Dept: LAB | Facility: HOSPITAL | Age: 80
End: 2025-05-27
Payer: MEDICARE

## 2025-05-27 ENCOUNTER — INFUSION (OUTPATIENT)
Dept: INFUSION THERAPY | Facility: HOSPITAL | Age: 80
End: 2025-05-27
Attending: INTERNAL MEDICINE
Payer: MEDICARE

## 2025-05-27 ENCOUNTER — OFFICE VISIT (OUTPATIENT)
Dept: HEMATOLOGY/ONCOLOGY | Facility: CLINIC | Age: 80
End: 2025-05-27
Payer: MEDICARE

## 2025-05-27 VITALS
BODY MASS INDEX: 23.83 KG/M2 | HEART RATE: 50 BPM | SYSTOLIC BLOOD PRESSURE: 153 MMHG | RESPIRATION RATE: 18 BRPM | WEIGHT: 179.81 LBS | TEMPERATURE: 97 F | OXYGEN SATURATION: 100 % | DIASTOLIC BLOOD PRESSURE: 80 MMHG | HEIGHT: 73 IN

## 2025-05-27 VITALS
BODY MASS INDEX: 23.82 KG/M2 | DIASTOLIC BLOOD PRESSURE: 80 MMHG | HEIGHT: 73 IN | TEMPERATURE: 97 F | HEART RATE: 56 BPM | WEIGHT: 179.69 LBS | SYSTOLIC BLOOD PRESSURE: 153 MMHG | RESPIRATION RATE: 18 BRPM

## 2025-05-27 DIAGNOSIS — G89.3 CANCER RELATED PAIN: ICD-10-CM

## 2025-05-27 DIAGNOSIS — R53.83 FATIGUE, UNSPECIFIED TYPE: ICD-10-CM

## 2025-05-27 DIAGNOSIS — D84.821 IMMUNODEFICIENCY DUE TO DRUGS: ICD-10-CM

## 2025-05-27 DIAGNOSIS — Z79.899 IMMUNODEFICIENCY DUE TO DRUGS: ICD-10-CM

## 2025-05-27 DIAGNOSIS — D49.9 IMMUNODEFICIENCY SECONDARY TO NEOPLASM: ICD-10-CM

## 2025-05-27 DIAGNOSIS — C49.9 PRIMARY MYXOFIBROSARCOMA: Primary | ICD-10-CM

## 2025-05-27 DIAGNOSIS — C49.9 PRIMARY MYXOFIBROSARCOMA: ICD-10-CM

## 2025-05-27 DIAGNOSIS — D84.81 IMMUNODEFICIENCY SECONDARY TO NEOPLASM: ICD-10-CM

## 2025-05-27 DIAGNOSIS — C77.4 SECONDARY MALIGNANCY OF INGUINAL LYMPH NODES: ICD-10-CM

## 2025-05-27 DIAGNOSIS — I10 ESSENTIAL HYPERTENSION: ICD-10-CM

## 2025-05-27 DIAGNOSIS — C49.9 SOFT TISSUE SARCOMA: ICD-10-CM

## 2025-05-27 LAB
ABSOLUTE EOSINOPHIL (OHS): 0.26 K/UL
ABSOLUTE MONOCYTE (OHS): 0.54 K/UL (ref 0.3–1)
ABSOLUTE NEUTROPHIL COUNT (OHS): 5.41 K/UL (ref 1.8–7.7)
ALBUMIN SERPL BCP-MCNC: 3.7 G/DL (ref 3.5–5.2)
ALP SERPL-CCNC: 125 UNIT/L (ref 40–150)
ALT SERPL W/O P-5'-P-CCNC: 11 UNIT/L (ref 10–44)
ANION GAP (OHS): 11 MMOL/L (ref 8–16)
AST SERPL-CCNC: 12 UNIT/L (ref 11–45)
BASOPHILS # BLD AUTO: 0.14 K/UL
BASOPHILS NFR BLD AUTO: 1.9 %
BILIRUB SERPL-MCNC: 0.6 MG/DL (ref 0.1–1)
BUN SERPL-MCNC: 15 MG/DL (ref 8–23)
CALCIUM SERPL-MCNC: 9.6 MG/DL (ref 8.7–10.5)
CHLORIDE SERPL-SCNC: 98 MMOL/L (ref 95–110)
CO2 SERPL-SCNC: 27 MMOL/L (ref 23–29)
CREAT SERPL-MCNC: 1 MG/DL (ref 0.5–1.4)
ERYTHROCYTE [DISTWIDTH] IN BLOOD BY AUTOMATED COUNT: 14.6 % (ref 11.5–14.5)
GFR SERPLBLD CREATININE-BSD FMLA CKD-EPI: >60 ML/MIN/1.73/M2
GLUCOSE SERPL-MCNC: 123 MG/DL (ref 70–110)
HCT VFR BLD AUTO: 42.3 % (ref 40–54)
HGB BLD-MCNC: 13.7 GM/DL (ref 14–18)
IMM GRANULOCYTES # BLD AUTO: 0.2 K/UL (ref 0–0.04)
IMM GRANULOCYTES NFR BLD AUTO: 2.7 % (ref 0–0.5)
LYMPHOCYTES # BLD AUTO: 0.95 K/UL (ref 1–4.8)
MCH RBC QN AUTO: 29 PG (ref 27–31)
MCHC RBC AUTO-ENTMCNC: 32.4 G/DL (ref 32–36)
MCV RBC AUTO: 90 FL (ref 82–98)
NUCLEATED RBC (/100WBC) (OHS): 0 /100 WBC
PLATELET # BLD AUTO: 207 K/UL (ref 150–450)
PMV BLD AUTO: 9.1 FL (ref 9.2–12.9)
POTASSIUM SERPL-SCNC: 4.3 MMOL/L (ref 3.5–5.1)
PROT SERPL-MCNC: 7.1 GM/DL (ref 6–8.4)
RBC # BLD AUTO: 4.72 M/UL (ref 4.6–6.2)
RELATIVE EOSINOPHIL (OHS): 3.5 %
RELATIVE LYMPHOCYTE (OHS): 12.7 % (ref 18–48)
RELATIVE MONOCYTE (OHS): 7.2 % (ref 4–15)
RELATIVE NEUTROPHIL (OHS): 72 % (ref 38–73)
SODIUM SERPL-SCNC: 136 MMOL/L (ref 136–145)
TSH SERPL-ACNC: 0.91 UIU/ML (ref 0.4–4)
WBC # BLD AUTO: 7.5 K/UL (ref 3.9–12.7)

## 2025-05-27 PROCEDURE — 85025 COMPLETE CBC W/AUTO DIFF WBC: CPT | Mod: HCNC

## 2025-05-27 PROCEDURE — G2211 COMPLEX E/M VISIT ADD ON: HCPCS | Mod: HCNC,S$GLB,, | Performed by: REGISTERED NURSE

## 2025-05-27 PROCEDURE — 3079F DIAST BP 80-89 MM HG: CPT | Mod: CPTII,HCNC,S$GLB, | Performed by: REGISTERED NURSE

## 2025-05-27 PROCEDURE — 96413 CHEMO IV INFUSION 1 HR: CPT | Mod: HCNC

## 2025-05-27 PROCEDURE — 1125F AMNT PAIN NOTED PAIN PRSNT: CPT | Mod: CPTII,HCNC,S$GLB, | Performed by: REGISTERED NURSE

## 2025-05-27 PROCEDURE — 36415 COLL VENOUS BLD VENIPUNCTURE: CPT | Mod: HCNC

## 2025-05-27 PROCEDURE — 25000003 PHARM REV CODE 250: Mod: HCNC | Performed by: REGISTERED NURSE

## 2025-05-27 PROCEDURE — 1159F MED LIST DOCD IN RCRD: CPT | Mod: CPTII,HCNC,S$GLB, | Performed by: REGISTERED NURSE

## 2025-05-27 PROCEDURE — 99215 OFFICE O/P EST HI 40 MIN: CPT | Mod: HCNC,S$GLB,, | Performed by: REGISTERED NURSE

## 2025-05-27 PROCEDURE — 1101F PT FALLS ASSESS-DOCD LE1/YR: CPT | Mod: CPTII,HCNC,S$GLB, | Performed by: REGISTERED NURSE

## 2025-05-27 PROCEDURE — 84443 ASSAY THYROID STIM HORMONE: CPT | Mod: HCNC

## 2025-05-27 PROCEDURE — 3077F SYST BP >= 140 MM HG: CPT | Mod: CPTII,HCNC,S$GLB, | Performed by: REGISTERED NURSE

## 2025-05-27 PROCEDURE — 1160F RVW MEDS BY RX/DR IN RCRD: CPT | Mod: CPTII,HCNC,S$GLB, | Performed by: REGISTERED NURSE

## 2025-05-27 PROCEDURE — 99999 PR PBB SHADOW E&M-EST. PATIENT-LVL IV: CPT | Mod: PBBFAC,HCNC,, | Performed by: REGISTERED NURSE

## 2025-05-27 PROCEDURE — 3288F FALL RISK ASSESSMENT DOCD: CPT | Mod: CPTII,HCNC,S$GLB, | Performed by: REGISTERED NURSE

## 2025-05-27 PROCEDURE — 80053 COMPREHEN METABOLIC PANEL: CPT | Mod: HCNC

## 2025-05-27 PROCEDURE — 63600175 PHARM REV CODE 636 W HCPCS: Mod: JZ,TB,HCNC | Performed by: REGISTERED NURSE

## 2025-05-27 RX ORDER — SODIUM CHLORIDE 0.9 % (FLUSH) 0.9 %
10 SYRINGE (ML) INJECTION
Status: DISCONTINUED | OUTPATIENT
Start: 2025-05-27 | End: 2025-05-27 | Stop reason: HOSPADM

## 2025-05-27 RX ORDER — SODIUM CHLORIDE 0.9 % (FLUSH) 0.9 %
10 SYRINGE (ML) INJECTION
Status: CANCELLED | OUTPATIENT
Start: 2025-05-27

## 2025-05-27 RX ORDER — DIPHENHYDRAMINE HYDROCHLORIDE 50 MG/ML
50 INJECTION, SOLUTION INTRAMUSCULAR; INTRAVENOUS ONCE AS NEEDED
Status: CANCELLED | OUTPATIENT
Start: 2025-05-27

## 2025-05-27 RX ORDER — DIPHENHYDRAMINE HYDROCHLORIDE 50 MG/ML
50 INJECTION, SOLUTION INTRAMUSCULAR; INTRAVENOUS ONCE AS NEEDED
Status: DISCONTINUED | OUTPATIENT
Start: 2025-05-27 | End: 2025-05-27 | Stop reason: HOSPADM

## 2025-05-27 RX ORDER — HEPARIN 100 UNIT/ML
500 SYRINGE INTRAVENOUS
Status: CANCELLED | OUTPATIENT
Start: 2025-05-27

## 2025-05-27 RX ORDER — EPINEPHRINE 0.3 MG/.3ML
0.3 INJECTION SUBCUTANEOUS ONCE AS NEEDED
Status: CANCELLED | OUTPATIENT
Start: 2025-05-27

## 2025-05-27 RX ORDER — HEPARIN 100 UNIT/ML
500 SYRINGE INTRAVENOUS
Status: DISCONTINUED | OUTPATIENT
Start: 2025-05-27 | End: 2025-05-27 | Stop reason: HOSPADM

## 2025-05-27 RX ORDER — EPINEPHRINE 0.3 MG/.3ML
0.3 INJECTION SUBCUTANEOUS ONCE AS NEEDED
Status: DISCONTINUED | OUTPATIENT
Start: 2025-05-27 | End: 2025-05-27 | Stop reason: HOSPADM

## 2025-05-27 RX ADMIN — SODIUM CHLORIDE 200 MG: 9 INJECTION, SOLUTION INTRAVENOUS at 12:05

## 2025-05-27 NOTE — PROGRESS NOTES
"PROGRESS NOTE    Subjective:      Patient ID: Zac Plunkett is a 79 y.o. male.    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 JD. TMB 6.3    Oncologic History copied from medical chart:  Prior patient of Dr Lucia's  2021  May              5/27- US right thigh: showed 2 hypoechoic masses in the subcutaneous tissues of the right anterior thigh. Largest measured 1.1 x 0.9 x 1 cm. Read at the time was concern for lymphadenopathy.   June 6/4 - CXR: "Bilateral reticular opacities are present in a perihilar distribution which appears slightly more pronounced compared to prior exams."               6/11 - underwent excisional biopsy of the right thigh; pathology came back positive for myxofibrosarcoma, high grade. Path a 1.8 x 1.1 x 0.9 cm mass with an adjacent 0.4 x 0.4 x 0.3 cm mass.   July/Aug              - IMRT with Dr. Mcadams  2022 January 1/12 - PET: locoregional recurrence and concerning 0.9 cm RUL lesion              1/20 - Path: excision on 2 recurrent lesions:  Myxofibrosarcoma, 1.9 cm at longest length, PD-L1 25%              1/31 - CT thigh: successful removal w/o evidence of recurrent disease  April 4/28 - recurrence, locally with metastatic deposit in LN. S/p excision on 4/28/22. Path: right thigh superior mass: Myxofibrosarcoma, high grade with focal tumor necrosis, incompletely excised. A portion of the lesion is suggestive of an involved lymph node exhibiting metastatic sarcoma with extranodal extension.  Sarcoma is focally present within inked but otherwise unspecified peripheral margins.   July 7/19 - CT thigh showed local recurrence and a 0.7 cm R inguinal lymph node    2023  [facility]   1/3 - CT thigh/pelvis showed "Heterogeneous enhancing soft tissue lesions and nodular foci at the anterior " "thigh and right groin highly concerning for worsening local recurrent disease with talon metastasis"    - stable pulmonary nodules on CT chest  b      - completed palliative radiation to right thigh  March  3/24- started keytruda, s/p 33 cycles    Interval History:   Mr Plunkett returns for follow up. Doing okay today. Pain is generally stable and controlled with oxycodone q4 hours. He does try to extend out timing between doses, but pain worsens quickly if not taken on time. He has trouble sleeping as well. States normally does not affect him but he is feeling tired today. Otherwise, no new concerns.     ECO. Presents with his close friend today    ROS:   A ten-point system review is obtained and negative except for what was stated in the Interval History.     Physical Examination:   Vital signs reviewed  General: well hydrated, well developed, in no acute distress  HEENT: normocephalic, EOMI, anicteric sclerae  Neck: supple, no JVD  Lungs: CTAB, no wheezing/rales/rhonchi  Heart: RRR, no M/R/G  Abdomen: soft, NT, ND. BS present  Ext: no clubbing, cyanosis, edema  Neuro: alert and oriented x 4  Psych: pleasant and appropriate mood and affect    Objective:     Laboratory Data:  Labs reviewed.     Imaging Data:    CT C/A/P 2025    Impression:     Stable hilar and mediastinal adenopathy.     Stable pulmonary nodules.  No new pulmonary nodules are identified.     Prominent atherosclerosis including coronary artery atherosclerosis in a multi vessel distribution and prominent atherosclerotic calcification involving the common iliac arteries bilaterally.     Chronic interstitial changes within the lungs with subpleural reticulation and mild traction bronchiolectasis.     Bilateral inguinal hernias.  Inguinal hernia on the left contains a nonobstructed loop of bowel.  Inguinal hernia on the right contains fat.    CT Thigh  2025    Impression:     No evidence for local recurrence in this patient with " history of sarcoma.     Prominent degenerative changes of the right hip and right knee with calcified loose bodies within the knee joint.  Calcified loose body is also noted within a right Baker's cyst.      Assessment and Plan:     1. Primary myxofibrosarcoma    2. 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 Cancer related pain    7. Essential hypertension        1-5  - Mr Plunkett is a 78 yo man with recurrent myxofibrosarcoma of the right thigh, has had 3 resections and IMRT  - he was seen by Dr. Mao and Dr. Unger, they did not feel that he would be a good surgical candidate and recommended palliative RT  - completed palliative RT 2/13/2023  - Previously discussed systemic treatment options, including chemotherapy and immunotherapy. He was not interested in chemotherapy including TKI. Pembrolizumab has efficacy in phase II studies and is recommended on NCCN Guidelines for patients with myxofibrosarcoma. PD-L1 is 25%. However, his TMB is not high. Pembrolizumab was denied by insurance and then denied at hearing. He got patient assistance through [employer]. Approved for q3 week infusions.   - started palliative keytruda on 3/24/23  - 4/2025 CT showed stable disease.     - Labs reviewed and adequate for treatment.   - proceed with keytruda infusion today.   - RTC in 3 weeks    6.  - Slightly worsening but he does not want to take Morphine just yet. C/w oxycodone 10 mg prn. It is tolerable. He is using his cane and has not fallen. Discussed likely need to change to long-acting if this trend continues  - Will continue to monitor.     7.  - BP generally well controlled. No report of chest pain or shortness of breath. Will continue to monitor.   - f/u with PCP    Follow-up:     In 3 weeks    Patient is in agreement with the proposed treatment plan. All questions were answered to the patient's satisfaction. Pt  knows to call clinic if anything is needed before the next clinic visit.    Patient discussed with collaborating physician, Dr. Martines.    At least 40 minutes were spent today on this encounter including face to face time with the patient, data gathering/interpretation and documentation.       Mahsa Lorenzana, MSN, APRN, [facility]  Hematology and Medical Oncology  Clinical Nurse Specialist to Dr. Nicolas, Dr. James & Dr. Martines         code applied: patient requires or will require a continuous, longitudinal, and active collaborative plan of care related to this patient's health condition, soft tissue sarcoma cancer --the management of which requires the direction of a practitioner with specialized clinical knowledge, skill, and expertise.     Route Chart for Scheduling    Med Onc Chart Routing      Follow up with physician 6 weeks. with labs for infusion   Follow up with NICOLLE 3 weeks. as scheduled   Infusion scheduling note   keytruda every 3 weeks   Injection scheduling note    Labs CBC, CMP and TSH   Scheduling:  Preferred lab:  Lab interval: every 3 weeks     Imaging    Pharmacy appointment    Other referrals            Treatment Plan Information   OP PEMBROLIZUMAB 200MG Q3W Ed Martines MD   Associated diagnosis: Primary myxofibrosarcoma   noted on 8/18/2022   Line of treatment: First Line  Treatment Goal: Palliative     Upcoming Treatment Dates - OP PEMBROLIZUMAB 200MG Q3W    5/20/2025       Chemotherapy       pembrolizumab (KEYTRUDA) 200 mg in 0.9% NaCl SolP 108 mL infusion  6/10/2025       Chemotherapy       pembrolizumab (KEYTRUDA) 200 mg in 0.9% NaCl SolP 108 mL infusion

## 2025-05-28 ENCOUNTER — PATIENT MESSAGE (OUTPATIENT)
Dept: HEMATOLOGY/ONCOLOGY | Facility: CLINIC | Age: 80
End: 2025-05-28
Payer: MEDICARE

## 2025-06-17 ENCOUNTER — OFFICE VISIT (OUTPATIENT)
Dept: HEMATOLOGY/ONCOLOGY | Facility: CLINIC | Age: 80
End: 2025-06-17
Payer: MEDICARE

## 2025-06-17 ENCOUNTER — INFUSION (OUTPATIENT)
Dept: INFUSION THERAPY | Facility: HOSPITAL | Age: 80
End: 2025-06-17
Attending: INTERNAL MEDICINE
Payer: MEDICARE

## 2025-06-17 VITALS
HEIGHT: 73 IN | HEIGHT: 73 IN | HEART RATE: 48 BPM | BODY MASS INDEX: 23.55 KG/M2 | SYSTOLIC BLOOD PRESSURE: 137 MMHG | BODY MASS INDEX: 23.55 KG/M2 | RESPIRATION RATE: 18 BRPM | HEART RATE: 48 BPM | TEMPERATURE: 98 F | DIASTOLIC BLOOD PRESSURE: 86 MMHG | WEIGHT: 177.69 LBS | WEIGHT: 177.69 LBS | DIASTOLIC BLOOD PRESSURE: 86 MMHG | RESPIRATION RATE: 18 BRPM | TEMPERATURE: 98 F | OXYGEN SATURATION: 98 % | SYSTOLIC BLOOD PRESSURE: 137 MMHG

## 2025-06-17 DIAGNOSIS — C49.9 SOFT TISSUE SARCOMA: ICD-10-CM

## 2025-06-17 DIAGNOSIS — D84.821 IMMUNODEFICIENCY DUE TO DRUGS: ICD-10-CM

## 2025-06-17 DIAGNOSIS — C49.9 PRIMARY MYXOFIBROSARCOMA: Primary | ICD-10-CM

## 2025-06-17 DIAGNOSIS — D49.9 IMMUNODEFICIENCY SECONDARY TO NEOPLASM: ICD-10-CM

## 2025-06-17 DIAGNOSIS — C77.4 SECONDARY MALIGNANCY OF INGUINAL LYMPH NODES: ICD-10-CM

## 2025-06-17 DIAGNOSIS — D84.81 IMMUNODEFICIENCY SECONDARY TO NEOPLASM: ICD-10-CM

## 2025-06-17 DIAGNOSIS — R53.83 FATIGUE, UNSPECIFIED TYPE: ICD-10-CM

## 2025-06-17 DIAGNOSIS — G89.3 CANCER RELATED PAIN: ICD-10-CM

## 2025-06-17 DIAGNOSIS — I10 ESSENTIAL HYPERTENSION: ICD-10-CM

## 2025-06-17 DIAGNOSIS — Z79.899 IMMUNODEFICIENCY DUE TO DRUGS: ICD-10-CM

## 2025-06-17 PROCEDURE — 1101F PT FALLS ASSESS-DOCD LE1/YR: CPT | Mod: CPTII,HCNC,S$GLB, | Performed by: PHYSICIAN ASSISTANT

## 2025-06-17 PROCEDURE — 25000003 PHARM REV CODE 250: Mod: HCNC | Performed by: PHYSICIAN ASSISTANT

## 2025-06-17 PROCEDURE — 1126F AMNT PAIN NOTED NONE PRSNT: CPT | Mod: CPTII,HCNC,S$GLB, | Performed by: PHYSICIAN ASSISTANT

## 2025-06-17 PROCEDURE — 99999 PR PBB SHADOW E&M-EST. PATIENT-LVL V: CPT | Mod: PBBFAC,HCNC,, | Performed by: PHYSICIAN ASSISTANT

## 2025-06-17 PROCEDURE — 3288F FALL RISK ASSESSMENT DOCD: CPT | Mod: CPTII,HCNC,S$GLB, | Performed by: PHYSICIAN ASSISTANT

## 2025-06-17 PROCEDURE — 96413 CHEMO IV INFUSION 1 HR: CPT | Mod: HCNC

## 2025-06-17 PROCEDURE — 1159F MED LIST DOCD IN RCRD: CPT | Mod: CPTII,HCNC,S$GLB, | Performed by: PHYSICIAN ASSISTANT

## 2025-06-17 PROCEDURE — G2211 COMPLEX E/M VISIT ADD ON: HCPCS | Mod: HCNC,S$GLB,, | Performed by: PHYSICIAN ASSISTANT

## 2025-06-17 PROCEDURE — 3075F SYST BP GE 130 - 139MM HG: CPT | Mod: CPTII,HCNC,S$GLB, | Performed by: PHYSICIAN ASSISTANT

## 2025-06-17 PROCEDURE — 1160F RVW MEDS BY RX/DR IN RCRD: CPT | Mod: CPTII,HCNC,S$GLB, | Performed by: PHYSICIAN ASSISTANT

## 2025-06-17 PROCEDURE — 3079F DIAST BP 80-89 MM HG: CPT | Mod: CPTII,HCNC,S$GLB, | Performed by: PHYSICIAN ASSISTANT

## 2025-06-17 PROCEDURE — 63600175 PHARM REV CODE 636 W HCPCS: Mod: JZ,TB,HCNC | Performed by: PHYSICIAN ASSISTANT

## 2025-06-17 PROCEDURE — 99215 OFFICE O/P EST HI 40 MIN: CPT | Mod: HCNC,S$GLB,, | Performed by: PHYSICIAN ASSISTANT

## 2025-06-17 RX ORDER — DIPHENHYDRAMINE HYDROCHLORIDE 50 MG/ML
50 INJECTION, SOLUTION INTRAMUSCULAR; INTRAVENOUS ONCE AS NEEDED
Status: CANCELLED | OUTPATIENT
Start: 2025-06-17

## 2025-06-17 RX ORDER — DIPHENHYDRAMINE HYDROCHLORIDE 50 MG/ML
50 INJECTION, SOLUTION INTRAMUSCULAR; INTRAVENOUS ONCE AS NEEDED
Status: DISCONTINUED | OUTPATIENT
Start: 2025-06-17 | End: 2025-06-17 | Stop reason: HOSPADM

## 2025-06-17 RX ORDER — EPINEPHRINE 0.3 MG/.3ML
0.3 INJECTION SUBCUTANEOUS ONCE AS NEEDED
Status: DISCONTINUED | OUTPATIENT
Start: 2025-06-17 | End: 2025-06-17 | Stop reason: HOSPADM

## 2025-06-17 RX ORDER — HEPARIN 100 UNIT/ML
500 SYRINGE INTRAVENOUS
Status: CANCELLED | OUTPATIENT
Start: 2025-06-17

## 2025-06-17 RX ORDER — EPINEPHRINE 0.3 MG/.3ML
0.3 INJECTION SUBCUTANEOUS ONCE AS NEEDED
Status: CANCELLED | OUTPATIENT
Start: 2025-06-17

## 2025-06-17 RX ORDER — HEPARIN 100 UNIT/ML
500 SYRINGE INTRAVENOUS
Status: DISCONTINUED | OUTPATIENT
Start: 2025-06-17 | End: 2025-06-17 | Stop reason: HOSPADM

## 2025-06-17 RX ORDER — SODIUM CHLORIDE 0.9 % (FLUSH) 0.9 %
10 SYRINGE (ML) INJECTION
Status: DISCONTINUED | OUTPATIENT
Start: 2025-06-17 | End: 2025-06-17 | Stop reason: HOSPADM

## 2025-06-17 RX ORDER — SODIUM CHLORIDE 0.9 % (FLUSH) 0.9 %
10 SYRINGE (ML) INJECTION
Status: CANCELLED | OUTPATIENT
Start: 2025-06-17

## 2025-06-17 RX ADMIN — SODIUM CHLORIDE: 9 INJECTION, SOLUTION INTRAVENOUS at 12:06

## 2025-06-17 RX ADMIN — SODIUM CHLORIDE 200 MG: 9 INJECTION, SOLUTION INTRAVENOUS at 01:06

## 2025-06-17 NOTE — PROGRESS NOTES
"                                    PROGRESS NOTE    Subjective:       Patient ID: Zac Plunkett is a 79 y.o. male.    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 JD. TMB 6.3    Oncologic History copied from medical chart:  Prior patient of Dr Lucia's  2021  May              5/27- US right thigh: showed 2 hypoechoic masses in the subcutaneous tissues of the right anterior thigh. Largest measured 1.1 x 0.9 x 1 cm. Read at the time was concern for lymphadenopathy.   June 6/4 - CXR: "Bilateral reticular opacities are present in a perihilar distribution which appears slightly more pronounced compared to prior exams."               6/11 - underwent excisional biopsy of the right thigh; pathology came back positive for myxofibrosarcoma, high grade. Path a 1.8 x 1.1 x 0.9 cm mass with an adjacent 0.4 x 0.4 x 0.3 cm mass.   July/Aug              - IMRT with Dr. Mcadams  2022 January 1/12 - PET: locoregional recurrence and concerning 0.9 cm RUL lesion              1/20 - Path: excision on 2 recurrent lesions:  Myxofibrosarcoma, 1.9 cm at longest length, PD-L1 25%              1/31 - CT thigh: successful removal w/o evidence of recurrent disease  April 4/28 - recurrence, locally with metastatic deposit in LN. S/p excision on 4/28/22. Path: right thigh superior mass: Myxofibrosarcoma, high grade with focal tumor necrosis, incompletely excised. A portion of the lesion is suggestive of an involved lymph node exhibiting metastatic sarcoma with extranodal extension.  Sarcoma is focally present within inked but otherwise unspecified peripheral margins.   July 7/19 - CT thigh showed local recurrence and a 0.7 cm R inguinal lymph node    2023  Jacoby   1/3 - CT thigh/pelvis showed "Heterogeneous enhancing soft tissue " "lesions and nodular foci at the anterior thigh and right groin highly concerning for worsening local recurrent disease with talon metastasis"    - stable pulmonary nodules on CT chest  Fe- completed palliative radiation to right thigh  March  3/24- started keytruda, s/p 34 cycles    Interval History:   Mr Plunkett returns for follow up. He did report worsened pain over the past several months in his right thigh but does get intermittent relief from his current regimen. We discussed potential need to re-attempt long-acting regimen if this pain continues to worsen and was agreeable. Otherwise, is doing well. He did have some days where he felt if continuing with treatment was worth it. He is feeling better today and wants to continue. Eating and has a good appetite.       ECO. Presents with his brother today    ROS:   A ten-point system review is obtained and negative except for what was stated in the Interval History.     Physical Examination:   Vital signs reviewed  General: well hydrated, well developed, in no acute distress  HEENT: normocephalic, EOMI, anicteric sclerae  Neck: supple, no JVD  Lungs: CTAB, no wheezing/rales/rhonchi  Heart: RRR, no M/R/G  Abdomen: soft, NT, ND. BS present  Ext: no clubbing, cyanosis, edema  Neuro: alert and oriented x 4  Psych: pleasant and appropriate mood and affect    Objective:     Laboratory Data:  Labs reviewed. CBC, CMP adequate for treatment. TSH normal     Imaging Data:    CT C/A/P 2025    Impression:     Stable hilar and mediastinal adenopathy.     Stable pulmonary nodules.  No new pulmonary nodules are identified.     Prominent atherosclerosis including coronary artery atherosclerosis in a multi vessel distribution and prominent atherosclerotic calcification involving the common iliac arteries bilaterally.     Chronic interstitial changes within the lungs with subpleural reticulation and mild traction bronchiolectasis.     Bilateral inguinal hernias. "  Inguinal hernia on the left contains a nonobstructed loop of bowel.  Inguinal hernia on the right contains fat.    CT Thigh  4/17/2025    Impression:     No evidence for local recurrence in this patient with history of sarcoma.     Prominent degenerative changes of the right hip and right knee with calcified loose bodies within the knee joint.  Calcified loose body is also noted within a right Baker's cyst.      Assessment and Plan:     1. Primary myxofibrosarcoma    2. 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 Cancer related pain    7. Essential hypertension    8. Fatigue, unspecified type        1-5  - Mr Plunkett is a 78 yo man with recurrent myxofibrosarcoma of the right thigh, has had 3 resections and IMRT  - he was seen by Dr. Mao and Dr. Unger, they did not feel that he would be a good surgical candidate and recommended palliative RT  - completed palliative RT 2/13/2023  - Previously discussed systemic treatment options, including chemotherapy and immunotherapy. He was not interested in chemotherapy including TKI. Pembrolizumab has efficacy in phase II studies and is recommended on NCCN Guidelines for patients with myxofibrosarcoma. PD-L1 is 25%. However, his TMB is not high. Pembrolizumab was denied by insurance and then denied at hearing. He got patient assistance through [employer]  - started palliative keytruda on 3/24/23  - Most recent CT in April displays Stable disease. Recc to continue with Keytruda    - doing fairly well. He is still tolerating treatment well. Eating well and has a good appetite. Energy level is stable as well. Pain is controlled with medication but he does feel that it worsens from time to time.   - I have personally reviewed the patient's lab work today, including CBC and CMP. ANC is adequate for treatment   - c/w keytruda every 3 weeks    - RTC in 3 weeks. Will see   Du with lab work, scans and treatment discussion with Pembro    6.  - Slightly worsening but he does not want to take Morphine just yet. C/w oxycodone 10 mg prn. It is tolerable. He is using his cane and has not fallen. Discussed likely need to change to long-acting if this trend continues  - Will continue to monitor.     7.  - BP well controlled. No report of chest pain or shortness of breath. Will continue to monitor.   - f/u with PCP    Follow-up:     In 3 weeks    Patient and family members displayed understanding of the above encounter and treatment plan. All thoughtful questions were answered to their satisfaction. Patient was advised to notify the care team or proceed to the ER if signs and symptoms worsen.     30 minutes were spent today on this encounter including face to face time with the patient, data gathering/interpretation and documentation. Greater than 50% of this time involved counseling or coordination of care. I have provided the patient with an opportunity to ask questions and have all questions answered to patient's satisfaction.     Visit today included increased complexity associated with the care of the episodic problem chemotherapy  addressed and managing the longitudinal care of the patient due to the serious and/or complex managed problem(s) GI malignancies/cancer. In addition, the above encounter addresses an illness that poses a significant threat to life, bodily function and overall performance status.      code applied: patient requires or will require a continuous, longitudinal, and active collaborative plan of care related to this patient's health condition, GI malignancies/cancer --the management of which requires the direction of a practitioner with specialized clinical knowledge, skill, and expertise       ADEEL Hurley, PA-C [facility]  Dept of Hematology/Oncology  MARILEE to GI Oncology team         Route Chart for Scheduling    Med Onc Chart  Routing      Follow up with physician 3 weeks and 6 weeks. See Dr Martines in 3 weeks with lab work, CT CAP, CT thigh and treatment discussion with Dr Martines and Urmila. Pembro in 6 weeks.   Follow up with NICOLLE . 9 weeks and 12 weeks, Pembro   Infusion scheduling note    Injection scheduling note    Labs CBC, CMP, TSH and free T4   Scheduling:  Preferred lab:  Lab interval: every 3 weeks     Imaging    Pharmacy appointment    Other referrals            Treatment Plan Information   OP PEMBROLIZUMAB 200MG Q3W Ed Martines MD   Associated diagnosis: Primary myxofibrosarcoma   noted on 8/18/2022   Line of treatment: First Line  Treatment Goal: Palliative     Upcoming Treatment Dates - OP PEMBROLIZUMAB 200MG Q3W    6/10/2025       Chemotherapy       pembrolizumab (KEYTRUDA) 200 mg in 0.9% NaCl SolP 108 mL infusion

## 2025-06-19 DIAGNOSIS — C49.9 SOFT TISSUE SARCOMA: ICD-10-CM

## 2025-06-19 DIAGNOSIS — G89.3 CANCER RELATED PAIN: ICD-10-CM

## 2025-06-19 DIAGNOSIS — C49.9 PRIMARY MYXOFIBROSARCOMA: ICD-10-CM

## 2025-06-20 RX ORDER — OXYCODONE HYDROCHLORIDE 10 MG/1
10 TABLET ORAL EVERY 4 HOURS PRN
Qty: 180 TABLET | Refills: 0 | Status: SHIPPED | OUTPATIENT
Start: 2025-06-20

## 2025-07-08 ENCOUNTER — OFFICE VISIT (OUTPATIENT)
Dept: HEMATOLOGY/ONCOLOGY | Facility: CLINIC | Age: 80
End: 2025-07-08
Payer: MEDICARE

## 2025-07-08 ENCOUNTER — INFUSION (OUTPATIENT)
Dept: INFUSION THERAPY | Facility: HOSPITAL | Age: 80
End: 2025-07-08
Attending: INTERNAL MEDICINE
Payer: MEDICARE

## 2025-07-08 VITALS
BODY MASS INDEX: 23.66 KG/M2 | RESPIRATION RATE: 20 BRPM | HEART RATE: 61 BPM | DIASTOLIC BLOOD PRESSURE: 69 MMHG | HEIGHT: 73 IN | OXYGEN SATURATION: 97 % | WEIGHT: 178.56 LBS | TEMPERATURE: 98 F | SYSTOLIC BLOOD PRESSURE: 116 MMHG

## 2025-07-08 DIAGNOSIS — Z79.899 IMMUNODEFICIENCY DUE TO DRUGS: ICD-10-CM

## 2025-07-08 DIAGNOSIS — C77.4 SECONDARY MALIGNANCY OF INGUINAL LYMPH NODES: ICD-10-CM

## 2025-07-08 DIAGNOSIS — C49.9 PRIMARY MYXOFIBROSARCOMA: Primary | ICD-10-CM

## 2025-07-08 DIAGNOSIS — C49.9 SOFT TISSUE SARCOMA: ICD-10-CM

## 2025-07-08 DIAGNOSIS — G89.3 CANCER RELATED PAIN: ICD-10-CM

## 2025-07-08 DIAGNOSIS — D49.9 IMMUNODEFICIENCY SECONDARY TO NEOPLASM: ICD-10-CM

## 2025-07-08 DIAGNOSIS — R53.83 FATIGUE, UNSPECIFIED TYPE: ICD-10-CM

## 2025-07-08 DIAGNOSIS — I10 ESSENTIAL HYPERTENSION: ICD-10-CM

## 2025-07-08 DIAGNOSIS — D84.821 IMMUNODEFICIENCY DUE TO DRUGS: ICD-10-CM

## 2025-07-08 DIAGNOSIS — D84.81 IMMUNODEFICIENCY SECONDARY TO NEOPLASM: ICD-10-CM

## 2025-07-08 PROCEDURE — 96413 CHEMO IV INFUSION 1 HR: CPT | Mod: HCNC

## 2025-07-08 PROCEDURE — 3078F DIAST BP <80 MM HG: CPT | Mod: CPTII,HCNC,S$GLB, | Performed by: PHYSICIAN ASSISTANT

## 2025-07-08 PROCEDURE — 25000003 PHARM REV CODE 250: Mod: HCNC | Performed by: PHYSICIAN ASSISTANT

## 2025-07-08 PROCEDURE — 3074F SYST BP LT 130 MM HG: CPT | Mod: CPTII,HCNC,S$GLB, | Performed by: PHYSICIAN ASSISTANT

## 2025-07-08 PROCEDURE — 99215 OFFICE O/P EST HI 40 MIN: CPT | Mod: HCNC,S$GLB,, | Performed by: PHYSICIAN ASSISTANT

## 2025-07-08 PROCEDURE — 63600175 PHARM REV CODE 636 W HCPCS: Mod: JZ,TB,HCNC | Performed by: PHYSICIAN ASSISTANT

## 2025-07-08 PROCEDURE — 99999 PR PBB SHADOW E&M-EST. PATIENT-LVL IV: CPT | Mod: PBBFAC,HCNC,, | Performed by: PHYSICIAN ASSISTANT

## 2025-07-08 PROCEDURE — 1101F PT FALLS ASSESS-DOCD LE1/YR: CPT | Mod: CPTII,HCNC,S$GLB, | Performed by: PHYSICIAN ASSISTANT

## 2025-07-08 PROCEDURE — 1125F AMNT PAIN NOTED PAIN PRSNT: CPT | Mod: CPTII,HCNC,S$GLB, | Performed by: PHYSICIAN ASSISTANT

## 2025-07-08 PROCEDURE — G2211 COMPLEX E/M VISIT ADD ON: HCPCS | Mod: HCNC,S$GLB,, | Performed by: PHYSICIAN ASSISTANT

## 2025-07-08 PROCEDURE — 1160F RVW MEDS BY RX/DR IN RCRD: CPT | Mod: CPTII,HCNC,S$GLB, | Performed by: PHYSICIAN ASSISTANT

## 2025-07-08 PROCEDURE — 3288F FALL RISK ASSESSMENT DOCD: CPT | Mod: CPTII,HCNC,S$GLB, | Performed by: PHYSICIAN ASSISTANT

## 2025-07-08 PROCEDURE — 1159F MED LIST DOCD IN RCRD: CPT | Mod: CPTII,HCNC,S$GLB, | Performed by: PHYSICIAN ASSISTANT

## 2025-07-08 RX ORDER — HEPARIN 100 UNIT/ML
500 SYRINGE INTRAVENOUS
Status: DISCONTINUED | OUTPATIENT
Start: 2025-07-08 | End: 2025-07-08 | Stop reason: HOSPADM

## 2025-07-08 RX ORDER — EPINEPHRINE 0.3 MG/.3ML
0.3 INJECTION SUBCUTANEOUS ONCE AS NEEDED
Status: DISCONTINUED | OUTPATIENT
Start: 2025-07-08 | End: 2025-07-08 | Stop reason: HOSPADM

## 2025-07-08 RX ORDER — DIPHENHYDRAMINE HYDROCHLORIDE 50 MG/ML
50 INJECTION, SOLUTION INTRAMUSCULAR; INTRAVENOUS ONCE AS NEEDED
Status: DISCONTINUED | OUTPATIENT
Start: 2025-07-08 | End: 2025-07-08 | Stop reason: HOSPADM

## 2025-07-08 RX ORDER — HEPARIN 100 UNIT/ML
500 SYRINGE INTRAVENOUS
Status: CANCELLED | OUTPATIENT
Start: 2025-07-08

## 2025-07-08 RX ORDER — SODIUM CHLORIDE 0.9 % (FLUSH) 0.9 %
10 SYRINGE (ML) INJECTION
Status: DISCONTINUED | OUTPATIENT
Start: 2025-07-08 | End: 2025-07-08 | Stop reason: HOSPADM

## 2025-07-08 RX ORDER — EPINEPHRINE 0.3 MG/.3ML
0.3 INJECTION SUBCUTANEOUS ONCE AS NEEDED
Status: CANCELLED | OUTPATIENT
Start: 2025-07-08 | End: 2036-12-03

## 2025-07-08 RX ORDER — SODIUM CHLORIDE 0.9 % (FLUSH) 0.9 %
10 SYRINGE (ML) INJECTION
Status: CANCELLED | OUTPATIENT
Start: 2025-07-08

## 2025-07-08 RX ORDER — DIPHENHYDRAMINE HYDROCHLORIDE 50 MG/ML
50 INJECTION, SOLUTION INTRAMUSCULAR; INTRAVENOUS ONCE AS NEEDED
Status: CANCELLED | OUTPATIENT
Start: 2025-07-08 | End: 2036-12-03

## 2025-07-08 RX ADMIN — SODIUM CHLORIDE 200 MG: 9 INJECTION, SOLUTION INTRAVENOUS at 12:07

## 2025-07-08 RX ADMIN — SODIUM CHLORIDE: 9 INJECTION, SOLUTION INTRAVENOUS at 11:07

## 2025-07-08 NOTE — PLAN OF CARE
11:30- Pt here for C36 Keytruda. Labs and clinic visit complete.    12:50- Pt tolerated treatment through PIV without reaction. RTC in 3 weeks, D/C in stable condition, ambulated independently with cane.

## 2025-07-08 NOTE — PROGRESS NOTES
"                                    PROGRESS NOTE    Subjective:       Patient ID: Zac Plunkett is a 80 y.o. male.    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 JD. TMB 6.3    Oncologic History copied from medical chart:  Prior patient of Dr Lucia's  2021  May              5/27- US right thigh: showed 2 hypoechoic masses in the subcutaneous tissues of the right anterior thigh. Largest measured 1.1 x 0.9 x 1 cm. Read at the time was concern for lymphadenopathy.   June 6/4 - CXR: "Bilateral reticular opacities are present in a perihilar distribution which appears slightly more pronounced compared to prior exams."               6/11 - underwent excisional biopsy of the right thigh; pathology came back positive for myxofibrosarcoma, high grade. Path a 1.8 x 1.1 x 0.9 cm mass with an adjacent 0.4 x 0.4 x 0.3 cm mass.   July/Aug              - IMRT with Dr. Mcadams  2022 January 1/12 - PET: locoregional recurrence and concerning 0.9 cm RUL lesion              1/20 - Path: excision on 2 recurrent lesions:  Myxofibrosarcoma, 1.9 cm at longest length, PD-L1 25%              1/31 - CT thigh: successful removal w/o evidence of recurrent disease  April 4/28 - recurrence, locally with metastatic deposit in LN. S/p excision on 4/28/22. Path: right thigh superior mass: Myxofibrosarcoma, high grade with focal tumor necrosis, incompletely excised. A portion of the lesion is suggestive of an involved lymph node exhibiting metastatic sarcoma with extranodal extension.  Sarcoma is focally present within inked but otherwise unspecified peripheral margins.   July 7/19 - CT thigh showed local recurrence and a 0.7 cm R inguinal lymph node    2023  [facility]   1/3 - CT thigh/pelvis showed "Heterogeneous enhancing soft tissue " "lesions and nodular foci at the anterior thigh and right groin highly concerning for worsening local recurrent disease with talon metastasis"    - stable pulmonary nodules on CT chest  Feb      - completed palliative radiation to right thigh  March  3/24- started keytruda, s/p 35 cycles    Interval History:   Mr Plunkett returns for follow up. He continues to have pain to his legs and is finding it harder to be as active as he used to be; however, his pain medication does help him and he continues to function. He is eating well and has a good appetite. He is tolerating treatment well and had a good birthday. Uses his cane to walk.     ECO. Presents alone today.     ROS:   A ten-point system review is obtained and negative except for what was stated in the Interval History.     Physical Examination:   Vital signs reviewed  General: well hydrated, well developed, in no acute distress  HEENT: normocephalic, EOMI, anicteric sclerae  Neck: supple, no JVD  Lungs: CTAB, no wheezing/rales/rhonchi  Heart: RRR, no M/R/G  Abdomen: soft, NT, ND. BS present  Ext: no clubbing, cyanosis, edema  Neuro: alert and oriented x 4  Psych: pleasant and appropriate mood and affect    Objective:     Laboratory Data:  Labs reviewed. CBC, CMP adequate for treatment. TSH normal     Imaging Data:    CT C/A/P 2025    Impression:     Stable hilar and mediastinal adenopathy.     Stable pulmonary nodules.  No new pulmonary nodules are identified.     Prominent atherosclerosis including coronary artery atherosclerosis in a multi vessel distribution and prominent atherosclerotic calcification involving the common iliac arteries bilaterally.     Chronic interstitial changes within the lungs with subpleural reticulation and mild traction bronchiolectasis.     Bilateral inguinal hernias.  Inguinal hernia on the left contains a nonobstructed loop of bowel.  Inguinal hernia on the right contains fat.    CT Thigh  2025    Impression:   "   No evidence for local recurrence in this patient with history of sarcoma.     Prominent degenerative changes of the right hip and right knee with calcified loose bodies within the knee joint.  Calcified loose body is also noted within a right Baker's cyst.      Assessment and Plan:     1. Primary myxofibrosarcoma    2. 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 Cancer related pain    7. Essential hypertension    8. Fatigue, unspecified type          1-5  - Mr Plunkett is a 80 yo man with recurrent myxofibrosarcoma of the right thigh, has had 3 resections and IMRT  - he was seen by Dr. Mao and Dr. Unger, they did not feel that he would be a good surgical candidate and recommended palliative RT  - completed palliative RT 2/13/2023  - Previously discussed systemic treatment options, including chemotherapy and immunotherapy. He was not interested in chemotherapy including TKI. Pembrolizumab has efficacy in phase II studies and is recommended on NCCN Guidelines for patients with myxofibrosarcoma. PD-L1 is 25%. However, his TMB is not high. Pembrolizumab was denied by insurance and then denied at hearing. He got patient assistance through [employer]  - started palliative keytruda on 3/24/23  - Most recent CT in April displays Stable disease. Recc to continue with Keytruda    - doing fairly well. He is still tolerating treatment well. Eating well and has a good appetite. Energy level is stable as well. Pain is controlled with medication but he does feel that it worsens from time to time.   - I have personally reviewed the patient's lab work today, including CBC and CMP. ANC is adequate for treatment   - c/w keytruda every 3 weeks    - RTC in 3 weeks. Will see Dr Martines with lab work, scans and treatment discussion with Pembro    6.  - Slightly worsening but he does not want to take Morphine just yet. C/w oxycodone  10 mg prn as this does help and makes the pain very tolerable. He is using his cane and has not fallen. Discussed likely need to change to long-acting if this trend continues  - Will continue to monitor.     7.  - BP well controlled. No report of chest pain or shortness of breath. Will continue to monitor.   - f/u with PCP    Follow-up:     In 3 weeks    Patient and family members displayed understanding of the above encounter and treatment plan. All thoughtful questions were answered to their satisfaction. Patient was advised to notify the care team or proceed to the ER if signs and symptoms worsen.     30 minutes were spent today on this encounter including face to face time with the patient, data gathering/interpretation and documentation. Greater than 50% of this time involved counseling or coordination of care. I have provided the patient with an opportunity to ask questions and have all questions answered to patient's satisfaction.     Visit today included increased complexity associated with the care of the episodic problem chemotherapy  addressed and managing the longitudinal care of the patient due to the serious and/or complex managed problem(s) GI malignancies/cancer. In addition, the above encounter addresses an illness that poses a significant threat to life, bodily function and overall performance status.      code applied: patient requires or will require a continuous, longitudinal, and active collaborative plan of care related to this patient's health condition, GI malignancies/cancer --the management of which requires the direction of a practitioner with specialized clinical knowledge, skill, and expertise       ADEEL Hurley, PA-C  [facility] [place]  Dept of Hematology/Oncology  PASENDY to GI Oncology team         Route Chart for Scheduling    Med Onc Chart Routing      Follow up with physician 3 weeks and 6 weeks. Keep appointments as scheduled.   Follow up with NICOLLE . Pembrolizumab q3  weeks with provider visit.   Infusion scheduling note    Injection scheduling note    Labs CBC, CMP and TSH   Scheduling:  Preferred lab:  Lab interval: every 3 weeks     Imaging CT chest abdomen pelvis   Scheduled. Thank you   Pharmacy appointment    Other referrals            Treatment Plan Information   OP PEMBROLIZUMAB 200MG Q3W dE Martines MD   Associated diagnosis: Primary myxofibrosarcoma   noted on 8/18/2022   Line of treatment: First Line  Treatment Goal: Palliative     Upcoming Treatment Dates - OP PEMBROLIZUMAB 200MG Q3W    7/1/2025       Chemotherapy       pembrolizumab (KEYTRUDA) 200 mg in 0.9% NaCl SolP 108 mL infusion  7/22/2025       Chemotherapy       pembrolizumab (KEYTRUDA) 200 mg in 0.9% NaCl SolP 108 mL infusion  8/12/2025       Chemotherapy       pembrolizumab (KEYTRUDA) 200 mg in 0.9% NaCl SolP 108 mL infusion  9/2/2025       Chemotherapy       pembrolizumab (KEYTRUDA) 200 mg in 0.9% NaCl SolP 108 mL infusion

## 2025-07-19 DIAGNOSIS — G89.3 CANCER RELATED PAIN: ICD-10-CM

## 2025-07-19 DIAGNOSIS — C49.9 PRIMARY MYXOFIBROSARCOMA: ICD-10-CM

## 2025-07-19 DIAGNOSIS — C49.9 SOFT TISSUE SARCOMA: ICD-10-CM

## 2025-07-21 RX ORDER — OXYCODONE HYDROCHLORIDE 10 MG/1
10 TABLET ORAL EVERY 4 HOURS PRN
Qty: 180 TABLET | Refills: 0 | Status: SHIPPED | OUTPATIENT
Start: 2025-07-21

## 2025-07-22 DIAGNOSIS — C49.9 PRIMARY MYXOFIBROSARCOMA: ICD-10-CM

## 2025-07-22 RX ORDER — PEMBROLIZUMAB 25 MG/ML
INJECTION, SOLUTION INTRAVENOUS
Qty: 8 ML | Refills: 10 | Status: SHIPPED | OUTPATIENT
Start: 2025-07-22

## 2025-07-25 ENCOUNTER — OFFICE VISIT (OUTPATIENT)
Dept: URGENT CARE | Facility: CLINIC | Age: 80
End: 2025-07-25
Payer: MEDICARE

## 2025-07-25 VITALS
HEIGHT: 73 IN | HEART RATE: 61 BPM | SYSTOLIC BLOOD PRESSURE: 155 MMHG | WEIGHT: 178.56 LBS | DIASTOLIC BLOOD PRESSURE: 81 MMHG | RESPIRATION RATE: 16 BRPM | OXYGEN SATURATION: 95 % | BODY MASS INDEX: 23.66 KG/M2 | TEMPERATURE: 98 F

## 2025-07-25 DIAGNOSIS — S50.311A ABRASION OF RIGHT ELBOW, INITIAL ENCOUNTER: Primary | ICD-10-CM

## 2025-07-25 DIAGNOSIS — Z51.89 VISIT FOR WOUND CARE: ICD-10-CM

## 2025-07-25 DIAGNOSIS — L03.113 CELLULITIS OF RIGHT ELBOW: ICD-10-CM

## 2025-07-25 RX ORDER — SILVER SULFADIAZINE 10 G/1000G
CREAM TOPICAL DAILY
Qty: 50 G | Refills: 0 | Status: SHIPPED | OUTPATIENT
Start: 2025-07-25

## 2025-07-25 RX ORDER — DOXYCYCLINE 100 MG/1
100 CAPSULE ORAL 2 TIMES DAILY WITH MEALS
Qty: 20 CAPSULE | Refills: 0 | Status: SHIPPED | OUTPATIENT
Start: 2025-07-25 | End: 2025-08-04

## 2025-07-25 RX ORDER — AMOXICILLIN AND CLAVULANATE POTASSIUM 875; 125 MG/1; MG/1
1 TABLET, FILM COATED ORAL EVERY 12 HOURS
Qty: 20 TABLET | Refills: 0 | Status: SHIPPED | OUTPATIENT
Start: 2025-07-25

## 2025-07-25 RX ORDER — SILVER SULFADIAZINE 10 G/1000G
CREAM TOPICAL
Status: COMPLETED | OUTPATIENT
Start: 2025-07-25 | End: 2025-07-25

## 2025-07-25 RX ORDER — MUPIROCIN 20 MG/G
OINTMENT TOPICAL
Status: COMPLETED | OUTPATIENT
Start: 2025-07-25 | End: 2025-07-25

## 2025-07-25 RX ADMIN — MUPIROCIN: 20 OINTMENT TOPICAL at 09:07

## 2025-07-25 RX ADMIN — SILVER SULFADIAZINE: 10 CREAM TOPICAL at 09:07

## 2025-07-25 NOTE — PATIENT INSTRUCTIONS
STOP ALL ANTIBIOTIC OINTMENT/CREAMS    USE ONLY WHAT I PRESCRIBED AND INSTRUCTED    USE A SMALL AMOUNT OF THE SILVADENE PRESCRIBE AND MIX WITH A LARGE AMOUNT OF AQUAPHOR AND KEEP WOUND COVERED IN MOIST UNTIL IT IS HEALED    TAKE YOUR ANTIBIOTICS AS PRESCRIBED  RETURN TO URGENT CARE OR GO TO EMERGENCY ROOM FOR ANY CONCERNS OR WORSENING OF SYMPTOMS

## 2025-07-25 NOTE — PROGRESS NOTES
"Subjective:      Patient ID: Zac Plunkett is a 80 y.o. male.    Vitals:  height is 6' 1" (1.854 m) and weight is 81 kg (178 lb 9.2 oz). His oral temperature is 98.4 °F (36.9 °C). His blood pressure is 155/81 (abnormal) and his pulse is 61. His respiration is 16 and oxygen saturation is 95%.     Chief Complaint: Elbow Injury    Pt states he had a fall "a little over a week ago" and has an abrasion to his R elbow.  C/o redness, swelling, and drainage. 6/10 pain  Applied neosporin to the site-- no relief.  The areas becoming more and more painful.  No fever chills.    Elbow Injury  This is a new problem. The current episode started 1 to 4 weeks ago. The problem occurs constantly. The problem has been gradually worsening. Associated symptoms include fatigue and a rash. Pertinent negatives include no chills, congestion, coughing or fever. Exacerbated by: touching. Treatments tried: neosporin. The treatment provided no relief.     Constitution: Positive for fatigue. Negative for chills and fever.   HENT:  Negative for congestion.    Respiratory:  Negative for cough.    Skin:  Positive for color change, rash, abrasion and erythema.      Objective:     Physical Exam   Constitutional: He is oriented to person, place, and time. He appears well-developed.  Non-toxic appearance. He does not appear ill. No distress.   HENT:   Head: Normocephalic and atraumatic.   Ears:   Right Ear: External ear normal.   Left Ear: External ear normal.   Nose: No rhinorrhea or congestion.   Mouth/Throat: Oropharynx is clear and moist.   Eyes: Conjunctivae, EOM and lids are normal. Pupils are equal, round, and reactive to light.   Neck: Trachea normal and phonation normal. Neck supple.   Abdominal: Normal appearance.   Musculoskeletal: Normal range of motion.         General: Tenderness present. No swelling. Normal range of motion.        Arms:       Comments: Abrasion 3 x 4 cm.  Slight scabbing but surrounding erythema extends out.  No " purulent drainage.  There is no joint involvement at the elbow.  No compartment compromise.  Distal sensory motor vascular are intact.   Neurological: He is alert and oriented to person, place, and time.   Skin: Skin is warm, dry, intact and not diaphoretic. erythema No bruising no jaundice  Psychiatric: His speech is normal and behavior is normal. Judgment and thought content normal.   Nursing note and vitals reviewed.      Assessment:     1. Abrasion of right elbow, initial encounter    2. Cellulitis of right elbow    3. Visit for wound care        Plan:       Abrasion of right elbow, initial encounter  -     silver sulfADIAZINE 1% cream  -     mupirocin 2 % ointment  -     BANDAGE ELASTIC 4IN ACE NS    Cellulitis of right elbow  -     silver sulfADIAZINE 1% cream  -     mupirocin 2 % ointment  -     BANDAGE ELASTIC 4IN ACE NS  -     silver sulfADIAZINE 1% (SILVADENE) 1 % cream; Apply topically once daily.  Dispense: 50 g; Refill: 0  -     amoxicillin-clavulanate 875-125mg (AUGMENTIN) 875-125 mg per tablet; Take 1 tablet by mouth every 12 (twelve) hours.  Dispense: 20 tablet; Refill: 0  -     doxycycline (VIBRAMYCIN) 100 MG Cap; Take 1 capsule (100 mg total) by mouth 2 (two) times daily with meals. for 10 days  Dispense: 20 capsule; Refill: 0    Visit for wound care  -     silver sulfADIAZINE 1% cream  -     mupirocin 2 % ointment  -     BANDAGE ELASTIC 4IN ACE NS      Follow up if symptoms worsen or fail to improve, for F/U with PCP or ED.   Patient Instructions   STOP ALL ANTIBIOTIC OINTMENT/CREAMS    USE ONLY WHAT I PRESCRIBED AND INSTRUCTED    USE A SMALL AMOUNT OF THE SILVADENE PRESCRIBE AND MIX WITH A LARGE AMOUNT OF AQUAPHOR AND KEEP WOUND COVERED IN MOIST UNTIL IT IS HEALED    TAKE YOUR ANTIBIOTICS AS PRESCRIBED  RETURN TO URGENT CARE OR GO TO EMERGENCY ROOM FOR ANY CONCERNS OR WORSENING OF SYMPTOMS   Follow up if symptoms worsen or fail to improve, for F/U with PCP or ED.   Patient Instructions   STOP ALL  ANTIBIOTIC OINTMENT/CREAMS    USE ONLY WHAT I PRESCRIBED AND INSTRUCTED    USE A SMALL AMOUNT OF THE SILVADENE PRESCRIBE AND MIX WITH A LARGE AMOUNT OF AQUAPHOR AND KEEP WOUND COVERED IN MOIST UNTIL IT IS HEALED    TAKE YOUR ANTIBIOTICS AS PRESCRIBED  RETURN TO URGENT CARE OR GO TO EMERGENCY ROOM FOR ANY CONCERNS OR WORSENING OF SYMPTOMS

## 2025-08-19 ENCOUNTER — OFFICE VISIT (OUTPATIENT)
Dept: HEMATOLOGY/ONCOLOGY | Facility: CLINIC | Age: 80
End: 2025-08-19
Payer: MEDICARE

## 2025-08-19 VITALS
SYSTOLIC BLOOD PRESSURE: 167 MMHG | BODY MASS INDEX: 24.11 KG/M2 | HEIGHT: 73 IN | DIASTOLIC BLOOD PRESSURE: 71 MMHG | OXYGEN SATURATION: 95 % | TEMPERATURE: 97 F | WEIGHT: 181.88 LBS | HEART RATE: 56 BPM | RESPIRATION RATE: 17 BRPM

## 2025-08-19 DIAGNOSIS — Z79.899 IMMUNODEFICIENCY DUE TO DRUGS: ICD-10-CM

## 2025-08-19 DIAGNOSIS — D49.9 IMMUNODEFICIENCY SECONDARY TO NEOPLASM: ICD-10-CM

## 2025-08-19 DIAGNOSIS — D84.81 IMMUNODEFICIENCY SECONDARY TO NEOPLASM: ICD-10-CM

## 2025-08-19 DIAGNOSIS — G89.3 CANCER RELATED PAIN: ICD-10-CM

## 2025-08-19 DIAGNOSIS — R21 RASH: ICD-10-CM

## 2025-08-19 DIAGNOSIS — C77.4 SECONDARY MALIGNANCY OF INGUINAL LYMPH NODES: ICD-10-CM

## 2025-08-19 DIAGNOSIS — C49.9 PRIMARY MYXOFIBROSARCOMA: ICD-10-CM

## 2025-08-19 DIAGNOSIS — I10 ESSENTIAL HYPERTENSION: ICD-10-CM

## 2025-08-19 DIAGNOSIS — R53.83 FATIGUE, UNSPECIFIED TYPE: ICD-10-CM

## 2025-08-19 DIAGNOSIS — C49.9 PRIMARY MYXOFIBROSARCOMA: Primary | ICD-10-CM

## 2025-08-19 DIAGNOSIS — D84.821 IMMUNODEFICIENCY DUE TO DRUGS: ICD-10-CM

## 2025-08-19 DIAGNOSIS — C49.9 SOFT TISSUE SARCOMA: ICD-10-CM

## 2025-08-19 PROCEDURE — 1125F AMNT PAIN NOTED PAIN PRSNT: CPT | Mod: CPTII,HCNC,S$GLB, | Performed by: INTERNAL MEDICINE

## 2025-08-19 PROCEDURE — 1160F RVW MEDS BY RX/DR IN RCRD: CPT | Mod: CPTII,HCNC,S$GLB, | Performed by: INTERNAL MEDICINE

## 2025-08-19 PROCEDURE — 99215 OFFICE O/P EST HI 40 MIN: CPT | Mod: HCNC,S$GLB,, | Performed by: INTERNAL MEDICINE

## 2025-08-19 PROCEDURE — G2211 COMPLEX E/M VISIT ADD ON: HCPCS | Mod: HCNC,S$GLB,, | Performed by: INTERNAL MEDICINE

## 2025-08-19 PROCEDURE — 1100F PTFALLS ASSESS-DOCD GE2>/YR: CPT | Mod: CPTII,HCNC,S$GLB, | Performed by: INTERNAL MEDICINE

## 2025-08-19 PROCEDURE — 3288F FALL RISK ASSESSMENT DOCD: CPT | Mod: CPTII,HCNC,S$GLB, | Performed by: INTERNAL MEDICINE

## 2025-08-19 PROCEDURE — 3078F DIAST BP <80 MM HG: CPT | Mod: CPTII,HCNC,S$GLB, | Performed by: INTERNAL MEDICINE

## 2025-08-19 PROCEDURE — 99999 PR PBB SHADOW E&M-EST. PATIENT-LVL V: CPT | Mod: PBBFAC,HCNC,, | Performed by: INTERNAL MEDICINE

## 2025-08-19 PROCEDURE — 3077F SYST BP >= 140 MM HG: CPT | Mod: CPTII,HCNC,S$GLB, | Performed by: INTERNAL MEDICINE

## 2025-08-19 PROCEDURE — 1159F MED LIST DOCD IN RCRD: CPT | Mod: CPTII,HCNC,S$GLB, | Performed by: INTERNAL MEDICINE

## 2025-08-19 RX ORDER — EPINEPHRINE 0.3 MG/.3ML
0.3 INJECTION SUBCUTANEOUS ONCE AS NEEDED
OUTPATIENT
Start: 2025-08-19 | End: 2037-01-14

## 2025-08-19 RX ORDER — DIPHENHYDRAMINE HYDROCHLORIDE 50 MG/ML
50 INJECTION, SOLUTION INTRAMUSCULAR; INTRAVENOUS ONCE AS NEEDED
OUTPATIENT
Start: 2025-08-19 | End: 2037-01-14

## 2025-08-19 RX ORDER — HEPARIN 100 UNIT/ML
500 SYRINGE INTRAVENOUS
OUTPATIENT
Start: 2025-08-19

## 2025-08-19 RX ORDER — SODIUM CHLORIDE 0.9 % (FLUSH) 0.9 %
10 SYRINGE (ML) INJECTION
OUTPATIENT
Start: 2025-08-19

## 2025-08-20 RX ORDER — OXYCODONE HYDROCHLORIDE 10 MG/1
10 TABLET ORAL EVERY 4 HOURS PRN
Qty: 180 TABLET | Refills: 0 | Status: SHIPPED | OUTPATIENT
Start: 2025-08-20

## 2025-08-21 ENCOUNTER — TELEPHONE (OUTPATIENT)
Dept: DERMATOLOGY | Facility: CLINIC | Age: 80
End: 2025-08-21
Payer: MEDICARE

## 2025-08-27 ENCOUNTER — TELEPHONE (OUTPATIENT)
Dept: DERMATOLOGY | Facility: CLINIC | Age: 80
End: 2025-08-27
Payer: MEDICARE

## 2025-09-02 ENCOUNTER — OFFICE VISIT (OUTPATIENT)
Dept: FAMILY MEDICINE | Facility: CLINIC | Age: 80
End: 2025-09-02
Payer: MEDICARE

## 2025-09-02 DIAGNOSIS — E78.5 HYPERLIPIDEMIA, UNSPECIFIED HYPERLIPIDEMIA TYPE: ICD-10-CM

## 2025-09-02 DIAGNOSIS — N40.0 BENIGN PROSTATIC HYPERPLASIA, UNSPECIFIED WHETHER LOWER URINARY TRACT SYMPTOMS PRESENT: ICD-10-CM

## 2025-09-02 DIAGNOSIS — R79.9 ABNORMAL FINDING OF BLOOD CHEMISTRY, UNSPECIFIED: ICD-10-CM

## 2025-09-02 DIAGNOSIS — J43.9 PULMONARY EMPHYSEMA, UNSPECIFIED EMPHYSEMA TYPE: ICD-10-CM

## 2025-09-02 DIAGNOSIS — I10 HYPERTENSION, BENIGN: Primary | Chronic | ICD-10-CM

## 2025-09-02 RX ORDER — ATORVASTATIN CALCIUM 80 MG/1
80 TABLET, FILM COATED ORAL DAILY
Qty: 90 TABLET | Refills: 3 | Status: SHIPPED | OUTPATIENT
Start: 2025-09-02 | End: 2025-09-04 | Stop reason: SDUPTHER

## 2025-09-02 RX ORDER — FINASTERIDE 5 MG/1
5 TABLET, FILM COATED ORAL DAILY
Qty: 90 TABLET | Refills: 3 | Status: SHIPPED | OUTPATIENT
Start: 2025-09-02 | End: 2025-09-02 | Stop reason: SDUPTHER

## 2025-09-02 RX ORDER — TAMSULOSIN HYDROCHLORIDE 0.4 MG/1
1 CAPSULE ORAL DAILY
Qty: 90 CAPSULE | Refills: 3 | Status: SHIPPED | OUTPATIENT
Start: 2025-09-02

## 2025-09-02 RX ORDER — TAMSULOSIN HYDROCHLORIDE 0.4 MG/1
1 CAPSULE ORAL DAILY
Qty: 90 CAPSULE | Refills: 3 | Status: SHIPPED | OUTPATIENT
Start: 2025-09-02 | End: 2025-09-02 | Stop reason: SDUPTHER

## 2025-09-02 RX ORDER — FINASTERIDE 5 MG/1
5 TABLET, FILM COATED ORAL DAILY
Qty: 90 TABLET | Refills: 3 | Status: SHIPPED | OUTPATIENT
Start: 2025-09-02 | End: 2025-09-04 | Stop reason: SDUPTHER

## 2025-09-02 RX ORDER — HYDROCHLOROTHIAZIDE 25 MG/1
25 TABLET ORAL DAILY
Qty: 90 TABLET | Refills: 3 | Status: SHIPPED | OUTPATIENT
Start: 2025-09-02 | End: 2025-09-02 | Stop reason: SDUPTHER

## 2025-09-02 RX ORDER — HYDROCHLOROTHIAZIDE 25 MG/1
25 TABLET ORAL DAILY
Qty: 90 TABLET | Refills: 3 | Status: SHIPPED | OUTPATIENT
Start: 2025-09-02 | End: 2025-09-04 | Stop reason: SDUPTHER

## 2025-09-03 ENCOUNTER — PATIENT MESSAGE (OUTPATIENT)
Dept: FAMILY MEDICINE | Facility: CLINIC | Age: 80
End: 2025-09-03
Payer: MEDICARE

## 2025-09-03 DIAGNOSIS — I10 HYPERTENSION, BENIGN: Chronic | ICD-10-CM

## 2025-09-03 DIAGNOSIS — E78.5 HYPERLIPIDEMIA, UNSPECIFIED HYPERLIPIDEMIA TYPE: ICD-10-CM

## 2025-09-03 DIAGNOSIS — N40.0 BENIGN PROSTATIC HYPERPLASIA, UNSPECIFIED WHETHER LOWER URINARY TRACT SYMPTOMS PRESENT: ICD-10-CM

## 2025-09-04 RX ORDER — HYDROCHLOROTHIAZIDE 25 MG/1
25 TABLET ORAL DAILY
Qty: 90 TABLET | Refills: 3 | Status: SHIPPED | OUTPATIENT
Start: 2025-09-04

## 2025-09-04 RX ORDER — ATORVASTATIN CALCIUM 80 MG/1
80 TABLET, FILM COATED ORAL DAILY
Qty: 90 TABLET | Refills: 3 | Status: SHIPPED | OUTPATIENT
Start: 2025-09-04

## 2025-09-04 RX ORDER — FINASTERIDE 5 MG/1
5 TABLET, FILM COATED ORAL DAILY
Qty: 90 TABLET | Refills: 3 | Status: SHIPPED | OUTPATIENT
Start: 2025-09-04

## (undated) DEVICE — Device

## (undated) DEVICE — CONTAINER SPECIMEN STRL 4OZ

## (undated) DEVICE — APPLIER LIGACLIP SM 9.38IN

## (undated) DEVICE — SEE MEDLINE ITEM 157110

## (undated) DEVICE — SOL 9P NACL IRR PIC IL

## (undated) DEVICE — ADHESIVE DERMABOND MINI HV

## (undated) DEVICE — ELECTRODE REM PLYHSV RETURN 9

## (undated) DEVICE — DRAIN SIL FLAT 10MM FULL PERF

## (undated) DEVICE — SEE MEDLINE ITEM 107746

## (undated) DEVICE — COVER OVERHEAD SURG LT BLUE

## (undated) DEVICE — NDL HYPO REG 25G X 1 1/2

## (undated) DEVICE — SPONGE GZ WOVEN 12-PLY 4X4IN

## (undated) DEVICE — SUT MONOCRYL 4.0 PS2 CP496G

## (undated) DEVICE — SUT ETHILON 3/0 18IN PS-1

## (undated) DEVICE — CLIPPER BLADE MOD 4406 (CAREF)

## (undated) DEVICE — DRESSING ADHESIVE ISLAND 3 X 6

## (undated) DEVICE — APPLICATOR CHLORAPREP ORN 26ML

## (undated) DEVICE — DRAIN PENROSE XRAY 18 X 1/4 ST

## (undated) DEVICE — GLOVE SURGICAL LATEX SZ 7

## (undated) DEVICE — SEE MEDLINE ITEM 146292

## (undated) DEVICE — BLANKET UPPER BODY 78.7X29.9IN

## (undated) DEVICE — SYR 10CC LUER LOCK

## (undated) DEVICE — SUT MCRYL PLUS 4-0 PS2 27IN

## (undated) DEVICE — SEE MEDLINE ITEM 146298

## (undated) DEVICE — EVACUATOR WOUND BULB 100CC

## (undated) DEVICE — ADHESIVE DERMABOND ADVANCED

## (undated) DEVICE — CANISTER SUCTION 2 LTR

## (undated) DEVICE — DRESSING TRANS 4X4 TEGADERM

## (undated) DEVICE — TIP YANKAUERS BULB NO VENT

## (undated) DEVICE — SUT VICRYL PLUS 3-0 SH 18IN

## (undated) DEVICE — PACK ENDOSCOPY GENERAL

## (undated) DEVICE — GLOVE BIOGEL 7.5

## (undated) DEVICE — SEE L#120831